# Patient Record
Sex: FEMALE | Race: WHITE | NOT HISPANIC OR LATINO | Employment: OTHER | URBAN - METROPOLITAN AREA
[De-identification: names, ages, dates, MRNs, and addresses within clinical notes are randomized per-mention and may not be internally consistent; named-entity substitution may affect disease eponyms.]

---

## 2017-08-29 ENCOUNTER — LAB (OUTPATIENT)
Dept: LAB | Facility: HOSPITAL | Age: 72
End: 2017-08-29

## 2017-08-29 ENCOUNTER — CONSULT (OUTPATIENT)
Dept: ONCOLOGY | Facility: CLINIC | Age: 72
End: 2017-08-29

## 2017-08-29 VITALS
WEIGHT: 146.8 LBS | TEMPERATURE: 98 F | BODY MASS INDEX: 24.46 KG/M2 | HEIGHT: 65 IN | HEART RATE: 48 BPM | RESPIRATION RATE: 16 BRPM | OXYGEN SATURATION: 94 % | SYSTOLIC BLOOD PRESSURE: 136 MMHG | DIASTOLIC BLOOD PRESSURE: 80 MMHG

## 2017-08-29 DIAGNOSIS — D75.839 THROMBOCYTOSIS: Primary | ICD-10-CM

## 2017-08-29 DIAGNOSIS — Z01.89 LABORATORY TEST: Primary | ICD-10-CM

## 2017-08-29 DIAGNOSIS — C80.1 CANCER (HCC): ICD-10-CM

## 2017-08-29 LAB
BASOPHILS # BLD AUTO: 1.4 10*3/MM3 (ref 0–0.1)
BASOPHILS NFR BLD AUTO: 5.9 % (ref 0–1.1)
DEPRECATED RDW RBC AUTO: 51.4 FL (ref 37–49)
EOSINOPHIL # BLD AUTO: 0.85 10*3/MM3 (ref 0–0.36)
EOSINOPHIL NFR BLD AUTO: 3.6 % (ref 1–5)
ERYTHROCYTE [DISTWIDTH] IN BLOOD BY AUTOMATED COUNT: 15.9 % (ref 11.7–14.5)
HCT VFR BLD AUTO: 40.2 % (ref 34–45)
HGB BLD-MCNC: 13.1 G/DL (ref 11.5–14.9)
IMM GRANULOCYTES # BLD: 1.23 10*3/MM3 (ref 0–0.03)
IMM GRANULOCYTES NFR BLD: 5.2 % (ref 0–0.5)
LYMPHOCYTES # BLD AUTO: 3.79 10*3/MM3 (ref 1–3.5)
LYMPHOCYTES NFR BLD AUTO: 15.9 % (ref 20–49)
MCH RBC QN AUTO: 28.9 PG (ref 27–33)
MCHC RBC AUTO-ENTMCNC: 32.6 G/DL (ref 32–35)
MCV RBC AUTO: 88.7 FL (ref 83–97)
MONOCYTES # BLD AUTO: 0.72 10*3/MM3 (ref 0.25–0.8)
MONOCYTES NFR BLD AUTO: 3 % (ref 4–12)
NEUTROPHILS # BLD AUTO: 15.84 10*3/MM3 (ref 1.5–7)
NEUTROPHILS NFR BLD AUTO: 66.4 % (ref 39–75)
NRBC BLD MANUAL-RTO: 0.1 /100 WBC (ref 0–0)
PLATELET # BLD AUTO: 1715 10*3/MM3 (ref 150–375)
PMV BLD AUTO: 10.2 FL (ref 8.9–12.1)
RBC # BLD AUTO: 4.53 10*6/MM3 (ref 3.9–5)
WBC NRBC COR # BLD: 23.83 10*3/MM3 (ref 4–10)

## 2017-08-29 PROCEDURE — 85025 COMPLETE CBC W/AUTO DIFF WBC: CPT | Performed by: INTERNAL MEDICINE

## 2017-08-29 PROCEDURE — 36416 COLLJ CAPILLARY BLOOD SPEC: CPT | Performed by: INTERNAL MEDICINE

## 2017-08-29 PROCEDURE — 99205 OFFICE O/P NEW HI 60 MIN: CPT | Performed by: INTERNAL MEDICINE

## 2017-08-29 RX ORDER — METOPROLOL SUCCINATE 50 MG/1
50 TABLET, EXTENDED RELEASE ORAL DAILY
COMMUNITY
End: 2017-09-11

## 2017-08-29 RX ORDER — FLUOXETINE HYDROCHLORIDE 40 MG/1
40 CAPSULE ORAL DAILY
COMMUNITY
End: 2018-03-28

## 2017-08-29 RX ORDER — HYDROXYUREA 500 MG/1
1000 CAPSULE ORAL DAILY
Qty: 60 CAPSULE | Refills: 3 | Status: SHIPPED | OUTPATIENT
Start: 2017-08-29 | End: 2017-09-06 | Stop reason: SDUPTHER

## 2017-08-29 RX ORDER — TRAZODONE HYDROCHLORIDE 100 MG/1
100 TABLET ORAL NIGHTLY
COMMUNITY
End: 2018-03-28

## 2017-08-29 RX ORDER — SULFAMETHOXAZOLE AND TRIMETHOPRIM 800; 160 MG/1; MG/1
TABLET ORAL
Refills: 0 | COMMUNITY
Start: 2017-08-25 | End: 2017-08-29

## 2017-08-29 RX ORDER — FUROSEMIDE 20 MG/1
TABLET ORAL
Refills: 5 | COMMUNITY
Start: 2017-08-09 | End: 2017-11-29

## 2017-08-29 RX ORDER — OLANZAPINE 2.5 MG/1
2.5 TABLET ORAL NIGHTLY
COMMUNITY
End: 2018-07-03 | Stop reason: ALTCHOICE

## 2017-08-29 NOTE — PROGRESS NOTES
REFERRING PROVIDER:    Amos Najera, SVETA  [address] [place] [address]    REASON FOR CONSULTATION:    Thrombocytosis    HISTORY OF PRESENT ILLNESS:  Nicole Lofton is a 71 y.o. female who is referred today for further evaluation of thrombocytosis.    She states that on Friday of last week she was found to have an elevated white blood cell count.  She was started empirically on treatment for urinary tract infection that was asymptomatic from a urinary standpoint.    For about 6 months she has noticed bilateral arm tingling and weakness in her hands.  This has occurred about 5 or 6 times over the past 6 months.  She is vague in her description of this.  She is globally weak and is quite inactive as she lives by herself.  She was completing physical therapy with some improvement but is now quite unsteady on her feet.  She has not had any falls.  She denies any bleeding.  She has chronic fatigue.  She has osteoarthritis pain and reports pain in the right side and in her neck.  She denies any acute low back pain but does have chronic low back pain.  She does have orthostatic symptoms and she is lightheaded when she stands.  She has had a decreased appetite and some weight loss over the past 9 months.    Labs performed on 8/28/2017 showed a white blood cell count of 39.1 with a differential showing 61% neutrophils and 32% lymphocytes, hemoglobin 13.9 and platelets 565,000.        She had further labs done at [facility] on 8/20/2017 showing a white blood cell count of 24.4, hemoglobin 13.7, and platelets 1795.  A differential showed 64% neutrophils, 7% lymphocytes, and 12% basophils.  Peripheral blood flow cytometry was also done on this date showing 11% basophils and less than 1% myeloblasts.  There was no monoclonal B-cell, T-cell, plasma cell, or NK cell population.  Apparently FISH for BCR-ABL and ESSENCE 2 mutation analysis are pending.    Her CBC was normal as of 7/8/2016.    Of note, she has a history of  bilateral breast cancer diagnosed in 1988.  She had bilateral mastectomy with reconstruction.  We do not have any records regarding this at this time.  She apparently completed at least 6 months of adjuvant chemotherapy but again does not know the details.    Past Medical History:   Diagnosis Date   • Cancer    • Depression    • Hyperlipidemia    • Hypertension    • Hypothyroidism        Past Surgical History:   Procedure Laterality Date   • BACK SURGERY  1986    ruptured herniated disc   • MASTECTOMY Bilateral 1990       SOCIAL HISTORY:   reports that she has been smoking Cigarettes.  She has a 60.00 pack-year smoking history. She has never used smokeless tobacco. She reports that she does not drink alcohol. Her drug history is not on file.    FAMILY HISTORY:  family history includes Breast cancer in her paternal grandmother and sister.    ALLERGIES:  No Known Allergies    MEDICATIONS:  The medication list has been reviewed with the patient by the medical assistant, and the list has been updated in the electronic medical record, which I reviewed.  Medication dosages and frequencies were confirmed to be accurate.    Review of Systems   Constitutional: Positive for chills and unexpected weight change. Negative for appetite change, fatigue and fever.   HENT: Negative for congestion, dental problem, facial swelling, hearing loss, mouth sores, nosebleeds, rhinorrhea, sore throat, tinnitus, trouble swallowing and voice change.    Eyes: Negative.    Respiratory: Negative for cough, chest tightness, shortness of breath, wheezing and stridor.    Cardiovascular: Negative for chest pain, palpitations and leg swelling.   Gastrointestinal: Negative for abdominal distention, abdominal pain, blood in stool, constipation, diarrhea, nausea and vomiting.   Endocrine: Negative.    Genitourinary: Negative for difficulty urinating, dysuria, flank pain, frequency, hematuria, menstrual problem, pelvic pain, vaginal bleeding and vaginal  "discharge.   Musculoskeletal: Positive for arthralgias, back pain, gait problem and neck pain. Negative for joint swelling, myalgias and neck stiffness.   Skin: Negative for color change, rash and wound.   Allergic/Immunologic: Negative for environmental allergies.   Neurological: Positive for light-headedness. Negative for dizziness, seizures, syncope, weakness, numbness and headaches.   Hematological: Negative for adenopathy. Does not bruise/bleed easily.   Psychiatric/Behavioral: Positive for confusion and dysphoric mood. Negative for agitation and sleep disturbance. The patient is not nervous/anxious.    All other systems reviewed and are negative.      Vitals:    08/29/17 1246   BP: 136/80   Pulse: (!) 48   Resp: 16   Temp: 98 °F (36.7 °C)   TempSrc: Oral   SpO2: 94%   Weight: 146 lb 12.8 oz (66.6 kg)   Height: 65.16\" (165.5 cm)  Comment: new ht   PainSc: 8  Comment: back, rt knee       Physical Exam   Constitutional: She is oriented to person, place, and time. She appears well-developed and well-nourished.   Frail appearing, a little unsteady on her feet   HENT:   Head: Normocephalic and atraumatic.   Nose: Nose normal.   Eyes: Conjunctivae and EOM are normal. Pupils are equal, round, and reactive to light.   Neck: Neck supple.   Cardiovascular: Regular rhythm, S1 normal, S2 normal and normal heart sounds.  Exam reveals no gallop and no friction rub.    No murmur heard.  bradycardic   Pulmonary/Chest: Effort normal and breath sounds normal. No stridor. No respiratory distress. She has no wheezes. She has no rhonchi. She has no rales. She exhibits no tenderness.   Abdominal: Soft. Bowel sounds are normal. She exhibits no distension and no mass. There is no tenderness. There is no rebound and no guarding.   Musculoskeletal: Normal range of motion. She exhibits no edema.   Lymphadenopathy:     She has no cervical adenopathy.     She has no axillary adenopathy.        Right: No inguinal and no supraclavicular " adenopathy present.        Left: No inguinal and no supraclavicular adenopathy present.   Neurological: She is alert and oriented to person, place, and time. No cranial nerve deficit or sensory deficit.   Skin: Skin is warm and dry. No rash noted. No erythema.   Psychiatric: She has a normal mood and affect. Her behavior is normal. Judgment and thought content normal.   Vitals reviewed.      DIAGNOSTIC DATA:  Lab Results   Component Value Date    WBC 23.83 (H) 08/29/2017    HGB 13.1 08/29/2017    HCT 40.2 08/29/2017    MCV 88.7 08/29/2017    PLT 1715 (C) 08/29/2017       IMAGING:    I personally reviewed her peripheral blood smear: Significant thrombocytosis noted.  White cells appear increased in number but otherwise normal in maturation and distribution.  Red cells are normal.    ASSESSMENT:  This is a 71 y.o. female with:  1.  Leukocytosis and thrombocytosis: I suspect she has a myeloproliferative disorder.  She had peripheral blood flow cytometry done yesterday at [facility] and apparently FISH for BCR-ABL and ESSENCE 2 mutation analysis are pending.  Her thrombocytosis is significant.  I recommended that she start aspirin 81 mg daily and I will start her on hydroxyurea 1000 mg daily.  A prescription for this was electronically sent to her pharmacy.  2.  Global weakness: It is difficult to know if this is simply related to deconditioning or if this represents something more.  Consider imaging if it persists.  See below.  3.  Bradycardia with orthostatic symptoms: I have advised her to discontinue her metoprolol at this point.  Hopefully she starts to feel better after this.  We will recheck her heart rate and blood pressure next week.    PLAN:   1.  Start aspirin 81 mg daily  2.  Start hydroxyurea 1000 mg daily.  A prescription for this was electronically sent to her pharmacy.  3.  Discontinue metoprolol  4.  We will work to obtain more records from primary care regarding her old blood counts  5.  Follow-up pending  BCR-ABL FISH and ESSENCE 2 mutation analysis that by report was sent yesterday at [place].  6.  I will see her back next week for follow-up with a CBC and other labs.

## 2017-09-06 ENCOUNTER — OFFICE VISIT (OUTPATIENT)
Dept: ONCOLOGY | Facility: CLINIC | Age: 72
End: 2017-09-06

## 2017-09-06 ENCOUNTER — TELEPHONE (OUTPATIENT)
Dept: ONCOLOGY | Facility: CLINIC | Age: 72
End: 2017-09-06

## 2017-09-06 ENCOUNTER — LAB (OUTPATIENT)
Dept: OTHER | Facility: HOSPITAL | Age: 72
End: 2017-09-06

## 2017-09-06 VITALS
OXYGEN SATURATION: 96 % | HEIGHT: 65 IN | RESPIRATION RATE: 16 BRPM | BODY MASS INDEX: 23.78 KG/M2 | DIASTOLIC BLOOD PRESSURE: 82 MMHG | TEMPERATURE: 98 F | SYSTOLIC BLOOD PRESSURE: 158 MMHG | WEIGHT: 142.7 LBS | HEART RATE: 69 BPM

## 2017-09-06 DIAGNOSIS — D75.839 THROMBOCYTOSIS: Primary | ICD-10-CM

## 2017-09-06 DIAGNOSIS — D75.839 THROMBOCYTOSIS: ICD-10-CM

## 2017-09-06 LAB
ALBUMIN SERPL-MCNC: 4.4 G/DL (ref 3.5–5.2)
ALBUMIN/GLOB SERPL: 1.3 G/DL
ALP SERPL-CCNC: 96 U/L (ref 39–117)
ALT SERPL W P-5'-P-CCNC: 29 U/L (ref 1–33)
ANION GAP SERPL CALCULATED.3IONS-SCNC: 13.6 MMOL/L
ANISOCYTOSIS BLD QL: NORMAL
AST SERPL-CCNC: 33 U/L (ref 1–32)
BASOPHILS # BLD AUTO: 0.9 10*3/MM3 (ref 0–0.2)
BASOPHILS NFR BLD AUTO: 7.2 % (ref 0–1.5)
BILIRUB SERPL-MCNC: 0.5 MG/DL (ref 0.1–1.2)
BUN BLD-MCNC: 16 MG/DL (ref 8–23)
BUN/CREAT SERPL: 16.3 (ref 7–25)
CALCIUM SPEC-SCNC: 9.6 MG/DL (ref 8.6–10.5)
CHLORIDE SERPL-SCNC: 99 MMOL/L (ref 98–107)
CO2 SERPL-SCNC: 26.4 MMOL/L (ref 22–29)
CREAT BLD-MCNC: 0.98 MG/DL (ref 0.57–1)
DEPRECATED RDW RBC AUTO: 51.4 FL (ref 37–54)
EOSINOPHIL # BLD AUTO: 0.32 10*3/MM3 (ref 0–0.7)
EOSINOPHIL NFR BLD AUTO: 2.6 % (ref 0.3–6.2)
ERYTHROCYTE [DISTWIDTH] IN BLOOD BY AUTOMATED COUNT: 16.1 % (ref 11.7–13)
GFR SERPL CREATININE-BSD FRML MDRD: 56 ML/MIN/1.73
GLOBULIN UR ELPH-MCNC: 3.4 GM/DL
GLUCOSE BLD-MCNC: 116 MG/DL (ref 65–99)
HCT VFR BLD AUTO: 42.3 % (ref 35.6–45.5)
HGB BLD-MCNC: 13.6 G/DL (ref 11.9–15.5)
IMM GRANULOCYTES # BLD: 0.21 10*3/MM3 (ref 0–0.03)
IMM GRANULOCYTES NFR BLD: 1.7 % (ref 0–0.5)
LDH SERPL-CCNC: 282 U/L (ref 135–214)
LYMPHOCYTES # BLD AUTO: 2.45 10*3/MM3 (ref 0.9–4.8)
LYMPHOCYTES NFR BLD AUTO: 19.7 % (ref 19.6–45.3)
MCH RBC QN AUTO: 28.7 PG (ref 26.9–32)
MCHC RBC AUTO-ENTMCNC: 32.2 G/DL (ref 32.4–36.3)
MCV RBC AUTO: 89.2 FL (ref 80.5–98.2)
MONOCYTES # BLD AUTO: 0.36 10*3/MM3 (ref 0.2–1.2)
MONOCYTES NFR BLD AUTO: 2.9 % (ref 5–12)
NEUTROPHILS # BLD AUTO: 8.22 10*3/MM3 (ref 1.9–8.1)
NEUTROPHILS NFR BLD AUTO: 65.9 % (ref 42.7–76)
NEUTS HYPERSEG # BLD: NORMAL 10*3/UL
NRBC BLD MANUAL-RTO: 0 /100 WBC (ref 0–0)
PLAT MORPH BLD: NORMAL
PLATELET # BLD AUTO: 1972 10*3/MM3 (ref 140–500)
PMV BLD AUTO: 9.8 FL (ref 6–12)
POTASSIUM BLD-SCNC: 3.9 MMOL/L (ref 3.5–5.2)
PROT SERPL-MCNC: 7.8 G/DL (ref 6–8.5)
RBC # BLD AUTO: 4.74 10*6/MM3 (ref 3.9–5.2)
SODIUM BLD-SCNC: 139 MMOL/L (ref 136–145)
URATE SERPL-MCNC: 5.4 MG/DL (ref 2.4–5.7)
WBC NRBC COR # BLD: 12.46 10*3/MM3 (ref 4.5–10.7)

## 2017-09-06 PROCEDURE — 85007 BL SMEAR W/DIFF WBC COUNT: CPT | Performed by: INTERNAL MEDICINE

## 2017-09-06 PROCEDURE — 84550 ASSAY OF BLOOD/URIC ACID: CPT | Performed by: INTERNAL MEDICINE

## 2017-09-06 PROCEDURE — 83615 LACTATE (LD) (LDH) ENZYME: CPT | Performed by: INTERNAL MEDICINE

## 2017-09-06 PROCEDURE — 99214 OFFICE O/P EST MOD 30 MIN: CPT | Performed by: INTERNAL MEDICINE

## 2017-09-06 PROCEDURE — 80053 COMPREHEN METABOLIC PANEL: CPT | Performed by: INTERNAL MEDICINE

## 2017-09-06 PROCEDURE — 81479 UNLISTED MOLECULAR PATHOLOGY: CPT

## 2017-09-06 PROCEDURE — 81207 BCR/ABL1 GENE MINOR BP: CPT | Performed by: INTERNAL MEDICINE

## 2017-09-06 PROCEDURE — 81206 BCR/ABL1 GENE MAJOR BP: CPT | Performed by: INTERNAL MEDICINE

## 2017-09-06 PROCEDURE — 85025 COMPLETE CBC W/AUTO DIFF WBC: CPT | Performed by: INTERNAL MEDICINE

## 2017-09-06 PROCEDURE — 36415 COLL VENOUS BLD VENIPUNCTURE: CPT

## 2017-09-06 RX ORDER — HYDROXYUREA 500 MG/1
1000 CAPSULE ORAL 2 TIMES DAILY
Qty: 120 CAPSULE | Refills: 2 | Status: SHIPPED | OUTPATIENT
Start: 2017-09-06 | End: 2017-10-25

## 2017-09-06 RX ORDER — HYDROXYUREA 500 MG/1
1500 CAPSULE ORAL DAILY
Qty: 90 CAPSULE | Refills: 3 | Status: SHIPPED | OUTPATIENT
Start: 2017-09-06 | End: 2017-09-06 | Stop reason: SDUPTHER

## 2017-09-06 NOTE — PROGRESS NOTES
[facility] CBC GROUP OUTPATIENT FOLLOW UP CLINIC VISIT    REASON FOR FOLLOW-UP:    1.  Thrombocytosis  2.  Positive FISH for BCR/ABL done at [place]    HISTORY OF PRESENT ILLNESS:  Nicole Lofton is a 71 y.o. female who returns today for follow up of the above issue.  She started hydroxyurea 1000 mg daily which he tolerates well.  She discontinue metoprolol.  She started aspirin 81 mg daily.  Her unsteadiness on her feet has improved a little bit.  She tolerates the hydroxyurea well without any adverse effects.        ONCOLOGIC HISTORY:  For about 6 months she has noticed bilateral arm tingling and weakness in her hands.  This has occurred about 5 or 6 times over the past 6 months.  She is vague in her description of this.  She is globally weak and is quite inactive as she lives by herself.  She was completing physical therapy with some improvement but is now quite unsteady on her feet.  She has not had any falls.  She denies any bleeding.  She has chronic fatigue.  She has osteoarthritis pain and reports pain in the right side and in her neck.  She denies any acute low back pain but does have chronic low back pain.  She does have orthostatic symptoms and she is lightheaded when she stands.  She has had a decreased appetite and some weight loss over the past 9 months.     Labs performed on 8/28/2017 showed a white blood cell count of 39.1 with a differential showing 61% neutrophils and 32% lymphocytes, hemoglobin 13.9 and platelets 565,000.         She had further labs done at [facility] on 8/20/2017 showing a white blood cell count of 24.4, hemoglobin 13.7, and platelets 1795.  A differential showed 64% neutrophils, 7% lymphocytes, and 12% basophils.  Peripheral blood flow cytometry was also done on this date showing 11% basophils and less than 1% myeloblasts.  There was no monoclonal B-cell, T-cell, plasma cell, or NK cell population.  FISH for BCR-ABL and ESSENCE 2 mutation analysis were done as well.     Her CBC was  normal as of 7/8/2016.     Of note, she has a history of bilateral breast cancer diagnosed in 1988.  She had bilateral mastectomy with reconstruction.  We do not have any records regarding this at this time.  She apparently completed at least 6 months of adjuvant chemotherapy but again does not know the details..    FISH for BCR-ABL performed at [facility] was actually positive.  As of 9/6/2017 ESSENCE 2 is still pending.    She returned on 9/6/2017 for follow-up.  Platelets are still 1.9 million with hydroxyurea 1000 mg daily.  She will increase the dose to 2000 mg daily.  She will have a bone marrow aspiration biopsy performed.  Weekly CBCs.      PAST MEDICAL, SURGICAL, FAMILY, AND SOCIAL HISTORIES were reviewed with the patient and in the electronic medical record, and were updated if indicated.    ALLERGIES:  No Known Allergies    MEDICATIONS:  The medication list has been reviewed with the patient by the medical assistant, and the list has been updated in the electronic medical record, which I reviewed.  Medication dosages and frequencies were confirmed to be accurate.    REVIEW OF SYSTEMS:  PAIN:  See Vital Signs below.  GENERAL:  No fevers, chills, night sweats, or unintended weight loss.  SKIN:  No rashes or non-healing lesions.  HEME/LYMPH:  No abnormal bleeding.  No palpable lymphadenopathy.  EYES:  No vision changes or diplopia.  ENT:  No sore throat or difficulty swallowing.  RESPIRATORY:  No cough, shortness of breath, hemoptysis, or wheezing.  CARDIOVASCULAR:  No chest pain, palpitations, orthopnea, or dyspnea on exertion.  Lightheadedness improved  GASTROINTESTINAL:  No abdominal pain, nausea, vomiting, constipation, diarrhea, melena, or hematochezia.  GENITOURINARY:  No dysuria or hematuria.  MUSCULOSKELETAL:  Arthralgias, back pain, abnormal gait, neck pain  NEUROLOGIC:  No dizziness, loss of consciousness, or seizures.  PSYCHIATRIC:  No depression, anxiety, or mood changes.    Vitals:    09/06/17 0902  "  BP: 158/82   Pulse: 69   Resp: 16   Temp: 98 °F (36.7 °C)   TempSrc: Oral   SpO2: 96%   Weight: 142 lb 11.2 oz (64.7 kg)   Height: 65.16\" (165.5 cm)   PainSc: 0-No pain       PHYSICAL EXAMINATION:  GENERAL:  Well-developed well-nourished female; awake, alert and oriented, in no acute distress.Ears to be more steady on her feet today.  No longer bradycardic.    DIAGNOSTIC DATA:  Lab Results   Component Value Date    WBC 12.46 (H) 09/06/2017    HGB 13.6 09/06/2017    HCT 42.3 09/06/2017    MCV 89.2 09/06/2017    PLT 1972 (H) 09/06/2017       IMAGING:  None reviewed    ASSESSMENT:  This is a 71 y.o. female with:  1.  History of bilateral breast cancer in 1988 status post bilateral mastectomy.  She apparently completed 6 months of adjuvant therapy.  2.  Thrombocytosis: She is on aspirin 81 mg daily.  She is on hydroxyurea 1000 mg daily.  Her platelet count did not improve today and remains 1.9 million.  She will increase the hydroxyurea to 1000 mg twice daily.  A new prescription was sent to her pharmacy.  See below.  We await ESSENCE 2 mutation analysis from [place].  3.  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  This is likely the cause of her thrombocytosis.  Her white blood cell count has improved on the hydroxyurea.  We will send peripheral blood for BCR-ABL PCR today.  We will schedule bone marrow aspiration and biopsy.  4.  Bradycardia: This resolves off the metoprolol.  Her blood pressure is a little bit high today and should be monitored at home.    PLAN:  1.  Continue aspirin 81 mg daily  2.  Increase hydroxyurea to 1000 mg twice daily  3.  Bone marrow aspiration and biopsy  4.  PCR for BCR-ABL in the peripheral blood today  5.  Weekly CBC with nurse review to make sure that the platelet count is improving  6.  I will see her back one to 2 weeks after her bone marrow biopsy to review the results.  At that time if all results are consistent with CML we will discuss starting imatinib.  "

## 2017-09-12 LAB — REF LAB TEST METHOD: NORMAL

## 2017-09-13 ENCOUNTER — LAB (OUTPATIENT)
Dept: OTHER | Facility: HOSPITAL | Age: 72
End: 2017-09-13

## 2017-09-13 ENCOUNTER — OFFICE VISIT (OUTPATIENT)
Dept: ONCOLOGY | Facility: CLINIC | Age: 72
End: 2017-09-13

## 2017-09-13 VITALS — BODY MASS INDEX: 23.37 KG/M2 | WEIGHT: 141.1 LBS

## 2017-09-13 DIAGNOSIS — D75.839 THROMBOCYTOSIS: Primary | ICD-10-CM

## 2017-09-13 LAB
ANISOCYTOSIS BLD QL: NORMAL
BASOPHILS # BLD AUTO: 0.28 10*3/MM3 (ref 0–0.2)
BASOPHILS NFR BLD AUTO: 3.5 % (ref 0–1.5)
DEPRECATED RDW RBC AUTO: 50.5 FL (ref 37–54)
EOSINOPHIL # BLD AUTO: 0.08 10*3/MM3 (ref 0–0.7)
EOSINOPHIL NFR BLD AUTO: 1 % (ref 0.3–6.2)
ERYTHROCYTE [DISTWIDTH] IN BLOOD BY AUTOMATED COUNT: 17.1 % (ref 11.7–13)
HCT VFR BLD AUTO: 38.2 % (ref 35.6–45.5)
HGB BLD-MCNC: 12.8 G/DL (ref 11.9–15.5)
IMM GRANULOCYTES # BLD: 0.06 10*3/MM3 (ref 0–0.03)
IMM GRANULOCYTES NFR BLD: 0.7 % (ref 0–0.5)
LYMPHOCYTES # BLD AUTO: 1.5 10*3/MM3 (ref 0.9–4.8)
LYMPHOCYTES NFR BLD AUTO: 18.7 % (ref 19.6–45.3)
MCH RBC QN AUTO: 29.2 PG (ref 26.9–32)
MCHC RBC AUTO-ENTMCNC: 33.5 G/DL (ref 32.4–36.3)
MCV RBC AUTO: 87.2 FL (ref 80.5–98.2)
MONOCYTES # BLD AUTO: 0.24 10*3/MM3 (ref 0.2–1.2)
MONOCYTES NFR BLD AUTO: 3 % (ref 5–12)
NEUTROPHILS # BLD AUTO: 5.88 10*3/MM3 (ref 1.9–8.1)
NEUTROPHILS NFR BLD AUTO: 73.1 % (ref 42.7–76)
NEUTS HYPERSEG # BLD: NORMAL 10*3/UL
NRBC BLD MANUAL-RTO: 0 /100 WBC (ref 0–0)
PLAT MORPH BLD: NORMAL
PLATELET # BLD AUTO: 1648 10*3/MM3 (ref 140–500)
PMV BLD AUTO: 9.4 FL (ref 6–12)
RBC # BLD AUTO: 4.38 10*6/MM3 (ref 3.9–5.2)
WBC NRBC COR # BLD: 8.04 10*3/MM3 (ref 4.5–10.7)

## 2017-09-13 PROCEDURE — 85007 BL SMEAR W/DIFF WBC COUNT: CPT | Performed by: INTERNAL MEDICINE

## 2017-09-13 PROCEDURE — 36415 COLL VENOUS BLD VENIPUNCTURE: CPT

## 2017-09-13 PROCEDURE — 99212-NC PR NO CHARGE CBC OFFICE OUTPATIENT VISIT 10 MINUTES: Performed by: NURSE PRACTITIONER

## 2017-09-13 PROCEDURE — 85025 COMPLETE CBC W/AUTO DIFF WBC: CPT | Performed by: INTERNAL MEDICINE

## 2017-09-13 NOTE — PROGRESS NOTES
I reviewed labs with the patient, her daughter, and her niece. She reports feeling relatively well on her current dose of hydrea. I have reviewed her labs with Dr Merida and we will not make any dose changes today. She will return as already scheduled next week to review bone marrow biopsy results with Dr Merida.    Lab Results   Component Value Date    WBC 8.04 09/13/2017    HGB 12.8 09/13/2017    HCT 38.2 09/13/2017    MCV 87.2 09/13/2017    PLT 1648 (H) 09/13/2017

## 2017-09-15 ENCOUNTER — HOSPITAL ENCOUNTER (OUTPATIENT)
Dept: CT IMAGING | Facility: HOSPITAL | Age: 72
Discharge: HOME | End: 2017-09-15
Attending: INTERNAL MEDICINE | Admitting: INTERNAL MEDICINE

## 2017-09-15 VITALS
HEART RATE: 68 BPM | OXYGEN SATURATION: 92 % | DIASTOLIC BLOOD PRESSURE: 74 MMHG | SYSTOLIC BLOOD PRESSURE: 134 MMHG | TEMPERATURE: 97.9 F | RESPIRATION RATE: 18 BRPM | BODY MASS INDEX: 21.97 KG/M2 | WEIGHT: 140 LBS | HEIGHT: 67 IN

## 2017-09-15 DIAGNOSIS — D75.839 THROMBOCYTOSIS: ICD-10-CM

## 2017-09-15 LAB
INR PPP: 1.1 (ref 0.8–1.2)
PROTHROMBIN TIME: 12.6 SECONDS (ref 12.8–15.2)

## 2017-09-15 PROCEDURE — 88184 FLOWCYTOMETRY/ TC 1 MARKER: CPT | Performed by: INTERNAL MEDICINE

## 2017-09-15 PROCEDURE — 88342 IMHCHEM/IMCYTCHM 1ST ANTB: CPT

## 2017-09-15 PROCEDURE — 88237 TISSUE CULTURE BONE MARROW: CPT

## 2017-09-15 PROCEDURE — 88311 DECALCIFY TISSUE: CPT | Performed by: INTERNAL MEDICINE

## 2017-09-15 PROCEDURE — 81219 CALR GENE COM VARIANTS: CPT

## 2017-09-15 PROCEDURE — 77012 CT SCAN FOR NEEDLE BIOPSY: CPT

## 2017-09-15 PROCEDURE — 88264 CHROMOSOME ANALYSIS 20-25: CPT

## 2017-09-15 PROCEDURE — 63710000001 ALPRAZOLAM 0.5 MG TABLET: Performed by: RADIOLOGY

## 2017-09-15 PROCEDURE — 88313 SPECIAL STAINS GROUP 2: CPT

## 2017-09-15 PROCEDURE — 88341 IMHCHEM/IMCYTCHM EA ADD ANTB: CPT

## 2017-09-15 PROCEDURE — 88305 TISSUE EXAM BY PATHOLOGIST: CPT | Performed by: INTERNAL MEDICINE

## 2017-09-15 PROCEDURE — A9270 NON-COVERED ITEM OR SERVICE: HCPCS | Performed by: RADIOLOGY

## 2017-09-15 PROCEDURE — 88185 FLOWCYTOMETRY/TC ADD-ON: CPT | Performed by: INTERNAL MEDICINE

## 2017-09-15 PROCEDURE — 88189 FLOWCYTOMETRY/READ 16 & >: CPT | Performed by: INTERNAL MEDICINE

## 2017-09-15 RX ORDER — ALPRAZOLAM 0.5 MG/1
0.5 TABLET ORAL NIGHTLY PRN
Status: DISCONTINUED | OUTPATIENT
Start: 2017-09-15 | End: 2017-09-16 | Stop reason: HOSPADM

## 2017-09-15 RX ORDER — ASPIRIN 81 MG/1
81 TABLET ORAL DAILY
COMMUNITY
End: 2018-03-07

## 2017-09-15 RX ORDER — LIDOCAINE HYDROCHLORIDE 10 MG/ML
20 INJECTION, SOLUTION INFILTRATION; PERINEURAL ONCE
Status: COMPLETED | OUTPATIENT
Start: 2017-09-15 | End: 2017-09-15

## 2017-09-15 RX ADMIN — LIDOCAINE HYDROCHLORIDE 20 ML: 10 INJECTION, SOLUTION INFILTRATION; PERINEURAL at 08:45

## 2017-09-15 RX ADMIN — ALPRAZOLAM 0.5 MG: 0.5 TABLET ORAL at 08:08

## 2017-09-15 NOTE — H&P (VIEW-ONLY)
[facility] CBC GROUP OUTPATIENT FOLLOW UP CLINIC VISIT    REASON FOR FOLLOW-UP:    1.  Thrombocytosis  2.  Positive FISH for BCR/ABL done at [place]    HISTORY OF PRESENT ILLNESS:  Nicole Lofton is a 71 y.o. female who returns today for follow up of the above issue.  She started hydroxyurea 1000 mg daily which he tolerates well.  She discontinue metoprolol.  She started aspirin 81 mg daily.  Her unsteadiness on her feet has improved a little bit.  She tolerates the hydroxyurea well without any adverse effects.        ONCOLOGIC HISTORY:  For about 6 months she has noticed bilateral arm tingling and weakness in her hands.  This has occurred about 5 or 6 times over the past 6 months.  She is vague in her description of this.  She is globally weak and is quite inactive as she lives by herself.  She was completing physical therapy with some improvement but is now quite unsteady on her feet.  She has not had any falls.  She denies any bleeding.  She has chronic fatigue.  She has osteoarthritis pain and reports pain in the right side and in her neck.  She denies any acute low back pain but does have chronic low back pain.  She does have orthostatic symptoms and she is lightheaded when she stands.  She has had a decreased appetite and some weight loss over the past 9 months.     Labs performed on 8/28/2017 showed a white blood cell count of 39.1 with a differential showing 61% neutrophils and 32% lymphocytes, hemoglobin 13.9 and platelets 565,000.         She had further labs done at [place] on 8/20/2017 showing a white blood cell count of 24.4, hemoglobin 13.7, and platelets 1795.  A differential showed 64% neutrophils, 7% lymphocytes, and 12% basophils.  Peripheral blood flow cytometry was also done on this date showing 11% basophils and less than 1% myeloblasts.  There was no monoclonal B-cell, T-cell, plasma cell, or NK cell population.  FISH for BCR-ABL and ESSENCE 2 mutation analysis were done as well.     Her CBC was  normal as of 7/8/2016.     Of note, she has a history of bilateral breast cancer diagnosed in 1988.  She had bilateral mastectomy with reconstruction.  We do not have any records regarding this at this time.  She apparently completed at least 6 months of adjuvant chemotherapy but again does not know the details..    FISH for BCR-ABL performed at [place] was actually positive.  As of 9/6/2017 ESSENCE 2 is still pending.    She returned on 9/6/2017 for follow-up.  Platelets are still 1.9 million with hydroxyurea 1000 mg daily.  She will increase the dose to 2000 mg daily.  She will have a bone marrow aspiration biopsy performed.  Weekly CBCs.      PAST MEDICAL, SURGICAL, FAMILY, AND SOCIAL HISTORIES were reviewed with the patient and in the electronic medical record, and were updated if indicated.    ALLERGIES:  No Known Allergies    MEDICATIONS:  The medication list has been reviewed with the patient by the medical assistant, and the list has been updated in the electronic medical record, which I reviewed.  Medication dosages and frequencies were confirmed to be accurate.    REVIEW OF SYSTEMS:  PAIN:  See Vital Signs below.  GENERAL:  No fevers, chills, night sweats, or unintended weight loss.  SKIN:  No rashes or non-healing lesions.  HEME/LYMPH:  No abnormal bleeding.  No palpable lymphadenopathy.  EYES:  No vision changes or diplopia.  ENT:  No sore throat or difficulty swallowing.  RESPIRATORY:  No cough, shortness of breath, hemoptysis, or wheezing.  CARDIOVASCULAR:  No chest pain, palpitations, orthopnea, or dyspnea on exertion.  Lightheadedness improved  GASTROINTESTINAL:  No abdominal pain, nausea, vomiting, constipation, diarrhea, melena, or hematochezia.  GENITOURINARY:  No dysuria or hematuria.  MUSCULOSKELETAL:  Arthralgias, back pain, abnormal gait, neck pain  NEUROLOGIC:  No dizziness, loss of consciousness, or seizures.  PSYCHIATRIC:  No depression, anxiety, or mood changes.    Vitals:    09/06/17 0902  "  BP: 158/82   Pulse: 69   Resp: 16   Temp: 98 °F (36.7 °C)   TempSrc: Oral   SpO2: 96%   Weight: 142 lb 11.2 oz (64.7 kg)   Height: 65.16\" (165.5 cm)   PainSc: 0-No pain       PHYSICAL EXAMINATION:  GENERAL:  Well-developed well-nourished female; awake, alert and oriented, in no acute distress.Ears to be more steady on her feet today.  No longer bradycardic.    DIAGNOSTIC DATA:  Lab Results   Component Value Date    WBC 12.46 (H) 09/06/2017    HGB 13.6 09/06/2017    HCT 42.3 09/06/2017    MCV 89.2 09/06/2017    PLT 1972 (H) 09/06/2017       IMAGING:  None reviewed    ASSESSMENT:  This is a 71 y.o. female with:  1.  History of bilateral breast cancer in 1988 status post bilateral mastectomy.  She apparently completed 6 months of adjuvant therapy.  2.  Thrombocytosis: She is on aspirin 81 mg daily.  She is on hydroxyurea 1000 mg daily.  Her platelet count did not improve today and remains 1.9 million.  She will increase the hydroxyurea to 1000 mg twice daily.  A new prescription was sent to her pharmacy.  See below.  We await ESSENCE 2 mutation analysis from [place].  3.  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  This is likely the cause of her thrombocytosis.  Her white blood cell count has improved on the hydroxyurea.  We will send peripheral blood for BCR-ABL PCR today.  We will schedule bone marrow aspiration and biopsy.  4.  Bradycardia: This resolves off the metoprolol.  Her blood pressure is a little bit high today and should be monitored at home.    PLAN:  1.  Continue aspirin 81 mg daily  2.  Increase hydroxyurea to 1000 mg twice daily  3.  Bone marrow aspiration and biopsy  4.  PCR for BCR-ABL in the peripheral blood today  5.  Weekly CBC with nurse review to make sure that the platelet count is improving  6.  I will see her back one to 2 weeks after her bone marrow biopsy to review the results.  At that time if all results are consistent with CML we will discuss starting imatinib.  "

## 2017-09-15 NOTE — DISCHARGE INSTRUCTIONS
EDUCATION /DISCHARGE INSTRUCTIONS  CT/US guided biopsy:  A biopsy is a procedure done to remove tissue for further analysis.  Before images are taken to locate the target area.  Images can be obtained using ultrasound, CT or MRI.  A physician will clean your skin with antiseptic soap, place a sterile towel around the site and administer a local anesthetic to numb the area.  The physician will then insert a special needle.  Sometimes images are taken of the needle after it is inserted to ensure the needle is in the correct area to be biopsied.   A sample is obtained and sent to the laboratory for study.  Occasionally the laboratory is unable to make a diagnosis from the sample and the procedure may need to be repeated.  Within a week the radiologist will send a report to your physician.  A pathologist will also examine the tissue and send a report.      Risks of the procedure include but are not limited to:   *  Bleeding    *  Infection   *  Puncture of surrounding organs *  Death     *  Lung collapse if the biopsy is near the chest which may require insertion of a       tube to re-inflate the lung if severe.      Benefits of the procedure:  Using x-ray helps to locate the area that requires a biopsy. The procedure is less invasive than a surgical procedure, there are no large incisions and it does not require anesthesia.      Alternatives to the procedure:  A biopsy can be performed surgically.  Risks of a surgical biopsy include exposure to anesthesia, infection, excessive bleeding and injury to abdominal organs.  A benefit of surgical biopsy is the ability to see the area to be biopsied and remove of a larger piece of tissue.    THIS EDUCATION INFORMATION WAS REVIEWED PRIOR TO PROCEDURE AND CONSENT. Patient initials__________________Time___________________    Post Procedure:    *  Expect the biopsy site may be tender up to one week.    *  Rest today (no pushing pulling or straining).   *  Slowly increase activity  tomorrow.    *  If you received sedation do not drive for 24 hours.   *  Keep dressing clean and dry.   *  Leave dressing on puncture site for 24 hours.    *  You may shower when dressing removed.    Call your doctor if experiencing:   *  Signs of infection such as redness, swelling, excessive pain and / or foul        smelling drainage from the puncture site.   *  Chills or fever over 101 degrees (by mouth).   *  Unrelieved pain.   *  Any new or severe symptoms.   *  If experiencing sudden / severe shortness of breath or chest pain go to the       nearest emergency room.     Following the procedure:     Follow-up with the ordering physician as directed.    Continue to take other medications as directed by your physician unless    otherwise instructed.   If applicable, resume taking your blood thinners or Aspirin on ___________.   If applicable, resume taking Glucophage on ___________.    If you have any concerns please call the Radiology Nurses Desk at [phone].  You are the most important factor in your recovery.  Follow the above instructions carefully.

## 2017-09-16 NOTE — PROGRESS NOTES
Was in pain yesterday and today it is better but still sore.  I suggested tylenol or advil and ice at the puncture site.  She thanked me and said she would.  I encouraged her to call if any other problems or concerns.

## 2017-09-18 LAB — REF LAB TEST METHOD: NORMAL

## 2017-09-19 ENCOUNTER — TELEPHONE (OUTPATIENT)
Dept: ONCOLOGY | Facility: HOSPITAL | Age: 72
End: 2017-09-19

## 2017-09-19 NOTE — TELEPHONE ENCOUNTER
Clarissa Rodriguez, daughter, called about pt Nicolenimco Lofton.  She was calling about Hydrea and stating that pt is out of med and pharmacy will not refill med.  RN noted that medication was sent on 9/6 by MD with new dosing - 1000mg BID (which is 2 pills BID).  RN called pharmacy and pharmacist stated that it was too soon to refill (pharmacist was looking at a script sent on a different date) and explained that new script was sent on 9/6 and pharmacist did see this new script  Pharmacist stated that the medication was filled on 9/9.  Called and left message for daughter to call office back.  RN called pt phone number and and spoke with pt and explained to her that the medication was filled on 9/9 and is ready to be picked up.  Pt v/u.

## 2017-09-20 ENCOUNTER — OFFICE VISIT (OUTPATIENT)
Dept: ONCOLOGY | Facility: CLINIC | Age: 72
End: 2017-09-20

## 2017-09-20 ENCOUNTER — APPOINTMENT (OUTPATIENT)
Dept: ONCOLOGY | Facility: CLINIC | Age: 72
End: 2017-09-20

## 2017-09-20 ENCOUNTER — APPOINTMENT (OUTPATIENT)
Dept: OTHER | Facility: HOSPITAL | Age: 72
End: 2017-09-20

## 2017-09-20 ENCOUNTER — LAB (OUTPATIENT)
Dept: OTHER | Facility: HOSPITAL | Age: 72
End: 2017-09-20

## 2017-09-20 DIAGNOSIS — D75.839 THROMBOCYTOSIS: Primary | ICD-10-CM

## 2017-09-20 LAB
ANISOCYTOSIS BLD QL: NORMAL
BASOPHILS # BLD AUTO: 0.14 10*3/MM3 (ref 0–0.2)
BASOPHILS NFR BLD AUTO: 3 % (ref 0–1.5)
BURR CELLS BLD QL SMEAR: NORMAL
DEPRECATED RDW RBC AUTO: 54.4 FL (ref 37–54)
EOSINOPHIL # BLD AUTO: 0.08 10*3/MM3 (ref 0–0.7)
EOSINOPHIL NFR BLD AUTO: 1.7 % (ref 0.3–6.2)
ERYTHROCYTE [DISTWIDTH] IN BLOOD BY AUTOMATED COUNT: 18.6 % (ref 11.7–13)
HCT VFR BLD AUTO: 37.7 % (ref 35.6–45.5)
HGB BLD-MCNC: 12.7 G/DL (ref 11.9–15.5)
IMM GRANULOCYTES # BLD: 0.03 10*3/MM3 (ref 0–0.03)
IMM GRANULOCYTES NFR BLD: 0.6 % (ref 0–0.5)
LYMPHOCYTES # BLD AUTO: 1.13 10*3/MM3 (ref 0.9–4.8)
LYMPHOCYTES NFR BLD AUTO: 23.9 % (ref 19.6–45.3)
MCH RBC QN AUTO: 29.9 PG (ref 26.9–32)
MCHC RBC AUTO-ENTMCNC: 33.7 G/DL (ref 32.4–36.3)
MCV RBC AUTO: 88.7 FL (ref 80.5–98.2)
MONOCYTES # BLD AUTO: 0.18 10*3/MM3 (ref 0.2–1.2)
MONOCYTES NFR BLD AUTO: 3.8 % (ref 5–12)
NEUTROPHILS # BLD AUTO: 3.17 10*3/MM3 (ref 1.9–8.1)
NEUTROPHILS NFR BLD AUTO: 67 % (ref 42.7–76)
NRBC BLD MANUAL-RTO: 0 /100 WBC (ref 0–0)
PLAT MORPH BLD: NORMAL
PLATELET # BLD AUTO: 1133 10*3/MM3 (ref 140–500)
PMV BLD AUTO: 9.7 FL (ref 6–12)
RBC # BLD AUTO: 4.25 10*6/MM3 (ref 3.9–5.2)
STOMATOCYTES BLD QL SMEAR: NORMAL
WBC MORPH BLD: NORMAL
WBC NRBC COR # BLD: 4.73 10*3/MM3 (ref 4.5–10.7)

## 2017-09-20 PROCEDURE — 99212-NC PR NO CHARGE CBC OFFICE OUTPATIENT VISIT 10 MINUTES: Performed by: NURSE PRACTITIONER

## 2017-09-20 PROCEDURE — 36415 COLL VENOUS BLD VENIPUNCTURE: CPT

## 2017-09-20 PROCEDURE — 85025 COMPLETE CBC W/AUTO DIFF WBC: CPT | Performed by: NURSE PRACTITIONER

## 2017-09-20 PROCEDURE — 85007 BL SMEAR W/DIFF WBC COUNT: CPT | Performed by: NURSE PRACTITIONER

## 2017-09-20 NOTE — PROGRESS NOTES
I reviewed labs with Mrs. Lofton and her daughter today. She reports feeling very fatigued since starting Hydrea. SHe denies shortness of breath,chest pain, fever or chills, or swelling. Her platelets have decreased further from 1648 to 1133. She had a bone marrow biopsy on Friday, 9/15/2017, and is scheduled to review the results with Dr Merida next week, 9/27/2017. She is anxious to for this visit. I have instructed her to call the office in the interim should she experience new or worsening symptoms.    Lab Results   Component Value Date    WBC 4.73 09/20/2017    HGB 12.7 09/20/2017    HCT 37.7 09/20/2017    MCV 88.7 09/20/2017    PLT 1133 (H) 09/20/2017

## 2017-09-27 ENCOUNTER — OFFICE VISIT (OUTPATIENT)
Dept: ONCOLOGY | Facility: CLINIC | Age: 72
End: 2017-09-27

## 2017-09-27 ENCOUNTER — DOCUMENTATION (OUTPATIENT)
Dept: ONCOLOGY | Facility: CLINIC | Age: 72
End: 2017-09-27

## 2017-09-27 ENCOUNTER — LAB (OUTPATIENT)
Dept: OTHER | Facility: HOSPITAL | Age: 72
End: 2017-09-27

## 2017-09-27 VITALS
HEIGHT: 65 IN | BODY MASS INDEX: 22.99 KG/M2 | OXYGEN SATURATION: 96 % | WEIGHT: 138 LBS | RESPIRATION RATE: 16 BRPM | SYSTOLIC BLOOD PRESSURE: 128 MMHG | DIASTOLIC BLOOD PRESSURE: 74 MMHG | HEART RATE: 70 BPM | TEMPERATURE: 98.6 F

## 2017-09-27 DIAGNOSIS — D75.839 THROMBOCYTOSIS: Primary | ICD-10-CM

## 2017-09-27 DIAGNOSIS — C92.10 CML (CHRONIC MYELOID LEUKEMIA) (HCC): Primary | ICD-10-CM

## 2017-09-27 LAB
ANISOCYTOSIS BLD QL: NORMAL
BASOPHILS # BLD AUTO: 0.11 10*3/MM3 (ref 0–0.2)
BASOPHILS NFR BLD AUTO: 2 % (ref 0–1.5)
DEPRECATED RDW RBC AUTO: 61.7 FL (ref 37–54)
EOSINOPHIL # BLD AUTO: 0.08 10*3/MM3 (ref 0–0.7)
EOSINOPHIL NFR BLD AUTO: 1.5 % (ref 0.3–6.2)
ERYTHROCYTE [DISTWIDTH] IN BLOOD BY AUTOMATED COUNT: 19.9 % (ref 11.7–13)
HCT VFR BLD AUTO: 36.8 % (ref 35.6–45.5)
HGB BLD-MCNC: 12.5 G/DL (ref 11.9–15.5)
IMM GRANULOCYTES # BLD: 0.03 10*3/MM3 (ref 0–0.03)
IMM GRANULOCYTES NFR BLD: 0.6 % (ref 0–0.5)
LYMPHOCYTES # BLD AUTO: 1.26 10*3/MM3 (ref 0.9–4.8)
LYMPHOCYTES NFR BLD AUTO: 23.2 % (ref 19.6–45.3)
MCH RBC QN AUTO: 30.7 PG (ref 26.9–32)
MCHC RBC AUTO-ENTMCNC: 34 G/DL (ref 32.4–36.3)
MCV RBC AUTO: 90.4 FL (ref 80.5–98.2)
MONOCYTES # BLD AUTO: 0.18 10*3/MM3 (ref 0.2–1.2)
MONOCYTES NFR BLD AUTO: 3.3 % (ref 5–12)
NEUTROPHILS # BLD AUTO: 3.77 10*3/MM3 (ref 1.9–8.1)
NEUTROPHILS NFR BLD AUTO: 69.4 % (ref 42.7–76)
NRBC BLD MANUAL-RTO: 0 /100 WBC (ref 0–0)
PLAT MORPH BLD: NORMAL
PLATELET # BLD AUTO: 722 10*3/MM3 (ref 140–500)
PMV BLD AUTO: 9.6 FL (ref 6–12)
RBC # BLD AUTO: 4.07 10*6/MM3 (ref 3.9–5.2)
WBC MORPH BLD: NORMAL
WBC NRBC COR # BLD: 5.43 10*3/MM3 (ref 4.5–10.7)

## 2017-09-27 PROCEDURE — 85025 COMPLETE CBC W/AUTO DIFF WBC: CPT | Performed by: INTERNAL MEDICINE

## 2017-09-27 PROCEDURE — 36415 COLL VENOUS BLD VENIPUNCTURE: CPT

## 2017-09-27 PROCEDURE — 85007 BL SMEAR W/DIFF WBC COUNT: CPT | Performed by: INTERNAL MEDICINE

## 2017-09-27 PROCEDURE — 99215 OFFICE O/P EST HI 40 MIN: CPT | Performed by: INTERNAL MEDICINE

## 2017-09-27 RX ORDER — IMATINIB MESYLATE 400 MG/1
400 TABLET, FILM COATED ORAL DAILY
Qty: 30 TABLET | Refills: 5 | Status: SHIPPED | OUTPATIENT
Start: 2017-09-27 | End: 2018-02-21

## 2017-09-27 RX ORDER — IMATINIB MESYLATE 400 MG/1
400 TABLET, FILM COATED ORAL DAILY
Qty: 30 TABLET | Refills: 5
Start: 2017-09-27 | End: 2017-09-27 | Stop reason: SDUPTHER

## 2017-09-27 NOTE — PROGRESS NOTES
Gleevec rx faxed to [facility] [phone]  Phone: [phone]-Dcta. Line  [phone]-pt. Line

## 2017-09-27 NOTE — PROGRESS NOTES
Staff message rec from Dr Merida-Pt will start Gleevec 400 mg daily. See below.  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  We are seeing her at [place].  Thanks,  [facility]                Process will be started.

## 2017-09-27 NOTE — PROGRESS NOTES
[facility] CBC GROUP OUTPATIENT FOLLOW UP CLINIC VISIT    REASON FOR FOLLOW-UP:    1.  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 female who returns today for follow up of the above issue.  She has been taking hydroxyurea at a dose of 1000 mg twice daily.  She was having some nausea but is now tolerating this well.  Energy level is improving.  Appetite is improving.    ONCOLOGIC HISTORY:  For about 6 months she has noticed bilateral arm tingling and weakness in her hands.  This has occurred about 5 or 6 times over the past 6 months.  She is vague in her description of this.  She is globally weak and is quite inactive as she lives by herself.  She was completing physical therapy with some improvement but is now quite unsteady on her feet.  She has not had any falls.  She denies any bleeding.  She has chronic fatigue.  She has osteoarthritis pain and reports pain in the right side and in her neck.  She denies any acute low back pain but does have chronic low back pain.  She does have orthostatic symptoms and she is lightheaded when she stands.  She has had a decreased appetite and some weight loss over the past 9 months.     Labs performed on 8/28/2017 showed a white blood cell count of 39.1 with a differential showing 61% neutrophils and 32% lymphocytes, hemoglobin 13.9 and platelets 565,000.         She had further labs done at [facility] on 8/20/2017 showing a white blood cell count of 24.4, hemoglobin 13.7, and platelets 1795.  A differential showed 64% neutrophils, 7% lymphocytes, and 12% basophils.  Peripheral blood flow cytometry was also done on this date showing 11% basophils and less than 1% myeloblasts.  There was no monoclonal B-cell, T-cell, plasma cell, or NK cell population.  FISH for BCR-ABL and ESSENCE 2 mutation analysis were done as well.     Her CBC was normal as of 7/8/2016.     Of note, she has a history  of bilateral breast cancer diagnosed in 1988.  She had bilateral mastectomy with reconstruction.  We do not have any records regarding this at this time.  She apparently completed at least 6 months of adjuvant chemotherapy but again does not know the details..    FISH for BCR-ABL performed at [place] was actually positive.  As of 9/6/2017 ESSENCE 2 is still pending.    She returned on 9/6/2017 for follow-up.  Platelets at 1.9 million with hydroxyurea 1000 mg daily.  She will increase the dose to 2000 mg daily.  She will have a bone marrow aspiration biopsy performed.  Weekly CBCs.    As of 9/6/2017 peripheral blood PCR was positive at 89.238% with a major break point mutation.    ESSENCE 2 and CALR mutation negative.    Bone marrow aspiration and biopsy performed on 9/15/2017.  Flow cytometry showed no increase in blasts.  Morphology showed no increase in blasts.  Bone marrow FISH showed BCR/ABL rearrangement in 93% of cells.  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    Labs as of 9/20/2017 show white blood cell count of 4.7 with hemoglobin of 12.7 and platelets 1.1 million.    On 9/27/2017 white blood cells and hemoglobin are normal.  Platelets are down to 722,000.  We plan to start Gleevec at 400 mg daily if she tolerates it.  In the meantime she will decrease the hydroxyurea dose to 1000 mg daily.      PAST MEDICAL, SURGICAL, FAMILY, AND SOCIAL HISTORIES were reviewed with the patient and in the electronic medical record, and were updated if indicated.    ALLERGIES:  No Known Allergies    MEDICATIONS:  The medication list has been reviewed with the patient by the medical assistant, and the list has been updated in the electronic medical record, which I reviewed.  Medication dosages and frequencies were confirmed to be accurate.    REVIEW OF SYSTEMS:  PAIN:  See Vital Signs below.  GENERAL:  No fevers, chills, night sweats, or unintended weight loss.  Fatigue  SKIN:  No rashes or non-healing lesions.  HEME/LYMPH:   "No abnormal bleeding.  No palpable lymphadenopathy.  EYES:  No vision changes or diplopia.  ENT:  No sore throat or difficulty swallowing.  RESPIRATORY:  No cough, shortness of breath, hemoptysis, or wheezing.  CARDIOVASCULAR:  No chest pain, palpitations, orthopnea, or dyspnea on exertion.  Lightheadedness improved  GASTROINTESTINAL:  No abdominal pain, nausea, vomiting, constipation, diarrhea, melena, or hematochezia.  Decreased appetite  GENITOURINARY:  No dysuria or hematuria.  MUSCULOSKELETAL:  Arthralgias, back pain, abnormal gait, neck pain  NEUROLOGIC:  No dizziness, loss of consciousness, or seizures.  PSYCHIATRIC:  No depression, anxiety, or mood changes.    Vitals:    09/27/17 0900   BP: 128/74   Pulse: 70   Resp: 16   Temp: 98.6 °F (37 °C)   TempSrc: Oral   SpO2: 96%   Weight: 138 lb (62.6 kg)   Height: 65.16\" (165.5 cm)   PainSc:   6   PainLoc: Generalized  Comment: hip and back pain       PHYSICAL EXAMINATION:  GENERAL:  Well-developed well-nourished female; awake, alert and oriented, in no acute distress.  SKIN:  Warm and dry, without rashes, purpura, or petechiae.  HEAD:  Normocephalic, atraumatic.  EYES:  Pupils equal, round and reactive to light.  Extraocular movements intact.  Conjunctivae normal.  EARS:  Hearing intact.  NOSE:  Septum midline.  No excoriations or nasal discharge.  MOUTH:  No stomatitis or ulcers.  Lips are normal.  THROAT:  Oropharynx without lesions or exudates.  NECK:  Supple with good range of motion; no thyromegaly or masses; no JVD or bruits.  LYMPHATICS:  No cervical, supraclavicular, axillary, or inguinal lymphadenopathy.  CHEST:  Lungs are clear to auscultation bilaterally.  No wheezes, rales, or rhonchi.  HEART:  Regular rate; normal rhythm.  No murmurs, gallops or rubs.  ABDOMEN:  Soft, non-tender, non-distended.  Normal active bowel sounds.  No organomegaly.  EXTREMITIES:  No clubbing, cyanosis, or edema.  NEUROLOGICAL:  No focal neurologic deficits.      DIAGNOSTIC " DATA:  Lab Results   Component Value Date    WBC 5.43 09/27/2017    HGB 12.5 09/27/2017    HCT 36.8 09/27/2017    MCV 90.4 09/27/2017     (H) 09/27/2017       IMAGING:  None reviewed    ASSESSMENT:  This is a 72 y.o. female with:  1.  History of bilateral breast cancer in 1988 status post bilateral mastectomy.  She apparently completed 6 months of adjuvant therapy.  We have no details regarding this.    2.  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  We await the final comprehensive case summary from her bone marrow biopsy.  She has been on hydroxyurea 2000 mg daily with significant improvement in her platelet count.  She will decrease the dose to 1000 mg daily at this point.  I discussed the diagnosis with her today.  We will plan to start therapy with Gleevec 400 mg daily if she tolerates it.  If she does not tolerate it we can decrease the dose to 200 mg daily.  I discussed potential adverse effects and gave her some written information regarding the drug today.  She will have a chemotherapy education session within the next week.  We will hope to start the drug in the next week or 2.  She will then return for a nurse practitioner visit to discuss oral compliance with medication.  I will then see her back a few weeks after that.  We will check weekly CBCs and she will have further labs with each provider visit.  2.  Thrombocytosis: Secondary to her myeloproliferative disorder.  Continue Hydrea but decrease the dose to 1000 mg daily.  Continue aspirin 81 mg daily.      PLAN:  1.  Continue aspirin 81 mg daily  2.  Decrease hydroxyurea to 1000 mg daily  3.  Start Gleevec 400 mg daily.  We will work to obtain this medication for her.  4.  Weekly CBC until I see her again  5.  Nurse practitioner appointment for an education session next week.  She will then see a nurse practitioner back a couple of weeks after starting Gleevec for oral compliance.  I will see  her back a couple of weeks after that.

## 2017-09-27 NOTE — PROGRESS NOTES
Gleevec PA submitted to [employer] via [employer] Request approved. Office will be faxed approval letter with dates.

## 2017-09-28 ENCOUNTER — DOCUMENTATION (OUTPATIENT)
Dept: ONCOLOGY | Facility: CLINIC | Age: 72
End: 2017-09-28

## 2017-09-28 NOTE — PROGRESS NOTES
"Pt's copay for Gleevec is $2340.17. There are no foundation with open funding for CML. We will have to apply to [employer] and apply for free drug. I have contacted the pt to spoke to her about the copay. Her response was \"i can't afford that and so I won't take it\". I proceeded to explain to her that we will look to the manufacture for assistance. She is agreeable with this. She will be at the [employer] office on Thursday, 10/5/17 and I will have the application out there for her to sign. She is aware to bring financial documentation. I have also informed Dr Merida.   "

## 2017-09-29 ENCOUNTER — DOCUMENTATION (OUTPATIENT)
Dept: ONCOLOGY | Facility: CLINIC | Age: 72
End: 2017-09-29

## 2017-09-29 NOTE — PROGRESS NOTES
Call rec from pts daughter, Clarissa. She states pt was completely confused by our conversation yesterday about the Gleevec. I explained to Clarissa about the copay cost for pt and that I would like to send her for assistance. No funding open through [employer] so we are going through the Manufacture, [email or web address], for free drug. Clarissa has access to a fax machine so the application page that needs pt signature was faxed to her. She will return it signed with the income documentation. Once this is rec I can fax to [employer].

## 2017-10-02 ENCOUNTER — DOCUMENTATION (OUTPATIENT)
Dept: ONCOLOGY | Facility: CLINIC | Age: 72
End: 2017-10-02

## 2017-10-02 NOTE — PROGRESS NOTES
Income information rec from pts daughter. I have faxed the [employer] application to [phone]  Phone: [phone]

## 2017-10-03 LAB
LAB AP CASE REPORT: NORMAL
Lab: NORMAL
PATH REPORT.ADDENDUM SPEC: NORMAL
PATH REPORT.FINAL DX SPEC: NORMAL
PATH REPORT.GROSS SPEC: NORMAL

## 2017-10-04 ENCOUNTER — DOCUMENTATION (OUTPATIENT)
Dept: ONCOLOGY | Facility: CLINIC | Age: 72
End: 2017-10-04

## 2017-10-04 NOTE — PROGRESS NOTES
Call rec from April at [facility] ([facility] She states they did the benefit investigation and pt has met all her deductibles. She has a 5% copay. I explained to April that pt may have to only pay 5% but she cannot afford this. April has sent pt's application to [employer] Assistance [employer]. The phone number for that dept is [phone]

## 2017-10-05 ENCOUNTER — DOCUMENTATION (OUTPATIENT)
Dept: ONCOLOGY | Facility: CLINIC | Age: 72
End: 2017-10-05

## 2017-10-05 ENCOUNTER — LAB (OUTPATIENT)
Dept: OTHER | Facility: HOSPITAL | Age: 72
End: 2017-10-05

## 2017-10-05 ENCOUNTER — OFFICE VISIT (OUTPATIENT)
Dept: ONCOLOGY | Facility: CLINIC | Age: 72
End: 2017-10-05

## 2017-10-05 VITALS — BODY MASS INDEX: 22.64 KG/M2 | WEIGHT: 136.7 LBS

## 2017-10-05 DIAGNOSIS — C92.10 CML (CHRONIC MYELOID LEUKEMIA) (HCC): Primary | ICD-10-CM

## 2017-10-05 DIAGNOSIS — C92.10 CML (CHRONIC MYELOID LEUKEMIA) (HCC): ICD-10-CM

## 2017-10-05 LAB
BASOPHILS # BLD AUTO: 0.08 10*3/MM3 (ref 0–0.2)
BASOPHILS NFR BLD AUTO: 1.1 % (ref 0–1.5)
DEPRECATED RDW RBC AUTO: 67.7 FL (ref 37–54)
EOSINOPHIL # BLD AUTO: 0.1 10*3/MM3 (ref 0–0.7)
EOSINOPHIL NFR BLD AUTO: 1.4 % (ref 0.3–6.2)
ERYTHROCYTE [DISTWIDTH] IN BLOOD BY AUTOMATED COUNT: 20.9 % (ref 11.7–13)
HCT VFR BLD AUTO: 39.6 % (ref 35.6–45.5)
HGB BLD-MCNC: 13.4 G/DL (ref 11.9–15.5)
IMM GRANULOCYTES # BLD: 0.04 10*3/MM3 (ref 0–0.03)
IMM GRANULOCYTES NFR BLD: 0.6 % (ref 0–0.5)
LYMPHOCYTES # BLD AUTO: 1.34 10*3/MM3 (ref 0.9–4.8)
LYMPHOCYTES NFR BLD AUTO: 18.9 % (ref 19.6–45.3)
MCH RBC QN AUTO: 31.3 PG (ref 26.9–32)
MCHC RBC AUTO-ENTMCNC: 33.8 G/DL (ref 32.4–36.3)
MCV RBC AUTO: 92.5 FL (ref 80.5–98.2)
MONOCYTES # BLD AUTO: 0.24 10*3/MM3 (ref 0.2–1.2)
MONOCYTES NFR BLD AUTO: 3.4 % (ref 5–12)
NEUTROPHILS # BLD AUTO: 5.3 10*3/MM3 (ref 1.9–8.1)
NEUTROPHILS NFR BLD AUTO: 74.6 % (ref 42.7–76)
NRBC BLD MANUAL-RTO: 0 /100 WBC (ref 0–0)
PLATELET # BLD AUTO: 466 10*3/MM3 (ref 140–500)
PMV BLD AUTO: 9.6 FL (ref 6–12)
RBC # BLD AUTO: 4.28 10*6/MM3 (ref 3.9–5.2)
WBC NRBC COR # BLD: 7.1 10*3/MM3 (ref 4.5–10.7)

## 2017-10-05 PROCEDURE — 36415 COLL VENOUS BLD VENIPUNCTURE: CPT

## 2017-10-05 PROCEDURE — 99215 OFFICE O/P EST HI 40 MIN: CPT | Performed by: NURSE PRACTITIONER

## 2017-10-05 PROCEDURE — 85025 COMPLETE CBC W/AUTO DIFF WBC: CPT | Performed by: INTERNAL MEDICINE

## 2017-10-05 NOTE — PROGRESS NOTES
Fax rec from [employer]-Pt is denied assistance due to her household income exceeds program limitations. I contacted [employer] [phone] to get clarity on this and pt's income exceeds the $75,000 allowed for a one person household. I explained that pt cannot afford the copay for the Gleevec and I am told at this time, she does not qualify. If her income changes, we can reapply.    I have notified Maryse ROMANO NP who is going to educate pt today. I have sent Dr Merida a staff message as well.

## 2017-10-05 NOTE — PROGRESS NOTES
Subjective     No primary care provider on file.    PATIENT NAME:  Nicole Lofton  YOB: 1945  PATIENTS AGE:  72 y.o.  PATIENTS SEX:  female  DATE OF SERVICE:  10/05/2017  PROVIDER:  FERNANDO Duke      __________ORAL CHEMOTHERAPY PATIENT EDUCATION__________    PATIENT EDUCATION:  Today I met with the patient to discuss ORAL chemotherapy/biotherapy recommended for treatment of her disease.  Also discussed were medication administration, adherence, and proper handling/disposal.  The patient received the Oral Chemotherapy/Biotherapy Plan Summary including diagnosis and specific treatment plan.    SIDE EFFECTS:  Common side effects were discussed with the patient, her son, and her daughter.  Discussion included hair loss/discoloration, anemia/fatigue, infection/chills/fever, appetite, bleeding risk/precautions, constipation, diarrhea, mouth sores, taste alteration, loss of appetite,nausea/vomiting, peripheral neuropathy, skin/nail changes, rash, muscle aches/weakness, photosensitivity, weight gain/loss, hearing loss, dizziness, menopausal symptoms, menstrrual irregularity, reproductive risk, high blood pressure, heart damage, liver damage, lung damage, kidney damage, DVT/PE risk, fluid retention, pleural/pericardial effusion, somnolence, electrolyte/LFT imbalance.    Discussion also included side effects specific to drugs in the treatment plan,  Gleevec specifically.        A total of 45  minutes were spent with the patient, with 100% of time spent in education and counseling.      **I was informed by Marifer Arvizu that the patient will not qualify for assistance and her co-pay will be over $2000.00 a month. The patient and her family want to think about this and remain on Hydrea. I will discus other options with Dr Parker. She is scheduled to return next week for lab review at which time we will discuss further treatment.    FERNANDO Duke

## 2017-10-09 ENCOUNTER — TELEPHONE (OUTPATIENT)
Dept: ONCOLOGY | Facility: HOSPITAL | Age: 72
End: 2017-10-09

## 2017-10-09 ENCOUNTER — DOCUMENTATION (OUTPATIENT)
Dept: ONCOLOGY | Facility: CLINIC | Age: 72
End: 2017-10-09

## 2017-10-09 RX ORDER — PROCHLORPERAZINE MALEATE 10 MG
TABLET ORAL
Qty: 360 TABLET | Refills: 0 | Status: SHIPPED | OUTPATIENT
Start: 2017-10-09 | End: 2017-10-25

## 2017-10-09 RX ORDER — PROCHLORPERAZINE MALEATE 10 MG
10 TABLET ORAL EVERY 6 HOURS PRN
Qty: 60 TABLET | Refills: 0 | Status: SHIPPED | OUTPATIENT
Start: 2017-10-09 | End: 2017-10-09 | Stop reason: SDUPTHER

## 2017-10-09 NOTE — TELEPHONE ENCOUNTER
Patient calling c/o off and on nausea since starting the hydrea. She has been nauseas the past couple of days. No vomitting but unable to eat much because of it. She would like something called in for nausea. Reviewed with [facility] [place] to send in compazine for patient. Escribed to patient's pharmacy. Notified patient.

## 2017-10-09 NOTE — PROGRESS NOTES
Fax rec stating Minnie has started a PA through [employer] for pts Prochlorperazine. I have completed the request and submitted to [employer]. The request has been approved from 10/9/17 to 10/9/18.    Minnie notified [phone]

## 2017-10-11 ENCOUNTER — OFFICE VISIT (OUTPATIENT)
Dept: ONCOLOGY | Facility: CLINIC | Age: 72
End: 2017-10-11

## 2017-10-11 ENCOUNTER — LAB (OUTPATIENT)
Dept: OTHER | Facility: HOSPITAL | Age: 72
End: 2017-10-11

## 2017-10-11 DIAGNOSIS — C92.10 CML (CHRONIC MYELOID LEUKEMIA) (HCC): ICD-10-CM

## 2017-10-11 DIAGNOSIS — C92.10 CML (CHRONIC MYELOID LEUKEMIA) (HCC): Primary | ICD-10-CM

## 2017-10-11 LAB
BASOPHILS # BLD AUTO: 0.12 10*3/MM3 (ref 0–0.2)
BASOPHILS NFR BLD AUTO: 1.7 % (ref 0–1.5)
DEPRECATED RDW RBC AUTO: 69 FL (ref 37–54)
EOSINOPHIL # BLD AUTO: 0.12 10*3/MM3 (ref 0–0.7)
EOSINOPHIL NFR BLD AUTO: 1.7 % (ref 0.3–6.2)
ERYTHROCYTE [DISTWIDTH] IN BLOOD BY AUTOMATED COUNT: 20.6 % (ref 11.7–13)
HCT VFR BLD AUTO: 37.8 % (ref 35.6–45.5)
HGB BLD-MCNC: 12.7 G/DL (ref 11.9–15.5)
IMM GRANULOCYTES # BLD: 0.04 10*3/MM3 (ref 0–0.03)
IMM GRANULOCYTES NFR BLD: 0.6 % (ref 0–0.5)
LYMPHOCYTES # BLD AUTO: 1.36 10*3/MM3 (ref 0.9–4.8)
LYMPHOCYTES NFR BLD AUTO: 19.2 % (ref 19.6–45.3)
MCH RBC QN AUTO: 31.6 PG (ref 26.9–32)
MCHC RBC AUTO-ENTMCNC: 33.6 G/DL (ref 32.4–36.3)
MCV RBC AUTO: 94 FL (ref 80.5–98.2)
MONOCYTES # BLD AUTO: 0.28 10*3/MM3 (ref 0.2–1.2)
MONOCYTES NFR BLD AUTO: 4 % (ref 5–12)
NEUTROPHILS # BLD AUTO: 5.16 10*3/MM3 (ref 1.9–8.1)
NEUTROPHILS NFR BLD AUTO: 72.8 % (ref 42.7–76)
NRBC BLD MANUAL-RTO: 0 /100 WBC (ref 0–0)
PLAT MORPH BLD: NORMAL
PLATELET # BLD AUTO: 433 10*3/MM3 (ref 140–500)
PMV BLD AUTO: 9.7 FL (ref 6–12)
RBC # BLD AUTO: 4.02 10*6/MM3 (ref 3.9–5.2)
RBC MORPH BLD: NORMAL
WBC MORPH BLD: NORMAL
WBC NRBC COR # BLD: 7.08 10*3/MM3 (ref 4.5–10.7)

## 2017-10-11 PROCEDURE — 85025 COMPLETE CBC W/AUTO DIFF WBC: CPT | Performed by: INTERNAL MEDICINE

## 2017-10-11 PROCEDURE — 36415 COLL VENOUS BLD VENIPUNCTURE: CPT

## 2017-10-11 PROCEDURE — 99212-NC PR NO CHARGE CBC OFFICE OUTPATIENT VISIT 10 MINUTES: Performed by: NURSE PRACTITIONER

## 2017-10-11 PROCEDURE — 85007 BL SMEAR W/DIFF WBC COUNT: CPT | Performed by: INTERNAL MEDICINE

## 2017-10-11 RX ORDER — ONDANSETRON HYDROCHLORIDE 8 MG/1
8 TABLET, FILM COATED ORAL EVERY 8 HOURS PRN
Qty: 30 TABLET | Refills: 2 | Status: SHIPPED | OUTPATIENT
Start: 2017-10-11 | End: 2020-12-08

## 2017-10-12 ENCOUNTER — DOCUMENTATION (OUTPATIENT)
Dept: ONCOLOGY | Facility: CLINIC | Age: 72
End: 2017-10-12

## 2017-10-12 NOTE — PROGRESS NOTES
[facility]    Pt is over qualified for assistance so she has paid the copay.

## 2017-10-18 ENCOUNTER — APPOINTMENT (OUTPATIENT)
Dept: OTHER | Facility: HOSPITAL | Age: 72
End: 2017-10-18

## 2017-10-18 ENCOUNTER — APPOINTMENT (OUTPATIENT)
Dept: ONCOLOGY | Facility: CLINIC | Age: 72
End: 2017-10-18

## 2017-10-19 ENCOUNTER — TELEPHONE (OUTPATIENT)
Dept: ONCOLOGY | Facility: CLINIC | Age: 72
End: 2017-10-19

## 2017-10-19 NOTE — TELEPHONE ENCOUNTER
----- Message from FERNANDO Sanders sent at 10/19/2017 10:22 AM EDT -----  Regarding: FW: Gleevec delivery  Please call this patient and let her know her apt 10/25 will be changed to a follow up appointment with the nurse practitioner instead of just a lab review since she started a new medication recently.      Leave her other appointments as scheduled (pascual if off the week prior).    Thanks,  Madina    ----- Message -----     From: FERNANDO Ontiveros     Sent: 10/17/2017   3:46 PM       To: FERNANDO Sanders  Subject: RE: Gleevec delivery                             Yes I think that sounds perfect.  ----- Message -----     From: FERNANDO Sanders     Sent: 10/13/2017   1:06 PM       To: FERNANDO Ontiveros  Subject: FW: Gleevec delivery                             Since you have seen this patient a few times and she looks to be on your schedule coming up I wanted to run her appointments by you.  She just got he Gleevec 10/12, so her 2 week oral f/u would be more in line with her 10/25 apt, I was thinking cancel 10/18.  And then have her see Merida around 11/8? Thoughts?    ----- Message -----     From: Marifer Arvizu     Sent: 10/12/2017   8:30 AM       To: Burt Merida MD, #  Subject: Gleevec delivery                                 Nicole will rec her Gleevec today, 10/12/17.    Thank you,  Marifer

## 2017-10-25 ENCOUNTER — LAB (OUTPATIENT)
Dept: OTHER | Facility: HOSPITAL | Age: 72
End: 2017-10-25

## 2017-10-25 ENCOUNTER — OFFICE VISIT (OUTPATIENT)
Dept: ONCOLOGY | Facility: CLINIC | Age: 72
End: 2017-10-25

## 2017-10-25 VITALS
OXYGEN SATURATION: 97 % | SYSTOLIC BLOOD PRESSURE: 124 MMHG | RESPIRATION RATE: 16 BRPM | HEIGHT: 65 IN | WEIGHT: 139.7 LBS | HEART RATE: 69 BPM | DIASTOLIC BLOOD PRESSURE: 80 MMHG | TEMPERATURE: 98.8 F | BODY MASS INDEX: 23.28 KG/M2

## 2017-10-25 DIAGNOSIS — C92.10 CML (CHRONIC MYELOID LEUKEMIA) (HCC): Primary | ICD-10-CM

## 2017-10-25 DIAGNOSIS — C92.10 CML (CHRONIC MYELOID LEUKEMIA) (HCC): ICD-10-CM

## 2017-10-25 LAB
BASOPHILS # BLD AUTO: 0.08 10*3/MM3 (ref 0–0.2)
BASOPHILS NFR BLD AUTO: 1 % (ref 0–1.5)
DEPRECATED RDW RBC AUTO: 65.7 FL (ref 37–54)
EOSINOPHIL # BLD AUTO: 0.11 10*3/MM3 (ref 0–0.7)
EOSINOPHIL NFR BLD AUTO: 1.3 % (ref 0.3–6.2)
ERYTHROCYTE [DISTWIDTH] IN BLOOD BY AUTOMATED COUNT: 19 % (ref 11.7–13)
HCT VFR BLD AUTO: 41.3 % (ref 35.6–45.5)
HGB BLD-MCNC: 13.8 G/DL (ref 11.9–15.5)
IMM GRANULOCYTES # BLD: 0.02 10*3/MM3 (ref 0–0.03)
IMM GRANULOCYTES NFR BLD: 0.2 % (ref 0–0.5)
LYMPHOCYTES # BLD AUTO: 1.28 10*3/MM3 (ref 0.9–4.8)
LYMPHOCYTES NFR BLD AUTO: 15.5 % (ref 19.6–45.3)
MCH RBC QN AUTO: 31.4 PG (ref 26.9–32)
MCHC RBC AUTO-ENTMCNC: 33.4 G/DL (ref 32.4–36.3)
MCV RBC AUTO: 94.1 FL (ref 80.5–98.2)
MONOCYTES # BLD AUTO: 0.29 10*3/MM3 (ref 0.2–1.2)
MONOCYTES NFR BLD AUTO: 3.5 % (ref 5–12)
NEUTROPHILS # BLD AUTO: 6.48 10*3/MM3 (ref 1.9–8.1)
NEUTROPHILS NFR BLD AUTO: 78.5 % (ref 42.7–76)
NRBC BLD MANUAL-RTO: 0 /100 WBC (ref 0–0)
PLATELET # BLD AUTO: 505 10*3/MM3 (ref 140–500)
PMV BLD AUTO: 10.7 FL (ref 6–12)
RBC # BLD AUTO: 4.39 10*6/MM3 (ref 3.9–5.2)
WBC NRBC COR # BLD: 8.26 10*3/MM3 (ref 4.5–10.7)

## 2017-10-25 PROCEDURE — 99214 OFFICE O/P EST MOD 30 MIN: CPT | Performed by: NURSE PRACTITIONER

## 2017-10-25 PROCEDURE — 85025 COMPLETE CBC W/AUTO DIFF WBC: CPT | Performed by: INTERNAL MEDICINE

## 2017-10-25 PROCEDURE — 36415 COLL VENOUS BLD VENIPUNCTURE: CPT

## 2017-10-25 NOTE — PROGRESS NOTES
[facility] CBC GROUP OUTPATIENT FOLLOW UP CLINIC VISIT    REASON FOR FOLLOW-UP:    1.  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 female who returns today for follow up of the above issue.  She started Gleevec approximately 12 days ago.  He reports feeling rather poorly.  Her primary complaint today is fatigue.  This has been ongoing for months and she does not feel this has improved  in the past couple of weeks.  Does take naps frequently and experiences some relief from this.  She denies fevers, chills, shortness of breath.  She denies bleeding or swelling.    She was taking Hydrea, which she directly correlates to her increased fatigue.  She did of course discontinue this medication when she started Gleevec less than 2 weeks ago.  Her  CBC is stable today, however, her platelet count is slightly higher at 505,000 as opposed to 433,000 last visit.    Does report nausea.  She has been taking Zofran but only once or twice a day.  Her nausea is worse in the morning and eases up throughout the day.  She denies vomiting, diarrhea or constipation.  She denies pain.      ONCOLOGIC HISTORY:  For about 6 months she has noticed bilateral arm tingling and weakness in her hands.  This has occurred about 5 or 6 times over the past 6 months.  She is vague in her description of this.  She is globally weak and is quite inactive as she lives by herself.  She was completing physical therapy with some improvement but is now quite unsteady on her feet.  She has not had any falls.  She denies any bleeding.  She has chronic fatigue.  She has osteoarthritis pain and reports pain in the right side and in her neck.  She denies any acute low back pain but does have chronic low back pain.  She does have orthostatic symptoms and she is lightheaded when she stands.  She has had a decreased appetite and some weight loss over the past 9  months.     Labs performed on 8/28/2017 showed a white blood cell count of 39.1 with a differential showing 61% neutrophils and 32% lymphocytes, hemoglobin 13.9 and platelets 565,000.         She had further labs done at [place] on 8/20/2017 showing a white blood cell count of 24.4, hemoglobin 13.7, and platelets 1795.  A differential showed 64% neutrophils, 7% lymphocytes, and 12% basophils.  Peripheral blood flow cytometry was also done on this date showing 11% basophils and less than 1% myeloblasts.  There was no monoclonal B-cell, T-cell, plasma cell, or NK cell population.  FISH for BCR-ABL and ESSENCE 2 mutation analysis were done as well.     Her CBC was normal as of 7/8/2016.     Of note, she has a history of bilateral breast cancer diagnosed in 1988.  She had bilateral mastectomy with reconstruction.  We do not have any records regarding this at this time.  She apparently completed at least 6 months of adjuvant chemotherapy but again does not know the details..    FISH for BCR-ABL performed at [place] was actually positive.  As of 9/6/2017 ESSENCE 2 is still pending.    She returned on 9/6/2017 for follow-up.  Platelets at 1.9 million with hydroxyurea 1000 mg daily.  She will increase the dose to 2000 mg daily.  She will have a bone marrow aspiration biopsy performed.  Weekly CBCs.    As of 9/6/2017 peripheral blood PCR was positive at 89.238% with a major break point mutation.    ESSENCE 2 and CALR mutation negative.    Bone marrow aspiration and biopsy performed on 9/15/2017.  Flow cytometry showed no increase in blasts.  Morphology showed no increase in blasts.  Bone marrow FISH showed BCR/ABL rearrangement in 93% of cells.  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    Labs as of 9/20/2017 show white blood cell count of 4.7 with hemoglobin of 12.7 and platelets 1.1 million.    On 9/27/2017 white blood cells and hemoglobin are normal.  Platelets are down to 722,000.  We plan to start Gleevec at 400 mg daily if  "she tolerates it.  In the meantime she will decrease the hydroxyurea dose to 1000 mg daily.      PAST MEDICAL, SURGICAL, FAMILY, AND SOCIAL HISTORIES were reviewed with the patient and in the electronic medical record, and were updated if indicated.    ALLERGIES:  No Known Allergies    MEDICATIONS:  The medication list has been reviewed with the patient by the medical assistant, and the list has been updated in the electronic medical record, which I reviewed.  Medication dosages and frequencies were confirmed to be accurate.    REVIEW OF SYSTEMS:  PAIN:  See Vital Signs below.  GENERAL:  No fevers, chills, night sweats, or unintended weight loss.  Fatigue  SKIN:  No rashes or non-healing lesions.  HEME/LYMPH: See history of present illness  EYES:  No vision changes or diplopia.  ENT:  No sore throat or difficulty swallowing.  RESPIRATORY:  No cough, shortness of breath, hemoptysis, or wheezing.  CARDIOVASCULAR:  No chest pain, palpitations, orthopnea, or dyspnea on exertion.  Lightheadedness improved  GASTROINTESTINAL: See history of present illness  GENITOURINARY:  No dysuria or hematuria.  MUSCULOSKELETAL:  Arthralgias, back pain, abnormal gait, neck pain  NEUROLOGIC:  No dizziness, loss of consciousness, or seizures.  PSYCHIATRIC:  No depression, anxiety, or mood changes.    Vitals:    10/25/17 1016   BP: 124/80   Pulse: 69   Resp: 16   Temp: 98.8 °F (37.1 °C)   TempSrc: Oral   SpO2: 97%   Weight: 139 lb 11.2 oz (63.4 kg)   Height: 65.16\" (165.5 cm)   PainSc: 0-No pain       PHYSICAL EXAMINATION:  GENERAL:  Well-developed well-nourished female; awake, alert and oriented, in no acute distress.She is accompanied by her daughter today.  SKIN:  Warm and dry, without rashes, purpura, or petechiae.  HEAD:  Normocephalic, atraumatic.  EYES:  Pupils equal, round and reactive to light.  Extraocular movements intact.  Conjunctivae normal.  EARS:  Hearing intact.  NOSE:  Septum midline.  No excoriations or nasal " discharge.  MOUTH:  No stomatitis or ulcers.  Lips are normal.  THROAT:  Oropharynx without lesions or exudates.  NECK:  Supple with good range of motion; no thyromegaly or masses; no JVD or bruits.  LYMPHATICS:  No cervical, supraclavicular, axillary, or inguinal lymphadenopathy.  CHEST:  Lungs are clear to auscultation bilaterally.  No wheezes, rales, or rhonchi.  HEART:  Regular rate; normal rhythm.  No murmurs, gallops or rubs.  ABDOMEN:  Soft, non-tender, non-distended.  Normal active bowel sounds.  No organomegaly.  EXTREMITIES:  No clubbing, cyanosis, or edema.  NEUROLOGICAL:  No focal neurologic deficits.      DIAGNOSTIC DATA:  Lab Results   Component Value Date    WBC 8.26 10/25/2017    HGB 13.8 10/25/2017    HCT 41.3 10/25/2017    MCV 94.1 10/25/2017     (H) 10/25/2017       IMAGING:  None reviewed    ASSESSMENT:  This is a 72 y.o. female with:  1.  History of bilateral breast cancer in 1988 status post bilateral mastectomy.  She apparently completed 6 months of adjuvant therapy.  We have no details regarding this.    2.  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  Started on Hydrea 1000 mg daily.  This was discontinued less than 2 weeks ago and she started Gleevec approximately 12 days ago.  He has tolerated Gleevec relatively well so far.  She does continue to struggle with significant fatigue.  3.  Thrombocytosis:  Her platelet count today is slightly higher at 505,000.  4.  Fatigue related to therapy and disease.  This is chronic and stable but disconcerting for the patient.  She does rest frequently throughout the day and does feel some relief when she naps.  5.  Nausea related to therapy and disease.  She has been taking Zofran but only once or twice daily.        PLAN:  1.  I have instructed the patient to start Zofran on an every 8 hour schedule for several days.  I have advised that she take the Zofran even when she has not nauseous.   We'll return as already scheduled week for RN review.  2.  He is currently scheduled to see Dr. Merida on 11/15/2017.  3.  She will continue Gleevec 400 mg daily as prescribed.  I did review potential side effects of Gleevec today and management of the side effects with the patient and her daughter.  They do verbalize understanding.  4.  I have instructed the patient to call the office should she experience new or worsening symptoms.

## 2017-11-01 ENCOUNTER — TELEPHONE (OUTPATIENT)
Dept: GENERAL RADIOLOGY | Facility: HOSPITAL | Age: 72
End: 2017-11-01

## 2017-11-01 ENCOUNTER — APPOINTMENT (OUTPATIENT)
Dept: OTHER | Facility: HOSPITAL | Age: 72
End: 2017-11-01

## 2017-11-01 ENCOUNTER — APPOINTMENT (OUTPATIENT)
Dept: ONCOLOGY | Facility: CLINIC | Age: 72
End: 2017-11-01

## 2017-11-01 ENCOUNTER — TELEPHONE (OUTPATIENT)
Dept: ONCOLOGY | Facility: HOSPITAL | Age: 72
End: 2017-11-01

## 2017-11-01 NOTE — TELEPHONE ENCOUNTER
Patient's daughter calling. She cancelled patient's lab appt today with NP because pt said she was not feeling well. She has been very tired and has some swelling in her face. Informed daughter she should keep appts when pt is not feeling well. She states she can reschedule appt. Reviewed with Maryse KING. Ok to reschedule patient for tomorrow or Friday. Inbasket sent to scheduling to set up.

## 2017-11-01 NOTE — TELEPHONE ENCOUNTER
----- Message from Bety Lechuga RN sent at 11/1/2017 10:55 AM EDT -----  Please call daughter (Clarissa) and reschedule patient's missed appt today at [facility] for tomorrow or Friday. (Friday might be better for NP if pt is ok with that)   [phone]

## 2017-11-03 ENCOUNTER — OFFICE VISIT (OUTPATIENT)
Dept: ONCOLOGY | Facility: CLINIC | Age: 72
End: 2017-11-03

## 2017-11-03 ENCOUNTER — LAB (OUTPATIENT)
Dept: OTHER | Facility: HOSPITAL | Age: 72
End: 2017-11-03

## 2017-11-03 VITALS
HEART RATE: 66 BPM | SYSTOLIC BLOOD PRESSURE: 132 MMHG | WEIGHT: 143.2 LBS | DIASTOLIC BLOOD PRESSURE: 80 MMHG | BODY MASS INDEX: 23.71 KG/M2 | TEMPERATURE: 98.5 F | OXYGEN SATURATION: 95 %

## 2017-11-03 DIAGNOSIS — R60.0 PERIORBITAL EDEMA: ICD-10-CM

## 2017-11-03 DIAGNOSIS — C92.10 CML (CHRONIC MYELOID LEUKEMIA) (HCC): ICD-10-CM

## 2017-11-03 DIAGNOSIS — E03.9 HYPOTHYROIDISM, UNSPECIFIED TYPE: ICD-10-CM

## 2017-11-03 DIAGNOSIS — C92.10 CML (CHRONIC MYELOID LEUKEMIA) (HCC): Primary | ICD-10-CM

## 2017-11-03 DIAGNOSIS — R53.83 OTHER FATIGUE: ICD-10-CM

## 2017-11-03 LAB
BASOPHILS # BLD AUTO: 0.05 10*3/MM3 (ref 0–0.2)
BASOPHILS NFR BLD AUTO: 0.8 % (ref 0–1.5)
DEPRECATED RDW RBC AUTO: 62.7 FL (ref 37–54)
EOSINOPHIL # BLD AUTO: 0.18 10*3/MM3 (ref 0–0.7)
EOSINOPHIL NFR BLD AUTO: 3 % (ref 0.3–6.2)
ERYTHROCYTE [DISTWIDTH] IN BLOOD BY AUTOMATED COUNT: 18 % (ref 11.7–13)
HCT VFR BLD AUTO: 37.6 % (ref 35.6–45.5)
HGB BLD-MCNC: 12.5 G/DL (ref 11.9–15.5)
IMM GRANULOCYTES # BLD: 0.02 10*3/MM3 (ref 0–0.03)
IMM GRANULOCYTES NFR BLD: 0.3 % (ref 0–0.5)
LYMPHOCYTES # BLD AUTO: 1.26 10*3/MM3 (ref 0.9–4.8)
LYMPHOCYTES NFR BLD AUTO: 21.3 % (ref 19.6–45.3)
MCH RBC QN AUTO: 31.3 PG (ref 26.9–32)
MCHC RBC AUTO-ENTMCNC: 33.2 G/DL (ref 32.4–36.3)
MCV RBC AUTO: 94 FL (ref 80.5–98.2)
MONOCYTES # BLD AUTO: 0.25 10*3/MM3 (ref 0.2–1.2)
MONOCYTES NFR BLD AUTO: 4.2 % (ref 5–12)
NEUTROPHILS # BLD AUTO: 4.16 10*3/MM3 (ref 1.9–8.1)
NEUTROPHILS NFR BLD AUTO: 70.4 % (ref 42.7–76)
NRBC BLD MANUAL-RTO: 0 /100 WBC (ref 0–0)
PLATELET # BLD AUTO: 225 10*3/MM3 (ref 140–500)
PMV BLD AUTO: 10.6 FL (ref 6–12)
RBC # BLD AUTO: 4 10*6/MM3 (ref 3.9–5.2)
T4 FREE SERPL-MCNC: 1.18 NG/DL (ref 0.93–1.7)
TSH SERPL DL<=0.05 MIU/L-ACNC: 28.3 MIU/ML (ref 0.27–4.2)
WBC NRBC COR # BLD: 5.92 10*3/MM3 (ref 4.5–10.7)

## 2017-11-03 PROCEDURE — 99214 OFFICE O/P EST MOD 30 MIN: CPT | Performed by: NURSE PRACTITIONER

## 2017-11-03 PROCEDURE — 84439 ASSAY OF FREE THYROXINE: CPT | Performed by: NURSE PRACTITIONER

## 2017-11-03 PROCEDURE — 36415 COLL VENOUS BLD VENIPUNCTURE: CPT

## 2017-11-03 PROCEDURE — 85025 COMPLETE CBC W/AUTO DIFF WBC: CPT | Performed by: INTERNAL MEDICINE

## 2017-11-03 PROCEDURE — 84443 ASSAY THYROID STIM HORMONE: CPT | Performed by: NURSE PRACTITIONER

## 2017-11-03 NOTE — PROGRESS NOTES
[facility] CBC GROUP OUTPATIENT FOLLOW UP CLINIC VISIT    REASON FOR FOLLOW-UP:    1.  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 female who returns today for follow up of the above issue.  She initiated Gleevac approximately 3 weeks ago and returns today with reports of progressive fatigue and periorbital edema over the last week. She denies opthalmic pain or vision changes. Fatigue has been a chronic issue over the last several months, though patient does feel that it has worsened over the past two weeks.     Despite fatigue, patient's counts have responded sufficiently to Gleevac with improvement of her platelets from 505,000 to 225,000 over a week. She denies infectious symptoms including fever, chills, or productive cough. No associated shortness of breath or chest pain reported.     Patient briefly mentioned a few small areas of skin lesions that have developed in the last 5 days. They are non-painful and slightly vesicular in appearance, though patient denies any oozing.      Nausea persists, though patient admits to missing doses of ondansetron throughout the day. Last week, patient was advised to take ondansetron scheduled every 8 hours for relief. Bowels and urination remain regular. Appetite has been decreased secondary to generalized malaise, though her weight remains stable.     ONCOLOGIC HISTORY:  For about 6 months she has noticed bilateral arm tingling and weakness in her hands.  This has occurred about 5 or 6 times over the past 6 months.  She is vague in her description of this.  She is globally weak and is quite inactive as she lives by herself.  She was completing physical therapy with some improvement but is now quite unsteady on her feet.  She has not had any falls.  She denies any bleeding.  She has chronic fatigue.  She has osteoarthritis pain and reports pain in the right side and in her neck.   She denies any acute low back pain but does have chronic low back pain.  She does have orthostatic symptoms and she is lightheaded when she stands.  She has had a decreased appetite and some weight loss over the past 9 months.     Labs performed on 8/28/2017 showed a white blood cell count of 39.1 with a differential showing 61% neutrophils and 32% lymphocytes, hemoglobin 13.9 and platelets 565,000.         She had further labs done at [place] on 8/20/2017 showing a white blood cell count of 24.4, hemoglobin 13.7, and platelets 1795.  A differential showed 64% neutrophils, 7% lymphocytes, and 12% basophils.  Peripheral blood flow cytometry was also done on this date showing 11% basophils and less than 1% myeloblasts.  There was no monoclonal B-cell, T-cell, plasma cell, or NK cell population.  FISH for BCR-ABL and ESSENCE 2 mutation analysis were done as well.     Her CBC was normal as of 7/8/2016.     Of note, she has a history of bilateral breast cancer diagnosed in 1988.  She had bilateral mastectomy with reconstruction.  We do not have any records regarding this at this time.  She apparently completed at least 6 months of adjuvant chemotherapy but again does not know the details..    FISH for BCR-ABL performed at [place] was actually positive.  As of 9/6/2017 ESSENCE 2 is still pending.    She returned on 9/6/2017 for follow-up.  Platelets at 1.9 million with hydroxyurea 1000 mg daily.  She will increase the dose to 2000 mg daily.  She will have a bone marrow aspiration biopsy performed.  Weekly CBCs.    As of 9/6/2017 peripheral blood PCR was positive at 89.238% with a major break point mutation.    ESSENCE 2 and CALR mutation negative.    Bone marrow aspiration and biopsy performed on 9/15/2017.  Flow cytometry showed no increase in blasts.  Morphology showed no increase in blasts.  Bone marrow FISH showed BCR/ABL rearrangement in 93% of cells.  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    Labs as of 9/20/2017 show  white blood cell count of 4.7 with hemoglobin of 12.7 and platelets 1.1 million.    On 9/27/2017 white blood cells and hemoglobin are normal.  Platelets are down to 722,000.  We plan to start Gleevec at 400 mg daily if she tolerates it.  In the meantime she will decrease the hydroxyurea dose to 1000 mg daily.      PAST MEDICAL, SURGICAL, FAMILY, AND SOCIAL HISTORIES were reviewed with the patient and in the electronic medical record, and were updated if indicated.    ALLERGIES:  No Known Allergies    MEDICATIONS:  The medication list has been reviewed with the patient by the medical assistant, and the list has been updated in the electronic medical record, which I reviewed.  Medication dosages and frequencies were confirmed to be accurate.    REVIEW OF SYSTEMS:  PAIN:  See Vital Signs below.  GENERAL:  No fevers, chills, night sweats, or unintended weight loss.  Fatigue  SKIN:  No rashes or non-healing lesions. See HPI   HEME/LYMPH: See history of present illness  EYES:  See HPI   ENT:  No sore throat or difficulty swallowing.  RESPIRATORY:  No cough, shortness of breath, hemoptysis, or wheezing.  CARDIOVASCULAR:  No chest pain, palpitations, orthopnea, or dyspnea on exertion.  Lightheadedness improved  GASTROINTESTINAL: See history of present illness  GENITOURINARY:  No dysuria or hematuria.  MUSCULOSKELETAL:  Arthralgias, back pain, abnormal gait, neck pain  NEUROLOGIC:  No dizziness, loss of consciousness, or seizures.  PSYCHIATRIC:  No depression, anxiety, or mood changes.    Vitals:    11/03/17 1249   BP: 132/80   Pulse: 66   Temp: 98.5 °F (36.9 °C)   TempSrc: Oral   SpO2: 95%   Weight: 143 lb 3.2 oz (65 kg)       PHYSICAL EXAMINATION:  GENERAL:  Well-developed well-nourished female; awake, alert and oriented, in no acute distress.She is accompanied by her daughter today.  SKIN:  Warm and dry, without rashes, purpura, or petechiae.  HEAD:  Normocephalic, atraumatic.  EYES:  Pupils equal, round and reactive to  light.  Extraocular movements intact.  Conjunctivae normal.Evidence of bilateral periorbital edema, no purulent drainage or visual deficits noted   EARS:  Hearing intact.  NOSE:  Septum midline.  No excoriations or nasal discharge.  MOUTH:  No stomatitis or ulcers.  Lips are normal.  THROAT:  Oropharynx without lesions or exudates.  NECK:  Supple with good range of motion; no thyromegaly or masses; no JVD or bruits.  LYMPHATICS:  No cervical, supraclavicular, axillary, or inguinal lymphadenopathy.  CHEST:  Lungs are clear to auscultation bilaterally.  No wheezes, rales, or rhonchi.  HEART:  Regular rate; normal rhythm.  No murmurs, gallops or rubs.  ABDOMEN:  Soft, non-tender, non-distended.  Normal active bowel sounds.  No organomegaly.  EXTREMITIES:  No clubbing, cyanosis, or edema. Evidence of one approximately  0.5 cm lesion with a vesicular appearance, lesion is firm to touch, non-painful with no warmth to palpation,   NEUROLOGICAL:  No focal neurologic deficits.      DIAGNOSTIC DATA:  Lab Results   Component Value Date    WBC 5.92 11/03/2017    HGB 12.5 11/03/2017    HCT 37.6 11/03/2017    MCV 94.0 11/03/2017     11/03/2017       IMAGING:  None reviewed    ASSESSMENT:  This is a 72 y.o. female with:  1.  History of bilateral breast cancer in 1988 status post bilateral mastectomy.  She apparently completed 6 months of adjuvant therapy.  We have no details regarding this.    2.  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  Started on Hydrea 1000 mg daily.  This was discontinued one month ago and she started Gleevec approximately 3 weeks ago.  She continues on 400 mg of Gleevac daily, though has noted progressive fatigue and precipitation of non-painful periorbital edema, which is a known side effect.   3.  Thrombocytosis:  Her platelet count today has improved significantly to 225,000  4.  Fatigue related to therapy and disease. Though this is a chronic  issue for the patient, she has noted progression, requiring more frequent rest breaks. This was reviewed with Dr. Merida and we will pursue additional lab testing with thyroid studies. Patient takes daily synthroid, but does not have a primary care provider who has been monitoring thyroid levels. Studies are pending.   5.  Nausea related to therapy and disease. Patient has noted a modest degree of relief with more frequent ondansetron dosing, though I have encouraged her to take this every 8 hours. Her daughter will assist her in obtaining a pill box so that she may be reminded to take her medications regularly.   6. Periorbital edema: This is a known side effect effect secondary to Gleevac, that developed for the patient one week ago. Thankfully this has not obstructed her vision, nor has inflicted pain.   7. Likely benign skin lesion: patient reports that this is not bothersome and only has one area on her right upper extremity. I have asked her to call with additional lesions. We will continue to monitor       PLAN:  1.  I have reviewed new periorbital edema and worsening fatigue with Dr. Merida and despite this, we will continue at current dosing of Gleevac given patient's degree of response in her blood counts. We will continue to monitor symptoms closely and will consider dose reduction to 200 mg daily if patient demonstrates further intolerance or side effects   2. Patient is to take zofran scheduled every 8 hours for nausea   3. She is scheduled for RN review on Wednesday, November 8, 2017 with Maryse Giraldo and will see Dr. Merida on 11/15/2017.  4. Thyroid studies pending today. I have asked the patient to obtain a primary care provider for continued management of hypothyroidism  5. Patient knows to call with any progression of symptoms prior to her next office visit.

## 2017-11-06 ENCOUNTER — APPOINTMENT (OUTPATIENT)
Dept: GENERAL RADIOLOGY | Facility: HOSPITAL | Age: 72
End: 2017-11-06

## 2017-11-06 ENCOUNTER — APPOINTMENT (OUTPATIENT)
Dept: ONCOLOGY | Facility: CLINIC | Age: 72
End: 2017-11-06

## 2017-11-06 ENCOUNTER — APPOINTMENT (OUTPATIENT)
Dept: OTHER | Facility: HOSPITAL | Age: 72
End: 2017-11-06

## 2017-11-06 ENCOUNTER — APPOINTMENT (OUTPATIENT)
Dept: ONCOLOGY | Facility: HOSPITAL | Age: 72
End: 2017-11-06

## 2017-11-06 ENCOUNTER — HOSPITAL ENCOUNTER (EMERGENCY)
Facility: HOSPITAL | Age: 72
Discharge: HOME | End: 2017-11-06
Attending: FAMILY MEDICINE | Admitting: FAMILY MEDICINE

## 2017-11-06 ENCOUNTER — TELEPHONE (OUTPATIENT)
Dept: ONCOLOGY | Facility: HOSPITAL | Age: 72
End: 2017-11-06

## 2017-11-06 VITALS
HEART RATE: 68 BPM | DIASTOLIC BLOOD PRESSURE: 89 MMHG | WEIGHT: 140 LBS | TEMPERATURE: 96.7 F | HEIGHT: 67 IN | SYSTOLIC BLOOD PRESSURE: 184 MMHG | BODY MASS INDEX: 21.97 KG/M2 | OXYGEN SATURATION: 97 % | RESPIRATION RATE: 16 BRPM

## 2017-11-06 DIAGNOSIS — C92.10 CML (CHRONIC MYELOID LEUKEMIA) (HCC): Primary | ICD-10-CM

## 2017-11-06 DIAGNOSIS — E87.6 HYPOKALEMIA: Primary | ICD-10-CM

## 2017-11-06 DIAGNOSIS — R11.2 NON-INTRACTABLE VOMITING WITH NAUSEA, UNSPECIFIED VOMITING TYPE: ICD-10-CM

## 2017-11-06 LAB
ALBUMIN SERPL-MCNC: 3.9 G/DL (ref 3.5–5.2)
ALBUMIN/GLOB SERPL: 1.2 G/DL
ALP SERPL-CCNC: 89 U/L (ref 39–117)
ALT SERPL W P-5'-P-CCNC: 28 U/L (ref 1–33)
ANION GAP SERPL CALCULATED.3IONS-SCNC: 14 MMOL/L
AST SERPL-CCNC: 35 U/L (ref 1–32)
BASOPHILS # BLD AUTO: 0.01 10*3/MM3 (ref 0–0.2)
BASOPHILS NFR BLD AUTO: 0.2 % (ref 0–1.5)
BILIRUB SERPL-MCNC: 0.9 MG/DL (ref 0.1–1.2)
BUN BLD-MCNC: 10 MG/DL (ref 8–23)
BUN/CREAT SERPL: 11.2 (ref 7–25)
CALCIUM SPEC-SCNC: 9.1 MG/DL (ref 8.6–10.5)
CHLORIDE SERPL-SCNC: 97 MMOL/L (ref 98–107)
CO2 SERPL-SCNC: 30 MMOL/L (ref 22–29)
CREAT BLD-MCNC: 0.89 MG/DL (ref 0.57–1)
DEPRECATED RDW RBC AUTO: 62.9 FL (ref 37–54)
EOSINOPHIL # BLD AUTO: 0.02 10*3/MM3 (ref 0–0.7)
EOSINOPHIL NFR BLD AUTO: 0.3 % (ref 0.3–6.2)
ERYTHROCYTE [DISTWIDTH] IN BLOOD BY AUTOMATED COUNT: 17.7 % (ref 11.7–13)
GFR SERPL CREATININE-BSD FRML MDRD: 62 ML/MIN/1.73
GLOBULIN UR ELPH-MCNC: 3.3 GM/DL
GLUCOSE BLD-MCNC: 108 MG/DL (ref 65–99)
HCT VFR BLD AUTO: 42.1 % (ref 35.6–45.5)
HGB BLD-MCNC: 14 G/DL (ref 11.9–15.5)
IMM GRANULOCYTES # BLD: 0 10*3/MM3 (ref 0–0.03)
IMM GRANULOCYTES NFR BLD: 0 % (ref 0–0.5)
LYMPHOCYTES # BLD AUTO: 1.08 10*3/MM3 (ref 0.9–4.8)
LYMPHOCYTES NFR BLD AUTO: 17.4 % (ref 19.6–45.3)
MCH RBC QN AUTO: 32.3 PG (ref 26.9–32)
MCHC RBC AUTO-ENTMCNC: 33.3 G/DL (ref 32.4–36.3)
MCV RBC AUTO: 97.2 FL (ref 80.5–98.2)
MONOCYTES # BLD AUTO: 0.31 10*3/MM3 (ref 0.2–1.2)
MONOCYTES NFR BLD AUTO: 5 % (ref 5–12)
NEUTROPHILS # BLD AUTO: 4.79 10*3/MM3 (ref 1.9–8.1)
NEUTROPHILS NFR BLD AUTO: 77.1 % (ref 42.7–76)
PLATELET # BLD AUTO: 168 10*3/MM3 (ref 140–500)
PMV BLD AUTO: 10.6 FL (ref 6–12)
POTASSIUM BLD-SCNC: 2.7 MMOL/L (ref 3.5–5.2)
PROT SERPL-MCNC: 7.2 G/DL (ref 6–8.5)
RBC # BLD AUTO: 4.33 10*6/MM3 (ref 3.9–5.2)
SODIUM BLD-SCNC: 141 MMOL/L (ref 136–145)
WBC NRBC COR # BLD: 6.21 10*3/MM3 (ref 4.5–10.7)

## 2017-11-06 PROCEDURE — 80053 COMPREHEN METABOLIC PANEL: CPT | Performed by: FAMILY MEDICINE

## 2017-11-06 PROCEDURE — 85025 COMPLETE CBC W/AUTO DIFF WBC: CPT | Performed by: FAMILY MEDICINE

## 2017-11-06 PROCEDURE — 96374 THER/PROPH/DIAG INJ IV PUSH: CPT

## 2017-11-06 PROCEDURE — 96361 HYDRATE IV INFUSION ADD-ON: CPT

## 2017-11-06 PROCEDURE — 99284 EMERGENCY DEPT VISIT MOD MDM: CPT

## 2017-11-06 PROCEDURE — 74022 RADEX COMPL AQT ABD SERIES: CPT

## 2017-11-06 PROCEDURE — 25010000002 ONDANSETRON PER 1 MG: Performed by: FAMILY MEDICINE

## 2017-11-06 RX ORDER — ONDANSETRON 2 MG/ML
4 INJECTION INTRAMUSCULAR; INTRAVENOUS ONCE
Status: COMPLETED | OUTPATIENT
Start: 2017-11-06 | End: 2017-11-06

## 2017-11-06 RX ORDER — PROMETHAZINE HYDROCHLORIDE 12.5 MG/1
12.5 TABLET ORAL EVERY 8 HOURS PRN
Qty: 4 TABLET | Refills: 0 | Status: SHIPPED | OUTPATIENT
Start: 2017-11-06 | End: 2018-09-06

## 2017-11-06 RX ORDER — SODIUM CHLORIDE 9 MG/ML
125 INJECTION, SOLUTION INTRAVENOUS CONTINUOUS
Status: DISCONTINUED | OUTPATIENT
Start: 2017-11-06 | End: 2017-11-06 | Stop reason: HOSPADM

## 2017-11-06 RX ORDER — POTASSIUM CHLORIDE 1.5 G/1.77G
40 POWDER, FOR SOLUTION ORAL ONCE
Status: COMPLETED | OUTPATIENT
Start: 2017-11-06 | End: 2017-11-06

## 2017-11-06 RX ADMIN — POTASSIUM CHLORIDE 40 MEQ: 1.5 POWDER, FOR SOLUTION ORAL at 13:21

## 2017-11-06 RX ADMIN — SODIUM CHLORIDE 1000 ML: 9 INJECTION, SOLUTION INTRAVENOUS at 12:09

## 2017-11-06 RX ADMIN — ONDANSETRON 4 MG: 2 INJECTION INTRAMUSCULAR; INTRAVENOUS at 12:09

## 2017-11-06 NOTE — ED PROVIDER NOTES
EMERGENCY DEPARTMENT ENCOUNTER    CHIEF COMPLAINT  Chief Complaint: vomiting  History given by: patient, family  History limited by: N/A  Room Number: 20/20  PMD: SVETA Camacho  Oncologist- Dr Merida (has appt on 11/15/17)      HPI:  Pt has hx of CML for which she was started on oral chemotherapy (gleevac) about 3 weeks ago. Since starting the gleevac, she has had fatigue, generalized weakness, bilateral periorbital swelling, nausea, and vomiting with bilious emesis. Yesterday, the nausea and vomiting worsened and are exacerbated by PO intake. She has also had decreased PO intake and constipation. She denies abd pain, chest pain, trouble breathing, diarrhea, fevers, chills, cough, and pain and difficulty with urination. Per family, pt has taken zofran without significant relief. Pt has no other complaints at this time.     Location: GI  Radiation: N/A  Quality: N/A  Intensity/Severity: moderate  Duration: started about 3 weeks ago  Onset quality: gradual  Timing: intermittent  Progression: worsening   Aggravating Factors: PO intake  Alleviating Factors: none  Previous Episodes: none mentioned  Treatment before arrival: Per family, pt has taken zofran for nausea and vomiting without significant relief.  Associated Symptoms: fatigue (for the last 3 weeks, started after initiating gleevac), generalized weakness (for the last 3 weeks, started after initiating gleevac), bilateral periorbital swelling (for the last 3 weeks, started after initiating gleevac), decreased PO intake, constipation       PAST MEDICAL HISTORY  Active Ambulatory Problems     Diagnosis Date Noted   • Thrombocytosis 08/29/2017   • CML (chronic myeloid leukemia) 09/27/2017     Resolved Ambulatory Problems     Diagnosis Date Noted   • No Resolved Ambulatory Problems     Past Medical History:   Diagnosis Date   • Arthritis    • Cancer    • Depression    • Hyperlipidemia    • Hypertension    • Hypothyroidism    • Thrombocytosis          PAST SURGICAL  HISTORY  Past Surgical History:   Procedure Laterality Date   • BACK SURGERY  1980; 1992    ruptured herniated disc   • MASTECTOMY Bilateral 1988         FAMILY HISTORY  Family History   Problem Relation Age of Onset   • Breast cancer Sister      Diagnosed in her 40s   • Breast cancer Paternal Grandmother    • Lung cancer Brother      Diagnosed in his 50s   • Lung cancer Brother      Diagnosed in his 50s   • Lung cancer Brother      Diagnosed in his 50s   • Breast cancer Sister      Diagnosed in her 40s   • No Known Problems Sister    • No Known Problems Sister          SOCIAL HISTORY  Social History     Social History   • Marital status:      Spouse name: N/A   • Number of children: 3   • Years of education: High school     Occupational History   •  Retired     Social History Main Topics   • Smoking status: Current Every Day Smoker     Packs/day: 1.00     Years: 60.00     Types: Cigarettes   • Smokeless tobacco: Never Used   • Alcohol use No   • Drug use: No   • Sexual activity: Defer     Other Topics Concern   • Not on file     Social History Narrative         ALLERGIES  Review of patient's allergies indicates no known allergies.        REVIEW OF SYSTEMS  Review of Systems   Constitutional: Positive for fatigue (for the last 3 weeks, started after initiating gleevac). Negative for chills and fever.        Decreased PO intake   HENT: Positive for facial swelling (bilateral periorbital swelling (for the last 3 weeks, started after initiating gleevac)). Negative for congestion, rhinorrhea and sore throat.    Eyes: Negative for pain.   Respiratory: Negative for cough, shortness of breath and stridor.    Cardiovascular: Negative for chest pain and palpitations.   Gastrointestinal: Positive for constipation, nausea and vomiting. Negative for abdominal pain and diarrhea.   Endocrine: Negative.    Genitourinary: Negative for difficulty urinating.   Musculoskeletal: Negative for myalgias.   Skin: Negative.     Neurological: Positive for weakness (generalized weakness (for the last 3 weeks, started after initiating gleevac), no focal weakness). Negative for speech difficulty, numbness and headaches.   Psychiatric/Behavioral: Negative.    All other systems reviewed and are negative.           PHYSICAL EXAM  ED Triage Vitals   Temp Heart Rate Resp BP SpO2   11/06/17 1020 11/06/17 1020 11/06/17 1020 11/06/17 1028 11/06/17 1020   96.7 °F (35.9 °C) 79 16 99/74 96 % WNL      Temp src Heart Rate Source Patient Position BP Location FiO2 (%)   -- -- 11/06/17 1028 -- --     Sitting         Physical Exam   Constitutional: She is oriented to person, place, and time. No distress.   HENT:   Head: Normocephalic.   Mouth/Throat: Mucous membranes are dry.   Mild bilateral periorbital edema   Eyes: EOM are normal. Pupils are equal, round, and reactive to light.   Neck: Normal range of motion. Neck supple.   Cardiovascular: Normal rate, regular rhythm and normal heart sounds.    Pulmonary/Chest: Effort normal and breath sounds normal. No respiratory distress. She has no decreased breath sounds. She has no wheezes. She has no rhonchi. She has no rales.   Abdominal: Soft. There is no tenderness. There is no rebound and no guarding.   Musculoskeletal: Normal range of motion.   Neurological: She is alert and oriented to person, place, and time. She has normal sensation.   Skin: Skin is warm and dry.   Psychiatric: Mood and affect normal.   Nursing note and vitals reviewed.          LAB RESULTS  Recent Results (from the past 24 hour(s))   Comprehensive Metabolic Panel    Collection Time: 11/06/17 12:09 PM   Result Value Ref Range    Glucose 108 (H) 65 - 99 mg/dL    BUN 10 8 - 23 mg/dL    Creatinine 0.89 0.57 - 1.00 mg/dL    Sodium 141 136 - 145 mmol/L    Potassium 2.7 (L) 3.5 - 5.2 mmol/L    Chloride 97 (L) 98 - 107 mmol/L    CO2 30.0 (H) 22.0 - 29.0 mmol/L    Calcium 9.1 8.6 - 10.5 mg/dL    Total Protein 7.2 6.0 - 8.5 g/dL    Albumin 3.90 3.50  - 5.20 g/dL    ALT (SGPT) 28 1 - 33 U/L    AST (SGOT) 35 (H) 1 - 32 U/L    Alkaline Phosphatase 89 39 - 117 U/L    Total Bilirubin 0.9 0.1 - 1.2 mg/dL    eGFR Non African Amer 62 >60 mL/min/1.73    Globulin 3.3 gm/dL    A/G Ratio 1.2 g/dL    BUN/Creatinine Ratio 11.2 7.0 - 25.0    Anion Gap 14.0 mmol/L   CBC Auto Differential    Collection Time: 11/06/17 12:09 PM   Result Value Ref Range    WBC 6.21 4.50 - 10.70 10*3/mm3    RBC 4.33 3.90 - 5.20 10*6/mm3    Hemoglobin 14.0 11.9 - 15.5 g/dL    Hematocrit 42.1 35.6 - 45.5 %    MCV 97.2 80.5 - 98.2 fL    MCH 32.3 (H) 26.9 - 32.0 pg    MCHC 33.3 32.4 - 36.3 g/dL    RDW 17.7 (H) 11.7 - 13.0 %    RDW-SD 62.9 (H) 37.0 - 54.0 fl    MPV 10.6 6.0 - 12.0 fL    Platelets 168 140 - 500 10*3/mm3    Neutrophil % 77.1 (H) 42.7 - 76.0 %    Lymphocyte % 17.4 (L) 19.6 - 45.3 %    Monocyte % 5.0 5.0 - 12.0 %    Eosinophil % 0.3 0.3 - 6.2 %    Basophil % 0.2 0.0 - 1.5 %    Immature Grans % 0.0 0.0 - 0.5 %    Neutrophils, Absolute 4.79 1.90 - 8.10 10*3/mm3    Lymphocytes, Absolute 1.08 0.90 - 4.80 10*3/mm3    Monocytes, Absolute 0.31 0.20 - 1.20 10*3/mm3    Eosinophils, Absolute 0.02 0.00 - 0.70 10*3/mm3    Basophils, Absolute 0.01 0.00 - 0.20 10*3/mm3    Immature Grans, Absolute 0.00 0.00 - 0.03 10*3/mm3       Ordered the above labs and reviewed the results.        RADIOLOGY  XR Abdomen 2 View With Chest 1 View   Final Result       No focal pulmonary consolidation. Nonobstructive bowel gas pattern. If   there is further clinical concern, CT can be considered for further   evaluation.       This report was finalized on 11/6/2017 12:58 PM by Dr. Rolly Soto MD.             KUB with CXR (independently viewed by me, interpreted by radiologist)- No focal pulmonary consolidation. Nonobstructive bowel gas pattern.      Ordered the above noted radiological studies. Reviewed by me in PACS.       PROCEDURES  Procedures          PROGRESS AND CONSULTS  ED Course     11:57 AM- Ordered KUB  with CXR and blood work for further evaluation. Ordered IV fluids for hydration and zofran for nausea.     1:10 PM- Potassium level is 2.7. Ordered KCl for hypokalemia.     1:30 PM- Rechecked pt. She states that she feels better after ED treatments. Pt tolerated PO fluid intake in ED. She has had no vomiting in ED. Discussed with pt and family about all pertinent results including decreased potassium level of 2.7, normal WBC count, and about KUB with CXR findings (no obvious acute process). Advised pt to stop taking lasix in order to allow her potassium level to normalize. Informed pt of plan to prescribe short course of phenergan but to take it only as needed for her nausea and vomiting unrelieved by zofra (she is on multiple meds). Instructed to f/u with oncologist on 11/15/17 as scheduled for recheck of condition and potassium level. RTER warnings given. Pt understands and agrees with plan. Addressed all questions.    1:38 PM- Will prescribe few doses of 12.5mg phenergan for nausea/vomiting since pt is on a lot of other medications.     2:27 PM- Orthostatics reviewed and are unremarkable.       MEDICAL DECISION MAKING  Results were reviewed/discussed with the patient and family.     MDM  Number of Diagnoses or Management Options  Hypokalemia:      Amount and/or Complexity of Data Reviewed  Clinical lab tests: ordered and reviewed (Potassium= 2.7, WBC= 6.21, BUN= 10, creatinine= 0.89)  Tests in the radiology section of CPT®: reviewed and ordered (KUB with CXR- No focal pulmonary consolidation. Nonobstructive bowel gas pattern.)  Decide to obtain previous medical records or to obtain history from someone other than the patient: yes  Review and summarize past medical records: yes (Pt was seen by CRYSTAL Rebollar (oncology) on 11/3/17 for follow up of CML. )  Independent visualization of images, tracings, or specimens: yes    Patient Progress  Patient progress: stable             DIAGNOSIS  Final diagnoses:   Hypokalemia          DISPOSITION  Discharged    DISCHARGE    Patient discharged in stable condition.    Reviewed implications of results, diagnosis, meds, responsibility to follow up, warning signs and symptoms of possible worsening, potential complications and reasons to return to ER.    Patient/Family voiced understanding of above instructions.    Discussed plan for discharge, as there is no emergent indication for admission.  Pt/family is agreeable and understands need for follow up and repeat testing.  Pt is aware that discharge does not mean that nothing is wrong but it indicates no emergency is present and they must continue care with follow-up as given below or physician of their choice.     FOLLOW-UP  Burt Merida MD  [address] [address]  [phone]      See Dr Merida as scheduled next week      DISCHARGE MEDICATIONS     Medication List      New Prescriptions          promethazine 12.5 MG tablet   Commonly known as:  PHENERGAN   Take 1 tablet by mouth Every 8 (Eight) Hours As Needed for Nausea or   Vomiting.                 Latest Documented Vital Signs:  As of 2:27 PM  BP- 184/89 HR- 68 Temp- 96.7 °F (35.9 °C) O2 sat- 97%        --  Documentation assistance provided by jose Rodrigues for Dr Oakley.  Information recorded by the scribe was done at my direction and has been verified and validated by me.     Calvin Rodrigues  11/06/17 4229       Mario Alberto Oakley MD  11/06/17 0914

## 2017-11-06 NOTE — TELEPHONE ENCOUNTER
Received a call from pt's daughter Clarissa. She states pt called her this morning informing her that she has been up vomiting all night. She has not been able to keep any food or drink down since Saturday. Daughter is concern she is getting dehydrated. She has tried to take zofran but it has not stayed down. Clarissa seems to think it is the Gleevec and not a stomach bug. She states her mother has had trouble with nausea since starting on Gleevec. Informed daughter we can bring pt in today to be seen by an NP and possible fluids. Message sent to scheduling to make an appt at [facility]. CBC and CMP order placed.

## 2017-11-08 ENCOUNTER — APPOINTMENT (OUTPATIENT)
Dept: OTHER | Facility: HOSPITAL | Age: 72
End: 2017-11-08

## 2017-11-08 ENCOUNTER — OFFICE VISIT (OUTPATIENT)
Dept: ONCOLOGY | Facility: CLINIC | Age: 72
End: 2017-11-08

## 2017-11-08 ENCOUNTER — APPOINTMENT (OUTPATIENT)
Dept: ONCOLOGY | Facility: CLINIC | Age: 72
End: 2017-11-08

## 2017-11-08 DIAGNOSIS — D75.839 THROMBOCYTOSIS: ICD-10-CM

## 2017-11-08 DIAGNOSIS — C92.10 CML (CHRONIC MYELOID LEUKEMIA) (HCC): Primary | ICD-10-CM

## 2017-11-08 PROCEDURE — 99212-NC PR NO CHARGE CBC OFFICE OUTPATIENT VISIT 10 MINUTES: Performed by: NURSE PRACTITIONER

## 2017-11-08 RX ORDER — LEVOTHYROXINE SODIUM 137 UG/1
TABLET ORAL
Qty: 90 TABLET | Refills: 0 | Status: SHIPPED | OUTPATIENT
Start: 2017-11-08 | End: 2018-02-21 | Stop reason: DRUGHIGH

## 2017-11-08 RX ORDER — LEVOTHYROXINE SODIUM 137 UG/1
137 TABLET ORAL DAILY
Qty: 30 TABLET | Refills: 0 | Status: SHIPPED | OUTPATIENT
Start: 2017-11-08 | End: 2017-11-08 | Stop reason: SDUPTHER

## 2017-11-08 NOTE — PROGRESS NOTES
I saw Mrs. Lofton today for scheduled RN review.  She continues on Gleevec 200 mg daily.  She continues to report significant fatigue and nausea.  She was actually seen in the emergency department over the weekend for nausea and provided fluids and antinausea medication through IV.  SHe states since this time her symptoms have improved.  She has not experienced nausea  for the last couple of days.  He reports eating and drinking well.  Her periorbital swelling has improved.    Her TSH and FreeT4 obtained last week reveal a TSH of 28.3 and a free T4 of 1.18.  SHe has been on Synthroid for quite some time, however, has not had thyroid panel drawn in what sounds like years.  Her daughter states she tried to set up an appointment with a new primary care last week but was put on hold to [facility].  Her primary care was the physician who had originally prescribed Synthroid.  I have reviewed her lab work with Dr. Merida today.  We have increased her Synthroid dose from 112 µg daily to 137 µg daily.  I have e- scribed 30 day supply to her pharmacy today.  I have instructed the patient and her daughter to establish primary care as soon as possible.  If she has trouble getting in within a reasonable time frame we will refer her to endocrinology.    She will return as already scheduled to see Dr. Merida next week.  Structured her to call the office with new or worsening symptoms.    Lab Results   Component Value Date    WBC 6.21 11/06/2017    HGB 14.0 11/06/2017    HCT 42.1 11/06/2017    MCV 97.2 11/06/2017     11/06/2017     Lab Results   Component Value Date    TSH 28.300 (H) 11/03/2017

## 2017-11-15 ENCOUNTER — OFFICE VISIT (OUTPATIENT)
Dept: ONCOLOGY | Facility: CLINIC | Age: 72
End: 2017-11-15

## 2017-11-15 ENCOUNTER — INFUSION (OUTPATIENT)
Dept: ONCOLOGY | Facility: HOSPITAL | Age: 72
End: 2017-11-15

## 2017-11-15 ENCOUNTER — LAB (OUTPATIENT)
Dept: OTHER | Facility: HOSPITAL | Age: 72
End: 2017-11-15

## 2017-11-15 VITALS
OXYGEN SATURATION: 99 % | RESPIRATION RATE: 16 BRPM | TEMPERATURE: 97.7 F | DIASTOLIC BLOOD PRESSURE: 80 MMHG | HEIGHT: 67 IN | HEART RATE: 68 BPM | SYSTOLIC BLOOD PRESSURE: 134 MMHG | WEIGHT: 140 LBS | BODY MASS INDEX: 21.97 KG/M2

## 2017-11-15 DIAGNOSIS — C92.10 CML (CHRONIC MYELOID LEUKEMIA) (HCC): Primary | ICD-10-CM

## 2017-11-15 DIAGNOSIS — C92.10 CML (CHRONIC MYELOID LEUKEMIA) (HCC): ICD-10-CM

## 2017-11-15 LAB
ALBUMIN SERPL-MCNC: 3.9 G/DL (ref 3.5–5.2)
ALBUMIN/GLOB SERPL: 1.2 G/DL
ALP SERPL-CCNC: 87 U/L (ref 39–117)
ALT SERPL W P-5'-P-CCNC: 25 U/L (ref 1–33)
ANION GAP SERPL CALCULATED.3IONS-SCNC: 12.7 MMOL/L
AST SERPL-CCNC: 39 U/L (ref 1–32)
BASOPHILS # BLD AUTO: 0.02 10*3/MM3 (ref 0–0.2)
BASOPHILS NFR BLD AUTO: 0.5 % (ref 0–1.5)
BILIRUB SERPL-MCNC: 0.8 MG/DL (ref 0.1–1.2)
BUN BLD-MCNC: 8 MG/DL (ref 8–23)
BUN/CREAT SERPL: 8.4 (ref 7–25)
CALCIUM SPEC-SCNC: 9 MG/DL (ref 8.6–10.5)
CHLORIDE SERPL-SCNC: 95 MMOL/L (ref 98–107)
CO2 SERPL-SCNC: 31.3 MMOL/L (ref 22–29)
CREAT BLD-MCNC: 0.95 MG/DL (ref 0.57–1)
DEPRECATED RDW RBC AUTO: 57.5 FL (ref 37–54)
EOSINOPHIL # BLD AUTO: 0.06 10*3/MM3 (ref 0–0.7)
EOSINOPHIL NFR BLD AUTO: 1.6 % (ref 0.3–6.2)
ERYTHROCYTE [DISTWIDTH] IN BLOOD BY AUTOMATED COUNT: 16.9 % (ref 11.7–13)
GFR SERPL CREATININE-BSD FRML MDRD: 58 ML/MIN/1.73
GLOBULIN UR ELPH-MCNC: 3.3 GM/DL
GLUCOSE BLD-MCNC: 102 MG/DL (ref 65–99)
HCT VFR BLD AUTO: 37.9 % (ref 35.6–45.5)
HGB BLD-MCNC: 13.2 G/DL (ref 11.9–15.5)
IMM GRANULOCYTES # BLD: 0.01 10*3/MM3 (ref 0–0.03)
IMM GRANULOCYTES NFR BLD: 0.3 % (ref 0–0.5)
LYMPHOCYTES # BLD AUTO: 1.04 10*3/MM3 (ref 0.9–4.8)
LYMPHOCYTES NFR BLD AUTO: 28.6 % (ref 19.6–45.3)
MAGNESIUM SERPL-MCNC: 2 MG/DL (ref 1.6–2.4)
MCH RBC QN AUTO: 32.3 PG (ref 26.9–32)
MCHC RBC AUTO-ENTMCNC: 34.8 G/DL (ref 32.4–36.3)
MCV RBC AUTO: 92.7 FL (ref 80.5–98.2)
MONOCYTES # BLD AUTO: 0.31 10*3/MM3 (ref 0.2–1.2)
MONOCYTES NFR BLD AUTO: 8.5 % (ref 5–12)
NEUTROPHILS # BLD AUTO: 2.2 10*3/MM3 (ref 1.9–8.1)
NEUTROPHILS NFR BLD AUTO: 60.5 % (ref 42.7–76)
NRBC BLD MANUAL-RTO: 0 /100 WBC (ref 0–0)
PLATELET # BLD AUTO: 173 10*3/MM3 (ref 140–500)
PMV BLD AUTO: 10.5 FL (ref 6–12)
POTASSIUM BLD-SCNC: 2.8 MMOL/L (ref 3.5–5.2)
PROT SERPL-MCNC: 7.2 G/DL (ref 6–8.5)
RBC # BLD AUTO: 4.09 10*6/MM3 (ref 3.9–5.2)
SODIUM BLD-SCNC: 139 MMOL/L (ref 136–145)
WBC NRBC COR # BLD: 3.64 10*3/MM3 (ref 4.5–10.7)

## 2017-11-15 PROCEDURE — 80053 COMPREHEN METABOLIC PANEL: CPT | Performed by: INTERNAL MEDICINE

## 2017-11-15 PROCEDURE — 96365 THER/PROPH/DIAG IV INF INIT: CPT | Performed by: INTERNAL MEDICINE

## 2017-11-15 PROCEDURE — 96366 THER/PROPH/DIAG IV INF ADDON: CPT | Performed by: INTERNAL MEDICINE

## 2017-11-15 PROCEDURE — 83735 ASSAY OF MAGNESIUM: CPT | Performed by: INTERNAL MEDICINE

## 2017-11-15 PROCEDURE — 85025 COMPLETE CBC W/AUTO DIFF WBC: CPT | Performed by: INTERNAL MEDICINE

## 2017-11-15 PROCEDURE — 99215 OFFICE O/P EST HI 40 MIN: CPT | Performed by: INTERNAL MEDICINE

## 2017-11-15 PROCEDURE — 25010000002 POTASSIUM CHLORIDE PER 2 MEQ OF POTASSIUM: Performed by: INTERNAL MEDICINE

## 2017-11-15 PROCEDURE — 36415 COLL VENOUS BLD VENIPUNCTURE: CPT

## 2017-11-15 PROCEDURE — 96361 HYDRATE IV INFUSION ADD-ON: CPT | Performed by: INTERNAL MEDICINE

## 2017-11-15 RX ORDER — SODIUM CHLORIDE 9 MG/ML
500 INJECTION, SOLUTION INTRAVENOUS ONCE
Status: COMPLETED | OUTPATIENT
Start: 2017-11-15 | End: 2017-11-15

## 2017-11-15 RX ORDER — POTASSIUM CHLORIDE 20 MEQ/1
40 TABLET, EXTENDED RELEASE ORAL DAILY
Qty: 60 TABLET | Refills: 0 | Status: SHIPPED | OUTPATIENT
Start: 2017-11-15 | End: 2017-11-15 | Stop reason: SDUPTHER

## 2017-11-15 RX ORDER — HYDROCODONE BITARTRATE AND ACETAMINOPHEN 7.5; 325 MG/1; MG/1
TABLET ORAL
Refills: 0 | COMMUNITY
Start: 2017-10-30 | End: 2017-11-29

## 2017-11-15 RX ADMIN — SODIUM CHLORIDE 500 ML: 900 INJECTION, SOLUTION INTRAVENOUS at 11:30

## 2017-11-15 RX ADMIN — POTASSIUM CHLORIDE: 149 INJECTION, SOLUTION, CONCENTRATE INTRAVENOUS at 12:54

## 2017-11-15 NOTE — PROGRESS NOTES
[facility] GROUP OUTPATIENT FOLLOW UP CLINIC VISIT    REASON FOR FOLLOW-UP:    1.  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  Gleevec initiated around 10/12/2017.    HISTORY OF PRESENT ILLNESS:  Nicole Lofton is a 72 y.o. female who returns today for follow up of the above issue.      Unfortunately she continues to have issues with nausea and vomiting.  She has been taking ondansetron 3 times daily for this which has not really been helping.  She is keeping fluids down.  She remains very weak.  She continues to have periorbital edema.  She continues Gleevec 400 mg daily.  She did present to the emergency department on 11/6/2017.  Her potassium was very low at 2.8.  She was given some IV fluids with potassium.    ONCOLOGIC HISTORY:  For about 6 months she has noticed bilateral arm tingling and weakness in her hands.  This has occurred about 5 or 6 times over the past 6 months.  She is vague in her description of this.  She is globally weak and is quite inactive as she lives by herself.  She was completing physical therapy with some improvement but is now quite unsteady on her feet.  She has not had any falls.  She denies any bleeding.  She has chronic fatigue.  She has osteoarthritis pain and reports pain in the right side and in her neck.  She denies any acute low back pain but does have chronic low back pain.  She does have orthostatic symptoms and she is lightheaded when she stands.  She has had a decreased appetite and some weight loss over the past 9 months.     Labs performed on 8/28/2017 showed a white blood cell count of 39.1 with a differential showing 61% neutrophils and 32% lymphocytes, hemoglobin 13.9 and platelets 565,000.         She had further labs done at [facility] on 8/20/2017 showing a white blood cell count of 24.4, hemoglobin 13.7, and platelets 1795.  A differential showed 64% neutrophils, 7% lymphocytes, and 12% basophils.  Peripheral blood flow  cytometry was also done on this date showing 11% basophils and less than 1% myeloblasts.  There was no monoclonal B-cell, T-cell, plasma cell, or NK cell population.  FISH for BCR-ABL and ESSENCE 2 mutation analysis were done as well.     Her CBC was normal as of 7/8/2016.     Of note, she has a history of bilateral breast cancer diagnosed in 1988.  She had bilateral mastectomy with reconstruction.  We do not have any records regarding this at this time.  She apparently completed at least 6 months of adjuvant chemotherapy but again does not know the details..    FISH for BCR-ABL performed at [place] was actually positive.  As of 9/6/2017 ESSENCE 2 is still pending.    She returned on 9/6/2017 for follow-up.  Platelets at 1.9 million with hydroxyurea 1000 mg daily.  She will increase the dose to 2000 mg daily.  She will have a bone marrow aspiration biopsy performed.  Weekly CBCs.    As of 9/6/2017 peripheral blood PCR was positive at 89.238% with a major break point mutation.    ESSENCE 2 and CALR mutation negative.    Bone marrow aspiration and biopsy performed on 9/15/2017.  Flow cytometry showed no increase in blasts.  Morphology showed no increase in blasts.  Bone marrow FISH showed BCR/ABL rearrangement in 93% of cells.  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    Labs as of 9/20/2017 show white blood cell count of 4.7 with hemoglobin of 12.7 and platelets 1.1 million.    On 9/27/2017 white blood cells and hemoglobin are normal.  Platelets are down to 722,000.  We plan to start Gleevec at 400 mg daily if she tolerates it.  In the meantime she will decrease the hydroxyurea dose to 1000 mg daily.      PAST MEDICAL, SURGICAL, FAMILY, AND SOCIAL HISTORIES were reviewed with the patient and in the electronic medical record, and were updated if indicated.    ALLERGIES:  No Known Allergies    MEDICATIONS:  The medication list has been reviewed with the patient by the medical assistant, and the list has been updated in the  "electronic medical record, which I reviewed.  Medication dosages and frequencies were confirmed to be accurate.    REVIEW OF SYSTEMS:  PAIN:  See Vital Signs below.  GENERAL:  No fevers, chills, night sweats, or unintended weight loss.  Fatigue  SKIN:  No rashes or non-healing lesions. See HPI   HEME/LYMPH: See history of present illness  EYES:  See HPI   ENT:  No sore throat or difficulty swallowing.  RESPIRATORY:  No cough, shortness of breath, hemoptysis, or wheezing.  CARDIOVASCULAR:  No chest pain, palpitations, orthopnea, or dyspnea on exertion.  Lightheadedness improved  GASTROINTESTINAL: See history of present illness  GENITOURINARY:  No dysuria or hematuria.  MUSCULOSKELETAL:  Arthralgias, back pain, abnormal gait, neck pain  NEUROLOGIC:  No dizziness, loss of consciousness, or seizures.  PSYCHIATRIC:  No depression, anxiety, or mood changes.    Vitals:    11/15/17 1018   BP: 134/80   Pulse: 68   Resp: 16   Temp: 97.7 °F (36.5 °C)   TempSrc: Oral   SpO2: 99%   Weight: 140 lb (63.5 kg)   Height: 67\" (170.2 cm)   PainSc:   4   PainLoc: Hip       PHYSICAL EXAMINATION:  GENERAL:  Moderately ill-appearing female; awake, alert and oriented, in no acute distress.She is accompanied by her son and daughter today.  SKIN:  Warm and dry, without rashes, purpura, or petechiae.  HEAD:  Normocephalic, atraumatic.  EYES:  Pupils equal, round and reactive to light.  Extraocular movements intact.  Conjunctivae normal.Evidence of bilateral periorbital edema, no purulent drainage or visual deficits noted   EARS:  Hearing intact.  NOSE:  Septum midline.  No excoriations or nasal discharge.  MOUTH:  No stomatitis or ulcers.  Lips are normal.  Dry mucous membranes  THROAT:  Oropharynx without lesions or exudates.  NECK:  Supple with good range of motion; no thyromegaly or masses; no JVD or bruits.  LYMPHATICS:  No cervical, supraclavicular, axillary, or inguinal lymphadenopathy.  CHEST:  Lungs are clear to auscultation " bilaterally.  No wheezes, rales, or rhonchi.  HEART:  Regular rate; normal rhythm.  No murmurs, gallops or rubs.  ABDOMEN:  Soft, non-tender, non-distended.  Normal active bowel sounds.  No organomegaly.  EXTREMITIES:  No clubbing, cyanosis, or edema.  NEUROLOGICAL:  No focal neurologic deficits.      DIAGNOSTIC DATA:  Lab Results   Component Value Date    WBC 3.64 (L) 11/15/2017    HGB 13.2 11/15/2017    HCT 37.9 11/15/2017    MCV 92.7 11/15/2017     11/15/2017       IMAGING:  None reviewed    ASSESSMENT:  This is a 72 y.o. female with:  1.  History of bilateral breast cancer in 1988 status post bilateral mastectomy.  She apparently completed 6 months of adjuvant therapy.  We have no details regarding this.    2.  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  Started on Hydrea 1000 mg daily.  This was discontinued and she started Gleevec On approximately 10/12/2017.  She continues on 400 mg of Gleevac daily.  We will discontinue this at this time due to intolerance including nausea vomiting and fatigue.  3.  Thrombocytosis:  Her platelet count today shows continued improvement.  4.  Fatigue related to therapy and disease. Though this is a chronic issue for the patient, she has noted progression.  He found her TSH to be quite elevated and we increased her levothyroxine dose last week.  This has not really helped.  5.  Nausea and vomiting: Gleevec can certainly be contributing.  Discontinue the medication at this time.  If we resume that we may try 200 mg daily.  Continue antiemetics.  6. Periorbital edema: This is a known side effect effect secondary to Gleevec.  Again, hold the medication at this time.  7.  Hypokalemia: Her potassium is 2.8 today.  Her magnesium is normal.  We will give her 20 mEq of potassium chloride today.  Start 40 mEq daily at home.  Lasix is listed as a home medication but she is no longer taking this.    PLAN:  1.  IV fluids with  potassium repletion today.  2.  Hold Gleevec at this time.  3.  Start potassium chloride 40 mEq once daily at home.  Nursing has sent a prescription to her pharmacy.  4.  I will see her back in 2 weeks for follow-up with labs.  Hopefully her symptoms will be improved at that time.  If so, we will consider resuming Gleevec at 200 mg daily.  Alternatively, another TKI could utilized as well.  Further discussion at that time.    Son and daughter are present and assisted with history.  High-risk medication use.  Multiple issues as discussed above.

## 2017-11-16 RX ORDER — POTASSIUM CHLORIDE 20 MEQ/1
TABLET, EXTENDED RELEASE ORAL
Qty: 180 TABLET | Refills: 0 | Status: SHIPPED | OUTPATIENT
Start: 2017-11-16 | End: 2018-03-07

## 2017-11-17 ENCOUNTER — TELEPHONE (OUTPATIENT)
Dept: ONCOLOGY | Facility: HOSPITAL | Age: 72
End: 2017-11-17

## 2017-11-17 RX ORDER — ALPRAZOLAM 0.25 MG/1
0.25 TABLET ORAL 2 TIMES DAILY PRN
Qty: 30 TABLET | Refills: 0 | OUTPATIENT
Start: 2017-11-17 | End: 2017-12-06 | Stop reason: SDUPTHER

## 2017-11-17 NOTE — TELEPHONE ENCOUNTER
Pt's daughter calling requesting script for xanax. They had discussed with Maryse previously but forgot to talk with Dr Merida about it. Reviewed with Dr Merida, Ok to call in xanax 0.25mg BID PRN #[id number] with no refills.   Script called in to patient's pharmacy.

## 2017-11-29 ENCOUNTER — LAB (OUTPATIENT)
Dept: OTHER | Facility: HOSPITAL | Age: 72
End: 2017-11-29

## 2017-11-29 ENCOUNTER — OFFICE VISIT (OUTPATIENT)
Dept: ONCOLOGY | Facility: CLINIC | Age: 72
End: 2017-11-29

## 2017-11-29 VITALS
HEART RATE: 77 BPM | OXYGEN SATURATION: 97 % | WEIGHT: 133.8 LBS | HEIGHT: 65 IN | SYSTOLIC BLOOD PRESSURE: 136 MMHG | DIASTOLIC BLOOD PRESSURE: 83 MMHG | BODY MASS INDEX: 22.29 KG/M2 | TEMPERATURE: 98.2 F | RESPIRATION RATE: 16 BRPM

## 2017-11-29 DIAGNOSIS — C92.10 CML (CHRONIC MYELOID LEUKEMIA) (HCC): ICD-10-CM

## 2017-11-29 DIAGNOSIS — C92.10 CML (CHRONIC MYELOID LEUKEMIA) (HCC): Primary | ICD-10-CM

## 2017-11-29 LAB
ALBUMIN SERPL-MCNC: 4.3 G/DL (ref 3.5–5.2)
ALBUMIN/GLOB SERPL: 1.3 G/DL
ALP SERPL-CCNC: 88 U/L (ref 39–117)
ALT SERPL W P-5'-P-CCNC: 26 U/L (ref 1–33)
ANION GAP SERPL CALCULATED.3IONS-SCNC: 12.9 MMOL/L
AST SERPL-CCNC: 31 U/L (ref 1–32)
BASOPHILS # BLD AUTO: 0.02 10*3/MM3 (ref 0–0.2)
BASOPHILS NFR BLD AUTO: 0.5 % (ref 0–1.5)
BILIRUB SERPL-MCNC: 0.8 MG/DL (ref 0.1–1.2)
BUN BLD-MCNC: 11 MG/DL (ref 8–23)
BUN/CREAT SERPL: 13.9 (ref 7–25)
CALCIUM SPEC-SCNC: 9.6 MG/DL (ref 8.6–10.5)
CHLORIDE SERPL-SCNC: 102 MMOL/L (ref 98–107)
CO2 SERPL-SCNC: 25.1 MMOL/L (ref 22–29)
CREAT BLD-MCNC: 0.79 MG/DL (ref 0.57–1)
DEPRECATED RDW RBC AUTO: 53.1 FL (ref 37–54)
EOSINOPHIL # BLD AUTO: 0.08 10*3/MM3 (ref 0–0.7)
EOSINOPHIL NFR BLD AUTO: 2 % (ref 0.3–6.2)
ERYTHROCYTE [DISTWIDTH] IN BLOOD BY AUTOMATED COUNT: 15 % (ref 11.7–13)
GFR SERPL CREATININE-BSD FRML MDRD: 71 ML/MIN/1.73
GLOBULIN UR ELPH-MCNC: 3.2 GM/DL
GLUCOSE BLD-MCNC: 105 MG/DL (ref 65–99)
HCT VFR BLD AUTO: 36.4 % (ref 35.6–45.5)
HGB BLD-MCNC: 12.4 G/DL (ref 11.9–15.5)
IMM GRANULOCYTES # BLD: 0.01 10*3/MM3 (ref 0–0.03)
IMM GRANULOCYTES NFR BLD: 0.2 % (ref 0–0.5)
LYMPHOCYTES # BLD AUTO: 1.4 10*3/MM3 (ref 0.9–4.8)
LYMPHOCYTES NFR BLD AUTO: 34.4 % (ref 19.6–45.3)
MAGNESIUM SERPL-MCNC: 1.8 MG/DL (ref 1.6–2.4)
MCH RBC QN AUTO: 32.8 PG (ref 26.9–32)
MCHC RBC AUTO-ENTMCNC: 34.1 G/DL (ref 32.4–36.3)
MCV RBC AUTO: 96.3 FL (ref 80.5–98.2)
MONOCYTES # BLD AUTO: 0.38 10*3/MM3 (ref 0.2–1.2)
MONOCYTES NFR BLD AUTO: 9.3 % (ref 5–12)
NEUTROPHILS # BLD AUTO: 2.18 10*3/MM3 (ref 1.9–8.1)
NEUTROPHILS NFR BLD AUTO: 53.6 % (ref 42.7–76)
NRBC BLD MANUAL-RTO: 0 /100 WBC (ref 0–0)
PLATELET # BLD AUTO: 175 10*3/MM3 (ref 140–500)
PMV BLD AUTO: 9.6 FL (ref 6–12)
POTASSIUM BLD-SCNC: 3.8 MMOL/L (ref 3.5–5.2)
PROT SERPL-MCNC: 7.5 G/DL (ref 6–8.5)
RBC # BLD AUTO: 3.78 10*6/MM3 (ref 3.9–5.2)
SODIUM BLD-SCNC: 140 MMOL/L (ref 136–145)
WBC NRBC COR # BLD: 4.07 10*3/MM3 (ref 4.5–10.7)

## 2017-11-29 PROCEDURE — 80053 COMPREHEN METABOLIC PANEL: CPT | Performed by: INTERNAL MEDICINE

## 2017-11-29 PROCEDURE — 36415 COLL VENOUS BLD VENIPUNCTURE: CPT

## 2017-11-29 PROCEDURE — 85025 COMPLETE CBC W/AUTO DIFF WBC: CPT | Performed by: INTERNAL MEDICINE

## 2017-11-29 PROCEDURE — 99214 OFFICE O/P EST MOD 30 MIN: CPT | Performed by: INTERNAL MEDICINE

## 2017-11-29 PROCEDURE — 83735 ASSAY OF MAGNESIUM: CPT | Performed by: INTERNAL MEDICINE

## 2017-11-29 RX ORDER — INFLUENZA A VIRUS A/MICHIGAN/45/2015 X-275 (H1N1) ANTIGEN (FORMALDEHYDE INACTIVATED), INFLUENZA A VIRUS A/SINGAPORE/INFIMH-16-0019/2016 IVR-186 (H3N2) ANTIGEN (FORMALDEHYDE INACTIVATED), AND INFLUENZA B VIRUS B/MARYLAND/15/2016 BX-69A (A B/COLORADO/6/2017-LIKE VIRUS) ANTIGEN (FORMALDEHYDE INACTIVATED) 60; 60; 60 UG/.5ML; UG/.5ML; UG/.5ML
INJECTION, SUSPENSION INTRAMUSCULAR
Refills: 0 | COMMUNITY
Start: 2017-11-22 | End: 2018-09-06

## 2017-11-29 NOTE — PROGRESS NOTES
[facility] GROUP OUTPATIENT FOLLOW UP CLINIC VISIT    REASON FOR FOLLOW-UP:    1.  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  Gleevec initiated around 10/12/2017, stopped on 11/15/2017 due to intolerance (noah-orbital edema, nausea/vomiting, fatigue).    HISTORY OF PRESENT ILLNESS:  Nicole Lofton is a 72 y.o. female who returns today for follow up of the above issue.      She feels better since stopping the Gleevec.  Energy level is slowly improving.  Nausea and vomiting continue to improve.  Periorbital edema is improving.  She does complain of arthritis pain mostly in her back.    ONCOLOGIC HISTORY:  For about 6 months she has noticed bilateral arm tingling and weakness in her hands.  This has occurred about 5 or 6 times over the past 6 months.  She is vague in her description of this.  She is globally weak and is quite inactive as she lives by herself.  She was completing physical therapy with some improvement but is now quite unsteady on her feet.  She has not had any falls.  She denies any bleeding.  She has chronic fatigue.  She has osteoarthritis pain and reports pain in the right side and in her neck.  She denies any acute low back pain but does have chronic low back pain.  She does have orthostatic symptoms and she is lightheaded when she stands.  She has had a decreased appetite and some weight loss over the past 9 months.     Labs performed on 8/28/2017 showed a white blood cell count of 39.1 with a differential showing 61% neutrophils and 32% lymphocytes, hemoglobin 13.9 and platelets 565,000.         She had further labs done at [place] on 8/20/2017 showing a white blood cell count of 24.4, hemoglobin 13.7, and platelets 1795.  A differential showed 64% neutrophils, 7% lymphocytes, and 12% basophils.  Peripheral blood flow cytometry was also done on this date showing 11% basophils and less than 1% myeloblasts.  There was no monoclonal B-cell, T-cell,  plasma cell, or NK cell population.  FISH for BCR-ABL and ESSENCE 2 mutation analysis were done as well.     Her CBC was normal as of 7/8/2016.     Of note, she has a history of bilateral breast cancer diagnosed in 1988.  She had bilateral mastectomy with reconstruction.  We do not have any records regarding this at this time.  She apparently completed at least 6 months of adjuvant chemotherapy but again does not know the details..    FISH for BCR-ABL performed at [place] was actually positive.  As of 9/6/2017 ESSENCE 2 is still pending.    She returned on 9/6/2017 for follow-up.  Platelets at 1.9 million with hydroxyurea 1000 mg daily.  She will increase the dose to 2000 mg daily.  She will have a bone marrow aspiration biopsy performed.  Weekly CBCs.    As of 9/6/2017 peripheral blood PCR was positive at 89.238% with a major break point mutation.    ESSENCE 2 and CALR mutation negative.    Bone marrow aspiration and biopsy performed on 9/15/2017.  Flow cytometry showed no increase in blasts.  Morphology showed no increase in blasts.  Bone marrow FISH showed BCR/ABL rearrangement in 93% of cells.  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    Labs as of 9/20/2017 show white blood cell count of 4.7 with hemoglobin of 12.7 and platelets 1.1 million.    On 9/27/2017 white blood cells and hemoglobin are normal.  Platelets are down to 722,000.  We plan to start Gleevec at 400 mg daily if she tolerates it.  In the meantime she will decrease the hydroxyurea dose to 1000 mg daily.    Blood counts normalized.  Hydroxyurea stopped.    Gleevec initiated around 10/12/2017, stopped on 11/15/2017 due to intolerance (noah-orbital edema, nausea/vomiting, fatigue).    Symptoms improving as of 11/29/2017.  Blood counts normal.  Hold further therapy at this time until she improves more clinically.        PAST MEDICAL, SURGICAL, FAMILY, AND SOCIAL HISTORIES were reviewed with the patient and in the electronic medical record, and were updated  "if indicated.    ALLERGIES:  No Known Allergies    MEDICATIONS:  The medication list has been reviewed with the patient by the medical assistant, and the list has been updated in the electronic medical record, which I reviewed.  Medication dosages and frequencies were confirmed to be accurate.    REVIEW OF SYSTEMS:  PAIN:  See Vital Signs below.  GENERAL:  No fevers, chills, night sweats, or unintended weight loss.  Fatigue  SKIN:  No rashes or non-healing lesions. See HPI   HEME/LYMPH: See history of present illness  EYES:  See HPI   ENT:  No sore throat or difficulty swallowing.  RESPIRATORY:  No cough, shortness of breath, hemoptysis, or wheezing.  CARDIOVASCULAR:  No chest pain, palpitations, orthopnea, or dyspnea on exertion.  Lightheadedness improved  GASTROINTESTINAL: See history of present illness  GENITOURINARY:  No dysuria or hematuria.  MUSCULOSKELETAL:  Arthralgias, back pain, abnormal gait, neck pain  NEUROLOGIC:  No dizziness, loss of consciousness, or seizures.  PSYCHIATRIC:  No depression, anxiety, or mood changes.    Vitals:    11/29/17 1104   BP: 136/83   Pulse: 77   Resp: 16   Temp: 98.2 °F (36.8 °C)   TempSrc: Oral   SpO2: 97%   Weight: 133 lb 12.8 oz (60.7 kg)   Height: 65.16\" (165.5 cm)  Comment: new ht was taken on 8/29/2017   PainSc:   8       PHYSICAL EXAMINATION:  GENERAL:  Well developed female; awake, alert and oriented, in no acute distress.  Looks somewhat better today.    SKIN:  Warm and dry, without rashes, purpura, or petechiae.  HEAD:  Normocephalic, atraumatic.  EYES:  Pupils equal, round and reactive to light.  Extraocular movements intact.  Conjunctivae normal.  Bilateral periorbital edema improved  EARS:  Hearing intact.  NOSE:  Septum midline.  No excoriations or nasal discharge.  MOUTH:  No stomatitis or ulcers.  Lips are normal.  Dry mucous membranes  THROAT:  Oropharynx without lesions or exudates.  NECK:  Supple with good range of motion; no thyromegaly or masses; no JVD or " bruits.  LYMPHATICS:  No cervical, supraclavicular, axillary, or inguinal lymphadenopathy.  CHEST:  Lungs are clear to auscultation bilaterally.  No wheezes, rales, or rhonchi.  HEART:  Regular rate; normal rhythm.  No murmurs, gallops or rubs.  ABDOMEN:  Soft, non-tender, non-distended.  Normal active bowel sounds.  No organomegaly.  EXTREMITIES:  No clubbing, cyanosis, or edema.  NEUROLOGICAL:  No focal neurologic deficits.      DIAGNOSTIC DATA:  Lab Results   Component Value Date    WBC 4.07 (L) 11/29/2017    HGB 12.4 11/29/2017    HCT 36.4 11/29/2017    MCV 96.3 11/29/2017     11/29/2017       IMAGING:  None reviewed    ASSESSMENT:  This is a 72 y.o. female with:  1.  History of bilateral breast cancer in 1988 status post bilateral mastectomy.  She apparently completed 6 months of adjuvant therapy.  We have no details regarding this.    2.  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  Started on Hydrea 1000 mg daily.  This was discontinued and she started Gleevec on approximately 10/12/2017.  Gleevec discontinued due to intolerance on 11/15/2017.  She continues to slowly improve.  Her blood counts are normal.  Hold further therapy at this time.  We plan to start nilotinib when she clinically improves.    3.  Thrombocytosis:  Her platelet count today shows continued improvement.  4.  Fatigue related to therapy and disease. Though this is a chronic issue for the patient, she has noted progression.  We found her TSH to be quite elevated and we increased her levothyroxine dose last week.  She is slowly improving.    5.  Nausea and vomiting: Improving off Gleevec  6. Periorbital edema: Improving off Gleevec.    7.  Hypokalemia:  Resolved.  Decrease oral potassium to once pill daily.    8.  Osteoarthritis pain: She requested an opiate today but I'm not comfortable prescribing this for this indication as it is not cancer related pain.  She is using ibuprofen.   I recommended naproxen.  New issue discussed today.      PLAN:  1.  Continue to hold Gleevec at this time.  2.  Continue potassium chloride  But decrease to 20 mEq once daily at home.    3.  I'll see her back in 3 weeks.  We can consider Gleevec 200 mg daily or nilotinib 300 mg bid at that time.  She has a supply of Gleevec at home.

## 2017-12-04 ENCOUNTER — TELEPHONE (OUTPATIENT)
Dept: ONCOLOGY | Facility: HOSPITAL | Age: 72
End: 2017-12-04

## 2017-12-04 NOTE — TELEPHONE ENCOUNTER
Patients daughter calling  Patient fell and is at [facility] and broke her humerus and needs surgery. Ortho surgeon wants to make sure it is ok. Told daughter to have surgeon call Dr. Merida.

## 2017-12-05 ENCOUNTER — TELEPHONE (OUTPATIENT)
Dept: ONCOLOGY | Facility: HOSPITAL | Age: 72
End: 2017-12-05

## 2017-12-05 NOTE — TELEPHONE ENCOUNTER
SULLY WITH [facility] CALLING, PT IS BEING DISCHARGED FROM HOSPITAL TODAY AFTER FALL WITH INJURY.  PT DOES  NOT HAVE A PCP, THEY WERE ENCOURAGED TO TRY SUB-ACUTE REHAB AT D/C BUT PT AND FAMILY REFUSED.  THEY ARE SEEING IF DR CARLIN WOULD BE OKAY BEING THE ORDERING MD FOR PT/OT EVAL AND RN TO GO TO HOUSE TO DO ASSESSMENT ON PT.    MESSAGE SENT TO DR CARLIN, KIMBERLEY TO ORDER PT/OT EVAL AND RN TO GO TO HOUSE TO EVALUATE PT.  ATTEMPTED TO CALL SULLY BACK BUT NO ANSWER, MESSAGE LEFT WITH ORDERS OKAY FOR PT/OT EVAL AND RN TO HOUSE. LEFT MESSAGE TO CALL OFFICE FOR FURTHER QUESTIONS

## 2017-12-06 RX ORDER — ALPRAZOLAM 0.25 MG/1
0.25 TABLET ORAL 2 TIMES DAILY PRN
Qty: 30 TABLET | Refills: 0 | OUTPATIENT
Start: 2017-12-06 | End: 2018-03-07

## 2017-12-06 RX ORDER — ALPRAZOLAM 0.25 MG/1
TABLET ORAL
Qty: 30 TABLET | Refills: 0 | Status: CANCELLED | OUTPATIENT
Start: 2017-12-06

## 2017-12-06 NOTE — TELEPHONE ENCOUNTER
Refill request received through [employer] for Xanax.  Refill called into [facility] pharmacy.  Xanax 0.25mg BID as needed for anxiety.  Dispense 30 with no refills.

## 2017-12-08 ENCOUNTER — TELEPHONE (OUTPATIENT)
Dept: ONCOLOGY | Facility: HOSPITAL | Age: 72
End: 2017-12-08

## 2017-12-08 RX ORDER — AMLODIPINE BESYLATE 10 MG/1
10 TABLET ORAL DAILY
Qty: 30 TABLET | Refills: 0 | Status: SHIPPED | OUTPATIENT
Start: 2017-12-08 | End: 2018-12-19 | Stop reason: SDUPTHER

## 2017-12-08 RX ORDER — ATORVASTATIN CALCIUM 40 MG/1
40 TABLET, FILM COATED ORAL DAILY
Qty: 30 TABLET | Refills: 0 | Status: SHIPPED | OUTPATIENT
Start: 2017-12-08 | End: 2018-10-16 | Stop reason: SDUPTHER

## 2017-12-08 RX ORDER — ATORVASTATIN CALCIUM 40 MG/1
TABLET, FILM COATED ORAL
Qty: 90 TABLET | Refills: 0 | OUTPATIENT
Start: 2017-12-08

## 2017-12-08 RX ORDER — AMLODIPINE BESYLATE 10 MG/1
TABLET ORAL
Qty: 90 TABLET | Refills: 0 | OUTPATIENT
Start: 2017-12-08

## 2017-12-08 NOTE — TELEPHONE ENCOUNTER
Denied request for 90 day supply---we will only fill 30 days once for pt as she transitions to new PCP

## 2017-12-08 NOTE — TELEPHONE ENCOUNTER
----- Message from Burt Merida MD sent at 12/8/2017 11:48 AM EST -----  Ok fine  ----- Message -----     From: Christa Tran RN     Sent: 12/8/2017   9:56 AM       To: Burt Merida MD    Patients daughter called--she is going to run out of her Norvasc and Lipitor and her PCP had to immediately stop practicing and they are in the process of getting [facility] to come out but wont be for a week or two. Asking if we can send in month supply until [facility] takes over?

## 2017-12-08 NOTE — TELEPHONE ENCOUNTER
Patients daughter called. Need rf of norvasc and lipitor and her pcp had to abruptly stop practicing. They have contacted [facility] and they will be coming out to see her in the next week or two to take over but will run out before then. Msg sent to  to see if ok to fill for a month.

## 2017-12-08 NOTE — TELEPHONE ENCOUNTER
Lisette PT with [facility] calling to obtain orders to continue PT twice weekly for 4 weeks.  Pt is in the midst of getting [facility] but currently doesn't have a PCP.  Dr. Fuad hewitt'd previously for OT/PT to eval and treat. Orders given

## 2017-12-18 RX ORDER — POTASSIUM CHLORIDE 20 MEQ/1
TABLET, EXTENDED RELEASE ORAL
Qty: 60 TABLET | Refills: 0 | Status: SHIPPED | OUTPATIENT
Start: 2017-12-18 | End: 2018-03-07

## 2017-12-20 ENCOUNTER — LAB (OUTPATIENT)
Dept: OTHER | Facility: HOSPITAL | Age: 72
End: 2017-12-20

## 2017-12-20 ENCOUNTER — OFFICE VISIT (OUTPATIENT)
Dept: ONCOLOGY | Facility: CLINIC | Age: 72
End: 2017-12-20

## 2017-12-20 VITALS
HEIGHT: 65 IN | HEART RATE: 75 BPM | OXYGEN SATURATION: 97 % | WEIGHT: 130.2 LBS | TEMPERATURE: 98 F | SYSTOLIC BLOOD PRESSURE: 139 MMHG | RESPIRATION RATE: 16 BRPM | BODY MASS INDEX: 21.69 KG/M2 | DIASTOLIC BLOOD PRESSURE: 82 MMHG

## 2017-12-20 DIAGNOSIS — C92.10 CML (CHRONIC MYELOID LEUKEMIA) (HCC): Primary | ICD-10-CM

## 2017-12-20 DIAGNOSIS — C92.10 CML (CHRONIC MYELOID LEUKEMIA) (HCC): ICD-10-CM

## 2017-12-20 LAB
ALBUMIN SERPL-MCNC: 4 G/DL (ref 3.5–5.2)
ALBUMIN/GLOB SERPL: 1.3 G/DL
ALP SERPL-CCNC: 122 U/L (ref 39–117)
ALT SERPL W P-5'-P-CCNC: 22 U/L (ref 1–33)
ANION GAP SERPL CALCULATED.3IONS-SCNC: 12 MMOL/L
AST SERPL-CCNC: 33 U/L (ref 1–32)
BASOPHILS # BLD AUTO: 0.04 10*3/MM3 (ref 0–0.2)
BASOPHILS NFR BLD AUTO: 0.6 % (ref 0–1.5)
BILIRUB SERPL-MCNC: 0.7 MG/DL (ref 0.1–1.2)
BUN BLD-MCNC: 14 MG/DL (ref 8–23)
BUN/CREAT SERPL: 16.1 (ref 7–25)
CALCIUM SPEC-SCNC: 9.6 MG/DL (ref 8.6–10.5)
CHLORIDE SERPL-SCNC: 100 MMOL/L (ref 98–107)
CO2 SERPL-SCNC: 28 MMOL/L (ref 22–29)
CREAT BLD-MCNC: 0.87 MG/DL (ref 0.57–1)
DEPRECATED RDW RBC AUTO: 46 FL (ref 37–54)
EOSINOPHIL # BLD AUTO: 0.12 10*3/MM3 (ref 0–0.7)
EOSINOPHIL NFR BLD AUTO: 1.8 % (ref 0.3–6.2)
ERYTHROCYTE [DISTWIDTH] IN BLOOD BY AUTOMATED COUNT: 13.1 % (ref 11.7–13)
GFR SERPL CREATININE-BSD FRML MDRD: 64 ML/MIN/1.73
GLOBULIN UR ELPH-MCNC: 3.2 GM/DL
GLUCOSE BLD-MCNC: 132 MG/DL (ref 65–99)
HCT VFR BLD AUTO: 36.9 % (ref 35.6–45.5)
HGB BLD-MCNC: 12.1 G/DL (ref 11.9–15.5)
IMM GRANULOCYTES # BLD: 0.03 10*3/MM3 (ref 0–0.03)
IMM GRANULOCYTES NFR BLD: 0.4 % (ref 0–0.5)
LYMPHOCYTES # BLD AUTO: 1.29 10*3/MM3 (ref 0.9–4.8)
LYMPHOCYTES NFR BLD AUTO: 19.3 % (ref 19.6–45.3)
MCH RBC QN AUTO: 31.6 PG (ref 26.9–32)
MCHC RBC AUTO-ENTMCNC: 32.8 G/DL (ref 32.4–36.3)
MCV RBC AUTO: 96.3 FL (ref 80.5–98.2)
MONOCYTES # BLD AUTO: 0.54 10*3/MM3 (ref 0.2–1.2)
MONOCYTES NFR BLD AUTO: 8.1 % (ref 5–12)
NEUTROPHILS # BLD AUTO: 4.67 10*3/MM3 (ref 1.9–8.1)
NEUTROPHILS NFR BLD AUTO: 69.8 % (ref 42.7–76)
NRBC BLD MANUAL-RTO: 0 /100 WBC (ref 0–0)
PLATELET # BLD AUTO: 377 10*3/MM3 (ref 140–500)
PMV BLD AUTO: 9.4 FL (ref 6–12)
POTASSIUM BLD-SCNC: 3.9 MMOL/L (ref 3.5–5.2)
PROT SERPL-MCNC: 7.2 G/DL (ref 6–8.5)
RBC # BLD AUTO: 3.83 10*6/MM3 (ref 3.9–5.2)
SODIUM BLD-SCNC: 140 MMOL/L (ref 136–145)
WBC NRBC COR # BLD: 6.69 10*3/MM3 (ref 4.5–10.7)

## 2017-12-20 PROCEDURE — 99213 OFFICE O/P EST LOW 20 MIN: CPT | Performed by: INTERNAL MEDICINE

## 2017-12-20 PROCEDURE — 80053 COMPREHEN METABOLIC PANEL: CPT | Performed by: NURSE PRACTITIONER

## 2017-12-20 PROCEDURE — 85025 COMPLETE CBC W/AUTO DIFF WBC: CPT | Performed by: NURSE PRACTITIONER

## 2017-12-20 PROCEDURE — 36415 COLL VENOUS BLD VENIPUNCTURE: CPT

## 2017-12-20 NOTE — PROGRESS NOTES
[facility] GROUP OUTPATIENT FOLLOW UP CLINIC VISIT    REASON FOR FOLLOW-UP:    1.  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  Gleevec initiated around 10/12/2017, stopped on 11/15/2017 due to intolerance (noah-orbital edema, nausea/vomiting, fatigue).    HISTORY OF PRESENT ILLNESS:  Nicole Lofton is a 72 y.o. female who returns today for follow up of the above issue.      She unfortunately fell about 2-3 weeks ago fracturing her right humerus.  This is healing nicely apparently according to the orthopedic surgeon.  In spite of this however she is feeling much better.  She is getting stronger.  Her appetite is improving.  No nausea or vomiting.    ONCOLOGIC HISTORY:  For about 6 months she has noticed bilateral arm tingling and weakness in her hands.  This has occurred about 5 or 6 times over the past 6 months.  She is vague in her description of this.  She is globally weak and is quite inactive as she lives by herself.  She was completing physical therapy with some improvement but is now quite unsteady on her feet.  She has not had any falls.  She denies any bleeding.  She has chronic fatigue.  She has osteoarthritis pain and reports pain in the right side and in her neck.  She denies any acute low back pain but does have chronic low back pain.  She does have orthostatic symptoms and she is lightheaded when she stands.  She has had a decreased appetite and some weight loss over the past 9 months.     Labs performed on 8/28/2017 showed a white blood cell count of 39.1 with a differential showing 61% neutrophils and 32% lymphocytes, hemoglobin 13.9 and platelets 565,000.         She had further labs done at [place] on 8/20/2017 showing a white blood cell count of 24.4, hemoglobin 13.7, and platelets 1795.  A differential showed 64% neutrophils, 7% lymphocytes, and 12% basophils.  Peripheral blood flow cytometry was also done on this date showing 11% basophils and less  than 1% myeloblasts.  There was no monoclonal B-cell, T-cell, plasma cell, or NK cell population.  FISH for BCR-ABL and ESSENCE 2 mutation analysis were done as well.     Her CBC was normal as of 7/8/2016.     Of note, she has a history of bilateral breast cancer diagnosed in 1988.  She had bilateral mastectomy with reconstruction.  We do not have any records regarding this at this time.  She apparently completed at least 6 months of adjuvant chemotherapy but again does not know the details..    FISH for BCR-ABL performed at [place] was actually positive.  As of 9/6/2017 ESSENCE 2 is still pending.    She returned on 9/6/2017 for follow-up.  Platelets at 1.9 million with hydroxyurea 1000 mg daily.  She will increase the dose to 2000 mg daily.  She will have a bone marrow aspiration biopsy performed.  Weekly CBCs.    As of 9/6/2017 peripheral blood PCR was positive at 89.238% with a major break point mutation.    ESSENCE 2 and CALR mutation negative.    Bone marrow aspiration and biopsy performed on 9/15/2017.  Flow cytometry showed no increase in blasts.  Morphology showed no increase in blasts.  Bone marrow FISH showed BCR/ABL rearrangement in 93% of cells.  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    Labs as of 9/20/2017 show white blood cell count of 4.7 with hemoglobin of 12.7 and platelets 1.1 million.    On 9/27/2017 white blood cells and hemoglobin are normal.  Platelets are down to 722,000.  We plan to start Gleevec at 400 mg daily if she tolerates it.  In the meantime she will decrease the hydroxyurea dose to 1000 mg daily.    Blood counts normalized.  Hydroxyurea stopped.    Gleevec initiated around 10/12/2017, stopped on 11/15/2017 due to intolerance (noah-orbital edema, nausea/vomiting, fatigue).    Symptoms improving as of 11/29/2017.  Blood counts normal.  Hold further therapy at this time until she improves more clinically.        PAST MEDICAL, SURGICAL, FAMILY, AND SOCIAL HISTORIES were reviewed with the  "patient and in the electronic medical record, and were updated if indicated.    ALLERGIES:  No Known Allergies    MEDICATIONS:  The medication list has been reviewed with the patient by the medical assistant, and the list has been updated in the electronic medical record, which I reviewed.  Medication dosages and frequencies were confirmed to be accurate.    REVIEW OF SYSTEMS:  PAIN:  See Vital Signs below.  GENERAL:  No fevers, chills, night sweats, or unintended weight loss.  Fatigue.  See history of present illness  SKIN:  No rashes or non-healing lesions. See HPI   HEME/LYMPH: See history of present illness  EYES:  Periorbital edema resolving  ENT:  No sore throat or difficulty swallowing.  RESPIRATORY:  No cough, shortness of breath, hemoptysis, or wheezing.  CARDIOVASCULAR:  No chest pain, palpitations, orthopnea, or dyspnea on exertion.  Lightheadedness improved  GASTROINTESTINAL: See history of present illness  GENITOURINARY:  No dysuria or hematuria.  MUSCULOSKELETAL:  Arthralgias, back pain, abnormal gait, neck pain.  Right arm pain secondary to humerus fracture  NEUROLOGIC:  No dizziness, loss of consciousness, or seizures.  PSYCHIATRIC:  No depression, anxiety, or mood changes.    Vitals:    12/20/17 1110   BP: 139/82   Pulse: 75   Resp: 16   Temp: 98 °F (36.7 °C)   TempSrc: Oral   SpO2: 97%   Weight: 59.1 kg (130 lb 3.2 oz)   Height: 165 cm (64.96\")  Comment: new ht   PainSc:   6   PainLoc: Arm  Comment: rt       PHYSICAL EXAMINATION:  GENERAL:  Well developed female; awake, alert and oriented, in no acute distress.  Looks a lot better today.    SKIN:  Warm and dry, without rashes, purpura, or petechiae.  HEAD:  Normocephalic, atraumatic.  EYES:  Pupils equal, round and reactive to light.  Extraocular movements intact.  Conjunctivae normal.  Bilateral periorbital edema resolved  EARS:  Hearing intact.  NOSE:  Septum midline.  No excoriations or nasal discharge.  MOUTH:  No stomatitis or ulcers.  Lips " are normal.  Dry mucous membranes  THROAT:  Oropharynx without lesions or exudates.  NECK:  Supple with good range of motion; no thyromegaly or masses; no JVD or bruits.  LYMPHATICS:  No cervical, supraclavicular, axillary, or inguinal lymphadenopathy.  CHEST:  Lungs are clear to auscultation bilaterally.  No wheezes, rales, or rhonchi.  HEART:  Regular rate; normal rhythm.  No murmurs, gallops or rubs.  ABDOMEN:  Soft, non-tender, non-distended.  Normal active bowel sounds.  No organomegaly.  EXTREMITIES:  No clubbing, cyanosis, or edema.  Right upper extremity immobilized in a sling  NEUROLOGICAL:  No focal neurologic deficits.      DIAGNOSTIC DATA:  Lab Results   Component Value Date    WBC 6.69 12/20/2017    HGB 12.1 12/20/2017    HCT 36.9 12/20/2017    MCV 96.3 12/20/2017     12/20/2017     Potassium 3.9    IMAGING:  None reviewed    ASSESSMENT:  This is a 72 y.o. female with:  1.  History of bilateral breast cancer in 1988 status post bilateral mastectomy.  She apparently completed 6 months of adjuvant therapy.  We have no details regarding this.    2.  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  Started on Hydrea 1000 mg daily.  This was discontinued and she started Gleevec on approximately 10/12/2017.  Gleevec discontinued due to intolerance on 11/15/2017.  She continues to improve.  Her blood counts are normal, although her platelet count has doubled and we will keep a close eye on this.  I discussed options today including resuming Gleevec at 200 mg, initiating nilotinib or holding therapy until she improves more following her right upper extremity fracture she and her daughter prefer to hold further therapy at this time.    3.  Thrombocytosis:  Her platelet count remains normal but is rising and we will keep a close eye on this.  4.  Fatigue related to Gleevec and disease. Though this is a chronic issue for the patient, she noted progression.  We  found her TSH to be quite elevated and we increased her levothyroxine dose.  Continuing improvement off Gleevec.    5.  Nausea and vomiting: Resolved off Gleevec  6. Periorbital edema: Resolved off Gleevec.    7.  Hypokalemia:  Resolved.  Continue oral potassium to once pill daily.    8.  Osteoarthritis pain: Did not complain of this today.    PLAN:  1.  Continue to hold Gleevec at this time.  2.  Continue potassium chloride at 20 mEq once daily at home.    3.  I'll see her back in 3 weeks with a CBC.  We can consider Gleevec 200 mg daily or nilotinib 300 mg bid at that time.

## 2018-01-10 ENCOUNTER — OFFICE VISIT (OUTPATIENT)
Dept: ONCOLOGY | Facility: CLINIC | Age: 73
End: 2018-01-10

## 2018-01-10 ENCOUNTER — LAB (OUTPATIENT)
Dept: OTHER | Facility: HOSPITAL | Age: 73
End: 2018-01-10

## 2018-01-10 VITALS
RESPIRATION RATE: 16 BRPM | BODY MASS INDEX: 21.36 KG/M2 | WEIGHT: 128.2 LBS | DIASTOLIC BLOOD PRESSURE: 80 MMHG | SYSTOLIC BLOOD PRESSURE: 132 MMHG | HEART RATE: 70 BPM | OXYGEN SATURATION: 97 % | TEMPERATURE: 98.1 F | HEIGHT: 65 IN

## 2018-01-10 DIAGNOSIS — C92.10 CML (CHRONIC MYELOID LEUKEMIA) (HCC): Primary | ICD-10-CM

## 2018-01-10 DIAGNOSIS — C92.10 CML (CHRONIC MYELOID LEUKEMIA) (HCC): ICD-10-CM

## 2018-01-10 DIAGNOSIS — E03.9 ACQUIRED HYPOTHYROIDISM: ICD-10-CM

## 2018-01-10 LAB
BASOPHILS # BLD AUTO: 0.1 10*3/MM3 (ref 0–0.2)
BASOPHILS NFR BLD AUTO: 1.2 % (ref 0–1.5)
DEPRECATED RDW RBC AUTO: 41.6 FL (ref 37–54)
EOSINOPHIL # BLD AUTO: 0.12 10*3/MM3 (ref 0–0.7)
EOSINOPHIL NFR BLD AUTO: 1.5 % (ref 0.3–6.2)
ERYTHROCYTE [DISTWIDTH] IN BLOOD BY AUTOMATED COUNT: 12.6 % (ref 11.7–13)
HCT VFR BLD AUTO: 37.8 % (ref 35.6–45.5)
HGB BLD-MCNC: 12.4 G/DL (ref 11.9–15.5)
IMM GRANULOCYTES # BLD: 0.04 10*3/MM3 (ref 0–0.03)
IMM GRANULOCYTES NFR BLD: 0.5 % (ref 0–0.5)
LYMPHOCYTES # BLD AUTO: 1.61 10*3/MM3 (ref 0.9–4.8)
LYMPHOCYTES NFR BLD AUTO: 20 % (ref 19.6–45.3)
MCH RBC QN AUTO: 29.6 PG (ref 26.9–32)
MCHC RBC AUTO-ENTMCNC: 32.8 G/DL (ref 32.4–36.3)
MCV RBC AUTO: 90.2 FL (ref 80.5–98.2)
MONOCYTES # BLD AUTO: 0.69 10*3/MM3 (ref 0.2–1.2)
MONOCYTES NFR BLD AUTO: 8.6 % (ref 5–12)
NEUTROPHILS # BLD AUTO: 5.48 10*3/MM3 (ref 1.9–8.1)
NEUTROPHILS NFR BLD AUTO: 68.2 % (ref 42.7–76)
NRBC BLD MANUAL-RTO: 0 /100 WBC (ref 0–0)
PLATELET # BLD AUTO: 479 10*3/MM3 (ref 140–500)
PMV BLD AUTO: 10.6 FL (ref 6–12)
RBC # BLD AUTO: 4.19 10*6/MM3 (ref 3.9–5.2)
WBC NRBC COR # BLD: 8.04 10*3/MM3 (ref 4.5–10.7)

## 2018-01-10 PROCEDURE — 36415 COLL VENOUS BLD VENIPUNCTURE: CPT

## 2018-01-10 PROCEDURE — 99214 OFFICE O/P EST MOD 30 MIN: CPT | Performed by: INTERNAL MEDICINE

## 2018-01-10 PROCEDURE — 85025 COMPLETE CBC W/AUTO DIFF WBC: CPT | Performed by: INTERNAL MEDICINE

## 2018-01-10 NOTE — PROGRESS NOTES
[facility] CBC GROUP OUTPATIENT FOLLOW UP CLINIC VISIT    REASON FOR FOLLOW-UP:    1.  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  Gleevec 400 mg daily initiated around 10/12/2017, stopped on 11/15/2017 due to intolerance (noah-orbital edema, nausea/vomiting, fatigue).    HISTORY OF PRESENT ILLNESS:  Nicole Lofton is a 72 y.o. female who returns today for follow up of the above issue.      She fell in early December fracturing her right humerus.  This is healing nicely apparently according to the orthopedic surgeon.    Discontinue steel nicely.  She continues to do better every day.  She is back at home living independently.  Her appetite is adequate.  Fatigue is improving.  Denies pain other than the right upper extremity.    ONCOLOGIC HISTORY:  For about 6 months she has noticed bilateral arm tingling and weakness in her hands.  This has occurred about 5 or 6 times over the past 6 months.  She is vague in her description of this.  She is globally weak and is quite inactive as she lives by herself.  She was completing physical therapy with some improvement but is now quite unsteady on her feet.  She has not had any falls.  She denies any bleeding.  She has chronic fatigue.  She has osteoarthritis pain and reports pain in the right side and in her neck.  She denies any acute low back pain but does have chronic low back pain.  She does have orthostatic symptoms and she is lightheaded when she stands.  She has had a decreased appetite and some weight loss over the past 9 months.     Labs performed on 8/28/2017 showed a white blood cell count of 39.1 with a differential showing 61% neutrophils and 32% lymphocytes, hemoglobin 13.9 and platelets 565,000.         She had further labs done at [facility] on 8/20/2017 showing a white blood cell count of 24.4, hemoglobin 13.7, and platelets 1795.  A differential showed 64% neutrophils, 7% lymphocytes, and 12% basophils.  Peripheral  blood flow cytometry was also done on this date showing 11% basophils and less than 1% myeloblasts.  There was no monoclonal B-cell, T-cell, plasma cell, or NK cell population.  FISH for BCR-ABL and ESSENCE 2 mutation analysis were done as well.     Her CBC was normal as of 7/8/2016.     Of note, she has a history of bilateral breast cancer diagnosed in 1988.  She had bilateral mastectomy with reconstruction.  We do not have any records regarding this at this time.  She apparently completed at least 6 months of adjuvant chemotherapy but again does not know the details..    FISH for BCR-ABL performed at [facility] was actually positive.  As of 9/6/2017 ESSENCE 2 is still pending.    She returned on 9/6/2017 for follow-up.  Platelets at 1.9 million with hydroxyurea 1000 mg daily.  She will increase the dose to 2000 mg daily.  She will have a bone marrow aspiration biopsy performed.  Weekly CBCs.    As of 9/6/2017 peripheral blood PCR was positive at 89.238% with a major break point mutation.    ESSENCE 2 and CALR mutation negative.    Bone marrow aspiration and biopsy performed on 9/15/2017.  Flow cytometry showed no increase in blasts.  Morphology showed no increase in blasts.  Bone marrow FISH showed BCR/ABL rearrangement in 93% of cells.  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    Labs as of 9/20/2017 show white blood cell count of 4.7 with hemoglobin of 12.7 and platelets 1.1 million.    On 9/27/2017 white blood cells and hemoglobin are normal.  Platelets are down to 722,000.  We plan to start Gleevec at 400 mg daily if she tolerates it.  In the meantime she will decrease the hydroxyurea dose to 1000 mg daily.    Blood counts normalized.  Hydroxyurea stopped.    Gleevec initiated around 10/12/2017, stopped on 11/15/2017 due to intolerance (noah-orbital edema, nausea/vomiting, fatigue).    Symptoms improving as of 11/29/2017.  Blood counts normal.  Hold further therapy at this time until she improves more clinically.   "    Plan to resume Gleevec at 200 mg daily as of 1/11/2018.      PAST MEDICAL, SURGICAL, FAMILY, AND SOCIAL HISTORIES were reviewed with the patient and in the electronic medical record, and were updated if indicated.    ALLERGIES:  No Known Allergies    MEDICATIONS:  The medication list has been reviewed with the patient by the medical assistant, and the list has been updated in the electronic medical record, which I reviewed.  Medication dosages and frequencies were confirmed to be accurate.    REVIEW OF SYSTEMS:  PAIN:  See Vital Signs below.  GENERAL:  No fevers, chills, night sweats, or unintended weight loss.  Fatigue.  See history of present illness  SKIN:  No rashes or non-healing lesions.   HEME/LYMPH: See history of present illness  EYES:  Periorbital edema resolved  ENT:  No sore throat or difficulty swallowing.  RESPIRATORY:  No cough, shortness of breath, hemoptysis, or wheezing.  CARDIOVASCULAR:  No chest pain, palpitations, orthopnea, or dyspnea on exertion.  Lightheadedness improved  GASTROINTESTINAL: No nausea vomiting diarrhea or constipation  GENITOURINARY:  No dysuria or hematuria.  MUSCULOSKELETAL:  Arthralgias, back pain, abnormal gait, neck pain.  Right arm pain secondary to humerus fracture  NEUROLOGIC:  No dizziness, loss of consciousness, or seizures.  PSYCHIATRIC:  No depression, anxiety, or mood changes.    Vitals:    01/10/18 1021   BP: 132/80   Pulse: 70   Resp: 16   Temp: 98.1 °F (36.7 °C)   TempSrc: Oral   SpO2: 97%   Weight: 58.2 kg (128 lb 3.2 oz)   Height: 165 cm (64.96\")   PainSc:   4   PainLoc: Arm  Comment: rt       PHYSICAL EXAMINATION:  GENERAL:  Well developed female; awake, alert and oriented, in no acute distress.  Remains chronically ill-appearing but appears better.   SKIN:  Warm and dry, without rashes, purpura, or petechiae.  HEAD:  Normocephalic, atraumatic.  EYES:  Pupils equal, round and reactive to light.  Extraocular movements intact.  Conjunctivae normal.  " Bilateral periorbital edema resolved  EARS:  Hearing intact.  NOSE:  Septum midline.  No excoriations or nasal discharge.  MOUTH:  No stomatitis or ulcers.  Lips are normal.  Dry mucous membranes  THROAT:  Oropharynx without lesions or exudates.  NECK:  Supple with good range of motion; no thyromegaly or masses; no JVD or bruits.  CHEST:  Lungs are clear to auscultation bilaterally.  No wheezes, rales, or rhonchi.  HEART:  Regular rate; normal rhythm.  No murmurs, gallops or rubs.  ABDOMEN:  Soft, non-tender, non-distended.  Normal active bowel sounds.  No organomegaly.  EXTREMITIES:  No clubbing, cyanosis, or edema.  Right upper extremity immobilized in a sling  NEUROLOGICAL:  No focal neurologic deficits.      DIAGNOSTIC DATA:  Lab Results   Component Value Date    WBC 6.69 12/20/2017    HGB 12.1 12/20/2017    HCT 36.9 12/20/2017    MCV 96.3 12/20/2017     12/20/2017       IMAGING:  None reviewed    ASSESSMENT:  This is a 72 y.o. female with:  1.  History of bilateral breast cancer in 1988 status post bilateral mastectomy.  She apparently completed 6 months of adjuvant therapy.  We have no details regarding this.    2.  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  Started on Hydrea 1000 mg daily.  This was discontinued and she started Gleevec on approximately 10/12/2017.  Gleevec discontinued due to intolerance on 11/15/2017.  She continues to improve.  Her blood counts are normal, although her platelet count has doubled and we will keep a close eye on this.  I discussed options today including resuming Gleevec at 200 mg, initiating nilotinib or holding therapy until she improves more.At this point she agrees to resume Gleevec at 200 mg daily.  Her 400 mg pills are scored and she will cut them in half.  She will resume tomorrow 1/11/2018 and notify us if she has any adverse effects.    3.  Thrombocytosis:  Her platelet count remains normal but is rising  and we will keep a close eye on this.  4.  Fatigue related to Gleevec and disease. Though this is a chronic issue for the patient, she noted progression.  We found her TSH to be quite elevated and we increased her levothyroxine dose.  Continuing improvement off Gleevec.    5.  Nausea and vomiting: Resolved off Gleevec  6. Periorbital edema: Resolved off Gleevec.    7.  Hypokalemia:  Resolved.  Continue oral potassium to once pill daily.    8.  Osteoarthritis pain: Did not complain of this today.    PLAN:  1.  She will resume Gleevec 200 mg daily tomorrow 1/11/2018.  Her 400 mg tablets are scored and she will take half of a tablet.  2.  She will notify us if she develops any adverse effects.  3.  She will see nurse practitioner in a few weeks for oral adherence with a CBC and I will see her back a few weeks after that with labs.

## 2018-01-31 ENCOUNTER — LAB (OUTPATIENT)
Dept: OTHER | Facility: HOSPITAL | Age: 73
End: 2018-01-31

## 2018-01-31 ENCOUNTER — OFFICE VISIT (OUTPATIENT)
Dept: ONCOLOGY | Facility: CLINIC | Age: 73
End: 2018-01-31

## 2018-01-31 VITALS
HEIGHT: 65 IN | OXYGEN SATURATION: 98 % | WEIGHT: 128.1 LBS | RESPIRATION RATE: 16 BRPM | BODY MASS INDEX: 21.34 KG/M2 | TEMPERATURE: 98.3 F | HEART RATE: 66 BPM | DIASTOLIC BLOOD PRESSURE: 81 MMHG | SYSTOLIC BLOOD PRESSURE: 131 MMHG

## 2018-01-31 DIAGNOSIS — C92.10 CML (CHRONIC MYELOID LEUKEMIA) (HCC): Primary | ICD-10-CM

## 2018-01-31 DIAGNOSIS — D75.839 THROMBOCYTOSIS: ICD-10-CM

## 2018-01-31 DIAGNOSIS — C92.10 CML (CHRONIC MYELOID LEUKEMIA) (HCC): ICD-10-CM

## 2018-01-31 LAB
BASOPHILS # BLD AUTO: 0.04 10*3/MM3 (ref 0–0.2)
BASOPHILS NFR BLD AUTO: 0.6 % (ref 0–1.5)
DEPRECATED RDW RBC AUTO: 42.5 FL (ref 37–54)
EOSINOPHIL # BLD AUTO: 0.23 10*3/MM3 (ref 0–0.7)
EOSINOPHIL NFR BLD AUTO: 3.3 % (ref 0.3–6.2)
ERYTHROCYTE [DISTWIDTH] IN BLOOD BY AUTOMATED COUNT: 13.2 % (ref 11.7–13)
HCT VFR BLD AUTO: 38.2 % (ref 35.6–45.5)
HGB BLD-MCNC: 12.7 G/DL (ref 11.9–15.5)
IMM GRANULOCYTES # BLD: 0.02 10*3/MM3 (ref 0–0.03)
IMM GRANULOCYTES NFR BLD: 0.3 % (ref 0–0.5)
LYMPHOCYTES # BLD AUTO: 1.87 10*3/MM3 (ref 0.9–4.8)
LYMPHOCYTES NFR BLD AUTO: 26.9 % (ref 19.6–45.3)
MCH RBC QN AUTO: 29.1 PG (ref 26.9–32)
MCHC RBC AUTO-ENTMCNC: 33.2 G/DL (ref 32.4–36.3)
MCV RBC AUTO: 87.6 FL (ref 80.5–98.2)
MONOCYTES # BLD AUTO: 0.45 10*3/MM3 (ref 0.2–1.2)
MONOCYTES NFR BLD AUTO: 6.5 % (ref 5–12)
NEUTROPHILS # BLD AUTO: 4.33 10*3/MM3 (ref 1.9–8.1)
NEUTROPHILS NFR BLD AUTO: 62.4 % (ref 42.7–76)
NRBC BLD MANUAL-RTO: 0 /100 WBC (ref 0–0)
PLATELET # BLD AUTO: 321 10*3/MM3 (ref 140–500)
PMV BLD AUTO: 11 FL (ref 6–12)
RBC # BLD AUTO: 4.36 10*6/MM3 (ref 3.9–5.2)
WBC NRBC COR # BLD: 6.94 10*3/MM3 (ref 4.5–10.7)

## 2018-01-31 PROCEDURE — 85025 COMPLETE CBC W/AUTO DIFF WBC: CPT | Performed by: INTERNAL MEDICINE

## 2018-01-31 PROCEDURE — 99214 OFFICE O/P EST MOD 30 MIN: CPT | Performed by: NURSE PRACTITIONER

## 2018-01-31 PROCEDURE — 36415 COLL VENOUS BLD VENIPUNCTURE: CPT

## 2018-01-31 NOTE — PROGRESS NOTES
[facility] CBC GROUP OUTPATIENT FOLLOW UP CLINIC VISIT    REASON FOR FOLLOW-UP:    1.  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  Gleevec 400 mg daily initiated around 10/12/2017, stopped on 11/15/2017 due to intolerance (noah-orbital edema, nausea/vomiting, fatigue).    HISTORY OF PRESENT ILLNESS:  Nicole Lofton is a 72 y.o. female who returns today for follow up of the above issue.  She started Gleevec approximately 2 weeks ago and discontinued after just 6 or 7 days.  She states that side effects were just as bad at a lower dose of 200mg the prior dose of 400 mg.  Reports severe fatigue, swelling, decreased appetite, nausea.  She has enjoyed a resolution of the symptoms since discontinuing.  It is been approximately 7 days and she last took Gleevec.  Her labs are stable today and she feels well.        ONCOLOGIC HISTORY:  For about 6 months she has noticed bilateral arm tingling and weakness in her hands.  This has occurred about 5 or 6 times over the past 6 months.  She is vague in her description of this.  She is globally weak and is quite inactive as she lives by herself.  She was completing physical therapy with some improvement but is now quite unsteady on her feet.  She has not had any falls.  She denies any bleeding.  She has chronic fatigue.  She has osteoarthritis pain and reports pain in the right side and in her neck.  She denies any acute low back pain but does have chronic low back pain.  She does have orthostatic symptoms and she is lightheaded when she stands.  She has had a decreased appetite and some weight loss over the past 9 months.     Labs performed on 8/28/2017 showed a white blood cell count of 39.1 with a differential showing 61% neutrophils and 32% lymphocytes, hemoglobin 13.9 and platelets 565,000.         She had further labs done at [facility] on 8/20/2017 showing a white blood cell count of 24.4, hemoglobin 13.7, and platelets 1795.  A  differential showed 64% neutrophils, 7% lymphocytes, and 12% basophils.  Peripheral blood flow cytometry was also done on this date showing 11% basophils and less than 1% myeloblasts.  There was no monoclonal B-cell, T-cell, plasma cell, or NK cell population.  FISH for BCR-ABL and ESSENCE 2 mutation analysis were done as well.     Her CBC was normal as of 7/8/2016.     Of note, she has a history of bilateral breast cancer diagnosed in 1988.  She had bilateral mastectomy with reconstruction.  We do not have any records regarding this at this time.  She apparently completed at least 6 months of adjuvant chemotherapy but again does not know the details..    FISH for BCR-ABL performed at [place] was actually positive.  As of 9/6/2017 ESSENCE 2 is still pending.    She returned on 9/6/2017 for follow-up.  Platelets at 1.9 million with hydroxyurea 1000 mg daily.  She will increase the dose to 2000 mg daily.  She will have a bone marrow aspiration biopsy performed.  Weekly CBCs.    As of 9/6/2017 peripheral blood PCR was positive at 89.238% with a major break point mutation.    ESSENCE 2 and CALR mutation negative.    Bone marrow aspiration and biopsy performed on 9/15/2017.  Flow cytometry showed no increase in blasts.  Morphology showed no increase in blasts.  Bone marrow FISH showed BCR/ABL rearrangement in 93% of cells.  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    Labs as of 9/20/2017 show white blood cell count of 4.7 with hemoglobin of 12.7 and platelets 1.1 million.    On 9/27/2017 white blood cells and hemoglobin are normal.  Platelets are down to 722,000.  We plan to start Gleevec at 400 mg daily if she tolerates it.  In the meantime she will decrease the hydroxyurea dose to 1000 mg daily.    Blood counts normalized.  Hydroxyurea stopped.    Gleevec initiated around 10/12/2017, stopped on 11/15/2017 due to intolerance (noah-orbital edema, nausea/vomiting, fatigue).    Symptoms improving as of 11/29/2017.  Blood counts  "normal.  Hold further therapy at this time until she improves more clinically.      Plan to resume Gleevec at 200 mg daily as of 1/11/2018.      PAST MEDICAL, SURGICAL, FAMILY, AND SOCIAL HISTORIES were reviewed with the patient and in the electronic medical record, and were updated if indicated.    ALLERGIES:  No Known Allergies    MEDICATIONS:  The medication list has been reviewed with the patient by the medical assistant, and the list has been updated in the electronic medical record, which I reviewed.  Medication dosages and frequencies were confirmed to be accurate.    REVIEW OF SYSTEMS:  PAIN:  See Vital Signs below.  GENERAL:  See history of present illness  SKIN:  No rashes or non-healing lesions.   HEME/LYMPH: See history of present illness  EYES:  Periorbital edema resolved  ENT:  No sore throat or difficulty swallowing.  RESPIRATORY:  No cough, shortness of breath, hemoptysis, or wheezing.  CARDIOVASCULAR:  No chest pain, palpitations, orthopnea, or dyspnea on exertion.  Lightheadedness improved  GASTROINTESTINAL: See history of present illness  GENITOURINARY:  No dysuria or hematuria.  MUSCULOSKELETAL:  Arthralgias, back pain, abnormal gait, neck pain.  Right arm pain secondary to humerus fracture  NEUROLOGIC:  No dizziness, loss of consciousness, or seizures.  PSYCHIATRIC:  No depression, anxiety, or mood changes.    Vitals:    01/31/18 1045   BP: 131/81   Pulse: 66   Resp: 16   Temp: 98.3 °F (36.8 °C)   TempSrc: Oral   SpO2: 98%   Weight: 58.1 kg (128 lb 1.6 oz)   Height: 165 cm (64.96\")   PainSc: 0-No pain       PHYSICAL EXAMINATION:  GENERAL:  Well developed female; awake, alert and oriented, in no acute distress.  Remains chronically ill-appearing but appears better. She is accompanied by her daughter.  SKIN:  Warm and dry, without rashes, purpura, or petechiae.  HEAD:  Normocephalic, atraumatic.  EYES:  Pupils equal, round and reactive to light.  Extraocular movements intact.  Conjunctivae normal. "  Bilateral periorbital edema resolved  EARS:  Hearing intact.  NOSE:  Septum midline.  No excoriations or nasal discharge.  MOUTH:  No stomatitis or ulcers.  Lips are normal.  Dry mucous membranes  THROAT:  Oropharynx without lesions or exudates.  NECK:  Supple with good range of motion; no thyromegaly or masses; no JVD or bruits.  CHEST:  Lungs are clear to auscultation bilaterally.  No wheezes, rales, or rhonchi.  HEART:  Regular rate; normal rhythm.  No murmurs, gallops or rubs.  ABDOMEN:  Soft, non-tender, non-distended.  Normal active bowel sounds.  No organomegaly.  EXTREMITIES:  No clubbing, cyanosis, or edema.  Right upper extremity immobilized in a sling  NEUROLOGICAL:  No focal neurologic deficits.      DIAGNOSTIC DATA:  Lab Results   Component Value Date    WBC 6.94 01/31/2018    HGB 12.7 01/31/2018    HCT 38.2 01/31/2018    MCV 87.6 01/31/2018     01/31/2018       IMAGING:  None reviewed    ASSESSMENT:  This is a 72 y.o. female with:  1.  History of bilateral breast cancer in 1988 status post bilateral mastectomy.  She apparently completed 6 months of adjuvant therapy.  We have no details regarding this.    2.  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  Started on Hydrea 1000 mg daily.  This was discontinued and she started Gleevec on approximately 10/12/2017.  Gleevec discontinued due to intolerance on 11/15/2017.  He started Gleevec at a lower dose of 200 mg approximately 2 weeks ago.  Her side effects were as severe with a lower dose and she therefore stopped taking the drug.  She has been off Gleevec for approximately 1 week and her symptoms have almost resolved completely.  3.  Thrombocytosis:  Her platelet count is slightly lower today at 321 as opposed to 479 last week.  4.  Fatigue related to Gleevec and disease. Though this is a chronic issue for the patient, she noted progression.  We found her TSH to be quite elevated and we  increased her levothyroxine dose.  Continuing improvement off Gleevec.    5.  Nausea and vomiting: Resolved off Gleevec.   6. Periorbital edema: Resolved off Gleevec.    7.  Hypokalemia:  Resolved.  Continue oral potassium to once pill daily.    8.  Osteoarthritis pain: Did not complain of this today.    PLAN:  1.  She will remain off the Gleevec.  She is currently scheduled to see Dr. Merida on 02/21/2018.  They will discuss at this time further treatment options.  2.  She will call the office with any new or worsening symptoms.

## 2018-02-21 ENCOUNTER — OFFICE VISIT (OUTPATIENT)
Dept: ONCOLOGY | Facility: CLINIC | Age: 73
End: 2018-02-21

## 2018-02-21 ENCOUNTER — LAB (OUTPATIENT)
Dept: OTHER | Facility: HOSPITAL | Age: 73
End: 2018-02-21

## 2018-02-21 ENCOUNTER — TELEPHONE (OUTPATIENT)
Dept: ONCOLOGY | Facility: HOSPITAL | Age: 73
End: 2018-02-21

## 2018-02-21 VITALS
OXYGEN SATURATION: 96 % | HEIGHT: 65 IN | SYSTOLIC BLOOD PRESSURE: 151 MMHG | BODY MASS INDEX: 21.33 KG/M2 | TEMPERATURE: 98.6 F | DIASTOLIC BLOOD PRESSURE: 81 MMHG | HEART RATE: 64 BPM | WEIGHT: 128 LBS | RESPIRATION RATE: 16 BRPM

## 2018-02-21 DIAGNOSIS — D75.839 THROMBOCYTOSIS: ICD-10-CM

## 2018-02-21 DIAGNOSIS — C92.10 CML (CHRONIC MYELOID LEUKEMIA) (HCC): Primary | ICD-10-CM

## 2018-02-21 DIAGNOSIS — E03.9 ACQUIRED HYPOTHYROIDISM: ICD-10-CM

## 2018-02-21 DIAGNOSIS — C92.10 CML (CHRONIC MYELOID LEUKEMIA) (HCC): ICD-10-CM

## 2018-02-21 LAB
ALBUMIN SERPL-MCNC: 3.9 G/DL (ref 3.5–5.2)
ALBUMIN/GLOB SERPL: 1.2 G/DL
ALP SERPL-CCNC: 104 U/L (ref 39–117)
ALT SERPL W P-5'-P-CCNC: 37 U/L (ref 1–33)
ANION GAP SERPL CALCULATED.3IONS-SCNC: 9.6 MMOL/L
AST SERPL-CCNC: 40 U/L (ref 1–32)
BASOPHILS # BLD MANUAL: 0.24 10*3/MM3 (ref 0–0.2)
BASOPHILS NFR BLD AUTO: 3 % (ref 0–2)
BILIRUB SERPL-MCNC: 0.3 MG/DL (ref 0.1–1.2)
BUN BLD-MCNC: 11 MG/DL (ref 8–23)
BUN/CREAT SERPL: 13.6 (ref 7–25)
CALCIUM SPEC-SCNC: 9.1 MG/DL (ref 8.6–10.5)
CHLORIDE SERPL-SCNC: 100 MMOL/L (ref 98–107)
CO2 SERPL-SCNC: 32.4 MMOL/L (ref 22–29)
CREAT BLD-MCNC: 0.81 MG/DL (ref 0.57–1)
DEPRECATED RDW RBC AUTO: 43.2 FL (ref 37–54)
EOSINOPHIL # BLD MANUAL: 0.16 10*3/MM3 (ref 0–0.7)
EOSINOPHIL NFR BLD MANUAL: 2 % (ref 0–7)
ERYTHROCYTE [DISTWIDTH] IN BLOOD BY AUTOMATED COUNT: 13.3 % (ref 11.7–13)
GFR SERPL CREATININE-BSD FRML MDRD: 69 ML/MIN/1.73
GLOBULIN UR ELPH-MCNC: 3.2 GM/DL
GLUCOSE BLD-MCNC: 94 MG/DL (ref 65–99)
HCT VFR BLD AUTO: 43.3 % (ref 35.6–45.5)
HGB BLD-MCNC: 13.7 G/DL (ref 11.9–15.5)
LYMPHOCYTES # BLD MANUAL: 1.87 10*3/MM3 (ref 0.8–7)
LYMPHOCYTES NFR BLD MANUAL: 23 % (ref 24–44)
LYMPHOCYTES NFR BLD MANUAL: 4 % (ref 0–12)
MCH RBC QN AUTO: 28 PG (ref 26.9–32)
MCHC RBC AUTO-ENTMCNC: 31.6 G/DL (ref 32.4–36.3)
MCV RBC AUTO: 88.5 FL (ref 80.5–98.2)
MONOCYTES # BLD AUTO: 0.33 10*3/MM3 (ref 0–1)
NEUTROPHILS # BLD AUTO: 5.54 10*3/MM3 (ref 1.9–8.1)
NEUTROPHILS NFR BLD MANUAL: 68 % (ref 42.7–76)
PLAT MORPH BLD: NORMAL
PLATELET # BLD AUTO: 792 10*3/MM3 (ref 140–500)
PMV BLD AUTO: 10 FL (ref 6–12)
POTASSIUM BLD-SCNC: 3.6 MMOL/L (ref 3.5–5.2)
PROT SERPL-MCNC: 7.1 G/DL (ref 6–8.5)
RBC # BLD AUTO: 4.89 10*6/MM3 (ref 3.9–5.2)
RBC MORPH BLD: NORMAL
SCAN SLIDE: NORMAL
SODIUM BLD-SCNC: 142 MMOL/L (ref 136–145)
TSH SERPL DL<=0.05 MIU/L-ACNC: 0.27 MIU/ML (ref 0.27–4.2)
WBC MORPH BLD: NORMAL
WBC NRBC COR # BLD: 8.15 10*3/MM3 (ref 4.5–10.7)

## 2018-02-21 PROCEDURE — 85007 BL SMEAR W/DIFF WBC COUNT: CPT | Performed by: INTERNAL MEDICINE

## 2018-02-21 PROCEDURE — 99214 OFFICE O/P EST MOD 30 MIN: CPT | Performed by: INTERNAL MEDICINE

## 2018-02-21 PROCEDURE — 36415 COLL VENOUS BLD VENIPUNCTURE: CPT

## 2018-02-21 PROCEDURE — 80053 COMPREHEN METABOLIC PANEL: CPT | Performed by: INTERNAL MEDICINE

## 2018-02-21 PROCEDURE — 84443 ASSAY THYROID STIM HORMONE: CPT | Performed by: INTERNAL MEDICINE

## 2018-02-21 PROCEDURE — 85025 COMPLETE CBC W/AUTO DIFF WBC: CPT | Performed by: INTERNAL MEDICINE

## 2018-02-21 RX ORDER — HYDROCODONE BITARTRATE AND ACETAMINOPHEN 5; 325 MG/1; MG/1
TABLET ORAL
Refills: 0 | COMMUNITY
Start: 2018-02-08 | End: 2018-09-06

## 2018-02-21 RX ORDER — LEVOTHYROXINE SODIUM 0.1 MG/1
100 TABLET ORAL DAILY
Qty: 30 TABLET | Refills: 3 | Status: SHIPPED | OUTPATIENT
Start: 2018-02-21 | End: 2018-07-02 | Stop reason: SDUPTHER

## 2018-02-21 RX ORDER — HYDROXYUREA 500 MG/1
1000 CAPSULE ORAL DAILY
Qty: 60 CAPSULE | Refills: 5 | Status: SHIPPED | OUTPATIENT
Start: 2018-02-21 | End: 2018-05-09

## 2018-02-21 RX ORDER — LEVOTHYROXINE SODIUM 112 UG/1
TABLET ORAL
Refills: 1 | COMMUNITY
Start: 2018-01-31 | End: 2018-02-21 | Stop reason: DRUGHIGH

## 2018-02-21 NOTE — TELEPHONE ENCOUNTER
----- Message from Burt Merida MD sent at 2/21/2018 12:06 PM EST -----  Regarding: TSH  Her TSH is slightly low today.  This means that her levothyroxine dose needs to be decreased slightly.  Can somebody call her to confirm which dose it she is taking and then decrease the dose to the next lower dose please.  Thanks,  [facility]      Pt called back stating she is taking 112mcg synthroid. Informed pt that her synthroid would be decreasing to 100mcg daily. New script sent to pharmacy

## 2018-02-21 NOTE — PROGRESS NOTES
[facility] GROUP OUTPATIENT FOLLOW UP CLINIC VISIT    REASON FOR FOLLOW-UP:    1.  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  Gleevec 400 mg daily initiated around 10/12/2017, stopped on 11/15/2017 due to intolerance (noah-orbital edema, nausea/vomiting, fatigue).  Resumed at 200 mg daily but, again, not tolerated.      HISTORY OF PRESENT ILLNESS:  Nicole Lofton is a 72 y.o. female who returns today for follow up of the above issue.  She resumed imatinib at 200 mg daily but only took it for 6 or 7 days and discontinued it secondary to intolerance as noted in the previous NP note.  She has been feeling well since then.  Energy level is improving.  Appetite is improving.  No bleeding.  No fevers or chills.        ONCOLOGIC HISTORY:  For about 6 months she has noticed bilateral arm tingling and weakness in her hands.  This has occurred about 5 or 6 times over the past 6 months.  She is vague in her description of this.  She is globally weak and is quite inactive as she lives by herself.  She was completing physical therapy with some improvement but is now quite unsteady on her feet.  She has not had any falls.  She denies any bleeding.  She has chronic fatigue.  She has osteoarthritis pain and reports pain in the right side and in her neck.  She denies any acute low back pain but does have chronic low back pain.  She does have orthostatic symptoms and she is lightheaded when she stands.  She has had a decreased appetite and some weight loss over the past 9 months.     Labs performed on 8/28/2017 showed a white blood cell count of 39.1 with a differential showing 61% neutrophils and 32% lymphocytes, hemoglobin 13.9 and platelets 565,000.         She had further labs done at [facility] on 8/20/2017 showing a white blood cell count of 24.4, hemoglobin 13.7, and platelets 1795.  A differential showed 64% neutrophils, 7% lymphocytes, and 12% basophils.  Peripheral blood flow  cytometry was also done on this date showing 11% basophils and less than 1% myeloblasts.  There was no monoclonal B-cell, T-cell, plasma cell, or NK cell population.  FISH for BCR-ABL and ESSENCE 2 mutation analysis were done as well.     Her CBC was normal as of 7/8/2016.     Of note, she has a history of bilateral breast cancer diagnosed in 1988.  She had bilateral mastectomy with reconstruction.  We do not have any records regarding this at this time.  She apparently completed at least 6 months of adjuvant chemotherapy but again does not know the details..    FISH for BCR-ABL performed at [place] was actually positive.  As of 9/6/2017 ESSENCE 2 is still pending.    She returned on 9/6/2017 for follow-up.  Platelets at 1.9 million with hydroxyurea 1000 mg daily.  She will increase the dose to 2000 mg daily.  She will have a bone marrow aspiration biopsy performed.  Weekly CBCs.    As of 9/6/2017 peripheral blood PCR was positive at 89.238% with a major break point mutation.    ESSENCE 2 and CALR mutation negative.    Bone marrow aspiration and biopsy performed on 9/15/2017.  Flow cytometry showed no increase in blasts.  Morphology showed no increase in blasts.  Bone marrow FISH showed BCR/ABL rearrangement in 93% of cells.  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    Labs as of 9/20/2017 show white blood cell count of 4.7 with hemoglobin of 12.7 and platelets 1.1 million.    On 9/27/2017 white blood cells and hemoglobin are normal.  Platelets are down to 722,000.  We plan to start Gleevec at 400 mg daily if she tolerates it.  In the meantime she will decrease the hydroxyurea dose to 1000 mg daily.    Blood counts normalized.  Hydroxyurea stopped.    Gleevec initiated around 10/12/2017, stopped on 11/15/2017 due to intolerance (noah-orbital edema, nausea/vomiting, fatigue).    Symptoms improving as of 11/29/2017.  Blood counts normal.  Hold further therapy at this time until she improves more clinically.      Plan to  "resume Gleevec at 200 mg daily as of 1/11/2018.  Only took it for less than a week but also did not tolerate this dose.    As of 2/21/2018 her platelet count is again elevated at greater than 700,000.  Resume hydroxyurea 1000 mg daily.  Considering nilotinib at a decreased dose once blood counts normalize.        PAST MEDICAL, SURGICAL, FAMILY, AND SOCIAL HISTORIES were reviewed with the patient and in the electronic medical record, and were updated if indicated.    ALLERGIES:  No Known Allergies    MEDICATIONS:  The medication list has been reviewed with the patient by the medical assistant, and the list has been updated in the electronic medical record, which I reviewed.  Medication dosages and frequencies were confirmed to be accurate.    REVIEW OF SYSTEMS:  PAIN:  See Vital Signs below.  GENERAL:  See history of present illness  SKIN:  No rashes or non-healing lesions.   HEME/LYMPH: Increased platelets.  No lymphadenopathy.    EYES:  Periorbital edema resolved  ENT:  No sore throat or difficulty swallowing.  RESPIRATORY:  No cough, shortness of breath, hemoptysis, or wheezing.  CARDIOVASCULAR:  No chest pain, palpitations, orthopnea, or dyspnea on exertion.  Lightheadedness improved  GASTROINTESTINAL: Appetite improving.  No nausea, vomiting, or diarrhea.    GENITOURINARY:  No dysuria or hematuria.  MUSCULOSKELETAL:  Arthralgias, back pain, abnormal gait, neck pain.  Right arm pain secondary to humerus fracture  NEUROLOGIC:  No dizziness, loss of consciousness, or seizures.  PSYCHIATRIC:  No depression, anxiety, or mood changes.    Vitals:    02/21/18 1039   BP: 151/81   Pulse: 64   Resp: 16   Temp: 98.6 °F (37 °C)   TempSrc: Oral   SpO2: 96%   Weight: 58.1 kg (128 lb)   Height: 165 cm (64.96\")   PainSc: 0-No pain       PHYSICAL EXAMINATION:  GENERAL:  Well developed female; awake, alert and oriented, in no acute distress.  Chronically ill appearing.  Overall better.  She is accompanied by her daughter.  SKIN:  " Warm and dry, without rashes, purpura, or petechiae.  HEAD:  Normocephalic, atraumatic.  EYES:  Bilateral periorbital edema resolved  EARS:  Hearing intact.  MOUTH:  No stomatitis or ulcers.  Lips are normal.  Dry mucous membranes  THROAT:  Oropharynx without lesions or exudates.  NECK:  Supple with good range of motion; no thyromegaly or masses; no JVD or bruits.  CHEST:  Lungs are clear to auscultation bilaterally.  No wheezes, rales, or rhonchi.  HEART:  Regular rate; normal rhythm.  No murmurs, gallops or rubs.  ABDOMEN:  Soft, non-tender, non-distended.  Normal active bowel sounds.  No organomegaly.  EXTREMITIES:  No edema.   NEUROLOGICAL:  No focal neurologic deficits.      DIAGNOSTIC DATA:  Lab Results   Component Value Date    WBC 8.15 02/21/2018    HGB 13.7 02/21/2018    HCT 43.3 02/21/2018    MCV 88.5 02/21/2018     (H) 02/21/2018     TSH 0.267    IMAGING:  None reviewed    ASSESSMENT:  This is a 72 y.o. female with:  1.  History of bilateral breast cancer in 1988 status post bilateral mastectomy.  She apparently completed 6 months of adjuvant therapy.  We have no details regarding this.    2.  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  Started on Hydrea 1000 mg daily.  This was discontinued and she started Gleevec on approximately 10/12/2017.  Gleevec discontinued due to intolerance on 11/15/2017.  We started Gleevec at a lower dose of 200 mg but she was also intolerant of this dose.  Her side effects were as severe with a lower dose and she therefore stopped taking the drug.  Symptoms have improved significantly.  3.  Thrombocytosis:  Her platelet count has increased significantly to 792,000 today.  Likely secondary to the CML although this is somewhat atypical since her white blood cell count and hemoglobin are normal.  At this point we will resume hydroxyurea 1000 mg daily and follow her blood counts closely.  4.  Hypothyroidism: Her TSH was  quite elevated but now is slightly low at 0.267 today.  I will have nursing contact her to decrease her levothyroxine dose slightly.  5.  Nausea and vomiting: Resolved off Gleevec.   6. Periorbital edema: Resolved off Gleevec.    7.  Hypokalemia:  Resolved.  Continue oral potassium to once pill daily.    8.  Osteoarthritis pain: Did not complain of this today.    PLAN:  1.  No further Gleevec  2.  Resume hydroxyurea 1000 mg daily.  3.  I will have nursing contact her to her to confirm which levothyroxine dose she is taking and we will decrease the dose to the next lower dose level.  4.  Return in 2 weeks with a nurse practitioner with a CBC and I will see her back in 4 weeks also with a CBC.  At that time if her blood counts are normal we will consider initiating therapy with nilotinib at a reduced dose to start, likely 150 mg twice daily.

## 2018-03-07 ENCOUNTER — DOCUMENTATION (OUTPATIENT)
Dept: ONCOLOGY | Facility: CLINIC | Age: 73
End: 2018-03-07

## 2018-03-07 ENCOUNTER — LAB (OUTPATIENT)
Dept: OTHER | Facility: HOSPITAL | Age: 73
End: 2018-03-07

## 2018-03-07 ENCOUNTER — OFFICE VISIT (OUTPATIENT)
Dept: ONCOLOGY | Facility: CLINIC | Age: 73
End: 2018-03-07

## 2018-03-07 VITALS
SYSTOLIC BLOOD PRESSURE: 136 MMHG | DIASTOLIC BLOOD PRESSURE: 79 MMHG | RESPIRATION RATE: 16 BRPM | HEIGHT: 65 IN | HEART RATE: 68 BPM | TEMPERATURE: 97.7 F | WEIGHT: 130.2 LBS | BODY MASS INDEX: 21.69 KG/M2 | OXYGEN SATURATION: 98 %

## 2018-03-07 DIAGNOSIS — D75.839 THROMBOCYTOSIS: ICD-10-CM

## 2018-03-07 DIAGNOSIS — C92.10 CML (CHRONIC MYELOID LEUKEMIA) (HCC): ICD-10-CM

## 2018-03-07 DIAGNOSIS — C92.10 CML (CHRONIC MYELOID LEUKEMIA) (HCC): Primary | ICD-10-CM

## 2018-03-07 LAB
BASOPHILS # BLD AUTO: 0.3 10*3/MM3 (ref 0–0.2)
BASOPHILS NFR BLD AUTO: 2.8 % (ref 0–1.5)
DEPRECATED RDW RBC AUTO: 45.6 FL (ref 37–54)
EOSINOPHIL # BLD AUTO: 0.17 10*3/MM3 (ref 0–0.7)
EOSINOPHIL NFR BLD AUTO: 1.6 % (ref 0.3–6.2)
ERYTHROCYTE [DISTWIDTH] IN BLOOD BY AUTOMATED COUNT: 15.1 % (ref 11.7–13)
HCT VFR BLD AUTO: 41.6 % (ref 35.6–45.5)
HGB BLD-MCNC: 13.6 G/DL (ref 11.9–15.5)
IMM GRANULOCYTES # BLD: 0.1 10*3/MM3 (ref 0–0.03)
IMM GRANULOCYTES NFR BLD: 0.9 % (ref 0–0.5)
LYMPHOCYTES # BLD AUTO: 2.67 10*3/MM3 (ref 0.9–4.8)
LYMPHOCYTES NFR BLD AUTO: 24.9 % (ref 19.6–45.3)
MCH RBC QN AUTO: 28.1 PG (ref 26.9–32)
MCHC RBC AUTO-ENTMCNC: 32.7 G/DL (ref 32.4–36.3)
MCV RBC AUTO: 86 FL (ref 80.5–98.2)
MONOCYTES # BLD AUTO: 0.56 10*3/MM3 (ref 0.2–1.2)
MONOCYTES NFR BLD AUTO: 5.2 % (ref 5–12)
NEUTROPHILS # BLD AUTO: 6.94 10*3/MM3 (ref 1.9–8.1)
NEUTROPHILS NFR BLD AUTO: 64.6 % (ref 42.7–76)
NRBC BLD MANUAL-RTO: 0 /100 WBC (ref 0–0)
PLAT MORPH BLD: NORMAL
PLATELET # BLD AUTO: 1129 10*3/MM3 (ref 140–500)
PMV BLD AUTO: 9.8 FL (ref 6–12)
RBC # BLD AUTO: 4.84 10*6/MM3 (ref 3.9–5.2)
RBC MORPH BLD: NORMAL
WBC MORPH BLD: NORMAL
WBC NRBC COR # BLD: 10.74 10*3/MM3 (ref 4.5–10.7)

## 2018-03-07 PROCEDURE — 85025 COMPLETE CBC W/AUTO DIFF WBC: CPT | Performed by: INTERNAL MEDICINE

## 2018-03-07 PROCEDURE — 36415 COLL VENOUS BLD VENIPUNCTURE: CPT

## 2018-03-07 PROCEDURE — 99213 OFFICE O/P EST LOW 20 MIN: CPT | Performed by: NURSE PRACTITIONER

## 2018-03-07 PROCEDURE — 85007 BL SMEAR W/DIFF WBC COUNT: CPT | Performed by: INTERNAL MEDICINE

## 2018-03-07 NOTE — PROGRESS NOTES
[facility] GROUP OUTPATIENT FOLLOW UP CLINIC VISIT    REASON FOR FOLLOW-UP:    1.  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  Gleevec 400 mg daily initiated around 10/12/2017, stopped on 11/15/2017 due to intolerance (noah-orbital edema, nausea/vomiting, fatigue).  Resumed at 200 mg daily but, again, not tolerated.      HISTORY OF PRESENT ILLNESS:  Nicole Lofton is a 72 y.o. female with the above-mentioned history, who returns today for lab review and toxicity check.  She resumed Hydrea 1000 mg 2 weeks ago due to elevated platelet count from CML.  She has tolerated this medication well.  She slowly recovered from previous intolerance to Gleevec with overall improvement in her nausea, vomiting and poor intake.  She has slowly began to gain weight.  She reports she did recently discontinue her aspirin 81 mg daily, unsure if this was necessary to continue.  She denies any chest pain, shortness of breath, diarrhea or constipation.  She denies any fevers or chills, signs or symptoms of infection.    ONCOLOGIC HISTORY:  For about 6 months she has noticed bilateral arm tingling and weakness in her hands.  This has occurred about 5 or 6 times over the past 6 months.  She is vague in her description of this.  She is globally weak and is quite inactive as she lives by herself.  She was completing physical therapy with some improvement but is now quite unsteady on her feet.  She has not had any falls.  She denies any bleeding.  She has chronic fatigue.  She has osteoarthritis pain and reports pain in the right side and in her neck.  She denies any acute low back pain but does have chronic low back pain.  She does have orthostatic symptoms and she is lightheaded when she stands.  She has had a decreased appetite and some weight loss over the past 9 months.     Labs performed on 8/28/2017 showed a white blood cell count of 39.1 with a differential showing 61% neutrophils and  32% lymphocytes, hemoglobin 13.9 and platelets 565,000.         She had further labs done at [facility] on 8/20/2017 showing a white blood cell count of 24.4, hemoglobin 13.7, and platelets 1795.  A differential showed 64% neutrophils, 7% lymphocytes, and 12% basophils.  Peripheral blood flow cytometry was also done on this date showing 11% basophils and less than 1% myeloblasts.  There was no monoclonal B-cell, T-cell, plasma cell, or NK cell population.  FISH for BCR-ABL and ESSENCE 2 mutation analysis were done as well.     Her CBC was normal as of 7/8/2016.     Of note, she has a history of bilateral breast cancer diagnosed in 1988.  She had bilateral mastectomy with reconstruction.  We do not have any records regarding this at this time.  She apparently completed at least 6 months of adjuvant chemotherapy but again does not know the details..    FISH for BCR-ABL performed at [facility] was actually positive.  As of 9/6/2017 ESSENCE 2 is still pending.    She returned on 9/6/2017 for follow-up.  Platelets at 1.9 million with hydroxyurea 1000 mg daily.  She will increase the dose to 2000 mg daily.  She will have a bone marrow aspiration biopsy performed.  Weekly CBCs.    As of 9/6/2017 peripheral blood PCR was positive at 89.238% with a major break point mutation.    ESSENCE 2 and CALR mutation negative.    Bone marrow aspiration and biopsy performed on 9/15/2017.  Flow cytometry showed no increase in blasts.  Morphology showed no increase in blasts.  Bone marrow FISH showed BCR/ABL rearrangement in 93% of cells.  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    Labs as of 9/20/2017 show white blood cell count of 4.7 with hemoglobin of 12.7 and platelets 1.1 million.    On 9/27/2017 white blood cells and hemoglobin are normal.  Platelets are down to 722,000.  We plan to start Gleevec at 400 mg daily if she tolerates it.  In the meantime she will decrease the hydroxyurea dose to 1000 mg daily.    Blood counts normalized.   Hydroxyurea stopped.    Gleevec initiated around 10/12/2017, stopped on 11/15/2017 due to intolerance (noah-orbital edema, nausea/vomiting, fatigue).    Symptoms improving as of 11/29/2017.  Blood counts normal.  Hold further therapy at this time until she improves more clinically.      Plan to resume Gleevec at 200 mg daily as of 1/11/2018.  Only took it for less than a week but also did not tolerate this dose.    As of 2/21/2018 her platelet count is again elevated at greater than 700,000.  Resume hydroxyurea 1000 mg daily.  Considering nilotinib at a decreased dose once blood counts normalize.        PAST MEDICAL, SURGICAL, FAMILY, AND SOCIAL HISTORIES were reviewed with the patient and in the electronic medical record, and were updated if indicated.    ALLERGIES:  No Known Allergies    MEDICATIONS:  The medication list has been reviewed with the patient by the medical assistant, and the list has been updated in the electronic medical record, which I reviewed.  Medication dosages and frequencies were confirmed to be accurate.    I have reviewed the patient's medical history in detail and updated the computerized patient record.    REVIEW OF SYSTEMS:  PAIN:  See Vital Signs below.  GENERAL:  See history of present illness  SKIN:  No rashes or non-healing lesions.   HEME/LYMPH: Increased platelets.  No lymphadenopathy.    EYES:  Periorbital edema resolved  ENT:  No sore throat or difficulty swallowing.  RESPIRATORY:  No cough, shortness of breath, hemoptysis, or wheezing.  CARDIOVASCULAR:  No chest pain, palpitations, orthopnea, or dyspnea on exertion.  Lightheadedness improved  GASTROINTESTINAL: Appetite improving.  No nausea, vomiting, or diarrhea.    GENITOURINARY:  No dysuria or hematuria.  MUSCULOSKELETAL:  Arthralgias, back pain, abnormal gait, neck pain.  Right arm pain secondary to humerus fracture  NEUROLOGIC:  No dizziness, loss of consciousness, or seizures.  PSYCHIATRIC:  No depression, anxiety, or mood  "changes.    Vitals:    03/07/18 1112   BP: 136/79   Pulse: 68   Resp: 16   Temp: 97.7 °F (36.5 °C)   TempSrc: Oral   SpO2: 98%   Weight: 59.1 kg (130 lb 3.2 oz)   Height: 165 cm (64.96\")   PainSc: 0-No pain       PHYSICAL EXAMINATION:  GENERAL:  Well developed female; awake, alert and oriented, in no acute distress.  Chronically ill appearing.  Overall better.  She is accompanied by her daughter.  SKIN:  Warm and dry, without rashes, purpura, or petechiae.  HEAD:  Normocephalic, atraumatic.  EYES:  Bilateral periorbital edema resolved  EARS:  Hearing intact.  CHEST:  Lungs are clear to auscultation bilaterally.  No wheezes, rales, or rhonchi.  HEART:  Regular rate; normal rhythm.  No murmurs, gallops or rubs.  ABDOMEN:  Soft, non-tender, non-distended.  Normal active bowel sounds.  No organomegaly.  EXTREMITIES:  No edema.   NEUROLOGICAL:  No focal neurologic deficits.      DIAGNOSTIC DATA:  Lab Results   Component Value Date    WBC 10.74 (H) 03/07/2018    HGB 13.6 03/07/2018    HCT 41.6 03/07/2018    MCV 86.0 03/07/2018    PLT 1129 (H) 03/07/2018     IMAGING:  None reviewed    ASSESSMENT:  This is a 72 y.o. female with:  1.  History of bilateral breast cancer in 1988 status post bilateral mastectomy.  She apparently completed 6 months of adjuvant therapy.  We have no details regarding this.    2.  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  Started on Hydrea 1000 mg daily.  This was discontinued and she started Gleevec on approximately 10/12/2017.  Gleevec discontinued due to intolerance on 11/15/2017.  We started Gleevec at a lower dose of 200 mg but she was also intolerant of this dose.  Her side effects were as severe with a lower dose and she therefore stopped taking the drug.  Symptoms have improved significantly.  3.  Thrombocytosis:  Likely secondary to the CML although this is somewhat atypical since her white blood cell count and hemoglobin are normal. " Resumed Hydrea 1000 mg 2 weeks ago it.  Further increase of platelet count to 1129,000, therefore Hydrea increased to 1500mg daily.  4.  Hypothyroidism: Most recent TSH was low at 0.267. Levothyroxine decreased to 100 mcg.  5.  Nausea and vomiting: Resolved off Gleevec.   6. Periorbital edema: Resolved off Gleevec.    7.  Hypokalemia:  Resolved.  Continue oral potassium to once pill daily.    8.  Osteoarthritis pain: Did not complain of this today.    PLAN:  1. Increase Hydrea to 1500mg daily.  2. Resume Aspirin 81mg daily, the patient had previously discontinued this.  3. MD follow up in 2 weeks with repeat CBC.  4. I have contacted [employer] to being obtaining approval for nilotinib 150mg twice a day, we will wait on the delivery and intiation of this medication until later notice.    Today's plan was reviewed with Dr. Merida who is in agreement.      Melanie Casas, APRN  03/07/2018

## 2018-03-07 NOTE — PROGRESS NOTES
Call rec from Melanie SRINIVASAN NP-She states that pt may have to have a treatment change to Nilotinib 150 mg BID.    She would like it processed by not ordered as pt is to return to see MD. I will get the medication approved and copay assistance completed if needed but the medication will NOT be ordered.    I will start the process.

## 2018-03-07 NOTE — PROGRESS NOTES
Tasigna PA submitted to [employer] through covermymeds  Request approved from 3/7/18 to 3/7/19    I will check cost and apply for assistance if needed.

## 2018-03-21 ENCOUNTER — LAB (OUTPATIENT)
Dept: OTHER | Facility: HOSPITAL | Age: 73
End: 2018-03-21

## 2018-03-21 ENCOUNTER — APPOINTMENT (OUTPATIENT)
Dept: ONCOLOGY | Facility: CLINIC | Age: 73
End: 2018-03-21

## 2018-03-21 ENCOUNTER — APPOINTMENT (OUTPATIENT)
Dept: OTHER | Facility: HOSPITAL | Age: 73
End: 2018-03-21

## 2018-03-21 ENCOUNTER — OFFICE VISIT (OUTPATIENT)
Dept: ONCOLOGY | Facility: CLINIC | Age: 73
End: 2018-03-21

## 2018-03-21 VITALS
SYSTOLIC BLOOD PRESSURE: 130 MMHG | RESPIRATION RATE: 16 BRPM | TEMPERATURE: 98.7 F | HEIGHT: 65 IN | BODY MASS INDEX: 21.81 KG/M2 | OXYGEN SATURATION: 96 % | HEART RATE: 71 BPM | DIASTOLIC BLOOD PRESSURE: 82 MMHG | WEIGHT: 130.9 LBS

## 2018-03-21 DIAGNOSIS — Z51.81 MEDICATION MONITORING ENCOUNTER: ICD-10-CM

## 2018-03-21 DIAGNOSIS — C92.10 CML (CHRONIC MYELOID LEUKEMIA) (HCC): Primary | ICD-10-CM

## 2018-03-21 DIAGNOSIS — C92.10 CML (CHRONIC MYELOID LEUKEMIA) (HCC): ICD-10-CM

## 2018-03-21 DIAGNOSIS — D75.839 THROMBOCYTOSIS: ICD-10-CM

## 2018-03-21 LAB
BASOPHILS # BLD AUTO: 0.26 10*3/MM3 (ref 0–0.2)
BASOPHILS NFR BLD AUTO: 3 % (ref 0–1.5)
DEPRECATED RDW RBC AUTO: 53.1 FL (ref 37–54)
EOSINOPHIL # BLD AUTO: 0.18 10*3/MM3 (ref 0–0.7)
EOSINOPHIL NFR BLD AUTO: 2.1 % (ref 0.3–6.2)
ERYTHROCYTE [DISTWIDTH] IN BLOOD BY AUTOMATED COUNT: 18.3 % (ref 11.7–13)
HCT VFR BLD AUTO: 41.4 % (ref 35.6–45.5)
HGB BLD-MCNC: 13.6 G/DL (ref 11.9–15.5)
IMM GRANULOCYTES # BLD: 0.11 10*3/MM3 (ref 0–0.03)
IMM GRANULOCYTES NFR BLD: 1.3 % (ref 0–0.5)
LYMPHOCYTES # BLD AUTO: 1.99 10*3/MM3 (ref 0.9–4.8)
LYMPHOCYTES NFR BLD AUTO: 22.8 % (ref 19.6–45.3)
MCH RBC QN AUTO: 28.5 PG (ref 26.9–32)
MCHC RBC AUTO-ENTMCNC: 32.9 G/DL (ref 32.4–36.3)
MCV RBC AUTO: 86.8 FL (ref 80.5–98.2)
MONOCYTES # BLD AUTO: 0.4 10*3/MM3 (ref 0.2–1.2)
MONOCYTES NFR BLD AUTO: 4.6 % (ref 5–12)
NEUTROPHILS # BLD AUTO: 5.79 10*3/MM3 (ref 1.9–8.1)
NEUTROPHILS NFR BLD AUTO: 66.2 % (ref 42.7–76)
NRBC BLD MANUAL-RTO: 0 /100 WBC (ref 0–0)
PLAT MORPH BLD: NORMAL
PLATELET # BLD AUTO: 1093 10*3/MM3 (ref 140–500)
PMV BLD AUTO: 9.6 FL (ref 6–12)
RBC # BLD AUTO: 4.77 10*6/MM3 (ref 3.9–5.2)
RBC MORPH BLD: NORMAL
WBC MORPH BLD: NORMAL
WBC NRBC COR # BLD: 8.73 10*3/MM3 (ref 4.5–10.7)

## 2018-03-21 PROCEDURE — 99214 OFFICE O/P EST MOD 30 MIN: CPT | Performed by: INTERNAL MEDICINE

## 2018-03-21 PROCEDURE — 85007 BL SMEAR W/DIFF WBC COUNT: CPT | Performed by: INTERNAL MEDICINE

## 2018-03-21 PROCEDURE — 36415 COLL VENOUS BLD VENIPUNCTURE: CPT

## 2018-03-21 PROCEDURE — 85025 COMPLETE CBC W/AUTO DIFF WBC: CPT | Performed by: INTERNAL MEDICINE

## 2018-03-21 NOTE — PROGRESS NOTES
[facility] GROUP OUTPATIENT FOLLOW UP CLINIC VISIT    REASON FOR FOLLOW-UP:    1.  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  Gleevec 400 mg daily initiated around 10/12/2017, stopped on 11/15/2017 due to intolerance (noah-orbital edema, nausea/vomiting, fatigue).  Resumed at 200 mg daily but, again, not tolerated.      HISTORY OF PRESENT ILLNESS:  Nicole Lofton is a 72 y.o. female who returns today for follow-up.  She continues hydroxyurea 1500 mg daily which she is tolerating very well.  She complains of dry mouth.  She has some mild ankle edema.  Energy level is improving.  She remains fatigued.  Otherwise she is doing well with no new complaints today.    ONCOLOGIC HISTORY:  For about 6 months she has noticed bilateral arm tingling and weakness in her hands.  This has occurred about 5 or 6 times over the past 6 months.  She is vague in her description of this.  She is globally weak and is quite inactive as she lives by herself.  She was completing physical therapy with some improvement but is now quite unsteady on her feet.  She has not had any falls.  She denies any bleeding.  She has chronic fatigue.  She has osteoarthritis pain and reports pain in the right side and in her neck.  She denies any acute low back pain but does have chronic low back pain.  She does have orthostatic symptoms and she is lightheaded when she stands.  She has had a decreased appetite and some weight loss over the past 9 months.     Labs performed on 8/28/2017 showed a white blood cell count of 39.1 with a differential showing 61% neutrophils and 32% lymphocytes, hemoglobin 13.9 and platelets 565,000.         She had further labs done at [place] on 8/20/2017 showing a white blood cell count of 24.4, hemoglobin 13.7, and platelets 1795.  A differential showed 64% neutrophils, 7% lymphocytes, and 12% basophils.  Peripheral blood flow cytometry was also done on this date showing 11%  basophils and less than 1% myeloblasts.  There was no monoclonal B-cell, T-cell, plasma cell, or NK cell population.  FISH for BCR-ABL and ESSENCE 2 mutation analysis were done as well.     Her CBC was normal as of 7/8/2016.     Of note, she has a history of bilateral breast cancer diagnosed in 1988.  She had bilateral mastectomy with reconstruction.  We do not have any records regarding this at this time.  She apparently completed at least 6 months of adjuvant chemotherapy but again does not know the details..    FISH for BCR-ABL performed at [place] was actually positive.  As of 9/6/2017 ESSENCE 2 is still pending.    She returned on 9/6/2017 for follow-up.  Platelets at 1.9 million with hydroxyurea 1000 mg daily.  She will increase the dose to 2000 mg daily.  She will have a bone marrow aspiration biopsy performed.  Weekly CBCs.    As of 9/6/2017 peripheral blood PCR was positive at 89.238% with a major break point mutation.    ESSENCE 2 and CALR mutation negative.    Bone marrow aspiration and biopsy performed on 9/15/2017.  Flow cytometry showed no increase in blasts.  Morphology showed no increase in blasts.  Bone marrow FISH showed BCR/ABL rearrangement in 93% of cells.  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    Labs as of 9/20/2017 show white blood cell count of 4.7 with hemoglobin of 12.7 and platelets 1.1 million.    On 9/27/2017 white blood cells and hemoglobin are normal.  Platelets are down to 722,000.  We plan to start Gleevec at 400 mg daily if she tolerates it.  In the meantime she will decrease the hydroxyurea dose to 1000 mg daily.    Blood counts normalized.  Hydroxyurea stopped.    Gleevec initiated around 10/12/2017, stopped on 11/15/2017 due to intolerance (noah-orbital edema, nausea/vomiting, fatigue).    Symptoms improving as of 11/29/2017.  Blood counts normal.  Hold further therapy at this time until she improves more clinically.      Plan to resume Gleevec at 200 mg daily as of 1/11/2018.   Only took it for less than a week but also did not tolerate this dose.    As of 2/21/2018 her platelet count is again elevated at greater than 700,000.  Resume hydroxyurea 1000 mg daily.  Considering nilotinib at a decreased dose.    Platelets continued to rise in March 2018.  Hydroxyurea increased to 1500 mg daily with stabilization of her platelet count and some improvement as of 3/21/2018.  Plan for nilotinib.      PAST MEDICAL, SURGICAL, FAMILY, AND SOCIAL HISTORIES were reviewed with the patient and in the electronic medical record, and were updated if indicated.    ALLERGIES:  No Known Allergies    MEDICATIONS:  The medication list has been reviewed with the patient by the medical assistant, and the list has been updated in the electronic medical record, which I reviewed.  Medication dosages and frequencies were confirmed to be accurate.    I have reviewed the patient's medical history in detail and updated the computerized patient record.    REVIEW OF SYSTEMS:  PAIN:  See Vital Signs below.  GENERAL:  See history of present illness  SKIN:  No rashes or non-healing lesions.   HEME/LYMPH: Increased platelets.  No lymphadenopathy.    EYES:  Periorbital edema resolved  ENT:  No sore throat or difficulty swallowing.  dry mouth  RESPIRATORY:  No cough, shortness of breath, hemoptysis, or wheezing.  CARDIOVASCULAR:  No chest pain, palpitations, orthopnea, or dyspnea on exertion.  Lightheadedness improved  GASTROINTESTINAL: Appetite improving.  No nausea, vomiting, or diarrhea.    GENITOURINARY:  No dysuria or hematuria.  MUSCULOSKELETAL:  Arthralgias, back pain, abnormal gait, neck pain.  Right arm pain secondary to humerus fracture has improved.  NEUROLOGIC:  No dizziness, loss of consciousness, or seizures.  PSYCHIATRIC:  No depression, anxiety, or mood changes.    Vitals:    03/21/18 1029   BP: 130/82   Pulse: 71   Resp: 16   Temp: 98.7 °F (37.1 °C)   TempSrc: Oral   SpO2: 96%   Weight: 59.4 kg (130 lb 14.4 oz)  "  Height: 165 cm (64.96\")   PainSc: 0-No pain       PHYSICAL EXAMINATION:  GENERAL:  Well developed female; awake, alert and oriented, in no acute distress.  Chronically ill appearing.  Overall better.  She is accompanied by her daughter.  SKIN:  Warm and dry, without rashes, purpura, or petechiae.  HEAD:  Normocephalic, atraumatic.  EYES:  Bilateral periorbital edema resolved  EARS:  Hearing intact.  CHEST:  Lungs are clear to auscultation bilaterally.  No wheezes, rales, or rhonchi.  HEART:  Regular rate; normal rhythm.  No murmurs, gallops or rubs.  ABDOMEN:  Soft, non-tender, non-distended.  Normal active bowel sounds.  No organomegaly.  EXTREMITIES:  Trace bilateral ankle edema left greater than right   NEUROLOGICAL:  No focal neurologic deficits.      DIAGNOSTIC DATA:  Lab Results   Component Value Date    WBC 8.73 03/21/2018    HGB 13.6 03/21/2018    HCT 41.4 03/21/2018    MCV 86.8 03/21/2018    PLT 1,093 (H) 03/21/2018     IMAGING:  None reviewed    ASSESSMENT:  This is a 72 y.o. female with:  1.  History of bilateral breast cancer in 1988 status post bilateral mastectomy.  She apparently completed 6 months of adjuvant therapy.  We have no details regarding this.    2.  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  Started on Hydrea 1000 mg daily.  This was discontinued and she started Gleevec on approximately 10/12/2017.  Gleevec discontinued due to intolerance on 11/15/2017.  We started Gleevec at a lower dose of 200 mg but she was also intolerant of this dose.  Her side effects were as severe with a lower dose and she therefore stopped taking the drug.  Symptoms have improved significantly.  3.  Thrombocytosis:  Likely secondary to the CML although this is somewhat atypical since her white blood cell count and hemoglobin are normal. He is now taking hydroxyurea 1500 mg daily with a slight improvement in her platelet count today.  Continue this dose.  " Today we discussed that ultimately would be better to take medication that helps with this at the molecular level and therefore we will plan to initiate therapy with nilotinib 150 mg twice daily as long as the cost is not prohibitive.  I will communicate with her pharmacy benefit advocate regarding this.  4.  Hypothyroidism: Most recent TSH was low at 0.267. Levothyroxine decreased to 100 mcg.  5.  Nausea and vomiting: Resolved off Gleevec.   6. Periorbital edema: Resolved off Gleevec.    7.  Hypokalemia:  Resolved.  Continue oral potassium to once pill daily.    8.  Osteoarthritis pain: Did not complain of this today.    PLAN:  1.  For now continue hydroxyurea 1500 mg daily  2.  Teaching session for nilotinib with plans to start this at 150 mg twice daily as long as the cost is not prohibitive.  Baseline EKG and labs at that visit.  3.  Return in 2 weeks after that for an oral adherence appointment with one of our nurse practitioners with an EKG and labs that day.  4.  I will see her back a couple of weeks after that.     Burt Merida MD

## 2018-03-22 ENCOUNTER — DOCUMENTATION (OUTPATIENT)
Dept: ONCOLOGY | Facility: CLINIC | Age: 73
End: 2018-03-22

## 2018-03-22 NOTE — PROGRESS NOTES
Call rec from Clarissa. I explained to her about the copay for the Tasigna and she will speak with her mom and contact me back on Monday.

## 2018-03-22 NOTE — PROGRESS NOTES
Staff message rec from Dr Merida asking about the cost of Tasigna. See below.    nilotinib   Received: Yesterday   Message Contents   Burt Merida MD sent to Marifer Caicedo,     Do we have an idea on cost for the nilotinib 150 bid?  [facility]      I did check the cost and failed to put a note in the chart.    Pt's copay is $2194.91.    Dr Merida also asked me to contact pts daughter to let her know as well.    I contacted Clarissa and left a VM asking for a return call back.

## 2018-03-23 ENCOUNTER — APPOINTMENT (OUTPATIENT)
Dept: OTHER | Facility: HOSPITAL | Age: 73
End: 2018-03-23

## 2018-03-23 ENCOUNTER — APPOINTMENT (OUTPATIENT)
Dept: ONCOLOGY | Facility: CLINIC | Age: 73
End: 2018-03-23

## 2018-03-26 ENCOUNTER — DOCUMENTATION (OUTPATIENT)
Dept: ONCOLOGY | Facility: CLINIC | Age: 73
End: 2018-03-26

## 2018-03-26 NOTE — PROGRESS NOTES
VM rec from Clarissa, pts Daughter-She states that her mom will proceed with treatment and she asks that I send in the rx. I will finish the process for the Tasigna. I have notified Dr Merida of pts decision to continue.

## 2018-03-26 NOTE — PROGRESS NOTES
Tasigna rx faxed to [facility] [phone]  Phone: [phone]-[place]. Line  [phone]-pt. Line

## 2018-03-28 ENCOUNTER — LAB (OUTPATIENT)
Dept: OTHER | Facility: HOSPITAL | Age: 73
End: 2018-03-28

## 2018-03-28 ENCOUNTER — OFFICE VISIT (OUTPATIENT)
Dept: ONCOLOGY | Facility: CLINIC | Age: 73
End: 2018-03-28

## 2018-03-28 VITALS — WEIGHT: 131.2 LBS | BODY MASS INDEX: 21.86 KG/M2

## 2018-03-28 DIAGNOSIS — Z79.899 LONG-TERM USE OF HIGH-RISK MEDICATION: Primary | ICD-10-CM

## 2018-03-28 DIAGNOSIS — C92.10 CML (CHRONIC MYELOID LEUKEMIA) (HCC): ICD-10-CM

## 2018-03-28 LAB
ALBUMIN SERPL-MCNC: 4 G/DL (ref 3.5–5.2)
ALBUMIN/GLOB SERPL: 1.2 G/DL
ALP SERPL-CCNC: 97 U/L (ref 39–117)
ALT SERPL W P-5'-P-CCNC: 26 U/L (ref 1–33)
ANION GAP SERPL CALCULATED.3IONS-SCNC: 12.1 MMOL/L
AST SERPL-CCNC: 30 U/L (ref 1–32)
BASOPHILS # BLD AUTO: 0.19 10*3/MM3 (ref 0–0.2)
BASOPHILS NFR BLD AUTO: 2.4 % (ref 0–1.5)
BILIRUB SERPL-MCNC: 0.4 MG/DL (ref 0.1–1.2)
BUN BLD-MCNC: 17 MG/DL (ref 8–23)
BUN/CREAT SERPL: 18.7 (ref 7–25)
CALCIUM SPEC-SCNC: 9.3 MG/DL (ref 8.6–10.5)
CHLORIDE SERPL-SCNC: 98 MMOL/L (ref 98–107)
CO2 SERPL-SCNC: 28.9 MMOL/L (ref 22–29)
CREAT BLD-MCNC: 0.91 MG/DL (ref 0.57–1)
DEPRECATED RDW RBC AUTO: 60.5 FL (ref 37–54)
EOSINOPHIL # BLD AUTO: 0.12 10*3/MM3 (ref 0–0.7)
EOSINOPHIL NFR BLD AUTO: 1.5 % (ref 0.3–6.2)
ERYTHROCYTE [DISTWIDTH] IN BLOOD BY AUTOMATED COUNT: 19.6 % (ref 11.7–13)
GFR SERPL CREATININE-BSD FRML MDRD: 60 ML/MIN/1.73
GLOBULIN UR ELPH-MCNC: 3.4 GM/DL
GLUCOSE BLD-MCNC: 100 MG/DL (ref 65–99)
HCT VFR BLD AUTO: 40.1 % (ref 35.6–45.5)
HGB BLD-MCNC: 13 G/DL (ref 11.9–15.5)
IMM GRANULOCYTES # BLD: 0.05 10*3/MM3 (ref 0–0.03)
IMM GRANULOCYTES NFR BLD: 0.6 % (ref 0–0.5)
LDH SERPL-CCNC: 192 U/L (ref 135–214)
LIPASE SERPL-CCNC: 44 U/L (ref 13–60)
LYMPHOCYTES # BLD AUTO: 1.9 10*3/MM3 (ref 0.9–4.8)
LYMPHOCYTES NFR BLD AUTO: 24.5 % (ref 19.6–45.3)
MCH RBC QN AUTO: 29.1 PG (ref 26.9–32)
MCHC RBC AUTO-ENTMCNC: 32.4 G/DL (ref 32.4–36.3)
MCV RBC AUTO: 89.7 FL (ref 80.5–98.2)
MONOCYTES # BLD AUTO: 0.26 10*3/MM3 (ref 0.2–1.2)
MONOCYTES NFR BLD AUTO: 3.3 % (ref 5–12)
NEUTROPHILS # BLD AUTO: 5.25 10*3/MM3 (ref 1.9–8.1)
NEUTROPHILS NFR BLD AUTO: 67.7 % (ref 42.7–76)
NRBC BLD MANUAL-RTO: 0 /100 WBC (ref 0–0)
PLAT MORPH BLD: NORMAL
PLATELET # BLD AUTO: 894 10*3/MM3 (ref 140–500)
PMV BLD AUTO: 9.5 FL (ref 6–12)
POTASSIUM BLD-SCNC: 3.7 MMOL/L (ref 3.5–5.2)
PROT SERPL-MCNC: 7.4 G/DL (ref 6–8.5)
RBC # BLD AUTO: 4.47 10*6/MM3 (ref 3.9–5.2)
RBC MORPH BLD: NORMAL
SODIUM BLD-SCNC: 139 MMOL/L (ref 136–145)
WBC MORPH BLD: NORMAL
WBC NRBC COR # BLD: 7.77 10*3/MM3 (ref 4.5–10.7)

## 2018-03-28 PROCEDURE — 99215 OFFICE O/P EST HI 40 MIN: CPT | Performed by: NURSE PRACTITIONER

## 2018-03-28 PROCEDURE — 85007 BL SMEAR W/DIFF WBC COUNT: CPT | Performed by: INTERNAL MEDICINE

## 2018-03-28 PROCEDURE — 36415 COLL VENOUS BLD VENIPUNCTURE: CPT

## 2018-03-28 PROCEDURE — 83690 ASSAY OF LIPASE: CPT | Performed by: INTERNAL MEDICINE

## 2018-03-28 PROCEDURE — 83615 LACTATE (LD) (LDH) ENZYME: CPT | Performed by: INTERNAL MEDICINE

## 2018-03-28 PROCEDURE — 85025 COMPLETE CBC W/AUTO DIFF WBC: CPT | Performed by: INTERNAL MEDICINE

## 2018-03-28 PROCEDURE — 80053 COMPREHEN METABOLIC PANEL: CPT | Performed by: INTERNAL MEDICINE

## 2018-03-28 PROCEDURE — 93005 ELECTROCARDIOGRAM TRACING: CPT | Performed by: NURSE PRACTITIONER

## 2018-03-28 RX ORDER — TEMAZEPAM 15 MG/1
15 CAPSULE ORAL NIGHTLY PRN
Qty: 30 CAPSULE | Refills: 2 | OUTPATIENT
Start: 2018-03-28 | End: 2018-06-18 | Stop reason: SDUPTHER

## 2018-03-28 RX ORDER — PAROXETINE HYDROCHLORIDE 20 MG/1
20 TABLET, FILM COATED ORAL EVERY MORNING
Qty: 30 TABLET | Refills: 2 | Status: SHIPPED | OUTPATIENT
Start: 2018-03-28 | End: 2018-07-02 | Stop reason: SDUPTHER

## 2018-03-28 NOTE — PROGRESS NOTES
Tasigna causes a prolonged QT interval when taken with Prozac and trazodone.  Dr. Merida and I discussed this with the pharmacy and did multiple cross checks.  I have therefore ordered Paxil 20 mg to be started every other day alternating with Prozac every other day for 2 weeks.  She will completely discontinue Prozac in 2 weeks.  At this time she will start her Tasigna.  I did instruct the patient to discontinue her trazodone tonight and start Temazepam.  I have called in a prescription for temazepam 15 mg nightly.  The patient and her daughter verbalize understanding of dose instructions and schedule.  I have also written this down for the patient.  She will return in 2 weeks for RN review.  She will then return in 4 weeks for a NP visit and oral adherence visit.  She will then see Dr. Merida in 6 weeks.  She will be due for an EKG in 4 weeks (after 2 weeks being on the drug, then again at 6 weeks).

## 2018-03-28 NOTE — PROGRESS NOTES
Subjective     PATIENT NAME:  Nicoel Lofton  YOB: 1945  PATIENTS AGE:  72 y.o.  PATIENTS SEX:  female  DATE OF SERVICE:  03/28/2018  PROVIDER:  FERNANDO Duke      ____________________PATIENT EDUCATION____________________    PATIENT EDUCATION:  Today I met with the patient to discuss the chemotherapy regimen recommended for treatment of her disease.  The patient was given explanation of treatment premed side effects including office policy that prohibits patients to drive if sedating medications are administered, MD explanation given regarding benefits, side effects, toxicities and goals of treatment.  The patient received a Chemotherapy/Biotherapy Plan Summary including diagnosis and specific treatment plan.    SIDE EFFECTS:  Common side effects were discussed with the patient and her daughter.  Discussion included hair loss/discoloration, anemia/fatigue, infection/chills/fever, appetite, bleeding risk/precautions, constipation, diarrhea, mouth sores, taste alteration, loss of appetite,nausea/vomiting, peripheral neuropathy, skin/nail changes, rash, muscle aches/weakness, photosensitivity, weight gain/loss, hearing loss, dizziness, heart damage, liver damage, lung damage, kidney damage, DVT/PE risk, fluid retention, pleural/pericardial effusion, somnolence, electrolyte/LFT imbalance.  .    Discussion also included side effects specific to drugs in the treatment plan, Tasigna specifically.      A total of 45 minutes were spent with the patient, with 100% of time spent in education and counseling.

## 2018-03-29 ENCOUNTER — DOCUMENTATION (OUTPATIENT)
Dept: ONCOLOGY | Facility: CLINIC | Age: 73
End: 2018-03-29

## 2018-04-06 RX ORDER — PROCHLORPERAZINE MALEATE 10 MG
TABLET ORAL
Qty: 60 TABLET | Refills: 0 | Status: SHIPPED | OUTPATIENT
Start: 2018-04-06 | End: 2018-07-16 | Stop reason: SDUPTHER

## 2018-04-11 ENCOUNTER — OFFICE VISIT (OUTPATIENT)
Dept: ONCOLOGY | Facility: CLINIC | Age: 73
End: 2018-04-11

## 2018-04-11 ENCOUNTER — LAB (OUTPATIENT)
Dept: OTHER | Facility: HOSPITAL | Age: 73
End: 2018-04-11

## 2018-04-11 DIAGNOSIS — C92.10 CML (CHRONIC MYELOID LEUKEMIA) (HCC): ICD-10-CM

## 2018-04-11 DIAGNOSIS — C92.10 CML (CHRONIC MYELOID LEUKEMIA) (HCC): Primary | ICD-10-CM

## 2018-04-11 LAB
ALBUMIN SERPL-MCNC: 4.3 G/DL (ref 3.5–5.2)
ALBUMIN/GLOB SERPL: 1.2 G/DL
ALP SERPL-CCNC: 99 U/L (ref 39–117)
ALT SERPL W P-5'-P-CCNC: 35 U/L (ref 1–33)
ANION GAP SERPL CALCULATED.3IONS-SCNC: 11.4 MMOL/L
AST SERPL-CCNC: 38 U/L (ref 1–32)
BASOPHILS # BLD AUTO: 0.1 10*3/MM3 (ref 0–0.2)
BASOPHILS NFR BLD AUTO: 1.3 % (ref 0–1.5)
BILIRUB SERPL-MCNC: 0.7 MG/DL (ref 0.1–1.2)
BUN BLD-MCNC: 18 MG/DL (ref 8–23)
BUN/CREAT SERPL: 22.5 (ref 7–25)
CALCIUM SPEC-SCNC: 9.4 MG/DL (ref 8.6–10.5)
CHLORIDE SERPL-SCNC: 99 MMOL/L (ref 98–107)
CO2 SERPL-SCNC: 29.6 MMOL/L (ref 22–29)
CREAT BLD-MCNC: 0.8 MG/DL (ref 0.57–1)
DEPRECATED RDW RBC AUTO: 71.1 FL (ref 37–54)
EOSINOPHIL # BLD AUTO: 0.19 10*3/MM3 (ref 0–0.7)
EOSINOPHIL NFR BLD AUTO: 2.5 % (ref 0.3–6.2)
ERYTHROCYTE [DISTWIDTH] IN BLOOD BY AUTOMATED COUNT: 21.8 % (ref 11.7–13)
GFR SERPL CREATININE-BSD FRML MDRD: 70 ML/MIN/1.73
GLOBULIN UR ELPH-MCNC: 3.5 GM/DL
GLUCOSE BLD-MCNC: 139 MG/DL (ref 65–99)
HCT VFR BLD AUTO: 40 % (ref 35.6–45.5)
HGB BLD-MCNC: 13 G/DL (ref 11.9–15.5)
IMM GRANULOCYTES # BLD: 0.05 10*3/MM3 (ref 0–0.03)
IMM GRANULOCYTES NFR BLD: 0.7 % (ref 0–0.5)
LDH SERPL-CCNC: 191 U/L (ref 135–214)
LIPASE SERPL-CCNC: 51 U/L (ref 13–60)
LYMPHOCYTES # BLD AUTO: 1.72 10*3/MM3 (ref 0.9–4.8)
LYMPHOCYTES NFR BLD AUTO: 23 % (ref 19.6–45.3)
MCH RBC QN AUTO: 30 PG (ref 26.9–32)
MCHC RBC AUTO-ENTMCNC: 32.5 G/DL (ref 32.4–36.3)
MCV RBC AUTO: 92.2 FL (ref 80.5–98.2)
MICROCYTES BLD QL: NORMAL
MONOCYTES # BLD AUTO: 0.24 10*3/MM3 (ref 0.2–1.2)
MONOCYTES NFR BLD AUTO: 3.2 % (ref 5–12)
NEUTROPHILS # BLD AUTO: 5.19 10*3/MM3 (ref 1.9–8.1)
NEUTROPHILS NFR BLD AUTO: 69.3 % (ref 42.7–76)
NRBC BLD MANUAL-RTO: 0 /100 WBC (ref 0–0)
PLAT MORPH BLD: NORMAL
PLATELET # BLD AUTO: 576 10*3/MM3 (ref 140–500)
PMV BLD AUTO: 9.7 FL (ref 6–12)
POTASSIUM BLD-SCNC: 3.2 MMOL/L (ref 3.5–5.2)
PROT SERPL-MCNC: 7.8 G/DL (ref 6–8.5)
RBC # BLD AUTO: 4.34 10*6/MM3 (ref 3.9–5.2)
SODIUM BLD-SCNC: 140 MMOL/L (ref 136–145)
WBC MORPH BLD: NORMAL
WBC NRBC COR # BLD: 7.49 10*3/MM3 (ref 4.5–10.7)

## 2018-04-11 PROCEDURE — 99212-NC PR NO CHARGE CBC OFFICE OUTPATIENT VISIT 10 MINUTES: Performed by: NURSE PRACTITIONER

## 2018-04-11 PROCEDURE — 85007 BL SMEAR W/DIFF WBC COUNT: CPT | Performed by: INTERNAL MEDICINE

## 2018-04-11 PROCEDURE — 83615 LACTATE (LD) (LDH) ENZYME: CPT | Performed by: INTERNAL MEDICINE

## 2018-04-11 PROCEDURE — 85025 COMPLETE CBC W/AUTO DIFF WBC: CPT | Performed by: INTERNAL MEDICINE

## 2018-04-11 PROCEDURE — 36415 COLL VENOUS BLD VENIPUNCTURE: CPT

## 2018-04-11 PROCEDURE — 83690 ASSAY OF LIPASE: CPT | Performed by: INTERNAL MEDICINE

## 2018-04-11 PROCEDURE — 80053 COMPREHEN METABOLIC PANEL: CPT | Performed by: INTERNAL MEDICINE

## 2018-04-11 NOTE — PROGRESS NOTES
The patient returns today for CBC with RN review. Over the past two weeks she has tapered off Prozac and onto Paxil. As well as off Trazodone and onto Restoril for sleep. We delayed the initiation of Tasigna. Now that she is off Prozac and Trazodone, she will initiate Tasigna 150mg twice a day. She will return in two weeks for CBC with nurse practitioner evaluation and oral adherence.  She will be due for an EKG at that time, 2 weeks into therapy.  She is scheduled for M.D. follow-up in 4 weeks, at which time she will also receive an EKG to evaluate QTc prolongation.    Lab Results   Component Value Date    WBC 7.49 04/11/2018    HGB 13.0 04/11/2018    HCT 40.0 04/11/2018    MCV 92.2 04/11/2018     (H) 04/11/2018     Melanie Casas, APRN  04/11/2018

## 2018-04-25 ENCOUNTER — OFFICE VISIT (OUTPATIENT)
Dept: ONCOLOGY | Facility: CLINIC | Age: 73
End: 2018-04-25

## 2018-04-25 ENCOUNTER — LAB (OUTPATIENT)
Dept: OTHER | Facility: HOSPITAL | Age: 73
End: 2018-04-25

## 2018-04-25 VITALS
OXYGEN SATURATION: 99 % | WEIGHT: 131 LBS | BODY MASS INDEX: 21.83 KG/M2 | HEART RATE: 70 BPM | DIASTOLIC BLOOD PRESSURE: 75 MMHG | TEMPERATURE: 98.9 F | RESPIRATION RATE: 18 BRPM | HEIGHT: 65 IN | SYSTOLIC BLOOD PRESSURE: 134 MMHG

## 2018-04-25 DIAGNOSIS — Z51.81 MEDICATION MONITORING ENCOUNTER: Primary | ICD-10-CM

## 2018-04-25 DIAGNOSIS — C92.10 CML (CHRONIC MYELOID LEUKEMIA) (HCC): ICD-10-CM

## 2018-04-25 LAB
ALBUMIN SERPL-MCNC: 3.9 G/DL (ref 3.5–5.2)
ALBUMIN/GLOB SERPL: 1.1 G/DL
ALP SERPL-CCNC: 116 U/L (ref 39–117)
ALT SERPL W P-5'-P-CCNC: 27 U/L (ref 1–33)
ANION GAP SERPL CALCULATED.3IONS-SCNC: 12.7 MMOL/L
AST SERPL-CCNC: 28 U/L (ref 1–32)
BASOPHILS # BLD AUTO: 0.03 10*3/MM3 (ref 0–0.2)
BASOPHILS NFR BLD AUTO: 0.4 % (ref 0–1.5)
BILIRUB SERPL-MCNC: 0.9 MG/DL (ref 0.1–1.2)
BUN BLD-MCNC: 14 MG/DL (ref 8–23)
BUN/CREAT SERPL: 19.7 (ref 7–25)
CALCIUM SPEC-SCNC: 9 MG/DL (ref 8.6–10.5)
CHLORIDE SERPL-SCNC: 101 MMOL/L (ref 98–107)
CO2 SERPL-SCNC: 25.3 MMOL/L (ref 22–29)
CREAT BLD-MCNC: 0.71 MG/DL (ref 0.57–1)
DEPRECATED RDW RBC AUTO: 72.8 FL (ref 37–54)
EOSINOPHIL # BLD AUTO: 0.23 10*3/MM3 (ref 0–0.7)
EOSINOPHIL NFR BLD AUTO: 2.8 % (ref 0.3–6.2)
ERYTHROCYTE [DISTWIDTH] IN BLOOD BY AUTOMATED COUNT: 21.1 % (ref 11.7–13)
GFR SERPL CREATININE-BSD FRML MDRD: 80 ML/MIN/1.73
GLOBULIN UR ELPH-MCNC: 3.7 GM/DL
GLUCOSE BLD-MCNC: 107 MG/DL (ref 65–99)
HCT VFR BLD AUTO: 38.5 % (ref 35.6–45.5)
HGB BLD-MCNC: 12.6 G/DL (ref 11.9–15.5)
IMM GRANULOCYTES # BLD: 0.03 10*3/MM3 (ref 0–0.03)
IMM GRANULOCYTES NFR BLD: 0.4 % (ref 0–0.5)
LDH SERPL-CCNC: 161 U/L (ref 135–214)
LIPASE SERPL-CCNC: 62 U/L (ref 13–60)
LYMPHOCYTES # BLD AUTO: 2.16 10*3/MM3 (ref 0.9–4.8)
LYMPHOCYTES NFR BLD AUTO: 26.4 % (ref 19.6–45.3)
MCH RBC QN AUTO: 30.7 PG (ref 26.9–32)
MCHC RBC AUTO-ENTMCNC: 32.7 G/DL (ref 32.4–36.3)
MCV RBC AUTO: 93.9 FL (ref 80.5–98.2)
MONOCYTES # BLD AUTO: 0.34 10*3/MM3 (ref 0.2–1.2)
MONOCYTES NFR BLD AUTO: 4.2 % (ref 5–12)
NEUTROPHILS # BLD AUTO: 5.39 10*3/MM3 (ref 1.9–8.1)
NEUTROPHILS NFR BLD AUTO: 65.8 % (ref 42.7–76)
NRBC BLD MANUAL-RTO: 0 /100 WBC (ref 0–0)
PLATELET # BLD AUTO: 323 10*3/MM3 (ref 140–500)
PMV BLD AUTO: 10.1 FL (ref 6–12)
POTASSIUM BLD-SCNC: 3.4 MMOL/L (ref 3.5–5.2)
PROT SERPL-MCNC: 7.6 G/DL (ref 6–8.5)
RBC # BLD AUTO: 4.1 10*6/MM3 (ref 3.9–5.2)
SODIUM BLD-SCNC: 139 MMOL/L (ref 136–145)
WBC NRBC COR # BLD: 8.18 10*3/MM3 (ref 4.5–10.7)

## 2018-04-25 PROCEDURE — 36415 COLL VENOUS BLD VENIPUNCTURE: CPT

## 2018-04-25 PROCEDURE — 85025 COMPLETE CBC W/AUTO DIFF WBC: CPT | Performed by: INTERNAL MEDICINE

## 2018-04-25 PROCEDURE — 99215 OFFICE O/P EST HI 40 MIN: CPT | Performed by: NURSE PRACTITIONER

## 2018-04-25 PROCEDURE — 93005 ELECTROCARDIOGRAM TRACING: CPT | Performed by: NURSE PRACTITIONER

## 2018-04-25 PROCEDURE — 80053 COMPREHEN METABOLIC PANEL: CPT | Performed by: INTERNAL MEDICINE

## 2018-04-25 PROCEDURE — 83615 LACTATE (LD) (LDH) ENZYME: CPT | Performed by: INTERNAL MEDICINE

## 2018-04-25 PROCEDURE — 83690 ASSAY OF LIPASE: CPT | Performed by: INTERNAL MEDICINE

## 2018-04-25 NOTE — PROGRESS NOTES
[facility] GROUP OUTPATIENT FOLLOW UP CLINIC VISIT    REASON FOR FOLLOW-UP:    1.  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  Gleevec 400 mg daily initiated around 10/12/2017, stopped on 11/15/2017 due to intolerance (noah-orbital edema, nausea/vomiting, fatigue).  Resumed at 200 mg daily but, again, not tolerated.      HISTORY OF PRESENT ILLNESS:  Nicole Lofton is a 72 y.o. female with the above medical history who is here today for scheduled toxicity check after initiating nilotinib  and 150 mg twice daily just 2 weeks ago.  She did require medication changes in order to start this drug which included replacing her Prozac with Taxol, and her trazodone with Restoril.  Slowly tapered off Prozac and onto Paxil over the course of a couple of weeks.    She reports feeling fair today.  She does feel the new medication has possibly made her arthritis slightly worse.  She denies any bleeding, bruising, swelling, chest pain, fever, chills.  Her primary complaint is fatigue.  She is eating well.  She denies nausea or vomiting.  Not feel that her Restoril is currently as effective as trazodone.      So expresses frustration with the cost of nilotinib,as  this does incur a co-pay of approximately $2000 monthly.    Platelets have improved today from 576,000 last visit to 323,000 today.    ONCOLOGIC HISTORY:  For about 6 months she has noticed bilateral arm tingling and weakness in her hands.  This has occurred about 5 or 6 times over the past 6 months.  She is vague in her description of this.  She is globally weak and is quite inactive as she lives by herself.  She was completing physical therapy with some improvement but is now quite unsteady on her feet.  She has not had any falls.  She denies any bleeding.  She has chronic fatigue.  She has osteoarthritis pain and reports pain in the right side and in her neck.  She denies any acute low back pain but does have chronic low  back pain.  She does have orthostatic symptoms and she is lightheaded when she stands.  She has had a decreased appetite and some weight loss over the past 9 months.     Labs performed on 8/28/2017 showed a white blood cell count of 39.1 with a differential showing 61% neutrophils and 32% lymphocytes, hemoglobin 13.9 and platelets 565,000.         She had further labs done at [place] on 8/20/2017 showing a white blood cell count of 24.4, hemoglobin 13.7, and platelets 1795.  A differential showed 64% neutrophils, 7% lymphocytes, and 12% basophils.  Peripheral blood flow cytometry was also done on this date showing 11% basophils and less than 1% myeloblasts.  There was no monoclonal B-cell, T-cell, plasma cell, or NK cell population.  FISH for BCR-ABL and ESSENCE 2 mutation analysis were done as well.     Her CBC was normal as of 7/8/2016.     Of note, she has a history of bilateral breast cancer diagnosed in 1988.  She had bilateral mastectomy with reconstruction.  We do not have any records regarding this at this time.  She apparently completed at least 6 months of adjuvant chemotherapy but again does not know the details..    FISH for BCR-ABL performed at [place] was actually positive.  As of 9/6/2017 ESSENCE 2 is still pending.    She returned on 9/6/2017 for follow-up.  Platelets at 1.9 million with hydroxyurea 1000 mg daily.  She will increase the dose to 2000 mg daily.  She will have a bone marrow aspiration biopsy performed.  Weekly CBCs.    As of 9/6/2017 peripheral blood PCR was positive at 89.238% with a major break point mutation.    ESSENCE 2 and CALR mutation negative.    Bone marrow aspiration and biopsy performed on 9/15/2017.  Flow cytometry showed no increase in blasts.  Morphology showed no increase in blasts.  Bone marrow FISH showed BCR/ABL rearrangement in 93% of cells.  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    Labs as of 9/20/2017 show white blood cell count of 4.7 with hemoglobin of 12.7 and  platelets 1.1 million.    On 9/27/2017 white blood cells and hemoglobin are normal.  Platelets are down to 722,000.  We plan to start Gleevec at 400 mg daily if she tolerates it.  In the meantime she will decrease the hydroxyurea dose to 1000 mg daily.    Blood counts normalized.  Hydroxyurea stopped.    Gleevec initiated around 10/12/2017, stopped on 11/15/2017 due to intolerance (noah-orbital edema, nausea/vomiting, fatigue).    Symptoms improving as of 11/29/2017.  Blood counts normal.  Hold further therapy at this time until she improves more clinically.      Plan to resume Gleevec at 200 mg daily as of 1/11/2018.  Only took it for less than a week but also did not tolerate this dose.    As of 2/21/2018 her platelet count is again elevated at greater than 700,000.  Resume hydroxyurea 1000 mg daily.  Considering nilotinib at a decreased dose.    Platelets continued to rise in March 2018.  Hydroxyurea increased to 1500 mg daily with stabilization of her platelet count and some improvement as of 3/21/2018.  Plan for nilotinib.      PAST MEDICAL, SURGICAL, FAMILY, AND SOCIAL HISTORIES were reviewed with the patient and in the electronic medical record, and were updated if indicated.    ALLERGIES:  No Known Allergies    MEDICATIONS:  The medication list has been reviewed with the patient by the medical assistant, and the list has been updated in the electronic medical record, which I reviewed.  Medication dosages and frequencies were confirmed to be accurate.    I have reviewed the patient's medical history in detail and updated the computerized patient record.    REVIEW OF SYSTEMS:  PAIN:  See Vital Signs below.  GENERAL:  See history of present illness  SKIN:  No rashes or non-healing lesions.   HEME/LYMPH: Increased platelets.  No lymphadenopathy.    EYES:  Periorbital edema resolved  ENT:  No sore throat or difficulty swallowing.  dry mouth  RESPIRATORY:  No cough, shortness of breath, hemoptysis, or  "wheezing.  CARDIOVASCULAR: History of present illness  Lightheadedness improved  GASTROINTESTINAL: Appetite improving.  No nausea, vomiting, or diarrhea.    GENITOURINARY:  No dysuria or hematuria.  MUSCULOSKELETAL:  Arthralgias, back pain, abnormal gait, neck pain.  Right arm pain secondary to humerus fracture has improved.  NEUROLOGIC:  No dizziness, loss of consciousness, or seizures.  PSYCHIATRIC:  See history of present illness    Vitals:    04/25/18 1032   BP: 134/75   Pulse: 70   Resp: 18   Temp: 98.9 °F (37.2 °C)   TempSrc: Oral   SpO2: 99%   Weight: 59.4 kg (131 lb)   Height: 165 cm (64.96\")   PainSc:   6   PainLoc: Generalized       PHYSICAL EXAMINATION:  GENERAL:  Well developed female; awake, alert and oriented, in no acute distress.  Chronically ill appearing.   She is accompanied by her daughter.  SKIN:  Warm and dry, without rashes, purpura, or petechiae.  HEAD:  Normocephalic, atraumatic.  EYES:  Bilateral periorbital edema resolved  EARS:  Hearing intact.  CHEST:  Lungs are clear to auscultation bilaterally.  No wheezes, rales, or rhonchi.  HEART:  Regular rate; normal rhythm.  No murmurs, gallops or rubs.  ABDOMEN:  Soft, non-tender, non-distended.  Normal active bowel sounds.  No organomegaly.  EXTREMITIES:  Trace bilateral ankle edema left greater than right   NEUROLOGICAL:  No focal neurologic deficits.      DIAGNOSTIC DATA:  Lab Results   Component Value Date    WBC 8.18 04/25/2018    HGB 12.6 04/25/2018    HCT 38.5 04/25/2018    MCV 93.9 04/25/2018     04/25/2018     IMAGING:  None reviewed    EKG obtained today,4/25/2018. We will await formal read.     ASSESSMENT:  This is a 72 y.o. female with:  1.  History of bilateral breast cancer in 1988 status post bilateral mastectomy.  She apparently completed 6 months of adjuvant therapy.  We have no details regarding this.    2.  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  Started on Hydrea " 1000 mg daily.  This was discontinued and she started Gleevec on approximately 10/12/2017.  Gleevec discontinued due to intolerance on 11/15/2017.  We started Gleevec at a lower dose of 200 mg but she was also intolerant of this dose.  Her side effects were as severe with a lower dose and she therefore stopped taking the drug.  Symptoms have improved significantly.    She started nilotinib  150mg twice daily.    3.  Thrombocytosis:  This has improved.   4.  Hypothyroidism: Most recent TSH was low at 0.267. Levothyroxine decreased to 100 mcg.  5.  Nausea and vomiting: Resolved off Gleevec.   6. Periorbital edema: Resolved off Gleevec.    7.  Hypokalemia:  Resolved.  Continue oral potassium to once pill daily.    8.  Osteoarthritis pain: she reports this has become worse sine starting nilotinib.  9.  Pressure in.  She had been on Prozac for years, however, drug interaction with nilotinib prompted us to change this to Paxil.  She continued on Prozac she had a higher risk of QT interval prolongation.  10.  Insomnia.  This had been treated with trazodone but as mentioned above this also caused a potential for QT interval prolongation.  She was therefore changed to Restoril.  She states that she is sleeping but feels the trazodone was more effective.    PLAN:  1.  Continue nilotinib as prescribed.  I have encouraged her to give the drug a little more time before making a decision tingling therapy.  At this time she does feel extremely fatigued and feels that her osteoarthritis has worsened since starting the medication.  I explained to the patient that we have made multiple medication changes over the past month and it may take time for her body to adjust.  2.  Continue trazodone as prescribed.  3.  New Paxil as prescribed.  I did explain to the patient and her daughter today that it can take several weeks to feel optimal effects of this medication.  4.  He will return as already scheduled in 2 weeks to see Dr. Merida.  At  this time they will discuss whether she is to proceed with further treatment..     FERNANDO Duke

## 2018-05-07 ENCOUNTER — DOCUMENTATION (OUTPATIENT)
Dept: ONCOLOGY | Facility: CLINIC | Age: 73
End: 2018-05-07

## 2018-05-09 ENCOUNTER — OFFICE VISIT (OUTPATIENT)
Dept: ONCOLOGY | Facility: CLINIC | Age: 73
End: 2018-05-09

## 2018-05-09 ENCOUNTER — LAB (OUTPATIENT)
Dept: OTHER | Facility: HOSPITAL | Age: 73
End: 2018-05-09

## 2018-05-09 ENCOUNTER — TELEPHONE (OUTPATIENT)
Dept: ONCOLOGY | Facility: HOSPITAL | Age: 73
End: 2018-05-09

## 2018-05-09 VITALS
DIASTOLIC BLOOD PRESSURE: 84 MMHG | TEMPERATURE: 97.4 F | HEART RATE: 73 BPM | SYSTOLIC BLOOD PRESSURE: 178 MMHG | WEIGHT: 132.5 LBS | RESPIRATION RATE: 16 BRPM | BODY MASS INDEX: 22.08 KG/M2 | HEIGHT: 65 IN | OXYGEN SATURATION: 96 %

## 2018-05-09 DIAGNOSIS — Z51.81 MEDICATION MONITORING ENCOUNTER: Primary | ICD-10-CM

## 2018-05-09 DIAGNOSIS — C92.10 CML (CHRONIC MYELOID LEUKEMIA) (HCC): ICD-10-CM

## 2018-05-09 DIAGNOSIS — Z51.81 MEDICATION MONITORING ENCOUNTER: ICD-10-CM

## 2018-05-09 LAB
ALBUMIN SERPL-MCNC: 4.3 G/DL (ref 3.5–5.2)
ALBUMIN/GLOB SERPL: 1.1 G/DL
ALP SERPL-CCNC: 116 U/L (ref 39–117)
ALT SERPL W P-5'-P-CCNC: 36 U/L (ref 1–33)
ANION GAP SERPL CALCULATED.3IONS-SCNC: 13.2 MMOL/L
AST SERPL-CCNC: 33 U/L (ref 1–32)
BASOPHILS # BLD AUTO: 0.03 10*3/MM3 (ref 0–0.2)
BASOPHILS NFR BLD AUTO: 0.4 % (ref 0–1.5)
BILIRUB SERPL-MCNC: 0.7 MG/DL (ref 0.1–1.2)
BUN BLD-MCNC: 12 MG/DL (ref 8–23)
BUN/CREAT SERPL: 16.9 (ref 7–25)
CALCIUM SPEC-SCNC: 9.2 MG/DL (ref 8.6–10.5)
CHLORIDE SERPL-SCNC: 97 MMOL/L (ref 98–107)
CO2 SERPL-SCNC: 28.8 MMOL/L (ref 22–29)
CREAT BLD-MCNC: 0.71 MG/DL (ref 0.57–1)
DEPRECATED RDW RBC AUTO: 62.4 FL (ref 37–54)
EOSINOPHIL # BLD AUTO: 0.15 10*3/MM3 (ref 0–0.7)
EOSINOPHIL NFR BLD AUTO: 2 % (ref 0.3–6.2)
ERYTHROCYTE [DISTWIDTH] IN BLOOD BY AUTOMATED COUNT: 18.7 % (ref 11.7–13)
GFR SERPL CREATININE-BSD FRML MDRD: 80 ML/MIN/1.73
GLOBULIN UR ELPH-MCNC: 3.9 GM/DL
GLUCOSE BLD-MCNC: 131 MG/DL (ref 65–99)
HCT VFR BLD AUTO: 38.4 % (ref 35.6–45.5)
HGB BLD-MCNC: 13.2 G/DL (ref 11.9–15.5)
IMM GRANULOCYTES # BLD: 0.03 10*3/MM3 (ref 0–0.03)
IMM GRANULOCYTES NFR BLD: 0.4 % (ref 0–0.5)
LIPASE SERPL-CCNC: 64 U/L (ref 13–60)
LYMPHOCYTES # BLD AUTO: 2.62 10*3/MM3 (ref 0.9–4.8)
LYMPHOCYTES NFR BLD AUTO: 35.1 % (ref 19.6–45.3)
MAGNESIUM SERPL-MCNC: 2 MG/DL (ref 1.6–2.4)
MCH RBC QN AUTO: 31.7 PG (ref 26.9–32)
MCHC RBC AUTO-ENTMCNC: 34.4 G/DL (ref 32.4–36.3)
MCV RBC AUTO: 92.3 FL (ref 80.5–98.2)
MONOCYTES # BLD AUTO: 0.59 10*3/MM3 (ref 0.2–1.2)
MONOCYTES NFR BLD AUTO: 7.9 % (ref 5–12)
NEUTROPHILS # BLD AUTO: 4.04 10*3/MM3 (ref 1.9–8.1)
NEUTROPHILS NFR BLD AUTO: 54.2 % (ref 42.7–76)
NRBC BLD MANUAL-RTO: 0 /100 WBC (ref 0–0)
PHOSPHATE SERPL-MCNC: 2.8 MG/DL (ref 2.5–4.5)
PLATELET # BLD AUTO: 245 10*3/MM3 (ref 140–500)
PMV BLD AUTO: 9.8 FL (ref 6–12)
POTASSIUM BLD-SCNC: 2.9 MMOL/L (ref 3.5–5.2)
PROT SERPL-MCNC: 8.2 G/DL (ref 6–8.5)
RBC # BLD AUTO: 4.16 10*6/MM3 (ref 3.9–5.2)
SODIUM BLD-SCNC: 139 MMOL/L (ref 136–145)
WBC NRBC COR # BLD: 7.46 10*3/MM3 (ref 4.5–10.7)

## 2018-05-09 PROCEDURE — 83735 ASSAY OF MAGNESIUM: CPT | Performed by: INTERNAL MEDICINE

## 2018-05-09 PROCEDURE — 99215 OFFICE O/P EST HI 40 MIN: CPT | Performed by: INTERNAL MEDICINE

## 2018-05-09 PROCEDURE — 83690 ASSAY OF LIPASE: CPT | Performed by: INTERNAL MEDICINE

## 2018-05-09 PROCEDURE — 80053 COMPREHEN METABOLIC PANEL: CPT | Performed by: INTERNAL MEDICINE

## 2018-05-09 PROCEDURE — 36415 COLL VENOUS BLD VENIPUNCTURE: CPT

## 2018-05-09 PROCEDURE — 93005 ELECTROCARDIOGRAM TRACING: CPT | Performed by: INTERNAL MEDICINE

## 2018-05-09 PROCEDURE — 84100 ASSAY OF PHOSPHORUS: CPT | Performed by: INTERNAL MEDICINE

## 2018-05-09 PROCEDURE — 85025 COMPLETE CBC W/AUTO DIFF WBC: CPT | Performed by: INTERNAL MEDICINE

## 2018-05-09 RX ORDER — POTASSIUM CHLORIDE 20 MEQ/1
20 TABLET, EXTENDED RELEASE ORAL
Qty: 60 TABLET | Refills: 2 | Status: SHIPPED | OUTPATIENT
Start: 2018-05-09 | End: 2018-08-13 | Stop reason: SDUPTHER

## 2018-05-09 NOTE — TELEPHONE ENCOUNTER
rec'd call from lab, pt K+ 2.9 today.  Reviewed with Dr Merida, he states pt is not taking her potassium supplement any more.  He asked that I contact the patient and ask her to resume her potassium.  I spoke with the patient over the phone and she stated she needed a refill.  I sent refill to her pharmacy.  She v/u instructions

## 2018-05-09 NOTE — PROGRESS NOTES
[facility] CBC GROUP OUTPATIENT FOLLOW UP CLINIC VISIT    REASON FOR FOLLOW-UP:    1.  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  Gleevec 400 mg daily initiated around 10/12/2017, stopped on 11/15/2017 due to intolerance (noah-orbital edema, nausea/vomiting, fatigue).  Resumed at 200 mg daily but, again, not tolerated.      HISTORY OF PRESENT ILLNESS:  Nicole Lofton is a 72 y.o. female with the above medical history who is here today for follow up, continuing nilotinib 150 mg twice daily.      She seems to be tolerating this better than the Gleevec at this point.  She complains of Scalf weakness.  She has not been very active.  Her appetite is adequate.  She is reluctant to be very active due to her recent fall.  She is not taking her potassium supplement.  She does complain of insomnia after switching her sleep aid.    ONCOLOGIC HISTORY:  For about 6 months she has noticed bilateral arm tingling and weakness in her hands.  This has occurred about 5 or 6 times over the past 6 months.  She is vague in her description of this.  She is globally weak and is quite inactive as she lives by herself.  She was completing physical therapy with some improvement but is now quite unsteady on her feet.  She has not had any falls.  She denies any bleeding.  She has chronic fatigue.  She has osteoarthritis pain and reports pain in the right side and in her neck.  She denies any acute low back pain but does have chronic low back pain.  She does have orthostatic symptoms and she is lightheaded when she stands.  She has had a decreased appetite and some weight loss over the past 9 months.     Labs performed on 8/28/2017 showed a white blood cell count of 39.1 with a differential showing 61% neutrophils and 32% lymphocytes, hemoglobin 13.9 and platelets 565,000.         She had further labs done at [place] on 8/20/2017 showing a white blood cell count of 24.4, hemoglobin 13.7, and  platelets 1795.  A differential showed 64% neutrophils, 7% lymphocytes, and 12% basophils.  Peripheral blood flow cytometry was also done on this date showing 11% basophils and less than 1% myeloblasts.  There was no monoclonal B-cell, T-cell, plasma cell, or NK cell population.  FISH for BCR-ABL and ESSENCE 2 mutation analysis were done as well.     Her CBC was normal as of 7/8/2016.     Of note, she has a history of bilateral breast cancer diagnosed in 1988.  She had bilateral mastectomy with reconstruction.  We do not have any records regarding this at this time.  She apparently completed at least 6 months of adjuvant chemotherapy but again does not know the details..    FISH for BCR-ABL performed at [place] was actually positive.  As of 9/6/2017 ESSENCE 2 is still pending.    She returned on 9/6/2017 for follow-up.  Platelets at 1.9 million with hydroxyurea 1000 mg daily.  She will increase the dose to 2000 mg daily.  She will have a bone marrow aspiration biopsy performed.  Weekly CBCs.    As of 9/6/2017 peripheral blood PCR was positive at 89.238% with a major break point mutation.    ESSENCE 2 and CALR mutation negative.    Bone marrow aspiration and biopsy performed on 9/15/2017.  Flow cytometry showed no increase in blasts.  Morphology showed no increase in blasts.  Bone marrow FISH showed BCR/ABL rearrangement in 93% of cells.  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    Labs as of 9/20/2017 show white blood cell count of 4.7 with hemoglobin of 12.7 and platelets 1.1 million.    On 9/27/2017 white blood cells and hemoglobin are normal.  Platelets are down to 722,000.  We plan to start Gleevec at 400 mg daily if she tolerates it.  In the meantime she will decrease the hydroxyurea dose to 1000 mg daily.    Blood counts normalized.  Hydroxyurea stopped.    Gleevec initiated around 10/12/2017, stopped on 11/15/2017 due to intolerance (noah-orbital edema, nausea/vomiting, fatigue).    Symptoms improving as of  11/29/2017.  Blood counts normal.  Hold further therapy at this time until she improves more clinically.      Plan to resume Gleevec at 200 mg daily as of 1/11/2018.  Only took it for less than a week but also did not tolerate this dose.    As of 2/21/2018 her platelet count is again elevated at greater than 700,000.  Resume hydroxyurea 1000 mg daily.  Considering nilotinib at a decreased dose.    Platelets continued to rise in March 2018.  Hydroxyurea increased to 1500 mg daily with stabilization of her platelet count and some improvement as of 3/21/2018.  Plan for nilotinib.    Nilotinib initiated in late March 2018.      PAST MEDICAL, SURGICAL, FAMILY, AND SOCIAL HISTORIES were reviewed with the patient and in the electronic medical record, and were updated if indicated.    ALLERGIES:  No Known Allergies    MEDICATIONS:  The medication list has been reviewed with the patient by the medical assistant, and the list has been updated in the electronic medical record, which I reviewed.  Medication dosages and frequencies were confirmed to be accurate.    I have reviewed the patient's medical history in detail and updated the computerized patient record.    REVIEW OF SYSTEMS:  PAIN:  See Vital Signs below.  GENERAL:  Fatigue and global weakness  SKIN:  No rashes or non-healing lesions.   HEME/LYMPH: Increased platelets.  No lymphadenopathy.    EYES:  Periorbital edema resolved  ENT:  No sore throat or difficulty swallowing.  dry mouth  RESPIRATORY:  No cough, shortness of breath, hemoptysis, or wheezing.  CARDIOVASCULAR: History of present illness  Lightheadedness improved  GASTROINTESTINAL: Appetite improving.  No nausea, vomiting, or diarrhea.    GENITOURINARY:  No dysuria or hematuria.  MUSCULOSKELETAL:  Arthralgias, back pain, abnormal gait, neck pain.  Right arm pain secondary to humerus fracture has improved.  NEUROLOGIC:  No dizziness, loss of consciousness, or seizures.  PSYCHIATRIC:  insomnia and  "depression    Vitals:    05/09/18 1048   BP: 178/84   Pulse: 73   Resp: 16   Temp: 97.4 °F (36.3 °C)   TempSrc: Oral   SpO2: 96%   Weight: 60.1 kg (132 lb 8 oz)   Height: 165 cm (64.96\")   PainSc:   5       PHYSICAL EXAMINATION:  GENERAL:  Well developed female; awake, alert and oriented, in no acute distress.  Chronically ill appearing.   She is accompanied by her daughter.  SKIN:  Warm and dry, without rashes, purpura, or petechiae.  HEAD:  Normocephalic, atraumatic.  EYES:  Bilateral periorbital edema resolved  EARS:  Hearing intact.  CHEST:  Lungs are clear to auscultation bilaterally.  No wheezes, rales, or rhonchi.  HEART:  Regular rate; normal rhythm.  No murmurs, gallops or rubs.  ABDOMEN:  Soft, non-tender, non-distended.  Normal active bowel sounds.  No organomegaly.  EXTREMITIES:  Trace bilateral ankle edema left greater than right   NEUROLOGICAL:  No focal neurologic deficits.      DIAGNOSTIC DATA:  Results for orders placed or performed in visit on 05/09/18   CBC Auto Differential   Result Value Ref Range    WBC 7.46 4.50 - 10.70 10*3/mm3    RBC 4.16 3.90 - 5.20 10*6/mm3    Hemoglobin 13.2 11.9 - 15.5 g/dL    Hematocrit 38.4 35.6 - 45.5 %    MCV 92.3 80.5 - 98.2 fL    MCH 31.7 26.9 - 32.0 pg    MCHC 34.4 32.4 - 36.3 g/dL    RDW 18.7 (H) 11.7 - 13.0 %    RDW-SD 62.4 (H) 37.0 - 54.0 fl    MPV 9.8 6.0 - 12.0 fL    Platelets 245 140 - 500 10*3/mm3    Neutrophil % 54.2 42.7 - 76.0 %    Lymphocyte % 35.1 19.6 - 45.3 %    Monocyte % 7.9 5.0 - 12.0 %    Eosinophil % 2.0 0.3 - 6.2 %    Basophil % 0.4 0.0 - 1.5 %    Immature Grans % 0.4 0.0 - 0.5 %    Neutrophils, Absolute 4.04 1.90 - 8.10 10*3/mm3    Lymphocytes, Absolute 2.62 0.90 - 4.80 10*3/mm3    Monocytes, Absolute 0.59 0.20 - 1.20 10*3/mm3    Eosinophils, Absolute 0.15 0.00 - 0.70 10*3/mm3    Basophils, Absolute 0.03 0.00 - 0.20 10*3/mm3    Immature Grans, Absolute 0.03 0.00 - 0.03 10*3/mm3    nRBC 0.0 0.0 - 0.0 /100 WBC         IMAGING:  EKG today with a " QTc interval of 500 ms     ASSESSMENT:  This is a 72 y.o. female with:  1.  History of bilateral breast cancer in 1988 status post bilateral mastectomy.  She apparently completed 6 months of adjuvant therapy.  We have no details regarding this.    2.  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  Started on Hydrea 1000 mg daily.  This was discontinued and she started Gleevec on approximately 10/12/2017.  Gleevec discontinued due to intolerance on 11/15/2017.  We started Gleevec at a lower dose of 200 mg but she was also intolerant of this dose.  Her side effects were as severe with a lower dose and she therefore stopped taking the drug.  Symptoms have improved significantly.    She started nilotinib at 150mg twice daily.  She seems to be tolerating this reasonably well.  Her counts are now normal.  See below.    3.  Thrombocytosis:  This has improved.   4.  Hypothyroidism: Most recent TSH was low at 0.267. Levothyroxine decreased to 100 mcg.  5.  Nausea and vomiting: Resolved off Gleevec.   6. Periorbital edema: Resolved off Gleevec.    7.  Hypokalemia:  He has not been on her oral potassium supplement.  We will check a potassium level today.  We will check a magnesium level today.  8.  Osteoarthritis pain: she reports this has become worse sine starting nilotinib.  9.  Depression: She had been on Prozac for years, however, drug interaction with nilotinib prompted us to change this to Paxil.  10.  Insomnia.  This had been treated with trazodone but as mentioned above this also caused a potential for QT interval prolongation.  She was therefore changed to temazepam.  Increased to 30 mg as necessary.  11.  QTC prolongation: Her QTC now is about 500 ms.  I have spoken with her pharmacist who will investigate other potential QTC prolonging drugs.  For now I have advised her to hold the nilotinib.  Return in 2 weeks with labs and EKG.  We will check potassium and  magnesium levels today and repeat if necessary.    PLAN:  1.  Hold nilotinib for now  2.  Double up on the temazepam if necessary.  3.  Otherwise continue medications for supportive care  4.  We will check a CMP and magnesium level today is necessary.  5.  Return in 2 weeks for follow-up with labs and EKG.  If labs are acceptable and EKG shows a QTc interval of around 450 msec, resume nilotinib.    6.  I'll plan to see her back in about 1 month for follow-up also with labs and an EKG.    ADDENDUM:  Potassium is low.  Nursing to call and advise her to resume her potassium supplement.  Magnesium is normal.     Burt Merida MD

## 2018-05-24 ENCOUNTER — APPOINTMENT (OUTPATIENT)
Dept: ONCOLOGY | Facility: HOSPITAL | Age: 73
End: 2018-05-24

## 2018-05-24 ENCOUNTER — LAB (OUTPATIENT)
Dept: OTHER | Facility: HOSPITAL | Age: 73
End: 2018-05-24

## 2018-05-24 ENCOUNTER — OFFICE VISIT (OUTPATIENT)
Dept: ONCOLOGY | Facility: CLINIC | Age: 73
End: 2018-05-24

## 2018-05-24 VITALS
OXYGEN SATURATION: 97 % | WEIGHT: 134.3 LBS | HEART RATE: 63 BPM | SYSTOLIC BLOOD PRESSURE: 133 MMHG | RESPIRATION RATE: 14 BRPM | BODY MASS INDEX: 22.38 KG/M2 | DIASTOLIC BLOOD PRESSURE: 81 MMHG | HEIGHT: 65 IN | TEMPERATURE: 98.5 F

## 2018-05-24 DIAGNOSIS — Z51.81 MEDICATION MONITORING ENCOUNTER: ICD-10-CM

## 2018-05-24 DIAGNOSIS — Z51.81 ENCOUNTER FOR MONITORING CARDIOTOXIC DRUG THERAPY: Primary | ICD-10-CM

## 2018-05-24 DIAGNOSIS — Z79.899 ENCOUNTER FOR MONITORING CARDIOTOXIC DRUG THERAPY: Primary | ICD-10-CM

## 2018-05-24 DIAGNOSIS — C92.10 CML (CHRONIC MYELOID LEUKEMIA) (HCC): ICD-10-CM

## 2018-05-24 LAB
ALBUMIN SERPL-MCNC: 4.4 G/DL (ref 3.5–5.2)
ALBUMIN/GLOB SERPL: 1.3 G/DL
ALP SERPL-CCNC: 98 U/L (ref 39–117)
ALT SERPL W P-5'-P-CCNC: 22 U/L (ref 1–33)
ANION GAP SERPL CALCULATED.3IONS-SCNC: 8.8 MMOL/L
AST SERPL-CCNC: 26 U/L (ref 1–32)
BASOPHILS # BLD AUTO: 0.05 10*3/MM3 (ref 0–0.2)
BASOPHILS NFR BLD AUTO: 0.6 % (ref 0–1.5)
BILIRUB SERPL-MCNC: 0.4 MG/DL (ref 0.1–1.2)
BUN BLD-MCNC: 17 MG/DL (ref 8–23)
BUN/CREAT SERPL: 20 (ref 7–25)
CALCIUM SPEC-SCNC: 9.4 MG/DL (ref 8.6–10.5)
CHLORIDE SERPL-SCNC: 102 MMOL/L (ref 98–107)
CO2 SERPL-SCNC: 29.2 MMOL/L (ref 22–29)
CREAT BLD-MCNC: 0.85 MG/DL (ref 0.57–1)
DEPRECATED RDW RBC AUTO: 57.6 FL (ref 37–54)
EOSINOPHIL # BLD AUTO: 0.15 10*3/MM3 (ref 0–0.7)
EOSINOPHIL NFR BLD AUTO: 1.9 % (ref 0.3–6.2)
ERYTHROCYTE [DISTWIDTH] IN BLOOD BY AUTOMATED COUNT: 16.1 % (ref 11.7–13)
GFR SERPL CREATININE-BSD FRML MDRD: 65 ML/MIN/1.73
GLOBULIN UR ELPH-MCNC: 3.4 GM/DL
GLUCOSE BLD-MCNC: 75 MG/DL (ref 65–99)
HCT VFR BLD AUTO: 40.8 % (ref 35.6–45.5)
HGB BLD-MCNC: 13.5 G/DL (ref 11.9–15.5)
IMM GRANULOCYTES # BLD: 0.03 10*3/MM3 (ref 0–0.03)
IMM GRANULOCYTES NFR BLD: 0.4 % (ref 0–0.5)
LIPASE SERPL-CCNC: 75 U/L (ref 13–60)
LYMPHOCYTES # BLD AUTO: 2.51 10*3/MM3 (ref 0.9–4.8)
LYMPHOCYTES NFR BLD AUTO: 32.2 % (ref 19.6–45.3)
MAGNESIUM SERPL-MCNC: 2 MG/DL (ref 1.6–2.4)
MCH RBC QN AUTO: 32 PG (ref 26.9–32)
MCHC RBC AUTO-ENTMCNC: 33.1 G/DL (ref 32.4–36.3)
MCV RBC AUTO: 96.7 FL (ref 80.5–98.2)
MONOCYTES # BLD AUTO: 0.64 10*3/MM3 (ref 0.2–1.2)
MONOCYTES NFR BLD AUTO: 8.2 % (ref 5–12)
NEUTROPHILS # BLD AUTO: 4.41 10*3/MM3 (ref 1.9–8.1)
NEUTROPHILS NFR BLD AUTO: 56.7 % (ref 42.7–76)
NRBC BLD MANUAL-RTO: 0 /100 WBC (ref 0–0)
PHOSPHATE SERPL-MCNC: 3.9 MG/DL (ref 2.5–4.5)
PLATELET # BLD AUTO: 256 10*3/MM3 (ref 140–500)
PMV BLD AUTO: 9.5 FL (ref 6–12)
POTASSIUM BLD-SCNC: 3.5 MMOL/L (ref 3.5–5.2)
PROT SERPL-MCNC: 7.8 G/DL (ref 6–8.5)
RBC # BLD AUTO: 4.22 10*6/MM3 (ref 3.9–5.2)
SODIUM BLD-SCNC: 140 MMOL/L (ref 136–145)
WBC NRBC COR # BLD: 7.79 10*3/MM3 (ref 4.5–10.7)

## 2018-05-24 PROCEDURE — 84100 ASSAY OF PHOSPHORUS: CPT | Performed by: INTERNAL MEDICINE

## 2018-05-24 PROCEDURE — 99214 OFFICE O/P EST MOD 30 MIN: CPT | Performed by: NURSE PRACTITIONER

## 2018-05-24 PROCEDURE — 83690 ASSAY OF LIPASE: CPT | Performed by: INTERNAL MEDICINE

## 2018-05-24 PROCEDURE — 83735 ASSAY OF MAGNESIUM: CPT | Performed by: INTERNAL MEDICINE

## 2018-05-24 PROCEDURE — 93005 ELECTROCARDIOGRAM TRACING: CPT | Performed by: NURSE PRACTITIONER

## 2018-05-24 PROCEDURE — 36415 COLL VENOUS BLD VENIPUNCTURE: CPT

## 2018-05-24 PROCEDURE — 85025 COMPLETE CBC W/AUTO DIFF WBC: CPT | Performed by: INTERNAL MEDICINE

## 2018-05-24 PROCEDURE — 80053 COMPREHEN METABOLIC PANEL: CPT | Performed by: INTERNAL MEDICINE

## 2018-05-31 ENCOUNTER — OFFICE VISIT (OUTPATIENT)
Dept: ONCOLOGY | Facility: CLINIC | Age: 73
End: 2018-05-31

## 2018-05-31 ENCOUNTER — LAB (OUTPATIENT)
Dept: OTHER | Facility: HOSPITAL | Age: 73
End: 2018-05-31

## 2018-05-31 ENCOUNTER — APPOINTMENT (OUTPATIENT)
Dept: ONCOLOGY | Facility: HOSPITAL | Age: 73
End: 2018-05-31

## 2018-05-31 DIAGNOSIS — C92.10 CML (CHRONIC MYELOID LEUKEMIA) (HCC): Primary | ICD-10-CM

## 2018-05-31 DIAGNOSIS — Z51.81 MEDICATION MONITORING ENCOUNTER: ICD-10-CM

## 2018-05-31 DIAGNOSIS — D75.839 THROMBOCYTOSIS: ICD-10-CM

## 2018-05-31 LAB
BASOPHILS # BLD AUTO: 0.06 10*3/MM3 (ref 0–0.2)
BASOPHILS NFR BLD AUTO: 0.6 % (ref 0–1.5)
DEPRECATED RDW RBC AUTO: 51.8 FL (ref 37–54)
EOSINOPHIL # BLD AUTO: 0.29 10*3/MM3 (ref 0–0.7)
EOSINOPHIL NFR BLD AUTO: 2.9 % (ref 0.3–6.2)
ERYTHROCYTE [DISTWIDTH] IN BLOOD BY AUTOMATED COUNT: 14.8 % (ref 11.7–13)
HCT VFR BLD AUTO: 38.4 % (ref 35.6–45.5)
HGB BLD-MCNC: 13.2 G/DL (ref 11.9–15.5)
IMM GRANULOCYTES # BLD: 0.04 10*3/MM3 (ref 0–0.03)
IMM GRANULOCYTES NFR BLD: 0.4 % (ref 0–0.5)
LYMPHOCYTES # BLD AUTO: 2.57 10*3/MM3 (ref 0.9–4.8)
LYMPHOCYTES NFR BLD AUTO: 25.5 % (ref 19.6–45.3)
MCH RBC QN AUTO: 32.6 PG (ref 26.9–32)
MCHC RBC AUTO-ENTMCNC: 34.4 G/DL (ref 32.4–36.3)
MCV RBC AUTO: 94.8 FL (ref 80.5–98.2)
MONOCYTES # BLD AUTO: 0.88 10*3/MM3 (ref 0.2–1.2)
MONOCYTES NFR BLD AUTO: 8.7 % (ref 5–12)
NEUTROPHILS # BLD AUTO: 6.22 10*3/MM3 (ref 1.9–8.1)
NEUTROPHILS NFR BLD AUTO: 61.9 % (ref 42.7–76)
NRBC BLD MANUAL-RTO: 0 /100 WBC (ref 0–0)
PLATELET # BLD AUTO: 267 10*3/MM3 (ref 140–500)
PMV BLD AUTO: 9.8 FL (ref 6–12)
RBC # BLD AUTO: 4.05 10*6/MM3 (ref 3.9–5.2)
WBC NRBC COR # BLD: 10.06 10*3/MM3 (ref 4.5–10.7)

## 2018-05-31 PROCEDURE — 99214 OFFICE O/P EST MOD 30 MIN: CPT | Performed by: NURSE PRACTITIONER

## 2018-05-31 PROCEDURE — 36415 COLL VENOUS BLD VENIPUNCTURE: CPT

## 2018-05-31 PROCEDURE — 85025 COMPLETE CBC W/AUTO DIFF WBC: CPT | Performed by: INTERNAL MEDICINE

## 2018-05-31 NOTE — PROGRESS NOTES
[facility] CBC GROUP OUTPATIENT FOLLOW UP CLINIC VISIT    REASON FOR FOLLOW-UP:    1.  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  Gleevec 400 mg daily initiated around 10/12/2017, stopped on 11/15/2017 due to intolerance (noah-orbital edema, nausea/vomiting, fatigue).  Resumed at 200 mg daily but, again, not tolerated.      HISTORY OF PRESENT ILLNESS:  Nicole Lofton is a 72 y.o. female with the above medical history who is here today for scheduled follow up and EKG.  She has been holding her nilotinib due to elevated QTC. She reports feeling quite well today. Her appetite is good and she is sleeping well.         She denies shortness of breath, bleeding, bruising. Fevers, or chills.    Her CBC is stable.    Her EKG today reveals a QT/Qtc of 454/479. She denies chest pain or palpitations.    ONCOLOGIC HISTORY:  For about 6 months she has noticed bilateral arm tingling and weakness in her hands.  This has occurred about 5 or 6 times over the past 6 months.  She is vague in her description of this.  She is globally weak and is quite inactive as she lives by herself.  She was completing physical therapy with some improvement but is now quite unsteady on her feet.  She has not had any falls.  She denies any bleeding.  She has chronic fatigue.  She has osteoarthritis pain and reports pain in the right side and in her neck.  She denies any acute low back pain but does have chronic low back pain.  She does have orthostatic symptoms and she is lightheaded when she stands.  She has had a decreased appetite and some weight loss over the past 9 months.     Labs performed on 8/28/2017 showed a white blood cell count of 39.1 with a differential showing 61% neutrophils and 32% lymphocytes, hemoglobin 13.9 and platelets 565,000.         She had further labs done at [place] on 8/20/2017 showing a white blood cell count of 24.4, hemoglobin 13.7, and platelets 1795.  A differential showed  64% neutrophils, 7% lymphocytes, and 12% basophils.  Peripheral blood flow cytometry was also done on this date showing 11% basophils and less than 1% myeloblasts.  There was no monoclonal B-cell, T-cell, plasma cell, or NK cell population.  FISH for BCR-ABL and ESSENCE 2 mutation analysis were done as well.     Her CBC was normal as of 7/8/2016.     Of note, she has a history of bilateral breast cancer diagnosed in 1988.  She had bilateral mastectomy with reconstruction.  We do not have any records regarding this at this time.  She apparently completed at least 6 months of adjuvant chemotherapy but again does not know the details..    FISH for BCR-ABL performed at [place] was actually positive.  As of 9/6/2017 ESSENCE 2 is still pending.    She returned on 9/6/2017 for follow-up.  Platelets at 1.9 million with hydroxyurea 1000 mg daily.  She will increase the dose to 2000 mg daily.  She will have a bone marrow aspiration biopsy performed.  Weekly CBCs.    As of 9/6/2017 peripheral blood PCR was positive at 89.238% with a major break point mutation.    ESSENCE 2 and CALR mutation negative.    Bone marrow aspiration and biopsy performed on 9/15/2017.  Flow cytometry showed no increase in blasts.  Morphology showed no increase in blasts.  Bone marrow FISH showed BCR/ABL rearrangement in 93% of cells.  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    Labs as of 9/20/2017 show white blood cell count of 4.7 with hemoglobin of 12.7 and platelets 1.1 million.    On 9/27/2017 white blood cells and hemoglobin are normal.  Platelets are down to 722,000.  We plan to start Gleevec at 400 mg daily if she tolerates it.  In the meantime she will decrease the hydroxyurea dose to 1000 mg daily.    Blood counts normalized.  Hydroxyurea stopped.    Gleevec initiated around 10/12/2017, stopped on 11/15/2017 due to intolerance (noah-orbital edema, nausea/vomiting, fatigue).    Symptoms improving as of 11/29/2017.  Blood counts normal.  Hold  further therapy at this time until she improves more clinically.      Plan to resume Gleevec at 200 mg daily as of 1/11/2018.  Only took it for less than a week but also did not tolerate this dose.    As of 2/21/2018 her platelet count is again elevated at greater than 700,000.  Resume hydroxyurea 1000 mg daily.  Considering nilotinib at a decreased dose.    Platelets continued to rise in March 2018.  Hydroxyurea increased to 1500 mg daily with stabilization of her platelet count and some improvement as of 3/21/2018.  Plan for nilotinib.    Nilotinib initiated in late March 2018.      PAST MEDICAL, SURGICAL, FAMILY, AND SOCIAL HISTORIES were reviewed with the patient and in the electronic medical record, and were updated if indicated.    ALLERGIES:  No Known Allergies    MEDICATIONS:  The medication list has been reviewed with the patient by the medical assistant, and the list has been updated in the electronic medical record, which I reviewed.  Medication dosages and frequencies were confirmed to be accurate.    I have reviewed the patient's medical history in detail and updated the computerized patient record.    REVIEW OF SYSTEMS:  PAIN:  See Vital Signs below.  GENERAL:  Fatigue and global weakness much improved.  SKIN:  No rashes or non-healing lesions.   HEME/LYMPH: Increased platelets.  No lymphadenopathy.    EYES:  Periorbital edema resolved  ENT:  No sore throat or difficulty swallowing.  dry mouth  RESPIRATORY:  No cough, shortness of breath, hemoptysis, or wheezing.  CARDIOVASCULAR: History of present illness  GASTROINTESTINAL: Appetite improving.  No nausea, vomiting, or diarrhea.    GENITOURINARY:  No dysuria or hematuria.  MUSCULOSKELETAL:  Arthralgias, back pain, abnormal gait, neck pain.  Right arm pain secondary to humerus fracture has improved. Improved.  NEUROLOGIC:  No dizziness, loss of consciousness, or seizures.  PSYCHIATRIC:  insomnia and depression, Improved.    There were no vitals filed  for this visit.    PHYSICAL EXAMINATION:  GENERAL:  Well developed female; awake, alert and oriented, in no acute distress. She appears stronger and in better spirits today than prior visits.  SKIN:  Warm and dry, without rashes, purpura, or petechiae.  HEAD:  Normocephalic, atraumatic.  EYES:  Bilateral periorbital edema resolved  EARS:  Hearing intact.  CHEST:  Lungs are clear to auscultation bilaterally.  No wheezes, rales, or rhonchi.  HEART:  Regular rate; normal rhythm.  No murmurs, gallops or rubs.  ABDOMEN:  Soft, non-tender, non-distended.  Normal active bowel sounds.  No organomegaly.  EXTREMITIES:  Trace bilateral ankle edema left greater than right   NEUROLOGICAL:  No focal neurologic deficits.      DIAGNOSTIC DATA:  Results for orders placed or performed in visit on 05/31/18   CBC Auto Differential   Result Value Ref Range    WBC 10.06 4.50 - 10.70 10*3/mm3    RBC 4.05 3.90 - 5.20 10*6/mm3    Hemoglobin 13.2 11.9 - 15.5 g/dL    Hematocrit 38.4 35.6 - 45.5 %    MCV 94.8 80.5 - 98.2 fL    MCH 32.6 (H) 26.9 - 32.0 pg    MCHC 34.4 32.4 - 36.3 g/dL    RDW 14.8 (H) 11.7 - 13.0 %    RDW-SD 51.8 37.0 - 54.0 fl    MPV 9.8 6.0 - 12.0 fL    Platelets 267 140 - 500 10*3/mm3    Neutrophil % 61.9 42.7 - 76.0 %    Lymphocyte % 25.5 19.6 - 45.3 %    Monocyte % 8.7 5.0 - 12.0 %    Eosinophil % 2.9 0.3 - 6.2 %    Basophil % 0.6 0.0 - 1.5 %    Immature Grans % 0.4 0.0 - 0.5 %    Neutrophils, Absolute 6.22 1.90 - 8.10 10*3/mm3    Lymphocytes, Absolute 2.57 0.90 - 4.80 10*3/mm3    Monocytes, Absolute 0.88 0.20 - 1.20 10*3/mm3    Eosinophils, Absolute 0.29 0.00 - 0.70 10*3/mm3    Basophils, Absolute 0.06 0.00 - 0.20 10*3/mm3    Immature Grans, Absolute 0.04 (H) 0.00 - 0.03 10*3/mm3    nRBC 0.0 0.0 - 0.0 /100 WBC         IMAGING:  EKG today with a QT/QTc interval of 454/479 ms - 5/31/2018    ASSESSMENT:  This is a 72 y.o. female with:  1.  History of bilateral breast cancer in 1988 status post bilateral mastectomy.  She  apparently completed 6 months of adjuvant therapy.  We have no details regarding this.    2.  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  Started on Hydrea 1000 mg daily.  This was discontinued and she started Gleevec on approximately 10/12/2017.  Gleevec discontinued due to intolerance on 11/15/2017.  We started Gleevec at a lower dose of 200 mg but she was also intolerant of this dose.  Her side effects were as severe with a lower dose and she therefore stopped taking the drug.  Symptoms have improved significantly.    She started nilotinib at 150mg twice daily but was held 3 weeks ago due to prolonged QT interval.    3.  Thrombocytosis:  This has improved.   4.  Hypothyroidism:Levothyroxine 100 mcg.  5.  Nausea and vomiting: Resolved off Gleevec.   6. Periorbital edema: Resolved off Gleevec.    7.  Hypokalemia:  Potassium today is 3.5.  8.  Osteoarthritis pain: she reports this has become worse sine starting nilotinib. This has improved over the past 2 weeks.  9.  Depression: She had been on Prozac for years, however, drug interaction with nilotinib prompted us to change this to Paxil. Her depression is improving.  10.  Insomnia.  This had been treated with trazodone but as mentioned above this also caused a potential for QT interval prolongation.  She was therefore changed to temazepam.  Increased to 30 mg as necessary. Improved.  11.  QTC prolongation: Her QTC today 454/479    PLAN:  1.  I have reviewed EKG results with Dr Merida who at this point feels the patient could restart her therapy. Mrs. Lofton, however, wants to wait another week. She is feeling so well and is fearful that if she restarts therapy she may decline. She is also more concerned with the cost of the drug at $3000 per month and wants to take more time to determine if she wants to proceed with treatment at all. She will return next wee as already scheduled to see Dr Merida on 6/6/2018. They  will discuss resuming treatment at this time.   2.  Continue temazepam if necessary.  3.  Continue medications for supportive care           FERNANDO Duke

## 2018-06-06 ENCOUNTER — LAB (OUTPATIENT)
Dept: OTHER | Facility: HOSPITAL | Age: 73
End: 2018-06-06

## 2018-06-06 ENCOUNTER — OFFICE VISIT (OUTPATIENT)
Dept: ONCOLOGY | Facility: CLINIC | Age: 73
End: 2018-06-06

## 2018-06-06 ENCOUNTER — PROCEDURE VISIT (OUTPATIENT)
Dept: ONCOLOGY | Facility: HOSPITAL | Age: 73
End: 2018-06-06

## 2018-06-06 VITALS
HEIGHT: 65 IN | RESPIRATION RATE: 16 BRPM | SYSTOLIC BLOOD PRESSURE: 140 MMHG | HEART RATE: 67 BPM | WEIGHT: 136.3 LBS | DIASTOLIC BLOOD PRESSURE: 78 MMHG | BODY MASS INDEX: 22.71 KG/M2 | TEMPERATURE: 97.8 F | OXYGEN SATURATION: 96 %

## 2018-06-06 DIAGNOSIS — R94.31 PROLONGED Q-T INTERVAL ON ECG: ICD-10-CM

## 2018-06-06 DIAGNOSIS — C92.10 CML (CHRONIC MYELOID LEUKEMIA) (HCC): ICD-10-CM

## 2018-06-06 DIAGNOSIS — Z51.81 MEDICATION MONITORING ENCOUNTER: Primary | ICD-10-CM

## 2018-06-06 LAB
ALBUMIN SERPL-MCNC: 4.5 G/DL (ref 3.5–5.2)
ALBUMIN/GLOB SERPL: 1.3 G/DL
ALP SERPL-CCNC: 113 U/L (ref 39–117)
ALT SERPL W P-5'-P-CCNC: 29 U/L (ref 1–33)
ANION GAP SERPL CALCULATED.3IONS-SCNC: 11.4 MMOL/L
AST SERPL-CCNC: 29 U/L (ref 1–32)
BASOPHILS # BLD AUTO: 0.05 10*3/MM3 (ref 0–0.2)
BASOPHILS NFR BLD AUTO: 0.7 % (ref 0–1.5)
BILIRUB SERPL-MCNC: 0.4 MG/DL (ref 0.1–1.2)
BUN BLD-MCNC: 14 MG/DL (ref 8–23)
BUN/CREAT SERPL: 16.9 (ref 7–25)
CALCIUM SPEC-SCNC: 9.6 MG/DL (ref 8.6–10.5)
CHLORIDE SERPL-SCNC: 98 MMOL/L (ref 98–107)
CO2 SERPL-SCNC: 30.6 MMOL/L (ref 22–29)
CREAT BLD-MCNC: 0.83 MG/DL (ref 0.57–1)
DEPRECATED RDW RBC AUTO: 50.7 FL (ref 37–54)
EOSINOPHIL # BLD AUTO: 0.25 10*3/MM3 (ref 0–0.7)
EOSINOPHIL NFR BLD AUTO: 3.3 % (ref 0.3–6.2)
ERYTHROCYTE [DISTWIDTH] IN BLOOD BY AUTOMATED COUNT: 14.5 % (ref 11.7–13)
GFR SERPL CREATININE-BSD FRML MDRD: 67 ML/MIN/1.73
GLOBULIN UR ELPH-MCNC: 3.5 GM/DL
GLUCOSE BLD-MCNC: 82 MG/DL (ref 65–99)
HCT VFR BLD AUTO: 40.6 % (ref 35.6–45.5)
HGB BLD-MCNC: 13.4 G/DL (ref 11.9–15.5)
IMM GRANULOCYTES # BLD: 0.03 10*3/MM3 (ref 0–0.03)
IMM GRANULOCYTES NFR BLD: 0.4 % (ref 0–0.5)
LYMPHOCYTES # BLD AUTO: 2.71 10*3/MM3 (ref 0.9–4.8)
LYMPHOCYTES NFR BLD AUTO: 35.9 % (ref 19.6–45.3)
MAGNESIUM SERPL-MCNC: 2.1 MG/DL (ref 1.6–2.4)
MCH RBC QN AUTO: 31.8 PG (ref 26.9–32)
MCHC RBC AUTO-ENTMCNC: 33 G/DL (ref 32.4–36.3)
MCV RBC AUTO: 96.4 FL (ref 80.5–98.2)
MONOCYTES # BLD AUTO: 0.62 10*3/MM3 (ref 0.2–1.2)
MONOCYTES NFR BLD AUTO: 8.2 % (ref 5–12)
NEUTROPHILS # BLD AUTO: 3.89 10*3/MM3 (ref 1.9–8.1)
NEUTROPHILS NFR BLD AUTO: 51.5 % (ref 42.7–76)
NRBC BLD MANUAL-RTO: 0 /100 WBC (ref 0–0)
PLATELET # BLD AUTO: 322 10*3/MM3 (ref 140–500)
PMV BLD AUTO: 9.7 FL (ref 6–12)
POTASSIUM BLD-SCNC: 3.4 MMOL/L (ref 3.5–5.2)
PROT SERPL-MCNC: 8 G/DL (ref 6–8.5)
RBC # BLD AUTO: 4.21 10*6/MM3 (ref 3.9–5.2)
SODIUM BLD-SCNC: 140 MMOL/L (ref 136–145)
WBC NRBC COR # BLD: 7.55 10*3/MM3 (ref 4.5–10.7)

## 2018-06-06 PROCEDURE — 83735 ASSAY OF MAGNESIUM: CPT | Performed by: INTERNAL MEDICINE

## 2018-06-06 PROCEDURE — 36415 COLL VENOUS BLD VENIPUNCTURE: CPT | Performed by: INTERNAL MEDICINE

## 2018-06-06 PROCEDURE — 99215 OFFICE O/P EST HI 40 MIN: CPT | Performed by: INTERNAL MEDICINE

## 2018-06-06 PROCEDURE — 85025 COMPLETE CBC W/AUTO DIFF WBC: CPT | Performed by: INTERNAL MEDICINE

## 2018-06-06 PROCEDURE — 80053 COMPREHEN METABOLIC PANEL: CPT | Performed by: INTERNAL MEDICINE

## 2018-06-06 PROCEDURE — 93005 ELECTROCARDIOGRAM TRACING: CPT | Performed by: INTERNAL MEDICINE

## 2018-06-06 NOTE — PROGRESS NOTES
[facility] GROUP OUTPATIENT FOLLOW UP CLINIC VISIT    REASON FOR FOLLOW-UP:    1.  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  Gleevec 400 mg daily initiated around 10/12/2017, stopped on 11/15/2017 due to intolerance (noah-orbital edema, nausea/vomiting, fatigue).  Resumed at 200 mg daily but, again, not tolerated.    3.  Nilotinib 150 mg twice daily started in late March, 2018.  Held due to intolerance and QTc prolongation.     HISTORY OF PRESENT ILLNESS:  Nicole Lofton is a 72 y.o. female with the above medical history who is here today for scheduled follow up and EKG.     She has been holding the nilotinib for a few weeks.  She is feeling better at this point with increased energy and appetite.    Her QTc interval today remains prolonged at 487 ms.  She does not desire to go back on the nilotinib due to adverse effects.    ONCOLOGIC HISTORY:  For about 6 months she has noticed bilateral arm tingling and weakness in her hands.  This has occurred about 5 or 6 times over the past 6 months.  She is vague in her description of this.  She is globally weak and is quite inactive as she lives by herself.  She was completing physical therapy with some improvement but is now quite unsteady on her feet.  She has not had any falls.  She denies any bleeding.  She has chronic fatigue.  She has osteoarthritis pain and reports pain in the right side and in her neck.  She denies any acute low back pain but does have chronic low back pain.  She does have orthostatic symptoms and she is lightheaded when she stands.  She has had a decreased appetite and some weight loss over the past 9 months.     Labs performed on 8/28/2017 showed a white blood cell count of 39.1 with a differential showing 61% neutrophils and 32% lymphocytes, hemoglobin 13.9 and platelets 565,000.         She had further labs done at [place] on 8/20/2017 showing a white blood cell count of 24.4, hemoglobin 13.7,  and platelets 1795.  A differential showed 64% neutrophils, 7% lymphocytes, and 12% basophils.  Peripheral blood flow cytometry was also done on this date showing 11% basophils and less than 1% myeloblasts.  There was no monoclonal B-cell, T-cell, plasma cell, or NK cell population.  FISH for BCR-ABL and ESSENCE 2 mutation analysis were done as well.     Her CBC was normal as of 7/8/2016.     Of note, she has a history of bilateral breast cancer diagnosed in 1988.  She had bilateral mastectomy with reconstruction.  We do not have any records regarding this at this time.  She apparently completed at least 6 months of adjuvant chemotherapy but again does not know the details..    FISH for BCR-ABL performed at [place] was actually positive.  As of 9/6/2017 ESSENCE 2 is still pending.    She returned on 9/6/2017 for follow-up.  Platelets at 1.9 million with hydroxyurea 1000 mg daily.  She will increase the dose to 2000 mg daily.  She will have a bone marrow aspiration biopsy performed.  Weekly CBCs.    As of 9/6/2017 peripheral blood PCR was positive at 89.238% with a major break point mutation.    ESSENCE 2 and CALR mutation negative.    Bone marrow aspiration and biopsy performed on 9/15/2017.  Flow cytometry showed no increase in blasts.  Morphology showed no increase in blasts.  Bone marrow FISH showed BCR/ABL rearrangement in 93% of cells.  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    Labs as of 9/20/2017 show white blood cell count of 4.7 with hemoglobin of 12.7 and platelets 1.1 million.    On 9/27/2017 white blood cells and hemoglobin are normal.  Platelets are down to 722,000.  We plan to start Gleevec at 400 mg daily if she tolerates it.  In the meantime she will decrease the hydroxyurea dose to 1000 mg daily.    Blood counts normalized.  Hydroxyurea stopped.    Gleevec initiated around 10/12/2017, stopped on 11/15/2017 due to intolerance (noah-orbital edema, nausea/vomiting, fatigue).    Symptoms improving as of  11/29/2017.  Blood counts normal.  Hold further therapy at this time until she improves more clinically.      Plan to resume Gleevec at 200 mg daily as of 1/11/2018.  Only took it for less than a week but also did not tolerate this dose.    As of 2/21/2018 her platelet count is again elevated at greater than 700,000.  Resume hydroxyurea 1000 mg daily.  Considering nilotinib at a decreased dose.    Platelets continued to rise in March 2018.  Hydroxyurea increased to 1500 mg daily with stabilization of her platelet count and some improvement as of 3/21/2018.  Plan for nilotinib.    Nilotinib initiated in late March 2018.    Subsequently held due to QTC prolongation.      PAST MEDICAL, SURGICAL, FAMILY, AND SOCIAL HISTORIES were reviewed with the patient and in the electronic medical record, and were updated if indicated.    ALLERGIES:  No Known Allergies    MEDICATIONS:  The medication list has been reviewed with the patient by the medical assistant, and the list has been updated in the electronic medical record, which I reviewed.  Medication dosages and frequencies were confirmed to be accurate.    I have reviewed the patient's medical history in detail and updated the computerized patient record.    REVIEW OF SYSTEMS:  PAIN:  See Vital Signs below.  GENERAL:  Fatigue and global weakness improved.  SKIN:  No rashes or non-healing lesions.   HEME/LYMPH:  No lymphadenopathy.    EYES:  Periorbital edema no longer an issue  ENT:  No sore throat or difficulty swallowing.  dry mouth  RESPIRATORY:  No cough, shortness of breath, hemoptysis, or wheezing.  CARDIOVASCULAR: No chest pain or palpitations  GASTROINTESTINAL: Appetite improving.  No nausea, vomiting, or diarrhea.    GENITOURINARY:  No dysuria or hematuria.  MUSCULOSKELETAL:  Arthralgias, back pain, abnormal gait, neck pain.  Right arm pain secondary to humerus fracture has improved. Improved.  NEUROLOGIC:  No dizziness, loss of consciousness, or  "seizures.  PSYCHIATRIC:  insomnia and depression, Improved.    Vitals:    06/06/18 1258   BP: 140/78   Pulse: 67   Resp: 16   Temp: 97.8 °F (36.6 °C)   TempSrc: Oral   SpO2: 96%   Weight: 61.8 kg (136 lb 4.8 oz)   Height: 165 cm (64.96\")   PainSc:   5   PainLoc: Neck       PHYSICAL EXAMINATION:  GENERAL:  Well developed female; awake, alert and oriented, in no acute distress.  SKIN:  Warm and dry, without rashes, purpura, or petechiae.  HEAD:  Normocephalic, atraumatic.  EYES:  Pupils equally round and reactive to light, anicteric sclera.  EARS:  Hearing intact.  CHEST:  Lungs are clear to auscultation bilaterally.  No wheezes, rales, or rhonchi.  HEART:  Regular rate; normal rhythm.  No murmurs, gallops or rubs.  ABDOMEN:  Soft, non-tender, non-distended.  Normal active bowel sounds.  No organomegaly.  EXTREMITIES:  Trace bilateral ankle edema left greater than right   NEUROLOGICAL:  No focal neurologic deficits.      DIAGNOSTIC DATA:  Results for orders placed or performed in visit on 06/06/18   CBC Auto Differential   Result Value Ref Range    WBC 7.55 4.50 - 10.70 10*3/mm3    RBC 4.21 3.90 - 5.20 10*6/mm3    Hemoglobin 13.4 11.9 - 15.5 g/dL    Hematocrit 40.6 35.6 - 45.5 %    MCV 96.4 80.5 - 98.2 fL    MCH 31.8 26.9 - 32.0 pg    MCHC 33.0 32.4 - 36.3 g/dL    RDW 14.5 (H) 11.7 - 13.0 %    RDW-SD 50.7 37.0 - 54.0 fl    MPV 9.7 6.0 - 12.0 fL    Platelets 322 140 - 500 10*3/mm3    Neutrophil % 51.5 42.7 - 76.0 %    Lymphocyte % 35.9 19.6 - 45.3 %    Monocyte % 8.2 5.0 - 12.0 %    Eosinophil % 3.3 0.3 - 6.2 %    Basophil % 0.7 0.0 - 1.5 %    Immature Grans % 0.4 0.0 - 0.5 %    Neutrophils, Absolute 3.89 1.90 - 8.10 10*3/mm3    Lymphocytes, Absolute 2.71 0.90 - 4.80 10*3/mm3    Monocytes, Absolute 0.62 0.20 - 1.20 10*3/mm3    Eosinophils, Absolute 0.25 0.00 - 0.70 10*3/mm3    Basophils, Absolute 0.05 0.00 - 0.20 10*3/mm3    Immature Grans, Absolute 0.03 0.00 - 0.03 10*3/mm3    nRBC 0.0 0.0 - 0.0 /100 WBC "         IMAGING:  EKG, QTc interval 487 ms    ASSESSMENT:  This is a 72 y.o. female with:  1.  History of bilateral breast cancer in 1988 status post bilateral mastectomy.  She apparently completed 6 months of adjuvant therapy.  We have no details regarding this.    2.  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  Started on Hydrea 1000 mg daily.  This was discontinued and she started Gleevec on approximately 10/12/2017.  Gleevec discontinued due to intolerance on 11/15/2017.  We started Gleevec at a lower dose of 200 mg but she was also intolerant of this dose.  Her side effects were as severe with a lower dose and she therefore stopped taking the drug.  Symptoms improved significantly.  Platelet count subsequently elevated again.    She started nilotinib at 150mg twice daily at the end of March 2018.  This was held in mid-May due to QTc prolongation.  Her QTc interval has not normalized enough to resume the medication.  She also reports significant adverse effects with fatigue and anorexia with the medication.  Therefore, she has not tolerated this medication well either.  We will not plan to resume it.  For now her CBC is normal and she will remain off medication.  We may consider hydroxyurea if her platelet count subsequently increases.    3.  Thrombocytosis:  This has resolved.  Due to CML.   4.  Hypothyroidism:Levothyroxine 100 mcg.  5.  Nausea and vomiting: Resolved off Gleevec.   6. Periorbital edema: Resolved off Gleevec.    7.  Hypokalemia:  Potassium today is 3.4.  8.  Osteoarthritis pain: she reports this became worse sine starting nilotinib. This has improved over the past few weeks.  9.  Depression: She had been on Prozac for years, however, drug interaction with nilotinib prompted us to change this to Paxil. Her depression is stable.  10.  Insomnia.  This had been treated with trazodone but as mentioned above this also caused a potential for QT  interval prolongation.  She was therefore changed to temazepam.  Increased to 30 mg as necessary. Improved.  11.  QTc prolongation: Her QTc today remains prolonged at 487 ms.  It's unclear to me why the long.  We changed medication that we thought would be contributing to this when she started the nilotinib.  It has not normalized at this point.  We may run into further issues in the future if we try to resume other medication for CML.  I do think it would be helpful for her to see cardiology regarding this and there are a few cardiologists who are interested in cardio oncology.  I will refer her to Dr. Ordoñez with [facility] for assistance.  12.  Tobacco use: She is unwilling to quit smoking although was advised to today.    PLAN:  1.  Hold any medication for CML at this time.  2.  If we do need to resume something in the future for thrombocytosis we will consider hydroxyurea.  3.  At this point I think it would be helpful for her to see cardiology regarding her persistently prolonged QTc interval and therefore I have referred her to Dr. Ordoñez with [facility].  Again, her QTc interval has not normalized enough for us to consider resuming nilotinib even if she were tolerating it well, and I am worried that we will continue to run into issues with this in the future if I try to resume any medication for her CML and therefore I think it would be useful to get a cardiologists perspective on this.  4.  I will have a nurse practitioner see her back in the office in a few weeks and I will see her back a few weeks after that with a CBC with each visit.    I spent 40 minutes in this visit today with 25 minutes spent in face-to-face counseling with the patient and her daughter regarding the above issues discussing options for therapy as well as side effects including and especially cardiac effects of the medication.     Burt Merida MD

## 2018-06-18 RX ORDER — TEMAZEPAM 15 MG/1
15 CAPSULE ORAL NIGHTLY PRN
Qty: 30 CAPSULE | Refills: 2 | Status: SHIPPED | OUTPATIENT
Start: 2018-06-18 | End: 2018-09-06

## 2018-06-18 RX ORDER — TEMAZEPAM 15 MG/1
CAPSULE ORAL
Qty: 30 CAPSULE | Refills: 0 | Status: CANCELLED | OUTPATIENT
Start: 2018-06-18

## 2018-06-27 ENCOUNTER — LAB (OUTPATIENT)
Dept: OTHER | Facility: HOSPITAL | Age: 73
End: 2018-06-27

## 2018-06-27 ENCOUNTER — OFFICE VISIT (OUTPATIENT)
Dept: ONCOLOGY | Facility: CLINIC | Age: 73
End: 2018-06-27

## 2018-06-27 VITALS
OXYGEN SATURATION: 95 % | HEIGHT: 65 IN | HEART RATE: 69 BPM | DIASTOLIC BLOOD PRESSURE: 80 MMHG | WEIGHT: 135 LBS | BODY MASS INDEX: 22.49 KG/M2 | RESPIRATION RATE: 16 BRPM | TEMPERATURE: 98.7 F | SYSTOLIC BLOOD PRESSURE: 170 MMHG

## 2018-06-27 DIAGNOSIS — C92.10 CML (CHRONIC MYELOID LEUKEMIA) (HCC): ICD-10-CM

## 2018-06-27 DIAGNOSIS — C92.10 CML (CHRONIC MYELOID LEUKEMIA) (HCC): Primary | ICD-10-CM

## 2018-06-27 DIAGNOSIS — D75.839 THROMBOCYTOSIS: ICD-10-CM

## 2018-06-27 LAB
BASOPHILS # BLD AUTO: 0.18 10*3/MM3 (ref 0–0.2)
BASOPHILS NFR BLD AUTO: 1.9 % (ref 0–1.5)
DEPRECATED RDW RBC AUTO: 44.1 FL (ref 37–54)
EOSINOPHIL # BLD AUTO: 0.21 10*3/MM3 (ref 0–0.7)
EOSINOPHIL NFR BLD AUTO: 2.2 % (ref 0.3–6.2)
ERYTHROCYTE [DISTWIDTH] IN BLOOD BY AUTOMATED COUNT: 13 % (ref 11.7–13)
HCT VFR BLD AUTO: 42.6 % (ref 35.6–45.5)
HGB BLD-MCNC: 14.4 G/DL (ref 11.9–15.5)
IMM GRANULOCYTES # BLD: 0.09 10*3/MM3 (ref 0–0.03)
IMM GRANULOCYTES NFR BLD: 0.9 % (ref 0–0.5)
LYMPHOCYTES # BLD AUTO: 3.12 10*3/MM3 (ref 0.9–4.8)
LYMPHOCYTES NFR BLD AUTO: 32.6 % (ref 19.6–45.3)
MCH RBC QN AUTO: 31.5 PG (ref 26.9–32)
MCHC RBC AUTO-ENTMCNC: 33.8 G/DL (ref 32.4–36.3)
MCV RBC AUTO: 93.2 FL (ref 80.5–98.2)
MONOCYTES # BLD AUTO: 0.72 10*3/MM3 (ref 0.2–1.2)
MONOCYTES NFR BLD AUTO: 7.5 % (ref 5–12)
NEUTROPHILS # BLD AUTO: 5.25 10*3/MM3 (ref 1.9–8.1)
NEUTROPHILS NFR BLD AUTO: 54.9 % (ref 42.7–76)
NRBC BLD MANUAL-RTO: 0 /100 WBC (ref 0–0)
PLATELET # BLD AUTO: 582 10*3/MM3 (ref 140–500)
PMV BLD AUTO: 9.8 FL (ref 6–12)
RBC # BLD AUTO: 4.57 10*6/MM3 (ref 3.9–5.2)
WBC NRBC COR # BLD: 9.57 10*3/MM3 (ref 4.5–10.7)

## 2018-06-27 PROCEDURE — 85025 COMPLETE CBC W/AUTO DIFF WBC: CPT | Performed by: INTERNAL MEDICINE

## 2018-06-27 PROCEDURE — 99214 OFFICE O/P EST MOD 30 MIN: CPT | Performed by: NURSE PRACTITIONER

## 2018-06-27 PROCEDURE — 36415 COLL VENOUS BLD VENIPUNCTURE: CPT

## 2018-06-27 NOTE — PROGRESS NOTES
[facility] GROUP OUTPATIENT FOLLOW UP CLINIC VISIT    REASON FOR FOLLOW-UP:    1.  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  Gleevec 400 mg daily initiated around 10/12/2017, stopped on 11/15/2017 due to intolerance (noah-orbital edema, nausea/vomiting, fatigue).  Resumed at 200 mg daily but, again, not tolerated.    3.  Nilotinib 150 mg twice daily started in late March, 2018.  Held due to intolerance and QTc prolongation.     HISTORY OF PRESENT ILLNESS:  Nicole Lofton is a 72 y.o. female with the above medical history who is here today for scheduled lab review and assessment.     She has been holding the nilotinib for several weeks.  She continues to feel quite well as a result.  Her energy level is good.  Her spirits are good.She is eating and drinking well. She denies fevers or chills. She denies shortness of breath. She denies swelling.    She has an appointment with cardiology on Tuesday of next week for  QTc interval today remains prolonged at 487 ms.  She denies chest pain, heart palpitations.    Her platelets have increased  today to 582,000 vs. 322,000.  She does not desire to go back on the nilotinib due to adverse effects at this time and would like to continue to monitor her counts.     ONCOLOGIC HISTORY:  For about 6 months she has noticed bilateral arm tingling and weakness in her hands.  This has occurred about 5 or 6 times over the past 6 months.  She is vague in her description of this.  She is globally weak and is quite inactive as she lives by herself.  She was completing physical therapy with some improvement but is now quite unsteady on her feet.  She has not had any falls.  She denies any bleeding.  She has chronic fatigue.  She has osteoarthritis pain and reports pain in the right side and in her neck.  She denies any acute low back pain but does have chronic low back pain.  She does have orthostatic symptoms and she is lightheaded when she  stands.  She has had a decreased appetite and some weight loss over the past 9 months.     Labs performed on 8/28/2017 showed a white blood cell count of 39.1 with a differential showing 61% neutrophils and 32% lymphocytes, hemoglobin 13.9 and platelets 565,000.         She had further labs done at [facility] on 8/20/2017 showing a white blood cell count of 24.4, hemoglobin 13.7, and platelets 1795.  A differential showed 64% neutrophils, 7% lymphocytes, and 12% basophils.  Peripheral blood flow cytometry was also done on this date showing 11% basophils and less than 1% myeloblasts.  There was no monoclonal B-cell, T-cell, plasma cell, or NK cell population.  FISH for BCR-ABL and ESSENCE 2 mutation analysis were done as well.     Her CBC was normal as of 7/8/2016.     Of note, she has a history of bilateral breast cancer diagnosed in 1988.  She had bilateral mastectomy with reconstruction.  We do not have any records regarding this at this time.  She apparently completed at least 6 months of adjuvant chemotherapy but again does not know the details..    FISH for BCR-ABL performed at [facility] was actually positive.  As of 9/6/2017 ESSENCE 2 is still pending.    She returned on 9/6/2017 for follow-up.  Platelets at 1.9 million with hydroxyurea 1000 mg daily.  She will increase the dose to 2000 mg daily.  She will have a bone marrow aspiration biopsy performed.  Weekly CBCs.    As of 9/6/2017 peripheral blood PCR was positive at 89.238% with a major break point mutation.    ESSENCE 2 and CALR mutation negative.    Bone marrow aspiration and biopsy performed on 9/15/2017.  Flow cytometry showed no increase in blasts.  Morphology showed no increase in blasts.  Bone marrow FISH showed BCR/ABL rearrangement in 93% of cells.  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    Labs as of 9/20/2017 show white blood cell count of 4.7 with hemoglobin of 12.7 and platelets 1.1 million.    On 9/27/2017 white blood cells and hemoglobin are normal.   Platelets are down to 722,000.  We plan to start Gleevec at 400 mg daily if she tolerates it.  In the meantime she will decrease the hydroxyurea dose to 1000 mg daily.    Blood counts normalized.  Hydroxyurea stopped.    Gleevec initiated around 10/12/2017, stopped on 11/15/2017 due to intolerance (noah-orbital edema, nausea/vomiting, fatigue).    Symptoms improving as of 11/29/2017.  Blood counts normal.  Hold further therapy at this time until she improves more clinically.      Plan to resume Gleevec at 200 mg daily as of 1/11/2018.  Only took it for less than a week but also did not tolerate this dose.    As of 2/21/2018 her platelet count is again elevated at greater than 700,000.  Resume hydroxyurea 1000 mg daily.  Considering nilotinib at a decreased dose.    Platelets continued to rise in March 2018.  Hydroxyurea increased to 1500 mg daily with stabilization of her platelet count and some improvement as of 3/21/2018.  Plan for nilotinib.    Nilotinib initiated in late March 2018.    Subsequently held due to QTC prolongation.      PAST MEDICAL, SURGICAL, FAMILY, AND SOCIAL HISTORIES were reviewed with the patient and in the electronic medical record, and were updated if indicated.    ALLERGIES:  No Known Allergies    MEDICATIONS:  The medication list has been reviewed with the patient by the medical assistant, and the list has been updated in the electronic medical record, which I reviewed.  Medication dosages and frequencies were confirmed to be accurate.    I have reviewed the patient's medical history in detail and updated the computerized patient record.    REVIEW OF SYSTEMS:  PAIN:  See Vital Signs below.  GENERAL:  Fatigue and global weakness improved.  SKIN:  No rashes or non-healing lesions.   HEME/LYMPH:  See HPI  EYES:  Periorbital edema no longer an issue  ENT:  No sore throat or difficulty swallowing.  dry mouth  RESPIRATORY:  No cough, shortness of breath, hemoptysis, or wheezing.  CARDIOVASCULAR:  "No chest pain or palpitations  GASTROINTESTINAL: Appetite improving.  No nausea, vomiting, or diarrhea.    GENITOURINARY:  No dysuria or hematuria.  MUSCULOSKELETAL:  Arthralgias, back pain, abnormal gait, neck pain.  Right arm pain secondary to humerus fracture has improved. Improved.  NEUROLOGIC:  No dizziness, loss of consciousness, or seizures.  PSYCHIATRIC:  insomnia and depression, Improved.    Vitals:    06/27/18 1104   BP: 170/80   Pulse: 69   Resp: 16   Temp: 98.7 °F (37.1 °C)   TempSrc: Oral   SpO2: 95%   Weight: 61.2 kg (135 lb)   Height: 165 cm (64.96\")   PainSc: 0-No pain       PHYSICAL EXAMINATION:  GENERAL:  Well developed female; awake, alert and oriented, in no acute distress. Accompanied by her daughter.  SKIN:  Warm and dry, without rashes, purpura, or petechiae.  HEAD:  Normocephalic, atraumatic.  EYES:  Pupils equally round and reactive to light, anicteric sclera.  EARS:  Hearing intact.  CHEST:  Lungs are clear to auscultation bilaterally.  No wheezes, rales, or rhonchi.  HEART:  Regular rate; normal rhythm.  No murmurs, gallops or rubs.  ABDOMEN:  Soft, non-tender, non-distended.  Normal active bowel sounds.  No organomegaly.  EXTREMITIES:  Trace bilateral ankle edema left greater than right   NEUROLOGICAL:  No focal neurologic deficits.      DIAGNOSTIC DATA:  Results for orders placed or performed in visit on 06/27/18   CBC Auto Differential   Result Value Ref Range    WBC 9.57 4.50 - 10.70 10*3/mm3    RBC 4.57 3.90 - 5.20 10*6/mm3    Hemoglobin 14.4 11.9 - 15.5 g/dL    Hematocrit 42.6 35.6 - 45.5 %    MCV 93.2 80.5 - 98.2 fL    MCH 31.5 26.9 - 32.0 pg    MCHC 33.8 32.4 - 36.3 g/dL    RDW 13.0 11.7 - 13.0 %    RDW-SD 44.1 37.0 - 54.0 fl    MPV 9.8 6.0 - 12.0 fL    Platelets 582 (H) 140 - 500 10*3/mm3    Neutrophil % 54.9 42.7 - 76.0 %    Lymphocyte % 32.6 19.6 - 45.3 %    Monocyte % 7.5 5.0 - 12.0 %    Eosinophil % 2.2 0.3 - 6.2 %    Basophil % 1.9 (H) 0.0 - 1.5 %    Immature Grans % 0.9 (H) " 0.0 - 0.5 %    Neutrophils, Absolute 5.25 1.90 - 8.10 10*3/mm3    Lymphocytes, Absolute 3.12 0.90 - 4.80 10*3/mm3    Monocytes, Absolute 0.72 0.20 - 1.20 10*3/mm3    Eosinophils, Absolute 0.21 0.00 - 0.70 10*3/mm3    Basophils, Absolute 0.18 0.00 - 0.20 10*3/mm3    Immature Grans, Absolute 0.09 (H) 0.00 - 0.03 10*3/mm3    nRBC 0.0 0.0 - 0.0 /100 WBC         IMAGING:  EKG, QTc interval 487 ms    ASSESSMENT:  This is a 72 y.o. female with:  1.  History of bilateral breast cancer in 1988 status post bilateral mastectomy.  She apparently completed 6 months of adjuvant therapy.  We have no details regarding this.    2.  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  Started on Hydrea 1000 mg daily.  This was discontinued and she started Gleevec on approximately 10/12/2017.  Gleevec discontinued due to intolerance on 11/15/2017.  We started Gleevec at a lower dose of 200 mg but she was also intolerant of this dose.  Her side effects were as severe with a lower dose and she therefore stopped taking the drug.  Symptoms improved significantly.  Platelet count subsequently elevated again.    She started nilotinib at 150mg twice daily at the end of March 2018.  This was held in mid-May due to QTc prolongation.  Her QTc interval has not normalized enough to resume the medication.  She also reports significant adverse effects with fatigue and anorexia with the medication.  Therefore, she has not tolerated this medication well either.  We will not plan to resume it.  Her platelet count has increased to [id number] today.  We may consider hydroxyurea if her platelet count continues to increase at this point she declines treatment.    3.  Thrombocytosis:  Her platelet count today is 582,000.   4.  Hypothyroidism:Levothyroxine 100 mcg.  5.  Nausea and vomiting: Resolved off Gleevec.   6. Periorbital edema: Resolved off Gleevec.    7.  Hypokalemia:  Potassium is 3.4 on 6/6/2018.  8.   Osteoarthritis pain: she reports this became worse sine starting nilotinib. This has improved over the past few weeks.  9.  Depression: She had been on Prozac for years, however, drug interaction with nilotinib prompted us to change this to Paxil. Her depression is stable.  10.  Insomnia.  This had been treated with trazodone but as mentioned above this also caused a potential for QT interval prolongation.  She was therefore changed to temazepam.  Increased to 30 mg as necessary. Improved.  11.  QTc prolongation: Her QTc on 6/6/2018 . She has an appointment with  Dr. Ordoñez with [facility] next Tuesday.  12.  Tobacco use: She is unwilling to quit smoking although was advised to today.    PLAN:  1.  Continue to hold any medication for CML at this time.She will return as already scheduled to see Dr Merida on 7/24/2018. I have instructed her to call the office should she experience any new or worsening symptoms.  2.  If we do need to resume something in the future for thrombocytosis we will consider hydroxyurea.  3.  Follow up with cardiology as already scheduled next Tuesday.         FERNANDO Duke

## 2018-07-02 RX ORDER — PAROXETINE HYDROCHLORIDE 20 MG/1
TABLET, FILM COATED ORAL
Qty: 30 TABLET | Refills: 4 | Status: SHIPPED | OUTPATIENT
Start: 2018-07-02 | End: 2018-09-06

## 2018-07-02 RX ORDER — LEVOTHYROXINE SODIUM 0.1 MG/1
100 TABLET ORAL DAILY
Qty: 30 TABLET | Refills: 0 | Status: SHIPPED | OUTPATIENT
Start: 2018-07-02 | End: 2018-08-04 | Stop reason: SDUPTHER

## 2018-07-02 NOTE — PROGRESS NOTES
Date of Office Visit: 2018  Encounter Provider: Galdino Duque MD  Place of Service: [facility] CARDIOLOGY  Patient Name: Nicole Lofton  :1945    Chief Complaint   Patient presents with   • Abnormal ECG     PROLONGED QT    :     HPI: Nicole Lofton is a 72 y.o. female who presents today at the request of Dr. Burt Merida regarding QT prolongation on EKG.      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     On 2018, she started nilotinib.  One week later, her fluoxetine and trazodone were discontinued; she was placed on paroxetine.  An EKG at that time reported at QTc of 477 msec, but it looks normal to my eyes, and is clearly less than 1/2 the R-R interval.    On , her QTc was reported as 483 msec.  It does visually appear mildly prolonged.      On May 9, the QTc was reported as 500 msec, and the treatment was stopped.  The QT interval was clearly prolonged to my eyes.    On May 24, The QTc was reported as 460 msec.  It looks visually normal to me, but the study is somewhat difficult because there are frequent PACs.    On , the QTc was minimally prolonged, but was improving.    She denies palpitations, lightheadedness, syncope, orthopnea, PND, or chest pain.  She denies any family history of sudden cardiac death or of SIDS.  In 2016, she had a normal echo in our office, as well as a submaximal treadmill stress test (which was stopped due to fatigue); she was never seen by a cardiologist and denies that she was having any chest pain.    Past Medical History:   Diagnosis Date   • Breast cancer (CMS/HCC)        • Cervical spondylosis    • CML (chronic myelocytic leukemia) (CMS/HCC)    • Depression    • H/O Closed fracture of right proximal humerus    • H/O Distal radius fracture, left    • Hyperlipidemia    • Hypertension    • Hypothyroidism    •  Lumbar spondylosis    • Osteoarthritis    • Thrombocytosis (CMS/HCC)        Past Surgical History:   Procedure Laterality Date   • BACK SURGERY  1980; 1992    ruptured herniated disc   • MASTECTOMY Bilateral 1988       Social History     Social History   • Marital status:      Spouse name: N/A   • Number of children: 3   • Years of education: High school     Occupational History   •  Retired     Social History Main Topics   • Smoking status: Current Every Day Smoker     Packs/day: 1.00     Years: 60.00     Types: Cigarettes   • Smokeless tobacco: Never Used   • Alcohol use No   • Drug use: No   • Sexual activity: Defer     Other Topics Concern   • Not on file     Social History Narrative   • No narrative on file       Family History   Problem Relation Age of Onset   • Breast cancer Sister         Diagnosed in her 40s   • Breast cancer Paternal Grandmother    • Lung cancer Brother         Diagnosed in his 50s   • Other Brother         Substance abuse   • Lung cancer Brother         Diagnosed in his 50s   • Liver disease Brother    • Lung cancer Brother         Diagnosed in his 50s   • Breast cancer Sister         Diagnosed in her 40s   • No Known Problems Sister    • No Known Problems Sister    • Dementia Mother    • Heart disease Father        Review of Systems   Cardiovascular: Negative for chest pain and palpitations.   Respiratory: Negative for shortness of breath.    Neurological: Positive for dizziness.       No Known Allergies      Current Outpatient Prescriptions:   •  amLODIPine (NORVASC) 10 MG tablet, Take 1 tablet by mouth Daily., Disp: 30 tablet, Rfl: 0  •  atorvastatin (LIPITOR) 40 MG tablet, Take 1 tablet by mouth Daily., Disp: 30 tablet, Rfl: 0  •  FLUZONE HIGH-DOSE 0.5 ML suspension prefilled syringe injection, ADM 0.5ML IM UTD, Disp: , Rfl: 0  •  Ibuprofen (ADVIL PO), Take  by mouth As Needed., Disp: , Rfl:   •  levothyroxine (SYNTHROID, LEVOTHROID) 100 MCG tablet, TAKE 1 TABLET BY MOUTH  "DAILY, Disp: 30 tablet, Rfl: 0  •  PARoxetine (PAXIL) 20 MG tablet, TAKE ONE TABLET BY MOUTH EVERY MORNING- FIRST 2 WEEKS ALTERNATE EVERY OTHER DAY AS INSTRUCTED, Disp: 30 tablet, Rfl: 4  •  potassium chloride (K-DUR,KLOR-CON) 20 MEQ CR tablet, Take 1 tablet by mouth 2 (Two) Times a Day., Disp: 60 tablet, Rfl: 2  •  temazepam (RESTORIL) 15 MG capsule, Take 1 capsule by mouth At Night As Needed for Sleep., Disp: 30 capsule, Rfl: 2  •  HYDROcodone-acetaminophen (NORCO) 5-325 MG per tablet, TK 1 T PO Q 6 H PRN FOR UP TO 10 DAYS, Disp: , Rfl: 0  •  nilotinib (TASIGNA) 150 MG capsule capsule, Take 1 capsule by mouth 2 (Two) Times a Day., Disp: 56 capsule, Rfl: 6  •  ondansetron (ZOFRAN) 8 MG tablet, Take 1 tablet by mouth Every 8 (Eight) Hours As Needed for Nausea or Vomiting., Disp: 30 tablet, Rfl: 2  •  prochlorperazine (COMPAZINE) 10 MG tablet, TAKE 1 TABLET BY MOUTH EVERY 6 HOURS AS NEEDED FOR NAUSEA OR VOMITING, Disp: 60 tablet, Rfl: 0  •  promethazine (PHENERGAN) 12.5 MG tablet, Take 1 tablet by mouth Every 8 (Eight) Hours As Needed for Nausea or Vomiting., Disp: 4 tablet, Rfl: 0      Objective:     Vitals:    07/03/18 1151   BP: 166/84   BP Location: Right arm   Pulse: 65   Weight: 61.5 kg (135 lb 9.6 oz)   Height: 165 cm (64.96\")     Body mass index is 22.59 kg/m².    Physical Exam      ECG 12 Lead  Date/Time: 7/3/2018 12:37 PM  Performed by: ADRIANA YANES  Authorized by: ADRIANA YANES   Comparison: compared with previous ECG   Comparison to previous ECG: QT is less long  Rhythm comments: Ectopic atrial rhythm  Conduction: conduction normal  ST Segments: ST segments normal  T Waves: T waves normal  QRS axis: normal  Other: no other findings  Clinical impression: normal ECG  Comments: QT by my measurement is between 400-420 ms (it reports 453); QT is clearly <1/2 RR              Assessment:       Diagnosis Plan   1. Prolonged Q-T interval on ECG     2. CML (chronic myeloid leukemia) (CMS/HCC)            Plan:     "   Her QTc interval on the EKG today is reported to be 472 ms.  When I measure her QT interval with calipers, I get a value between 400-410 msec, which gives a QTc of 427 msec.  Visually it looks completely normal.    Unfortunately though, she did develop documented QT prolongation with nilotinib, and will not be able to take this medication in the future.  She says she tolerated it so poorly that she doesn't ever want to consider taking it again.      As her QT is now normal, and as she has no worrisome family history of symptoms suggestive of inherited LQTS, I will see her as needed.    Sincerely,       Galdino Duque MD

## 2018-07-03 ENCOUNTER — OFFICE VISIT (OUTPATIENT)
Dept: CARDIOLOGY | Facility: CLINIC | Age: 73
End: 2018-07-03

## 2018-07-03 VITALS
DIASTOLIC BLOOD PRESSURE: 84 MMHG | HEIGHT: 65 IN | BODY MASS INDEX: 22.59 KG/M2 | WEIGHT: 135.6 LBS | HEART RATE: 65 BPM | SYSTOLIC BLOOD PRESSURE: 166 MMHG

## 2018-07-03 DIAGNOSIS — C92.10 CML (CHRONIC MYELOID LEUKEMIA) (HCC): ICD-10-CM

## 2018-07-03 DIAGNOSIS — R94.31 PROLONGED Q-T INTERVAL ON ECG: Primary | ICD-10-CM

## 2018-07-03 PROCEDURE — 93000 ELECTROCARDIOGRAM COMPLETE: CPT | Performed by: INTERNAL MEDICINE

## 2018-07-03 PROCEDURE — 99204 OFFICE O/P NEW MOD 45 MIN: CPT | Performed by: INTERNAL MEDICINE

## 2018-07-16 RX ORDER — PROCHLORPERAZINE MALEATE 10 MG
TABLET ORAL
Qty: 360 TABLET | Refills: 0 | Status: SHIPPED | OUTPATIENT
Start: 2018-07-16 | End: 2018-09-06

## 2018-07-16 RX ORDER — PROCHLORPERAZINE MALEATE 10 MG
TABLET ORAL
Qty: 60 TABLET | Refills: 0 | Status: SHIPPED | OUTPATIENT
Start: 2018-07-16 | End: 2018-07-16 | Stop reason: SDUPTHER

## 2018-07-24 ENCOUNTER — LAB (OUTPATIENT)
Dept: OTHER | Facility: HOSPITAL | Age: 73
End: 2018-07-24

## 2018-07-24 ENCOUNTER — OFFICE VISIT (OUTPATIENT)
Dept: ONCOLOGY | Facility: CLINIC | Age: 73
End: 2018-07-24

## 2018-07-24 VITALS
RESPIRATION RATE: 16 BRPM | OXYGEN SATURATION: 94 % | BODY MASS INDEX: 23.32 KG/M2 | HEIGHT: 65 IN | HEART RATE: 69 BPM | DIASTOLIC BLOOD PRESSURE: 76 MMHG | TEMPERATURE: 98 F | WEIGHT: 140 LBS | SYSTOLIC BLOOD PRESSURE: 122 MMHG

## 2018-07-24 DIAGNOSIS — C92.10 CML (CHRONIC MYELOID LEUKEMIA) (HCC): ICD-10-CM

## 2018-07-24 DIAGNOSIS — C92.10 CML (CHRONIC MYELOID LEUKEMIA) (HCC): Primary | ICD-10-CM

## 2018-07-24 LAB
BASOPHILS # BLD MANUAL: 1.62 10*3/MM3 (ref 0–0.2)
BASOPHILS NFR BLD AUTO: 8 % (ref 0–2)
DEPRECATED RDW RBC AUTO: 44.3 FL (ref 37–54)
EOSINOPHIL # BLD MANUAL: 0.41 10*3/MM3 (ref 0–0.7)
EOSINOPHIL NFR BLD MANUAL: 2 % (ref 0–7)
ERYTHROCYTE [DISTWIDTH] IN BLOOD BY AUTOMATED COUNT: 13.1 % (ref 11.7–13)
HCT VFR BLD AUTO: 41.4 % (ref 35.6–45.5)
HGB BLD-MCNC: 13.6 G/DL (ref 11.9–15.5)
LYMPHOCYTES # BLD MANUAL: 5.89 10*3/MM3 (ref 0.8–7)
LYMPHOCYTES NFR BLD MANUAL: 2 % (ref 0–12)
LYMPHOCYTES NFR BLD MANUAL: 29 % (ref 24–44)
MCH RBC QN AUTO: 30.6 PG (ref 26.9–32)
MCHC RBC AUTO-ENTMCNC: 32.9 G/DL (ref 32.4–36.3)
MCV RBC AUTO: 93 FL (ref 80.5–98.2)
METAMYELOCYTES NFR BLD MANUAL: 1 % (ref 0–0)
MONOCYTES # BLD AUTO: 0.41 10*3/MM3 (ref 0–1)
MYELOCYTES NFR BLD MANUAL: 2 % (ref 0–0)
NEUTROPHILS # BLD AUTO: 11.37 10*3/MM3 (ref 1.9–8.1)
NEUTROPHILS NFR BLD MANUAL: 56 % (ref 42.7–76)
PLAT MORPH BLD: NORMAL
PLATELET # BLD AUTO: 989 10*3/MM3 (ref 140–500)
PMV BLD AUTO: 10.9 FL (ref 6–12)
RBC # BLD AUTO: 4.45 10*6/MM3 (ref 3.9–5.2)
RBC MORPH BLD: NORMAL
SCAN SLIDE: NORMAL
WBC MORPH BLD: NORMAL
WBC NRBC COR # BLD: 20.31 10*3/MM3 (ref 4.5–10.7)

## 2018-07-24 PROCEDURE — 99215 OFFICE O/P EST HI 40 MIN: CPT | Performed by: INTERNAL MEDICINE

## 2018-07-24 PROCEDURE — 85025 COMPLETE CBC W/AUTO DIFF WBC: CPT | Performed by: INTERNAL MEDICINE

## 2018-07-24 PROCEDURE — 85007 BL SMEAR W/DIFF WBC COUNT: CPT | Performed by: INTERNAL MEDICINE

## 2018-07-24 PROCEDURE — 36415 COLL VENOUS BLD VENIPUNCTURE: CPT

## 2018-07-24 NOTE — PROGRESS NOTES
[facility] CBC GROUP OUTPATIENT FOLLOW UP CLINIC VISIT    REASON FOR FOLLOW-UP:    1.  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  Gleevec 400 mg daily initiated around 10/12/2017, stopped on 11/15/2017 due to intolerance (noah-orbital edema, nausea/vomiting, fatigue).  Resumed at 200 mg daily but, again, not tolerated.    3.  Nilotinib 150 mg twice daily started in late March, 2018.  Held due to intolerance and QTc prolongation.     HISTORY OF PRESENT ILLNESS:  Nicole Lofton is a 72 y.o. female with the above medical history who is here today for scheduled follow up.    She has been off the nilotinib.  She saw Dr. Duque with cardiology, with a normal QTc interval at that time.      As of 6/27/2018 her white blood cell count remained normal at 9.57 with a normal hemoglobin of 14.4 and her platelets increased to 582,000.    She reports worsening fatigue over the past couple of weeks.  She is very inactive.  However, she does report an improved appetite and she is gaining some weight.    She also reports some unsteadiness on her feet.  She has experienced some nasal and otic congestion and decreased hearing.  She has been taking Sudafed for this.  This is been ongoing for about 2 weeks.    ONCOLOGIC HISTORY:  For about 6 months she has noticed bilateral arm tingling and weakness in her hands.  This has occurred about 5 or 6 times over the past 6 months.  She is vague in her description of this.  She is globally weak and is quite inactive as she lives by herself.  She was completing physical therapy with some improvement but is now quite unsteady on her feet.  She has not had any falls.  She denies any bleeding.  She has chronic fatigue.  She has osteoarthritis pain and reports pain in the right side and in her neck.  She denies any acute low back pain but does have chronic low back pain.  She does have orthostatic symptoms and she is lightheaded when she stands.  She has  had a decreased appetite and some weight loss over the past 9 months.     Labs performed on 8/28/2017 showed a white blood cell count of 39.1 with a differential showing 61% neutrophils and 32% lymphocytes, hemoglobin 13.9 and platelets 565,000.         She had further labs done at [place] on 8/20/2017 showing a white blood cell count of 24.4, hemoglobin 13.7, and platelets 1795.  A differential showed 64% neutrophils, 7% lymphocytes, and 12% basophils.  Peripheral blood flow cytometry was also done on this date showing 11% basophils and less than 1% myeloblasts.  There was no monoclonal B-cell, T-cell, plasma cell, or NK cell population.  FISH for BCR-ABL and ESSENCE 2 mutation analysis were done as well.     Her CBC was normal as of 7/8/2016.     Of note, she has a history of bilateral breast cancer diagnosed in 1988.  She had bilateral mastectomy with reconstruction.  We do not have any records regarding this at this time.  She apparently completed at least 6 months of adjuvant chemotherapy but again does not know the details..    FISH for BCR-ABL performed at [place] was actually positive.  As of 9/6/2017 ESSENCE 2 is still pending.    She returned on 9/6/2017 for follow-up.  Platelets at 1.9 million with hydroxyurea 1000 mg daily.  She will increase the dose to 2000 mg daily.  She will have a bone marrow aspiration biopsy performed.  Weekly CBCs.    As of 9/6/2017 peripheral blood PCR was positive at 89.238% with a major break point mutation.    ESSENCE 2 and CALR mutation negative.    Bone marrow aspiration and biopsy performed on 9/15/2017.  Flow cytometry showed no increase in blasts.  Morphology showed no increase in blasts.  Bone marrow FISH showed BCR/ABL rearrangement in 93% of cells.  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    Labs as of 9/20/2017 show white blood cell count of 4.7 with hemoglobin of 12.7 and platelets 1.1 million.    On 9/27/2017 white blood cells and hemoglobin are normal.  Platelets are  down to 722,000.  We plan to start Gleevec at 400 mg daily if she tolerates it.  In the meantime she will decrease the hydroxyurea dose to 1000 mg daily.    Blood counts normalized.  Hydroxyurea stopped.    Gleevec initiated around 10/12/2017, stopped on 11/15/2017 due to intolerance (noah-orbital edema, nausea/vomiting, fatigue).    Symptoms improving as of 11/29/2017.  Blood counts normal.  Hold further therapy at this time until she improves more clinically.      Plan to resume Gleevec at 200 mg daily as of 1/11/2018.  Only took it for less than a week but also did not tolerate this dose.    As of 2/21/2018 her platelet count is again elevated at greater than 700,000.  Resume hydroxyurea 1000 mg daily.  Considering nilotinib at a decreased dose.    Platelets continued to rise in March 2018.  Hydroxyurea increased to 1500 mg daily with stabilization of her platelet count and some improvement as of 3/21/2018.  Plan for nilotinib.    Nilotinib initiated in late March 2018.    Subsequently held due to QTC prolongation.      PAST MEDICAL, SURGICAL, FAMILY, AND SOCIAL HISTORIES were reviewed with the patient and in the electronic medical record, and were updated if indicated.    ALLERGIES:  No Known Allergies    MEDICATIONS:  The medication list has been reviewed with the patient by the medical assistant, and the list has been updated in the electronic medical record, which I reviewed.  Medication dosages and frequencies were confirmed to be accurate.    I have reviewed the patient's medical history in detail and updated the computerized patient record.    REVIEW OF SYSTEMS:  PAIN:  See Vital Signs below.  GENERAL:  Fatigue and global weakness are worsening.  SKIN:  No rashes or non-healing lesions.   HEME/LYMPH:  No lymphadenopathy.    EYES:  Periorbital edema no longer an issue  ENT:  No sore throat or difficulty swallowing.  dry mouth  RESPIRATORY:  No cough, shortness of breath, hemoptysis, or  "wheezing.  CARDIOVASCULAR: No chest pain or palpitations  GASTROINTESTINAL: Appetite improving.  No nausea, vomiting, or diarrhea.    GENITOURINARY:  No dysuria or hematuria.  MUSCULOSKELETAL:  Arthralgias, back pain, abnormal gait, neck pain.  Right arm pain secondary to humerus fracture has improved. Improved.  NEUROLOGIC:  No dizziness, loss of consciousness, or seizures.  PSYCHIATRIC:  insomnia and depression, Improved.    Vitals:    07/24/18 1254   BP: 122/76   Pulse: 69   Resp: 16   Temp: 98 °F (36.7 °C)   TempSrc: Oral   SpO2: 94%   Weight: 63.5 kg (140 lb)   Height: 165 cm (64.96\")   PainSc:   6   PainLoc: Knee  Comment: and back       PHYSICAL EXAMINATION:  GENERAL:  Well developed female; awake, alert and oriented, in no acute distress.  SKIN:  Warm and dry, without rashes, purpura, or petechiae.  HEAD:  Normocephalic, atraumatic.  EYES:  Pupils equally round and reactive to light, anicteric sclera.  EARS:  Both auditory canals are occluded with cerumen.  CHEST:  Lungs are clear to auscultation bilaterally.  No wheezes, rales, or rhonchi.  HEART:  Regular rate; normal rhythm.  No murmurs, gallops or rubs.  ABDOMEN:  Soft, non-tender, non-distended.  Normal active bowel sounds.  No organomegaly.  EXTREMITIES:  Trace bilateral ankle edema left greater than right   NEUROLOGICAL:  No focal neurologic deficits.      DIAGNOSTIC DATA:  Results for orders placed or performed in visit on 07/24/18   CBC Auto Differential   Result Value Ref Range    WBC 20.31 (H) 4.50 - 10.70 10*3/mm3    RBC 4.45 3.90 - 5.20 10*6/mm3    Hemoglobin 13.6 11.9 - 15.5 g/dL    Hematocrit 41.4 35.6 - 45.5 %    MCV 93.0 80.5 - 98.2 fL    MCH 30.6 26.9 - 32.0 pg    MCHC 32.9 32.4 - 36.3 g/dL    RDW 13.1 (H) 11.7 - 13.0 %    RDW-SD 44.3 37.0 - 54.0 fl    MPV 10.9 6.0 - 12.0 fL    Platelets 989 (H) 140 - 500 10*3/mm3   Scan Slide   Result Value Ref Range    Scan Slide     Manual Differential   Result Value Ref Range    Neutrophil % 56.0 42.7 - " 76.0 %    Lymphocyte % 29.0 24.0 - 44.0 %    Monocyte % 2.0 0.0 - 12.0 %    Eosinophil % 2.0 0.0 - 7.0 %    Basophil % 8.0 (H) 0.0 - 2.0 %    Metamyelocyte % 1.0 (H) 0.0 - 0.0 %    Myelocyte % 2.0 (H) 0.0 - 0.0 %    Neutrophils Absolute 11.37 (H) 1.90 - 8.10 10*3/mm3    Lymphocytes Absolute 5.89 0.80 - 7.00 10*3/mm3    Monocytes Absolute 0.41 0.00 - 1.00 10*3/mm3    Eosinophils Absolute 0.41 0.00 - 0.70 10*3/mm3    Basophils Absolute 1.62 (H) 0.00 - 0.20 10*3/mm3    RBC Morphology Normal Normal    WBC Morphology Normal Normal    Platelet Morphology Normal Normal         IMAGING:  EKG, QTc interval 487 ms    ASSESSMENT:  This is a 72 y.o. female with:  1.  History of bilateral breast cancer in 1988 status post bilateral mastectomy.  She apparently completed 6 months of adjuvant therapy.  We have no details regarding this.    2.  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  Started on Hydrea 1000 mg daily.  This was discontinued and she started Gleevec on approximately 10/12/2017.  Gleevec discontinued due to intolerance on 11/15/2017.  We started Gleevec at a lower dose of 200 mg but she was also intolerant of this dose.  Her side effects were as severe with a lower dose and she therefore stopped taking the drug.  Symptoms improved significantly.  Platelet count subsequently elevated again.    She started nilotinib at 150mg twice daily at the end of March 2018.  This was held in mid-May due to QTc prolongation.  The QTc interval did subsequently normalized.  However, due to this and other adverse effects we are not able to resume the nilotinib nor does she desire to.    Her blood counts are higher at this point.  She will resume hydroxyurea at 1000 mg daily and I will see her back in about 2 weeks to see what her blood counts are doing.  We discussed other treatment options for the CML at this point.  She is not interested in any further TKIs.  We discussed the  possibility of injectable Synribo but she does not want an injectable medication either.  3.  Thrombocytosis:  Due to CML.  This resolved all she was on imatinib and nilotinib but it is not worsening again.  4.  Hypothyroidism:  Levothyroxine 100 mcg.  5.  Nausea and vomiting: Resolved off Gleevec.   6. Periorbital edema: Resolved off Gleevec.    7.  Hypokalemia:  Potassium not checked today  8.  Osteoarthritis pain: she reports this became worse sine starting nilotinib. This has improved over the past few weeks.  9.  Depression: She had been on Prozac for years, however, drug interaction with nilotinib prompted us to change this to Paxil. Her depression is stable.  10.  Insomnia.  This had been treated with trazodone but as mentioned above this also caused a potential for QT interval prolongation.  She was therefore changed to temazepam.  Increased to 30 mg as necessary. Improved.  11.  QTc prolongation: Due to nilotinib.  This resolved.  She saw Dr. Duque with cardiology.    12.  Tobacco use: She is unwilling to quit smoking  13.  Nasal and otic congestion: I advised discontinuing the Sudafed and starting a nasal steroid and nonsedating antihistamine.  In addition, both of her auditory canals are occluded with cerumen.  I advised Debrox and she'll follow up with primary care regarding this.  She could also see otolaryngology as well.    PLAN:  1.  Resume hydroxyurea 1000 mg daily.  2.  We discussed the possibility of injectable Synribo but she is not interested in this at this time.  I did advise her that if her blood counts continue to increase in spite of hydroxyurea that we will need to explore other treatment options for the CML.  3.  I will see her back in 2 weeks for follow-up with a CBC and CMP at that time.  4.  Use Debrox in her ears and follow-up with primary care.  Stop Sudafed and start a nasal steroid and antihistamine if needed.     Burt Merida MD

## 2018-08-06 RX ORDER — LEVOTHYROXINE SODIUM 0.1 MG/1
100 TABLET ORAL DAILY
Qty: 30 TABLET | Refills: 0 | Status: SHIPPED | OUTPATIENT
Start: 2018-08-06 | End: 2018-09-10 | Stop reason: SDUPTHER

## 2018-08-07 ENCOUNTER — OFFICE VISIT (OUTPATIENT)
Dept: ONCOLOGY | Facility: CLINIC | Age: 73
End: 2018-08-07

## 2018-08-07 ENCOUNTER — LAB (OUTPATIENT)
Dept: OTHER | Facility: HOSPITAL | Age: 73
End: 2018-08-07

## 2018-08-07 VITALS
HEART RATE: 77 BPM | RESPIRATION RATE: 14 BRPM | BODY MASS INDEX: 23.19 KG/M2 | WEIGHT: 139.2 LBS | DIASTOLIC BLOOD PRESSURE: 82 MMHG | TEMPERATURE: 97.9 F | HEIGHT: 65 IN | OXYGEN SATURATION: 92 % | SYSTOLIC BLOOD PRESSURE: 149 MMHG

## 2018-08-07 DIAGNOSIS — C92.10 CML (CHRONIC MYELOID LEUKEMIA) (HCC): ICD-10-CM

## 2018-08-07 DIAGNOSIS — R30.0 DYSURIA: Primary | ICD-10-CM

## 2018-08-07 DIAGNOSIS — R30.0 DYSURIA: ICD-10-CM

## 2018-08-07 LAB
ALBUMIN SERPL-MCNC: 4.5 G/DL (ref 3.5–5.2)
ALBUMIN/GLOB SERPL: 1.2 G/DL
ALP SERPL-CCNC: 99 U/L (ref 39–117)
ALT SERPL W P-5'-P-CCNC: 18 U/L (ref 1–33)
ANION GAP SERPL CALCULATED.3IONS-SCNC: 12.1 MMOL/L
AST SERPL-CCNC: 26 U/L (ref 1–32)
BACTERIA UR QL AUTO: ABNORMAL /HPF
BASOPHILS # BLD AUTO: 0.59 10*3/MM3 (ref 0–0.2)
BASOPHILS NFR BLD AUTO: 4 % (ref 0–1.5)
BILIRUB SERPL-MCNC: 0.3 MG/DL (ref 0.1–1.2)
BILIRUB UR QL STRIP: NEGATIVE
BUN BLD-MCNC: 12 MG/DL (ref 8–23)
BUN/CREAT SERPL: 14.8 (ref 7–25)
CALCIUM SPEC-SCNC: 9.6 MG/DL (ref 8.6–10.5)
CHLORIDE SERPL-SCNC: 98 MMOL/L (ref 98–107)
CLARITY UR: CLEAR
CO2 SERPL-SCNC: 27.9 MMOL/L (ref 22–29)
COLOR UR: YELLOW
CREAT BLD-MCNC: 0.81 MG/DL (ref 0.57–1)
DEPRECATED RDW RBC AUTO: 46.5 FL (ref 37–54)
EOSINOPHIL # BLD AUTO: 0.46 10*3/MM3 (ref 0–0.7)
EOSINOPHIL NFR BLD AUTO: 3.1 % (ref 0.3–6.2)
ERYTHROCYTE [DISTWIDTH] IN BLOOD BY AUTOMATED COUNT: 14 % (ref 11.7–13)
GFR SERPL CREATININE-BSD FRML MDRD: 69 ML/MIN/1.73
GLOBULIN UR ELPH-MCNC: 3.7 GM/DL
GLUCOSE BLD-MCNC: 104 MG/DL (ref 65–99)
GLUCOSE UR STRIP-MCNC: NEGATIVE MG/DL
HCT VFR BLD AUTO: 42.6 % (ref 35.6–45.5)
HGB BLD-MCNC: 13.8 G/DL (ref 11.9–15.5)
HGB UR QL STRIP.AUTO: NEGATIVE
HYALINE CASTS UR QL AUTO: ABNORMAL /LPF
IMM GRANULOCYTES # BLD: 0.28 10*3/MM3 (ref 0–0.03)
IMM GRANULOCYTES NFR BLD: 1.9 % (ref 0–0.5)
KETONES UR QL STRIP: ABNORMAL
LEUKOCYTE ESTERASE UR QL STRIP.AUTO: ABNORMAL
LYMPHOCYTES # BLD AUTO: 2.78 10*3/MM3 (ref 0.9–4.8)
LYMPHOCYTES NFR BLD AUTO: 19 % (ref 19.6–45.3)
MCH RBC QN AUTO: 30.5 PG (ref 26.9–32)
MCHC RBC AUTO-ENTMCNC: 32.4 G/DL (ref 32.4–36.3)
MCV RBC AUTO: 94 FL (ref 80.5–98.2)
MONOCYTES # BLD AUTO: 0.42 10*3/MM3 (ref 0.2–1.2)
MONOCYTES NFR BLD AUTO: 2.9 % (ref 5–12)
NEUTROPHILS # BLD AUTO: 10.09 10*3/MM3 (ref 1.9–8.1)
NEUTROPHILS NFR BLD AUTO: 69.1 % (ref 42.7–76)
NITRITE UR QL STRIP: NEGATIVE
NRBC BLD MANUAL-RTO: 0 /100 WBC (ref 0–0)
PH UR STRIP.AUTO: 7 [PH] (ref 5–8)
PLAT MORPH BLD: NORMAL
PLATELET # BLD AUTO: 1340 10*3/MM3 (ref 140–500)
PMV BLD AUTO: 9.8 FL (ref 6–12)
POTASSIUM BLD-SCNC: 3.9 MMOL/L (ref 3.5–5.2)
PROT SERPL-MCNC: 8.2 G/DL (ref 6–8.5)
PROT UR QL STRIP: ABNORMAL
RBC # BLD AUTO: 4.53 10*6/MM3 (ref 3.9–5.2)
RBC # UR: ABNORMAL /HPF
REF LAB TEST METHOD: ABNORMAL
SODIUM BLD-SCNC: 138 MMOL/L (ref 136–145)
SP GR UR STRIP: 1.01 (ref 1–1.03)
SQUAMOUS #/AREA URNS HPF: ABNORMAL /HPF
STOMATOCYTES BLD QL SMEAR: NORMAL
UROBILINOGEN UR QL STRIP: ABNORMAL
WBC MORPH BLD: NORMAL
WBC NRBC COR # BLD: 14.62 10*3/MM3 (ref 4.5–10.7)
WBC UR QL AUTO: ABNORMAL /HPF

## 2018-08-07 PROCEDURE — 85025 COMPLETE CBC W/AUTO DIFF WBC: CPT | Performed by: INTERNAL MEDICINE

## 2018-08-07 PROCEDURE — 81001 URINALYSIS AUTO W/SCOPE: CPT | Performed by: INTERNAL MEDICINE

## 2018-08-07 PROCEDURE — 85007 BL SMEAR W/DIFF WBC COUNT: CPT | Performed by: INTERNAL MEDICINE

## 2018-08-07 PROCEDURE — 36415 COLL VENOUS BLD VENIPUNCTURE: CPT

## 2018-08-07 PROCEDURE — 80053 COMPREHEN METABOLIC PANEL: CPT | Performed by: INTERNAL MEDICINE

## 2018-08-07 PROCEDURE — 87086 URINE CULTURE/COLONY COUNT: CPT | Performed by: INTERNAL MEDICINE

## 2018-08-07 PROCEDURE — 99215 OFFICE O/P EST HI 40 MIN: CPT | Performed by: INTERNAL MEDICINE

## 2018-08-07 RX ORDER — MELOXICAM 7.5 MG/1
7.5 TABLET ORAL DAILY
Qty: 30 TABLET | Refills: 5 | Status: SHIPPED | OUTPATIENT
Start: 2018-08-07 | End: 2018-11-20

## 2018-08-07 NOTE — PROGRESS NOTES
[facility] OUTPATIENT FOLLOW UP CLINIC VISIT    REASON FOR FOLLOW-UP:    1.  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  Gleevec 400 mg daily initiated around 10/12/2017, stopped on 11/15/2017 due to intolerance (noah-orbital edema, nausea/vomiting, fatigue).  Resumed at 200 mg daily but, again, not tolerated.    3.  Nilotinib 150 mg twice daily started in late March, 2018.  Held due to intolerance and QTc prolongation.     HISTORY OF PRESENT ILLNESS:  Nicole Lofton is a 72 y.o. female with the above medical history who is here today for scheduled follow up.    When I saw HER-2 weeks ago we resumed hydroxyurea 1000 mg daily.  She tolerates this well.  Energy level is reasonable.  She continues to complain of some pain in her neck back and shoulder since her fall around the first of the year.  She has been a little bit more active recently, flying to and from [place] to visit family.    She does complain today of increased urinary frequency and some burning on urination for the past few days.  She has a history of urinary tract infections once or twice per year.  She has not had one recently.  No fevers or chills.  No abdominal pain.    ONCOLOGIC HISTORY:  For about 6 months she has noticed bilateral arm tingling and weakness in her hands.  This has occurred about 5 or 6 times over the past 6 months.  She is vague in her description of this.  She is globally weak and is quite inactive as she lives by herself.  She was completing physical therapy with some improvement but is now quite unsteady on her feet.  She has not had any falls.  She denies any bleeding.  She has chronic fatigue.  She has osteoarthritis pain and reports pain in the right side and in her neck.  She denies any acute low back pain but does have chronic low back pain.  She does have orthostatic symptoms and she is lightheaded when she stands.  She has had a decreased appetite and some weight  loss over the past 9 months.     Labs performed on 8/28/2017 showed a white blood cell count of 39.1 with a differential showing 61% neutrophils and 32% lymphocytes, hemoglobin 13.9 and platelets 565,000.         She had further labs done at [facility] on 8/20/2017 showing a white blood cell count of 24.4, hemoglobin 13.7, and platelets 1795.  A differential showed 64% neutrophils, 7% lymphocytes, and 12% basophils.  Peripheral blood flow cytometry was also done on this date showing 11% basophils and less than 1% myeloblasts.  There was no monoclonal B-cell, T-cell, plasma cell, or NK cell population.  FISH for BCR-ABL and ESSENCE 2 mutation analysis were done as well.     Her CBC was normal as of 7/8/2016.     Of note, she has a history of bilateral breast cancer diagnosed in 1988.  She had bilateral mastectomy with reconstruction.  We do not have any records regarding this at this time.  She apparently completed at least 6 months of adjuvant chemotherapy but again does not know the details..    FISH for BCR-ABL performed at [facility] was actually positive.  As of 9/6/2017 SESENCE 2 is still pending.    She returned on 9/6/2017 for follow-up.  Platelets at 1.9 million with hydroxyurea 1000 mg daily.  She will increase the dose to 2000 mg daily.  She will have a bone marrow aspiration biopsy performed.  Weekly CBCs.    As of 9/6/2017 peripheral blood PCR was positive at 89.238% with a major break point mutation.    ESSENCE 2 and CALR mutation negative.    Bone marrow aspiration and biopsy performed on 9/15/2017.  Flow cytometry showed no increase in blasts.  Morphology showed no increase in blasts.  Bone marrow FISH showed BCR/ABL rearrangement in 93% of cells.  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    Labs as of 9/20/2017 show white blood cell count of 4.7 with hemoglobin of 12.7 and platelets 1.1 million.    On 9/27/2017 white blood cells and hemoglobin are normal.  Platelets are down to 722,000.  We plan to start Gleevec  at 400 mg daily if she tolerates it.  In the meantime she will decrease the hydroxyurea dose to 1000 mg daily.    Blood counts normalized.  Hydroxyurea stopped.    Gleevec initiated around 10/12/2017, stopped on 11/15/2017 due to intolerance (noah-orbital edema, nausea/vomiting, fatigue).    Symptoms improving as of 11/29/2017.  Blood counts normal.  Hold further therapy at this time until she improves more clinically.      Plan to resume Gleevec at 200 mg daily as of 1/11/2018.  Only took it for less than a week but also did not tolerate this dose.    As of 2/21/2018 her platelet count is again elevated at greater than 700,000.  Resume hydroxyurea 1000 mg daily.  Considering nilotinib at a decreased dose.    Platelets continued to rise in March 2018.  Hydroxyurea increased to 1500 mg daily with stabilization of her platelet count and some improvement as of 3/21/2018.  Plan for nilotinib.    Nilotinib initiated in late March 2018.    Subsequently held due to QTC prolongation.    Hydroxyurea 1000 mg daily resumed at the end of July 2018.  Increased the dose to 1000 mg twice daily as of 8/7/2018 for a rising platelet count of 1.3 million.    PAST MEDICAL, SURGICAL, FAMILY, AND SOCIAL HISTORIES were reviewed with the patient and in the electronic medical record, and were updated if indicated.    ALLERGIES:  No Known Allergies    MEDICATIONS:  The medication list has been reviewed with the patient by the medical assistant, and the list has been updated in the electronic medical record, which I reviewed.  Medication dosages and frequencies were confirmed to be accurate.    I have reviewed the patient's medical history in detail and updated the computerized patient record.    REVIEW OF SYSTEMS:  PAIN:  See Vital Signs below.  GENERAL:  Fatigue and global weakness are worsening.  SKIN:  No rashes or non-healing lesions.   HEME/LYMPH:  No lymphadenopathy.    EYES:  Periorbital edema no longer an issue  ENT:  No sore throat  "or difficulty swallowing.  dry mouth  RESPIRATORY:  No cough, shortness of breath, hemoptysis, or wheezing.  CARDIOVASCULAR: No chest pain or palpitations  GASTROINTESTINAL: Appetite improving.  No nausea, vomiting, or diarrhea.    GENITOURINARY:  Dysuria is present  MUSCULOSKELETAL:  Arthralgias, back pain, abnormal gait, neck pain.  Right arm pain secondary to humerus fracture has improved. Improved.  NEUROLOGIC:  No dizziness, loss of consciousness, or seizures.  PSYCHIATRIC:  insomnia and depression, Improved.    Vitals:    08/07/18 1116   BP: 149/82   Pulse: 77   Resp: 14   Temp: 97.9 °F (36.6 °C)   TempSrc: Oral   SpO2: 92%   Weight: 63.1 kg (139 lb 3.2 oz)   Height: 165 cm (64.96\")   PainSc:   6       PHYSICAL EXAMINATION:  GENERAL:  Well developed female; awake, alert and oriented, in no acute distress.  SKIN:  Warm and dry, without rashes, purpura, or petechiae.  HEAD:  Normocephalic, atraumatic.  EYES:  Pupils equally round and reactive to light, anicteric sclera.  EARS:  Both auditory canals are occluded with cerumen.  CHEST:  Lungs are clear to auscultation bilaterally.  No wheezes, rales, or rhonchi.  HEART:  Regular rate; normal rhythm.  No murmurs, gallops or rubs.  ABDOMEN:  Soft, non-tender, non-distended.  Normal active bowel sounds.  No organomegaly.  EXTREMITIES:  No clubbing cyanosis or edema  NEUROLOGICAL:  No focal neurologic deficits.      DIAGNOSTIC DATA:  Results for orders placed or performed in visit on 08/07/18   CBC Auto Differential   Result Value Ref Range    WBC 14.62 (H) 4.50 - 10.70 10*3/mm3    RBC 4.53 3.90 - 5.20 10*6/mm3    Hemoglobin 13.8 11.9 - 15.5 g/dL    Hematocrit 42.6 35.6 - 45.5 %    MCV 94.0 80.5 - 98.2 fL    MCH 30.5 26.9 - 32.0 pg    MCHC 32.4 32.4 - 36.3 g/dL    RDW 14.0 (H) 11.7 - 13.0 %    RDW-SD 46.5 37.0 - 54.0 fl    MPV 9.8 6.0 - 12.0 fL    Platelets 1,340 (H) 140 - 500 10*3/mm3    Neutrophil % 69.1 42.7 - 76.0 %    Lymphocyte % 19.0 (L) 19.6 - 45.3 %    " Monocyte % 2.9 (L) 5.0 - 12.0 %    Eosinophil % 3.1 0.3 - 6.2 %    Basophil % 4.0 (H) 0.0 - 1.5 %    Immature Grans % 1.9 (H) 0.0 - 0.5 %    Neutrophils, Absolute 10.09 (H) 1.90 - 8.10 10*3/mm3    Lymphocytes, Absolute 2.78 0.90 - 4.80 10*3/mm3    Monocytes, Absolute 0.42 0.20 - 1.20 10*3/mm3    Eosinophils, Absolute 0.46 0.00 - 0.70 10*3/mm3    Basophils, Absolute 0.59 (H) 0.00 - 0.20 10*3/mm3    Immature Grans, Absolute 0.28 (H) 0.00 - 0.03 10*3/mm3    nRBC 0.0 0.0 - 0.0 /100 WBC   Scan Slide   Result Value Ref Range    Stomatocytes Slight/1+ None Seen    WBC Morphology Normal Normal    Platelet Morphology Normal Normal         IMAGING:  EKG, QTc interval 487 ms    ASSESSMENT:  This is a 72 y.o. female with:  1.  History of bilateral breast cancer in 1988 status post bilateral mastectomy.  She apparently completed 6 months of adjuvant therapy.  We have no details regarding this.    2.  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  Started on Hydrea 1000 mg daily.  This was discontinued and she started Gleevec on approximately 10/12/2017.  Gleevec discontinued due to intolerance on 11/15/2017.  We started Gleevec at a lower dose of 200 mg but she was also intolerant of this dose.  Her side effects were as severe with a lower dose and she therefore stopped taking the drug.  Symptoms improved significantly.  Platelet count subsequently elevated again.    She started nilotinib at 150mg twice daily at the end of March 2018.  This was held in mid-May due to QTc prolongation.  The QTc interval did subsequently normalized.  However, due to this and other adverse effects we are not able to resume the nilotinib nor does she desire to.    In July we resumed hydroxyurea 1000 mg daily.  In spite of this, her platelet count has gone up from 900,000 to 1.3 million.  Her hemoglobin is stable.  Her white blood cell count has decreased from 20,000 to 14,000.  We will increase the  hydroxyurea to 1000 mg twice daily at this point.  She remains reluctant to try any other medications although we did again today discussed the possibility of starting injectable Synribo.      3.  Thrombocytosis:  Due to CML.  This resolved while she was on imatinib and nilotinib but it is worsening again.  4.  Hypothyroidism:  Levothyroxine 100 mcg.  5.  Nausea and vomiting: Resolved off Gleevec.   6. Periorbital edema: Resolved off Gleevec.    7.  Hypokalemia:  Potassium pending today  8.  Osteoarthritis pain: she reports this became worse sine starting nilotinib. She is taking acetaminophen for this.  She inquires about a stronger pain medication.  I will prescribe meloxicam for her.  9.  Depression: She had been on Prozac for years, however, drug interaction with nilotinib prompted us to change this to Paxil. Her depression is stable.  10.  Insomnia.  This had been treated with trazodone but as mentioned above this also caused a potential for QT interval prolongation.  She was therefore changed to temazepam.  Increased to 30 mg as necessary. Improved.  11.  QTc prolongation: Due to nilotinib.  This resolved.  She saw Dr. Duque with cardiology.    12.  Tobacco use: She is unwilling to quit smoking  13.  Nasal and otic congestion: I advised discontinuing the Sudafed and starting a nasal steroid and nonsedating antihistamine, which she did and this has helped.  In addition, both of her auditory canals remain occluded with cerumen.  I advised continuing Debrox and she'll follow up with primary care regarding this.   14.  Dysuria:  U/a and culture today    PLAN:  1.  Increase hydroxyurea to 1000 mg twice daily.  2.  We discussed the possibility of injectable Synribo but she is not interested in this at this time.  I did advise her that if her blood counts continue to increase in spite of hydroxyurea that we will need to explore other treatment options for the CML.  3.  U/a and culture today for dysuria.  I'll  prescribe an antibiotic based on the result.    4.  Start meloxicam 7.5 mg daily  5.  NP visit with a CBC in two weeks.  Further plans based on the result of her CBC.       Burt Merida MD    ADDENDUM:  Urinalysis with microscopy reviewed.  Not consistent with urinary tract infection.  We await culture results, however.  I discussed the result with her by phone.

## 2018-08-08 LAB — BACTERIA SPEC AEROBE CULT: NORMAL

## 2018-08-13 RX ORDER — POTASSIUM CHLORIDE 20 MEQ/1
TABLET, EXTENDED RELEASE ORAL
Qty: 60 TABLET | Refills: 0 | Status: SHIPPED | OUTPATIENT
Start: 2018-08-13 | End: 2019-03-06

## 2018-08-21 ENCOUNTER — LAB (OUTPATIENT)
Dept: OTHER | Facility: HOSPITAL | Age: 73
End: 2018-08-21

## 2018-08-21 ENCOUNTER — OFFICE VISIT (OUTPATIENT)
Dept: ONCOLOGY | Facility: CLINIC | Age: 73
End: 2018-08-21

## 2018-08-21 VITALS
HEIGHT: 65 IN | BODY MASS INDEX: 23.43 KG/M2 | RESPIRATION RATE: 16 BRPM | DIASTOLIC BLOOD PRESSURE: 85 MMHG | WEIGHT: 140.6 LBS | HEART RATE: 69 BPM | SYSTOLIC BLOOD PRESSURE: 124 MMHG | TEMPERATURE: 98.4 F | OXYGEN SATURATION: 96 %

## 2018-08-21 DIAGNOSIS — D75.839 THROMBOCYTOSIS: Primary | ICD-10-CM

## 2018-08-21 DIAGNOSIS — C92.10 CML (CHRONIC MYELOID LEUKEMIA) (HCC): ICD-10-CM

## 2018-08-21 LAB
ANISOCYTOSIS BLD QL: ABNORMAL
BASOPHILS # BLD MANUAL: 0.35 10*3/MM3 (ref 0–0.2)
BASOPHILS NFR BLD AUTO: 5 % (ref 0–2)
DEPRECATED RDW RBC AUTO: 53.6 FL (ref 37–54)
EOSINOPHIL # BLD MANUAL: 0.49 10*3/MM3 (ref 0–0.7)
EOSINOPHIL NFR BLD MANUAL: 7 % (ref 0–7)
ERYTHROCYTE [DISTWIDTH] IN BLOOD BY AUTOMATED COUNT: 17.2 % (ref 11.7–13)
HCT VFR BLD AUTO: 39.1 % (ref 35.6–45.5)
HGB BLD-MCNC: 12.9 G/DL (ref 11.9–15.5)
LYMPHOCYTES # BLD MANUAL: 1.94 10*3/MM3 (ref 0.8–7)
LYMPHOCYTES NFR BLD MANUAL: 28 % (ref 24–44)
LYMPHOCYTES NFR BLD MANUAL: 4 % (ref 0–12)
MCH RBC QN AUTO: 31.2 PG (ref 26.9–32)
MCHC RBC AUTO-ENTMCNC: 33 G/DL (ref 32.4–36.3)
MCV RBC AUTO: 94.4 FL (ref 80.5–98.2)
MICROCYTES BLD QL: ABNORMAL
MONOCYTES # BLD AUTO: 0.28 10*3/MM3 (ref 0–1)
NEUTROPHILS # BLD AUTO: 3.81 10*3/MM3 (ref 1.9–8.1)
NEUTROPHILS NFR BLD MANUAL: 55 % (ref 42.7–76)
PLAT MORPH BLD: NORMAL
PLATELET # BLD AUTO: 975 10*3/MM3 (ref 140–500)
PMV BLD AUTO: 9.8 FL (ref 6–12)
RBC # BLD AUTO: 4.14 10*6/MM3 (ref 3.9–5.2)
SCAN SLIDE: NORMAL
STOMATOCYTES BLD QL SMEAR: ABNORMAL
VARIANT LYMPHS NFR BLD MANUAL: 1 % (ref 0–5)
WBC MORPH BLD: NORMAL
WBC NRBC COR # BLD: 6.93 10*3/MM3 (ref 4.5–10.7)

## 2018-08-21 PROCEDURE — 85007 BL SMEAR W/DIFF WBC COUNT: CPT | Performed by: INTERNAL MEDICINE

## 2018-08-21 PROCEDURE — 99213 OFFICE O/P EST LOW 20 MIN: CPT | Performed by: NURSE PRACTITIONER

## 2018-08-21 PROCEDURE — 36415 COLL VENOUS BLD VENIPUNCTURE: CPT

## 2018-08-21 PROCEDURE — 85025 COMPLETE CBC W/AUTO DIFF WBC: CPT | Performed by: INTERNAL MEDICINE

## 2018-08-21 RX ORDER — CETIRIZINE HYDROCHLORIDE 10 MG/1
10 TABLET ORAL
COMMUNITY
End: 2019-02-04

## 2018-08-21 RX ORDER — AMOXICILLIN 875 MG/1
875 TABLET, COATED ORAL
COMMUNITY
Start: 2018-08-13 | End: 2018-08-23

## 2018-08-23 ENCOUNTER — TELEPHONE (OUTPATIENT)
Dept: ONCOLOGY | Facility: CLINIC | Age: 73
End: 2018-08-23

## 2018-08-23 NOTE — TELEPHONE ENCOUNTER
----- Message from Gavino Soto sent at 8/23/2018  1:21 PM EDT -----  Contact: [phone]  Pt is calling to get a note from the doctor she is not able to fly

## 2018-08-23 NOTE — TELEPHONE ENCOUNTER
Pt requesting note from doctor, stating that she cannot fly.  Discussed with Maryse KING and Maryse to contact patient and take care of letter.

## 2018-09-06 ENCOUNTER — OFFICE VISIT (OUTPATIENT)
Dept: INTERNAL MEDICINE | Facility: CLINIC | Age: 73
End: 2018-09-06

## 2018-09-06 VITALS
SYSTOLIC BLOOD PRESSURE: 126 MMHG | BODY MASS INDEX: 22.59 KG/M2 | HEIGHT: 65 IN | HEART RATE: 75 BPM | WEIGHT: 135.6 LBS | OXYGEN SATURATION: 97 % | DIASTOLIC BLOOD PRESSURE: 84 MMHG

## 2018-09-06 DIAGNOSIS — F32.2 SEVERE SINGLE CURRENT EPISODE OF MAJOR DEPRESSIVE DISORDER, WITHOUT PSYCHOTIC FEATURES (HCC): Primary | ICD-10-CM

## 2018-09-06 DIAGNOSIS — F51.01 PRIMARY INSOMNIA: ICD-10-CM

## 2018-09-06 PROCEDURE — 99204 OFFICE O/P NEW MOD 45 MIN: CPT | Performed by: FAMILY MEDICINE

## 2018-09-06 RX ORDER — HYDROXYUREA 500 MG/1
3 CAPSULE ORAL 2 TIMES DAILY
Refills: 2 | COMMUNITY
Start: 2018-08-27 | End: 2018-12-18 | Stop reason: SDUPTHER

## 2018-09-06 RX ORDER — AZELASTINE 1 MG/ML
2 SPRAY, METERED NASAL DAILY
COMMUNITY
End: 2019-11-15 | Stop reason: SDDI

## 2018-09-06 RX ORDER — ACETAMINOPHEN 325 MG/1
650 TABLET ORAL EVERY 6 HOURS PRN
COMMUNITY
End: 2019-03-06 | Stop reason: ALTCHOICE

## 2018-09-06 RX ORDER — ASPIRIN 81 MG/1
81 TABLET ORAL DAILY
COMMUNITY
End: 2019-11-15 | Stop reason: SDDI

## 2018-09-07 ENCOUNTER — OFFICE VISIT (OUTPATIENT)
Dept: ONCOLOGY | Facility: CLINIC | Age: 73
End: 2018-09-07

## 2018-09-07 ENCOUNTER — LAB (OUTPATIENT)
Dept: OTHER | Facility: HOSPITAL | Age: 73
End: 2018-09-07

## 2018-09-07 ENCOUNTER — TELEPHONE (OUTPATIENT)
Dept: INTERNAL MEDICINE | Facility: CLINIC | Age: 73
End: 2018-09-07

## 2018-09-07 VITALS
SYSTOLIC BLOOD PRESSURE: 137 MMHG | OXYGEN SATURATION: 97 % | HEIGHT: 65 IN | HEART RATE: 67 BPM | WEIGHT: 138 LBS | DIASTOLIC BLOOD PRESSURE: 77 MMHG | RESPIRATION RATE: 18 BRPM | TEMPERATURE: 97.8 F | BODY MASS INDEX: 22.99 KG/M2

## 2018-09-07 DIAGNOSIS — C92.10 CML (CHRONIC MYELOID LEUKEMIA) (HCC): Primary | ICD-10-CM

## 2018-09-07 DIAGNOSIS — D75.839 THROMBOCYTOSIS: ICD-10-CM

## 2018-09-07 DIAGNOSIS — C92.10 CML (CHRONIC MYELOID LEUKEMIA) (HCC): ICD-10-CM

## 2018-09-07 LAB
ANISOCYTOSIS BLD QL: NORMAL
BASOPHILS # BLD AUTO: 0.15 10*3/MM3 (ref 0–0.2)
BASOPHILS NFR BLD AUTO: 3 % (ref 0–1.5)
DEPRECATED RDW RBC AUTO: 71 FL (ref 37–54)
EOSINOPHIL # BLD AUTO: 0.19 10*3/MM3 (ref 0–0.7)
EOSINOPHIL NFR BLD AUTO: 3.8 % (ref 0.3–6.2)
ERYTHROCYTE [DISTWIDTH] IN BLOOD BY AUTOMATED COUNT: 21 % (ref 11.7–13)
HCT VFR BLD AUTO: 38.7 % (ref 35.6–45.5)
HGB BLD-MCNC: 12.8 G/DL (ref 11.9–15.5)
IMM GRANULOCYTES # BLD: 0.02 10*3/MM3 (ref 0–0.03)
IMM GRANULOCYTES NFR BLD: 0.4 % (ref 0–0.5)
LYMPHOCYTES # BLD AUTO: 1.47 10*3/MM3 (ref 0.9–4.8)
LYMPHOCYTES NFR BLD AUTO: 29.5 % (ref 19.6–45.3)
MCH RBC QN AUTO: 31.8 PG (ref 26.9–32)
MCHC RBC AUTO-ENTMCNC: 33.1 G/DL (ref 32.4–36.3)
MCV RBC AUTO: 96 FL (ref 80.5–98.2)
MONOCYTES # BLD AUTO: 0.27 10*3/MM3 (ref 0.2–1.2)
MONOCYTES NFR BLD AUTO: 5.4 % (ref 5–12)
NEUTROPHILS # BLD AUTO: 2.88 10*3/MM3 (ref 1.9–8.1)
NEUTROPHILS NFR BLD AUTO: 57.9 % (ref 42.7–76)
NRBC BLD MANUAL-RTO: 0 /100 WBC (ref 0–0)
PLAT MORPH BLD: NORMAL
PLATELET # BLD AUTO: 509 10*3/MM3 (ref 140–500)
PMV BLD AUTO: 9.8 FL (ref 6–12)
RBC # BLD AUTO: 4.03 10*6/MM3 (ref 3.9–5.2)
WBC MORPH BLD: NORMAL
WBC NRBC COR # BLD: 4.98 10*3/MM3 (ref 4.5–10.7)

## 2018-09-07 PROCEDURE — 85007 BL SMEAR W/DIFF WBC COUNT: CPT | Performed by: NURSE PRACTITIONER

## 2018-09-07 PROCEDURE — 85025 COMPLETE CBC W/AUTO DIFF WBC: CPT | Performed by: NURSE PRACTITIONER

## 2018-09-07 PROCEDURE — 99214 OFFICE O/P EST MOD 30 MIN: CPT | Performed by: INTERNAL MEDICINE

## 2018-09-07 PROCEDURE — 36415 COLL VENOUS BLD VENIPUNCTURE: CPT

## 2018-09-07 NOTE — TELEPHONE ENCOUNTER
Prior authorization for Belsomra has been approved by [employer] (Medicare) from 6/7/18 to 9/6/19. Authorization number is [id number]. Pharmacy notified.

## 2018-09-07 NOTE — PROGRESS NOTES
[facility] GROUP OUTPATIENT FOLLOW UP CLINIC VISIT    REASON FOR FOLLOW-UP:    1.  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  Gleevec 400 mg daily initiated around 10/12/2017, stopped on 11/15/2017 due to intolerance (noah-orbital edema, nausea/vomiting, fatigue).  Resumed at 200 mg daily but, again, not tolerated.    3.  Nilotinib 150 mg twice daily started in late March, 2018.  Held due to intolerance and QTc prolongation.     HISTORY OF PRESENT ILLNESS:  Nicole Lofton is a 73 y.o. female with the above-mentioned history who is here today for reevaluation.  She continues hydroxyurea 1000 mg twice daily which she tolerates well.  She remains weak.  She has been very depressed and not sleeping well.  Her primary care provider recently started her on some additional medication for this.  She did see otolaryngology and had earwax cleaned out from her years and she is hearing much better as result of this.    ONCOLOGIC HISTORY:  For about 6 months she has noticed bilateral arm tingling and weakness in her hands.  This has occurred about 5 or 6 times over the past 6 months.  She is vague in her description of this.  She is globally weak and is quite inactive as she lives by herself.  She was completing physical therapy with some improvement but is now quite unsteady on her feet.  She has not had any falls.  She denies any bleeding.  She has chronic fatigue.  She has osteoarthritis pain and reports pain in the right side and in her neck.  She denies any acute low back pain but does have chronic low back pain.  She does have orthostatic symptoms and she is lightheaded when she stands.  She has had a decreased appetite and some weight loss over the past 9 months.     Labs performed on 8/28/2017 showed a white blood cell count of 39.1 with a differential showing 61% neutrophils and 32% lymphocytes, hemoglobin 13.9 and platelets 565,000.         She had further labs done at  [facility] on 8/20/2017 showing a white blood cell count of 24.4, hemoglobin 13.7, and platelets 1795.  A differential showed 64% neutrophils, 7% lymphocytes, and 12% basophils.  Peripheral blood flow cytometry was also done on this date showing 11% basophils and less than 1% myeloblasts.  There was no monoclonal B-cell, T-cell, plasma cell, or NK cell population.  FISH for BCR-ABL and ESSENCE 2 mutation analysis were done as well.     Her CBC was normal as of 7/8/2016.     Of note, she has a history of bilateral breast cancer diagnosed in 1988.  She had bilateral mastectomy with reconstruction.  We do not have any records regarding this at this time.  She apparently completed at least 6 months of adjuvant chemotherapy but again does not know the details..    FISH for BCR-ABL performed at [facility] was actually positive.  As of 9/6/2017 ESSENCE 2 is still pending.    She returned on 9/6/2017 for follow-up.  Platelets at 1.9 million with hydroxyurea 1000 mg daily.  She will increase the dose to 2000 mg daily.  She will have a bone marrow aspiration biopsy performed.  Weekly CBCs.    As of 9/6/2017 peripheral blood PCR was positive at 89.238% with a major break point mutation.    ESSENCE 2 and CALR mutation negative.    Bone marrow aspiration and biopsy performed on 9/15/2017.  Flow cytometry showed no increase in blasts.  Morphology showed no increase in blasts.  Bone marrow FISH showed BCR/ABL rearrangement in 93% of cells.  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    Labs as of 9/20/2017 show white blood cell count of 4.7 with hemoglobin of 12.7 and platelets 1.1 million.    On 9/27/2017 white blood cells and hemoglobin are normal.  Platelets are down to 722,000.  We plan to start Gleevec at 400 mg daily if she tolerates it.  In the meantime she will decrease the hydroxyurea dose to 1000 mg daily.    Blood counts normalized.  Hydroxyurea stopped.    Gleevec initiated around 10/12/2017, stopped on 11/15/2017 due to intolerance  (noah-orbital edema, nausea/vomiting, fatigue).    Symptoms improving as of 11/29/2017.  Blood counts normal.  Hold further therapy at this time until she improves more clinically.      Plan to resume Gleevec at 200 mg daily as of 1/11/2018.  Only took it for less than a week but also did not tolerate this dose.    As of 2/21/2018 her platelet count is again elevated at greater than 700,000.  Resume hydroxyurea 1000 mg daily.  Considering nilotinib at a decreased dose.    Platelets continued to rise in March 2018.  Hydroxyurea increased to 1500 mg daily with stabilization of her platelet count and some improvement as of 3/21/2018.  Plan for nilotinib.    Nilotinib initiated in late March 2018.    Subsequently held due to QTC prolongation.    Hydroxyurea 1000 mg daily resumed at the end of July 2018.  Increased the dose to 1000 mg twice daily as of 8/7/2018 for a rising platelet count of 1.3 million.    Platelets as of 9/7/2018 500,000.  Continue hydroxyurea 1000 mg twice daily.    PAST MEDICAL, SURGICAL, FAMILY, AND SOCIAL HISTORIES were reviewed with the patient and in the electronic medical record, and were updated if indicated.    ALLERGIES:  No Known Allergies    MEDICATIONS:  The medication list has been reviewed with the patient by the medical assistant, and the list has been updated in the electronic medical record, which I reviewed.  Medication dosages and frequencies were confirmed to be accurate.    I have reviewed the patient's medical history in detail and updated the computerized patient record.    Review of Systems   Constitutional: Negative for appetite change, chills, fatigue, fever and unexpected weight change.   HENT:   Negative for hearing loss, mouth sores, nosebleeds, sore throat and trouble swallowing.    Respiratory: Negative for cough and shortness of breath.    Cardiovascular: Negative for chest pain and leg swelling.   Gastrointestinal: Negative for abdominal pain, constipation, diarrhea,  "nausea and vomiting.   Endocrine: Negative for hot flashes.   Genitourinary: Negative for difficulty urinating.    Musculoskeletal: Positive for arthralgias and back pain. Negative for myalgias.   Skin: Negative for rash.   Neurological: Positive for dizziness. Negative for extremity weakness.   Hematological: Negative for adenopathy. Does not bruise/bleed easily.   Psychiatric/Behavioral: Positive for depression and sleep disturbance. The patient is nervous/anxious.        Vitals:    09/07/18 0927   BP: 137/77   Pulse: 67   Resp: 18   Temp: 97.8 °F (36.6 °C)   TempSrc: Oral   SpO2: 97%   Weight: 62.6 kg (138 lb)   Height: 165 cm (64.96\")   PainSc: 5  Comment: arthrtitis pain   PainLoc: Generalized       Physical Exam   Constitutional: She is oriented to person, place, and time. She appears well-developed and well-nourished. No distress.   HENT:   Head: Normocephalic and atraumatic.   Mouth/Throat: Oropharynx is clear and moist and mucous membranes are normal. No oropharyngeal exudate.   Left canal with cerumen impaction although less is present and compared to prior examinations.    Eyes: Pupils are equal, round, and reactive to light.   Neck: Normal range of motion.   Cardiovascular: Normal rate, regular rhythm and normal heart sounds.    No murmur heard.  Pulmonary/Chest: Effort normal and breath sounds normal. No respiratory distress. She has no wheezes. She has no rhonchi. She has no rales.   Abdominal: Soft. Normal appearance and bowel sounds are normal. She exhibits no distension. There is no hepatosplenomegaly.   Musculoskeletal: Normal range of motion. She exhibits no edema.   Neurological: She is alert and oriented to person, place, and time.   Skin: Skin is warm and dry. No rash noted.   Psychiatric: She has a normal mood and affect.   Vitals reviewed.      DIAGNOSTIC DATA:  Results for orders placed or performed in visit on 09/07/18   CBC Auto Differential   Result Value Ref Range    WBC 4.98 4.50 - " 10.70 10*3/mm3    RBC 4.03 3.90 - 5.20 10*6/mm3    Hemoglobin 12.8 11.9 - 15.5 g/dL    Hematocrit 38.7 35.6 - 45.5 %    MCV 96.0 80.5 - 98.2 fL    MCH 31.8 26.9 - 32.0 pg    MCHC 33.1 32.4 - 36.3 g/dL    RDW 21.0 (H) 11.7 - 13.0 %    RDW-SD 71.0 (H) 37.0 - 54.0 fl    MPV 9.8 6.0 - 12.0 fL    Platelets 509 (H) 140 - 500 10*3/mm3    Neutrophil % 57.9 42.7 - 76.0 %    Lymphocyte % 29.5 19.6 - 45.3 %    Monocyte % 5.4 5.0 - 12.0 %    Eosinophil % 3.8 0.3 - 6.2 %    Basophil % 3.0 (H) 0.0 - 1.5 %    Immature Grans % 0.4 0.0 - 0.5 %    Neutrophils, Absolute 2.88 1.90 - 8.10 10*3/mm3    Lymphocytes, Absolute 1.47 0.90 - 4.80 10*3/mm3    Monocytes, Absolute 0.27 0.20 - 1.20 10*3/mm3    Eosinophils, Absolute 0.19 0.00 - 0.70 10*3/mm3    Basophils, Absolute 0.15 0.00 - 0.20 10*3/mm3    Immature Grans, Absolute 0.02 0.00 - 0.03 10*3/mm3    nRBC 0.0 0.0 - 0.0 /100 WBC   Scan Slide   Result Value Ref Range    Anisocytosis Mod/2+ None Seen    WBC Morphology Normal Normal    Platelet Morphology Normal Normal         IMAGING:  None reviewed    ASSESSMENT:  This is a 73 y.o. female with:  1.  History of bilateral breast cancer in 1988 status post bilateral mastectomy.  She apparently completed 6 months of adjuvant therapy.  We have no details regarding this.      2.  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  Started on Hydrea 1000 mg daily.  This was discontinued and she started Gleevec on approximately 10/12/2017.  Gleevec discontinued due to intolerance on 11/15/2017.  We started Gleevec at a lower dose of 200 mg but she was also intolerant of this dose.  Her side effects were as severe with a lower dose and she therefore stopped taking the drug.  Symptoms improved significantly.  Platelet count subsequently elevated again.    She started nilotinib at 150mg twice daily at the end of March 2018.  This was held in mid-May due to QTc prolongation.  The QTc interval did  subsequently normalized.  However, due to this and other adverse effects we are not able to resume the nilotinib nor does she desire to.    In July we resumed hydroxyurea 1000 mg daily.  Her platelet count increased.  Her dose was increased to 1000 mg twice daily.  Blood counts have improved nicely.  Continue this dose.    3.  Thrombocytosis:  Due to CML.  This resolved while she was on imatinib and nilotinib but it again worsened.  It is now improving on the hydroxyurea.  4.  Hypothyroidism:  Levothyroxine 100 mcg.  5.  Nausea and vomiting: Resolved off Gleevec.   6. Periorbital edema: Resolved off Gleevec.    7.  Hypokalemia:  Potassium 3.9 on 8/7/2018.  Recheck I see her back next.  8.  Osteoarthritis pain: she reports this became worse sine starting nilotinib. She is taking acetaminophen for this.  Prescribed meloxicam on 8/7/2018.    9.  Depression: She had been on Prozac for years, however, drug interaction with nilotinib prompted us to change this to Paxil. Her depression is worse.  Primary care managing at this point.    10.  Insomnia.  This had been treated with trazodone but as mentioned above this also caused a potential for QT interval prolongation.  She was therefore changed to temazepam.  Primary care now managing.    11.  QTc prolongation: Due to nilotinib.  This resolved.  She saw Dr. Duque with cardiology.    12.  Tobacco use: She is unwilling to quit smoking  13.  Nasal and otic congestion: I patient was diagnosed with a sinus infection and started on amoxicillin.  She is about 2 days remaining of this antibiotic.  She did follow-up with ENT for cerumen disimpaction.     PLAN:  1.  Continue hydroxyurea to 1000 mg twice daily  2.  I will see her back in 1 month for follow-up with a CBC and CMP.    Burt Merida MD

## 2018-09-10 RX ORDER — LEVOTHYROXINE SODIUM 0.1 MG/1
100 TABLET ORAL DAILY
Qty: 30 TABLET | Refills: 1 | Status: SHIPPED | OUTPATIENT
Start: 2018-09-10 | End: 2018-11-07 | Stop reason: SDUPTHER

## 2018-10-01 ENCOUNTER — OFFICE VISIT (OUTPATIENT)
Dept: INTERNAL MEDICINE | Facility: CLINIC | Age: 73
End: 2018-10-01

## 2018-10-01 VITALS
BODY MASS INDEX: 24.34 KG/M2 | HEART RATE: 64 BPM | OXYGEN SATURATION: 98 % | WEIGHT: 146.1 LBS | SYSTOLIC BLOOD PRESSURE: 140 MMHG | DIASTOLIC BLOOD PRESSURE: 78 MMHG | HEIGHT: 65 IN

## 2018-10-01 DIAGNOSIS — F32.9 MAJOR DEPRESSIVE DISORDER WITH CURRENT ACTIVE EPISODE, UNSPECIFIED DEPRESSION EPISODE SEVERITY, UNSPECIFIED WHETHER RECURRENT: Primary | ICD-10-CM

## 2018-10-01 DIAGNOSIS — F51.01 PRIMARY INSOMNIA: ICD-10-CM

## 2018-10-01 PROBLEM — F32.A DEPRESSION: Status: ACTIVE | Noted: 2018-10-01

## 2018-10-01 PROCEDURE — 99213 OFFICE O/P EST LOW 20 MIN: CPT | Performed by: FAMILY MEDICINE

## 2018-10-01 RX ORDER — ZOLPIDEM TARTRATE 5 MG/1
5 TABLET ORAL NIGHTLY PRN
Qty: 30 TABLET | Refills: 0 | Status: SHIPPED | OUTPATIENT
Start: 2018-10-01 | End: 2018-10-30

## 2018-10-02 NOTE — PROGRESS NOTES
Subjective   Nicole Lofton is a 73 y.o. female.     Chief Complaint   Patient presents with   • Depression   • Insomnia         History of Present Illness     Patient notes that her depression is doing well.  Patient states that the depression is under well control. Patient is currently taking trintellix and states that its working well for her.    Patient notes that she continues to have trouble sleeping. Patient states that the belsomra isnt working for her. Patient is interested in trying ambien.    The following portions of the patient's history were reviewed and updated as appropriate: allergies, current medications, past family history, past medical history, past social history, past surgical history and problem list.    Review of Systems   Constitutional: Negative.    HENT: Negative.    Respiratory: Negative.    Cardiovascular: Negative.    Gastrointestinal: Negative.    Musculoskeletal: Negative.    Psychiatric/Behavioral: Positive for sleep disturbance. Negative for decreased concentration and dysphoric mood. The patient is not nervous/anxious.        Objective   Physical Exam   Constitutional: She is oriented to person, place, and time. She appears well-developed and well-nourished.   HENT:   Head: Normocephalic and atraumatic.   Eyes: EOM are normal.   Neck: Normal range of motion. Neck supple.   Cardiovascular: Normal rate, regular rhythm and normal heart sounds.    Pulmonary/Chest: Effort normal and breath sounds normal. No respiratory distress.   Neurological: She is alert and oriented to person, place, and time.   Psychiatric: She has a normal mood and affect. Her behavior is normal.   Nursing note and vitals reviewed.      Assessment/Plan   Nicole was seen today for depression and insomnia.    Diagnoses and all orders for this visit:    Major depressive disorder with current active episode, unspecified depression episode severity, unspecified whether recurrent        -     Continue  trintellix.     Primary insomnia  -     zolpidem (AMBIEN) 5 MG tablet; Take 1 tablet by mouth At Night As Needed for Sleep.          No Follow-up on file.    Dictated utilizing Dragon Voice Recognition Software

## 2018-10-04 ENCOUNTER — TELEPHONE (OUTPATIENT)
Dept: ONCOLOGY | Facility: HOSPITAL | Age: 73
End: 2018-10-04

## 2018-10-04 NOTE — TELEPHONE ENCOUNTER
----- Message from Leticia Hook sent at 10/4/2018 11:12 AM EDT -----  [phone]/ daughter Clarissa  Can she take the flu shot

## 2018-10-04 NOTE — TELEPHONE ENCOUNTER
Spoke with pt's daughter Clarissa and told her pt's WBC is normal, she has no allergies, so she can have the flu vaccine.

## 2018-10-05 ENCOUNTER — TELEPHONE (OUTPATIENT)
Dept: INTERNAL MEDICINE | Facility: CLINIC | Age: 73
End: 2018-10-05

## 2018-10-05 DIAGNOSIS — G47.09 OTHER INSOMNIA: Primary | ICD-10-CM

## 2018-10-05 NOTE — TELEPHONE ENCOUNTER
Patient called stating that the Ambien Dr. Campbell gave her is not agreeing with her. Patient stated she still has trouble falling asleep early and she cannot stay asleep. Patient stated she also wakes up sweating heavily. Patient wanted to know if there was anything else she could do. Per Dr. Campbell refer patient to sleep medicine. Patient notified and voiced understanding. Order entered into EPIC.

## 2018-10-12 ENCOUNTER — OFFICE VISIT (OUTPATIENT)
Dept: ONCOLOGY | Facility: CLINIC | Age: 73
End: 2018-10-12

## 2018-10-12 ENCOUNTER — LAB (OUTPATIENT)
Dept: OTHER | Facility: HOSPITAL | Age: 73
End: 2018-10-12

## 2018-10-12 VITALS
WEIGHT: 141 LBS | SYSTOLIC BLOOD PRESSURE: 130 MMHG | RESPIRATION RATE: 20 BRPM | TEMPERATURE: 98.5 F | BODY MASS INDEX: 23.49 KG/M2 | HEIGHT: 65 IN | HEART RATE: 67 BPM | OXYGEN SATURATION: 97 % | DIASTOLIC BLOOD PRESSURE: 80 MMHG

## 2018-10-12 DIAGNOSIS — C92.10 CML (CHRONIC MYELOID LEUKEMIA) (HCC): Primary | ICD-10-CM

## 2018-10-12 DIAGNOSIS — C92.10 CML (CHRONIC MYELOID LEUKEMIA) (HCC): ICD-10-CM

## 2018-10-12 LAB
ALBUMIN SERPL-MCNC: 4.1 G/DL (ref 3.5–5.2)
ALBUMIN/GLOB SERPL: 1.2 G/DL
ALP SERPL-CCNC: 82 U/L (ref 39–117)
ALT SERPL W P-5'-P-CCNC: 16 U/L (ref 1–33)
ANION GAP SERPL CALCULATED.3IONS-SCNC: 9.4 MMOL/L
ANISOCYTOSIS BLD QL: NORMAL
AST SERPL-CCNC: 21 U/L (ref 1–32)
BASOPHILS # BLD AUTO: 0.05 10*3/MM3 (ref 0–0.2)
BASOPHILS NFR BLD AUTO: 1.4 % (ref 0–1.5)
BILIRUB SERPL-MCNC: 0.6 MG/DL (ref 0.1–1.2)
BUN BLD-MCNC: 15 MG/DL (ref 8–23)
BUN/CREAT SERPL: 17.6 (ref 7–25)
CALCIUM SPEC-SCNC: 9.6 MG/DL (ref 8.6–10.5)
CHLORIDE SERPL-SCNC: 99 MMOL/L (ref 98–107)
CO2 SERPL-SCNC: 30.6 MMOL/L (ref 22–29)
CREAT BLD-MCNC: 0.85 MG/DL (ref 0.57–1)
DEPRECATED RDW RBC AUTO: 83.3 FL (ref 37–54)
EOSINOPHIL # BLD AUTO: 0.06 10*3/MM3 (ref 0–0.7)
EOSINOPHIL NFR BLD AUTO: 1.7 % (ref 0.3–6.2)
ERYTHROCYTE [DISTWIDTH] IN BLOOD BY AUTOMATED COUNT: 21.5 % (ref 11.7–13)
GFR SERPL CREATININE-BSD FRML MDRD: 65 ML/MIN/1.73
GLOBULIN UR ELPH-MCNC: 3.5 GM/DL
GLUCOSE BLD-MCNC: 96 MG/DL (ref 65–99)
HCT VFR BLD AUTO: 38.1 % (ref 35.6–45.5)
HGB BLD-MCNC: 12.8 G/DL (ref 11.9–15.5)
IMM GRANULOCYTES # BLD: 0.01 10*3/MM3 (ref 0–0.03)
IMM GRANULOCYTES NFR BLD: 0.3 % (ref 0–0.5)
LYMPHOCYTES # BLD AUTO: 1.38 10*3/MM3 (ref 0.9–4.8)
LYMPHOCYTES NFR BLD AUTO: 39.2 % (ref 19.6–45.3)
MCH RBC QN AUTO: 35.6 PG (ref 26.9–32)
MCHC RBC AUTO-ENTMCNC: 33.6 G/DL (ref 32.4–36.3)
MCV RBC AUTO: 105.8 FL (ref 80.5–98.2)
MONOCYTES # BLD AUTO: 0.1 10*3/MM3 (ref 0.2–1.2)
MONOCYTES NFR BLD AUTO: 2.8 % (ref 5–12)
NEUTROPHILS # BLD AUTO: 1.92 10*3/MM3 (ref 1.9–8.1)
NEUTROPHILS NFR BLD AUTO: 54.6 % (ref 42.7–76)
NEUTS HYPERSEG # BLD: NORMAL 10*3/UL
NRBC BLD MANUAL-RTO: 0 /100 WBC (ref 0–0)
OVALOCYTES BLD QL SMEAR: NORMAL
PLAT MORPH BLD: NORMAL
PLATELET # BLD AUTO: 242 10*3/MM3 (ref 140–500)
PMV BLD AUTO: 9.1 FL (ref 6–12)
POTASSIUM BLD-SCNC: 3.6 MMOL/L (ref 3.5–5.2)
PROT SERPL-MCNC: 7.6 G/DL (ref 6–8.5)
RBC # BLD AUTO: 3.6 10*6/MM3 (ref 3.9–5.2)
SODIUM BLD-SCNC: 139 MMOL/L (ref 136–145)
SPHEROCYTES BLD QL SMEAR: NORMAL
STOMATOCYTES BLD QL SMEAR: NORMAL
WBC NRBC COR # BLD: 3.52 10*3/MM3 (ref 4.5–10.7)

## 2018-10-12 PROCEDURE — 99214 OFFICE O/P EST MOD 30 MIN: CPT | Performed by: INTERNAL MEDICINE

## 2018-10-12 PROCEDURE — 85025 COMPLETE CBC W/AUTO DIFF WBC: CPT | Performed by: INTERNAL MEDICINE

## 2018-10-12 PROCEDURE — 85007 BL SMEAR W/DIFF WBC COUNT: CPT | Performed by: INTERNAL MEDICINE

## 2018-10-12 PROCEDURE — 80053 COMPREHEN METABOLIC PANEL: CPT | Performed by: INTERNAL MEDICINE

## 2018-10-12 PROCEDURE — 36415 COLL VENOUS BLD VENIPUNCTURE: CPT

## 2018-10-12 NOTE — PROGRESS NOTES
[facility] GROUP OUTPATIENT FOLLOW UP CLINIC VISIT    REASON FOR FOLLOW-UP:    1.  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  Gleevec 400 mg daily initiated around 10/12/2017, stopped on 11/15/2017 due to intolerance (noah-orbital edema, nausea/vomiting, fatigue).  Resumed at 200 mg daily but, again, not tolerated.    3.  Nilotinib 150 mg twice daily started in late March, 2018.  Held due to intolerance and QTc prolongation.   4.  Hydroxyurea initiated in July 2018.     HISTORY OF PRESENT ILLNESS:  Nicole Lofton is a 73 y.o. female with the above-mentioned history who is here today for reevaluation.  She continues hydroxyurea 1000 mg twice daily which she tolerates well.  She complains of worsening arthritis pain in her knees and she is having some arthralgias in her hands at this point which is new.  No fevers or chills.      ONCOLOGIC HISTORY:  For about 6 months she has noticed bilateral arm tingling and weakness in her hands.  This has occurred about 5 or 6 times over the past 6 months.  She is vague in her description of this.  She is globally weak and is quite inactive as she lives by herself.  She was completing physical therapy with some improvement but is now quite unsteady on her feet.  She has not had any falls.  She denies any bleeding.  She has chronic fatigue.  She has osteoarthritis pain and reports pain in the right side and in her neck.  She denies any acute low back pain but does have chronic low back pain.  She does have orthostatic symptoms and she is lightheaded when she stands.  She has had a decreased appetite and some weight loss over the past 9 months.     Labs performed on 8/28/2017 showed a white blood cell count of 39.1 with a differential showing 61% neutrophils and 32% lymphocytes, hemoglobin 13.9 and platelets 565,000.         She had further labs done at [place] on 8/20/2017 showing a white blood cell count of 24.4, hemoglobin 13.7,  and platelets 1795.  A differential showed 64% neutrophils, 7% lymphocytes, and 12% basophils.  Peripheral blood flow cytometry was also done on this date showing 11% basophils and less than 1% myeloblasts.  There was no monoclonal B-cell, T-cell, plasma cell, or NK cell population.  FISH for BCR-ABL and ESSENCE 2 mutation analysis were done as well.     Her CBC was normal as of 7/8/2016.     Of note, she has a history of bilateral breast cancer diagnosed in 1988.  She had bilateral mastectomy with reconstruction.  We do not have any records regarding this at this time.  She apparently completed at least 6 months of adjuvant chemotherapy but again does not know the details..    FISH for BCR-ABL performed at [place] was actually positive.  As of 9/6/2017 ESSENCE 2 is still pending.    She returned on 9/6/2017 for follow-up.  Platelets at 1.9 million with hydroxyurea 1000 mg daily.  She will increase the dose to 2000 mg daily.  She will have a bone marrow aspiration biopsy performed.  Weekly CBCs.    As of 9/6/2017 peripheral blood PCR was positive at 89.238% with a major break point mutation.    ESSENCE 2 and CALR mutation negative.    Bone marrow aspiration and biopsy performed on 9/15/2017.  Flow cytometry showed no increase in blasts.  Morphology showed no increase in blasts.  Bone marrow FISH showed BCR/ABL rearrangement in 93% of cells.  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    Labs as of 9/20/2017 show white blood cell count of 4.7 with hemoglobin of 12.7 and platelets 1.1 million.    On 9/27/2017 white blood cells and hemoglobin are normal.  Platelets are down to 722,000.  We plan to start Gleevec at 400 mg daily if she tolerates it.  In the meantime she will decrease the hydroxyurea dose to 1000 mg daily.    Blood counts normalized.  Hydroxyurea stopped.    Gleevec initiated around 10/12/2017, stopped on 11/15/2017 due to intolerance (noah-orbital edema, nausea/vomiting, fatigue).    Symptoms improving as of  11/29/2017.  Blood counts normal.  Hold further therapy at this time until she improves more clinically.      Plan to resume Gleevec at 200 mg daily as of 1/11/2018.  Only took it for less than a week but also did not tolerate this dose.    As of 2/21/2018 her platelet count is again elevated at greater than 700,000.  Resume hydroxyurea 1000 mg daily.  Considering nilotinib at a decreased dose.    Platelets continued to rise in March 2018.  Hydroxyurea increased to 1500 mg daily with stabilization of her platelet count and some improvement as of 3/21/2018.  Plan for nilotinib.    Nilotinib initiated in late March 2018.    Subsequently held due to QTC prolongation.    Hydroxyurea 1000 mg daily resumed at the end of July 2018.  Increased the dose to 1000 mg twice daily as of 8/7/2018 for a rising platelet count of 1.3 million.    Platelets as of 9/7/2018 500,000.  Continue hydroxyurea 1000 mg twice daily.    As of 10/12/2018 her CBC has improved significantly.  Platelets 242,000 with a white blood cell count of 3.52.  Decrease hydroxyurea to 1001 g daily.    PAST MEDICAL, SURGICAL, FAMILY, AND SOCIAL HISTORIES were reviewed with the patient and in the electronic medical record, and were updated if indicated.    ALLERGIES:  No Known Allergies    MEDICATIONS:  The medication list has been reviewed with the patient by the medical assistant, and the list has been updated in the electronic medical record, which I reviewed.  Medication dosages and frequencies were confirmed to be accurate.    I have reviewed the patient's medical history in detail and updated the computerized patient record.    Review of Systems   Constitutional: Negative for appetite change, chills, fatigue, fever and unexpected weight change.   HENT:   Negative for hearing loss, mouth sores, nosebleeds, sore throat and trouble swallowing.    Respiratory: Negative for cough and shortness of breath.    Cardiovascular: Negative for chest pain and leg  "swelling.   Gastrointestinal: Negative for abdominal pain, constipation, diarrhea, nausea and vomiting.   Endocrine: Negative for hot flashes.   Genitourinary: Negative for difficulty urinating.    Musculoskeletal: Positive for arthralgias and back pain. Negative for myalgias.   Skin: Negative for rash.   Neurological: Positive for dizziness. Negative for extremity weakness.   Hematological: Negative for adenopathy. Does not bruise/bleed easily.   Psychiatric/Behavioral: Positive for depression and sleep disturbance. The patient is nervous/anxious.        Vitals:    10/12/18 1127   BP: 130/80   Pulse: 67   Resp: 20   Temp: 98.5 °F (36.9 °C)   TempSrc: Oral   SpO2: 97%   Weight: 64 kg (141 lb)   Height: 165 cm (64.96\")   PainSc:   6   PainLoc: Generalized       Physical Exam   Constitutional: She is oriented to person, place, and time. She appears well-developed and well-nourished. No distress.   HENT:   Head: Normocephalic and atraumatic.   Mouth/Throat: Oropharynx is clear and moist and mucous membranes are normal. No oropharyngeal exudate.   Left canal with cerumen impaction although less is present and compared to prior examinations.    Eyes: Pupils are equal, round, and reactive to light.   Neck: Normal range of motion.   Cardiovascular: Normal rate, regular rhythm and normal heart sounds.    No murmur heard.  Pulmonary/Chest: Effort normal and breath sounds normal. No respiratory distress. She has no wheezes. She has no rhonchi. She has no rales.   Abdominal: Soft. Normal appearance and bowel sounds are normal. She exhibits no distension. There is no hepatosplenomegaly.   Musculoskeletal: Normal range of motion. She exhibits no edema.   Neurological: She is alert and oriented to person, place, and time.   Skin: Skin is warm and dry. No rash noted.   Psychiatric: She has a normal mood and affect.   Vitals reviewed.      DIAGNOSTIC DATA:  Results for orders placed or performed in visit on 10/12/18   Comprehensive " Metabolic Panel   Result Value Ref Range    Glucose 96 65 - 99 mg/dL    BUN 15 8 - 23 mg/dL    Creatinine 0.85 0.57 - 1.00 mg/dL    Sodium 139 136 - 145 mmol/L    Potassium 3.6 3.5 - 5.2 mmol/L    Chloride 99 98 - 107 mmol/L    CO2 30.6 (H) 22.0 - 29.0 mmol/L    Calcium 9.6 8.6 - 10.5 mg/dL    Total Protein 7.6 6.0 - 8.5 g/dL    Albumin 4.10 3.50 - 5.20 g/dL    ALT (SGPT) 16 1 - 33 U/L    AST (SGOT) 21 1 - 32 U/L    Alkaline Phosphatase 82 39 - 117 U/L    Total Bilirubin 0.6 0.1 - 1.2 mg/dL    eGFR Non African Amer 65 >60 mL/min/1.73    Globulin 3.5 gm/dL    A/G Ratio 1.2 g/dL    BUN/Creatinine Ratio 17.6 7.0 - 25.0    Anion Gap 9.4 mmol/L   CBC Auto Differential   Result Value Ref Range    WBC 3.52 (L) 4.50 - 10.70 10*3/mm3    RBC 3.60 (L) 3.90 - 5.20 10*6/mm3    Hemoglobin 12.8 11.9 - 15.5 g/dL    Hematocrit 38.1 35.6 - 45.5 %    .8 (H) 80.5 - 98.2 fL    MCH 35.6 (H) 26.9 - 32.0 pg    MCHC 33.6 32.4 - 36.3 g/dL    RDW 21.5 (H) 11.7 - 13.0 %    RDW-SD 83.3 (H) 37.0 - 54.0 fl    MPV 9.1 6.0 - 12.0 fL    Platelets 242 140 - 500 10*3/mm3    Neutrophil % 54.6 42.7 - 76.0 %    Lymphocyte % 39.2 19.6 - 45.3 %    Monocyte % 2.8 (L) 5.0 - 12.0 %    Eosinophil % 1.7 0.3 - 6.2 %    Basophil % 1.4 0.0 - 1.5 %    Immature Grans % 0.3 0.0 - 0.5 %    Neutrophils, Absolute 1.92 1.90 - 8.10 10*3/mm3    Lymphocytes, Absolute 1.38 0.90 - 4.80 10*3/mm3    Monocytes, Absolute 0.10 (L) 0.20 - 1.20 10*3/mm3    Eosinophils, Absolute 0.06 0.00 - 0.70 10*3/mm3    Basophils, Absolute 0.05 0.00 - 0.20 10*3/mm3    Immature Grans, Absolute 0.01 0.00 - 0.03 10*3/mm3    nRBC 0.0 0.0 - 0.0 /100 WBC   Scan Slide   Result Value Ref Range    Anisocytosis Slight/1+ None Seen    Ovalocytes Slight/1+ None Seen    Spherocytes Slight/1+ None Seen    Stomatocytes Slight/1+ None Seen    Hypersegmented Neutrophils Slight/1+ None Seen    Platelet Morphology Normal Normal         IMAGING:  None reviewed    ASSESSMENT:  This is a 73 y.o. female  with:  1.  History of bilateral breast cancer in 1988 status post bilateral mastectomy.  She apparently completed 6 months of adjuvant therapy.  We have no details regarding this.      2.  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  Started on Hydrea 1000 mg daily.  This was discontinued and she started Gleevec on approximately 10/12/2017.  Gleevec discontinued due to intolerance on 11/15/2017.  We started Gleevec at a lower dose of 200 mg but she was also intolerant of this dose.  Her side effects were as severe with a lower dose and she therefore stopped taking the drug.  Symptoms improved significantly.  Platelet count subsequently elevated again.    She started nilotinib at 150mg twice daily at the end of March 2018.  This was held in mid-May due to QTc prolongation.  The QTc interval did subsequently normalized.  However, due to this and other adverse effects we are not able to resume the nilotinib nor does she desire to.    In July we resumed hydroxyurea 1000 mg daily.  Her platelet count increased.  Her dose was increased to 1000 mg twice daily.  Blood counts have improved nicely.  Platelet count has normalized but her white blood cell count is now low.  Decrease hydroxyurea to 1000 g daily.    3.  Thrombocytosis:  Due to CML.  This resolved while she was on imatinib and nilotinib but it again worsened.  It is now improving on the hydroxyurea.  4.  Hypothyroidism:  Levothyroxine 100 mcg.  5.  Nausea and vomiting: Resolved off Gleevec.   6. Periorbital edema: Resolved off Gleevec.    7.  Hypokalemia:  Potassium 3.9 on 8/7/2018.  Recheck I see her back next.  8.  Osteoarthritis pain: she reports this became worse after starting nilotinib. She is taking acetaminophen for this.  Prescribed meloxicam on 8/7/2018.  No longer on nilotinib.  She will continue meloxicam although she has not been taking it.  Hopefully will improve with decreasing the hydroxyurea dose  as well.  9.  Depression: She had been on Prozac for years, however, drug interaction with nilotinib prompted us to change this to Paxil. Her depression is worse.  Primary care managing at this point.    10.  Insomnia.  This had been treated with trazodone but as mentioned above this also caused a potential for QT interval prolongation.  She was therefore changed to temazepam.  Primary care now managing.    11.  QTc prolongation: Due to nilotinib.  This resolved.  She saw Dr. Duque with cardiology.    12.  Tobacco use: She is unwilling to quit smoking  13.  Nasal and otic congestion: I patient was diagnosed with a sinus infection and was treated with amoxicillin with improvement.  She did follow-up with ENT for cerumen disimpaction.  She continues to have some earwax in the left ear canal.  She will follow-up with ENT.    PLAN:  1.  Decrease hydroxyurea to 1000 mg daily  2.  Meloxicam for osteoarthritis pain.    3.  I will see her back in 1 month for follow-up with a CBC      Burt Merida MD

## 2018-10-16 RX ORDER — ATORVASTATIN CALCIUM 40 MG/1
40 TABLET, FILM COATED ORAL DAILY
Qty: 90 TABLET | Refills: 1 | Status: SHIPPED | OUTPATIENT
Start: 2018-10-16 | End: 2019-04-18 | Stop reason: SDUPTHER

## 2018-10-30 ENCOUNTER — OFFICE VISIT (OUTPATIENT)
Dept: INTERNAL MEDICINE | Facility: CLINIC | Age: 73
End: 2018-10-30

## 2018-10-30 VITALS
SYSTOLIC BLOOD PRESSURE: 124 MMHG | TEMPERATURE: 98.7 F | WEIGHT: 138 LBS | OXYGEN SATURATION: 97 % | HEIGHT: 65 IN | DIASTOLIC BLOOD PRESSURE: 74 MMHG | BODY MASS INDEX: 22.99 KG/M2 | HEART RATE: 74 BPM

## 2018-10-30 DIAGNOSIS — F51.01 PRIMARY INSOMNIA: Primary | ICD-10-CM

## 2018-10-30 PROCEDURE — 99213 OFFICE O/P EST LOW 20 MIN: CPT | Performed by: FAMILY MEDICINE

## 2018-10-30 RX ORDER — TRAZODONE HYDROCHLORIDE 150 MG/1
150 TABLET ORAL NIGHTLY
Qty: 30 TABLET | Refills: 5 | Status: SHIPPED | OUTPATIENT
Start: 2018-10-30 | End: 2019-02-04

## 2018-10-30 NOTE — PROGRESS NOTES
Subjective   Nicole Lofton is a 73 y.o. female.     Chief Complaint   Patient presents with   • Itching     x1 week   • Cough     dry   • Balance Issues     x1 week   • Dizziness     x1 week         History of Present Illness     Patient notes that she'll like to come off the Ambien.  Patient states that she was on trazodone before.  She's taking trazodone 100 mg before.  Patient will like to have a higher dose of trazodone.  Patient states that the Ambien is not working well for her.  Patient states that she feels uneasy will be on Ambien.    The following portions of the patient's history were reviewed and updated as appropriate: allergies, current medications, past family history, past medical history, past social history, past surgical history and problem list.    Review of Systems   Constitutional: Negative.    HENT: Negative.    Respiratory: Negative.    Cardiovascular: Negative.    Psychiatric/Behavioral: Positive for sleep disturbance.       Objective   Physical Exam   Constitutional: She is oriented to person, place, and time. She appears well-developed and well-nourished.   HENT:   Head: Normocephalic and atraumatic.   Eyes: EOM are normal.   Neck: Normal range of motion. Neck supple.   Cardiovascular: Normal rate, regular rhythm and normal heart sounds.    Pulmonary/Chest: Effort normal and breath sounds normal.   Neurological: She is alert and oriented to person, place, and time.   Psychiatric: She has a normal mood and affect. Her behavior is normal.   Nursing note and vitals reviewed.      Assessment/Plan   Nicole was seen today for itching, cough, balance issues and dizziness.    Diagnoses and all orders for this visit:    Primary insomnia  -     traZODone (DESYREL) 150 MG tablet; Take 1 tablet by mouth Every Night.  -     We'll stop Ambien.  We'll start patient on trazodone 150 mg daily.          No Follow-up on file.    Dictated utilizing Dragon Voice Recognition Software

## 2018-11-06 ENCOUNTER — OFFICE VISIT (OUTPATIENT)
Dept: INTERNAL MEDICINE | Facility: CLINIC | Age: 73
End: 2018-11-06

## 2018-11-06 VITALS
OXYGEN SATURATION: 96 % | HEIGHT: 65 IN | WEIGHT: 142.6 LBS | DIASTOLIC BLOOD PRESSURE: 78 MMHG | SYSTOLIC BLOOD PRESSURE: 122 MMHG | HEART RATE: 63 BPM | BODY MASS INDEX: 23.76 KG/M2

## 2018-11-06 DIAGNOSIS — E03.9 ACQUIRED HYPOTHYROIDISM: ICD-10-CM

## 2018-11-06 DIAGNOSIS — F32.9 MAJOR DEPRESSIVE DISORDER WITH CURRENT ACTIVE EPISODE, UNSPECIFIED DEPRESSION EPISODE SEVERITY, UNSPECIFIED WHETHER RECURRENT: ICD-10-CM

## 2018-11-06 DIAGNOSIS — M19.90 OSTEOARTHRITIS, UNSPECIFIED OSTEOARTHRITIS TYPE, UNSPECIFIED SITE: Primary | ICD-10-CM

## 2018-11-06 PROCEDURE — 99214 OFFICE O/P EST MOD 30 MIN: CPT | Performed by: FAMILY MEDICINE

## 2018-11-06 NOTE — PROGRESS NOTES
Subjective   Nicole Lofton is a 73 y.o. female.     Chief Complaint   Patient presents with   • Insomnia   • Hypertension   • Hypothyroidism         History of Present Illness     Patient's past medical history osteoarthritis.  Patient states that she's currently taken meloxicam 7.5 mg daily.  Meloxicam is not helping as well as she would like.  However patient states that she does not want take any more medications.  Patient is interested in thinking of other natural ways as well as herbal remedies that may help her.  Patient is interested in physical therapy, however she does not want to drive to get to these places.  Patient states that she will try a therapy program at the [facility] is currently offering, and it is convenient to her home location.    Patient states that she has a past medical history for major depressive disorder.  Patient states that the medication she is currently taking trintellix 10mg is working well for her.  She denies any side effects of medication.  She states that she feels better on this medicine.    Patient has past medical history for hypothyroidism.  Patient's currently taken levothyroxine 100 µg daily.  She denies any side effects of medication.    The following portions of the patient's history were reviewed and updated as appropriate: allergies, current medications, past family history, past medical history, past social history, past surgical history and problem list.    Review of Systems   Constitutional: Negative for chills and fever.   HENT: Negative for congestion, rhinorrhea, sinus pain and sore throat.    Eyes: Negative for photophobia and visual disturbance.   Respiratory: Negative for cough, chest tightness and shortness of breath.    Cardiovascular: Negative for chest pain and palpitations.   Gastrointestinal: Negative for diarrhea, nausea and vomiting.   Genitourinary: Negative for dysuria, frequency and urgency.   Musculoskeletal: Positive for arthralgias.   Skin:  Negative for rash and wound.   Neurological: Negative for dizziness and syncope.   Psychiatric/Behavioral: Negative for behavioral problems and confusion.       Objective   Physical Exam   Constitutional: She is oriented to person, place, and time. She appears well-developed and well-nourished.   HENT:   Head: Normocephalic and atraumatic.   Right Ear: External ear normal.   Left Ear: External ear normal.   Eyes: EOM are normal.   Neck: Normal range of motion. Neck supple.   Cardiovascular: Normal rate, regular rhythm and normal heart sounds.    Pulmonary/Chest: Effort normal and breath sounds normal. No respiratory distress.   Musculoskeletal: Normal range of motion.   Lymphadenopathy:     She has no cervical adenopathy.   Neurological: She is alert and oriented to person, place, and time.   Skin: Skin is warm.   Psychiatric: She has a normal mood and affect. Her behavior is normal.   Nursing note and vitals reviewed.      Assessment/Plan   Nicole was seen today for insomnia, hypertension and hypothyroidism.    Diagnoses and all orders for this visit:    Osteoarthritis, unspecified osteoarthritis type, unspecified site        -     Continue meloxicam daily.  Recommended physical therapy.    Major depressive disorder with current active episode, unspecified depression episode severity, unspecified whether recurrent        -     Continue Trintellix.  The patient doing well on medication.    Acquired hypothyroidism  -     Thyroid Panel With TSH   -     Continue levothyroxine 100 µg daily.          No Follow-up on file.    Dictated utilizing Dragon Voice Recognition Software          H/O- [id number]

## 2018-11-07 LAB
FT4I SERPL CALC-MCNC: 2.8 (ref 1.2–4.9)
T3RU NFR SERPL: 29 % (ref 24–39)
T4 SERPL-MCNC: 9.8 UG/DL (ref 4.5–12)
TSH SERPL DL<=0.005 MIU/L-ACNC: 2.24 UIU/ML (ref 0.45–4.5)

## 2018-11-07 RX ORDER — LEVOTHYROXINE SODIUM 0.1 MG/1
100 TABLET ORAL DAILY
Qty: 30 TABLET | Refills: 1 | Status: SHIPPED | OUTPATIENT
Start: 2018-11-07 | End: 2019-01-14 | Stop reason: SDUPTHER

## 2018-11-15 ENCOUNTER — TELEPHONE (OUTPATIENT)
Dept: INTERNAL MEDICINE | Facility: CLINIC | Age: 73
End: 2018-11-15

## 2018-11-15 NOTE — TELEPHONE ENCOUNTER
----- Message from Keith Campbell MD sent at 11/15/2018  4:04 PM EST -----  The labs were reviewed. Please inform patient that labs were normal.  
Patient notified of results, and expressed understanding.    
none

## 2018-11-20 ENCOUNTER — APPOINTMENT (OUTPATIENT)
Dept: OTHER | Facility: HOSPITAL | Age: 73
End: 2018-11-20

## 2018-11-20 ENCOUNTER — APPOINTMENT (OUTPATIENT)
Dept: ONCOLOGY | Facility: CLINIC | Age: 73
End: 2018-11-20

## 2018-11-20 ENCOUNTER — OFFICE VISIT (OUTPATIENT)
Dept: ONCOLOGY | Facility: CLINIC | Age: 73
End: 2018-11-20

## 2018-11-20 ENCOUNTER — LAB (OUTPATIENT)
Dept: OTHER | Facility: HOSPITAL | Age: 73
End: 2018-11-20

## 2018-11-20 VITALS
BODY MASS INDEX: 22.99 KG/M2 | WEIGHT: 138 LBS | HEIGHT: 65 IN | SYSTOLIC BLOOD PRESSURE: 143 MMHG | TEMPERATURE: 99.2 F | OXYGEN SATURATION: 95 % | HEART RATE: 74 BPM | RESPIRATION RATE: 16 BRPM | DIASTOLIC BLOOD PRESSURE: 84 MMHG

## 2018-11-20 DIAGNOSIS — C92.10 CML (CHRONIC MYELOID LEUKEMIA) (HCC): ICD-10-CM

## 2018-11-20 DIAGNOSIS — C92.10 CML (CHRONIC MYELOID LEUKEMIA) (HCC): Primary | ICD-10-CM

## 2018-11-20 LAB
BASOPHILS # BLD MANUAL: 0.66 10*3/MM3 (ref 0–0.2)
BASOPHILS NFR BLD AUTO: 7 % (ref 0–2)
DEPRECATED RDW RBC AUTO: 54.4 FL (ref 37–54)
EOSINOPHIL # BLD MANUAL: 0.19 10*3/MM3 (ref 0–0.7)
EOSINOPHIL NFR BLD MANUAL: 2 % (ref 0–7)
ERYTHROCYTE [DISTWIDTH] IN BLOOD BY AUTOMATED COUNT: 14.2 % (ref 11.7–13)
HCT VFR BLD AUTO: 41.1 % (ref 35.6–45.5)
HGB BLD-MCNC: 14.1 G/DL (ref 11.9–15.5)
LYMPHOCYTES # BLD MANUAL: 3.22 10*3/MM3 (ref 0.8–7)
LYMPHOCYTES NFR BLD MANUAL: 34 % (ref 24–44)
LYMPHOCYTES NFR BLD MANUAL: 6 % (ref 0–12)
MCH RBC QN AUTO: 36.2 PG (ref 26.9–32)
MCHC RBC AUTO-ENTMCNC: 34.3 G/DL (ref 32.4–36.3)
MCV RBC AUTO: 105.4 FL (ref 80.5–98.2)
MONOCYTES # BLD AUTO: 0.57 10*3/MM3 (ref 0–1)
NEUTROPHILS # BLD AUTO: 4.83 10*3/MM3 (ref 1.9–8.1)
NEUTROPHILS NFR BLD MANUAL: 51 % (ref 42.7–76)
PLAT MORPH BLD: NORMAL
PLATELET # BLD AUTO: 752 10*3/MM3 (ref 140–500)
PMV BLD AUTO: 9.9 FL (ref 6–12)
RBC # BLD AUTO: 3.9 10*6/MM3 (ref 3.9–5.2)
RBC MORPH BLD: NORMAL
SCAN SLIDE: NORMAL
SPHEROCYTES BLD QL SMEAR: NORMAL
STOMATOCYTES BLD QL SMEAR: NORMAL
WBC MORPH BLD: NORMAL
WBC NRBC COR # BLD: 9.48 10*3/MM3 (ref 4.5–10.7)

## 2018-11-20 PROCEDURE — 36415 COLL VENOUS BLD VENIPUNCTURE: CPT

## 2018-11-20 PROCEDURE — 85007 BL SMEAR W/DIFF WBC COUNT: CPT | Performed by: INTERNAL MEDICINE

## 2018-11-20 PROCEDURE — 85025 COMPLETE CBC W/AUTO DIFF WBC: CPT | Performed by: INTERNAL MEDICINE

## 2018-11-20 PROCEDURE — 99214 OFFICE O/P EST MOD 30 MIN: CPT | Performed by: INTERNAL MEDICINE

## 2018-11-20 NOTE — PROGRESS NOTES
[facility] OUTPATIENT FOLLOW UP CLINIC VISIT    REASON FOR FOLLOW-UP:    1.  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  Gleevec 400 mg daily initiated around 10/12/2017, stopped on 11/15/2017 due to intolerance (noah-orbital edema, nausea/vomiting, fatigue).  Resumed at 200 mg daily but, again, not tolerated.    3.  Nilotinib 150 mg twice daily started in late March, 2018.  Held due to intolerance and QTc prolongation.   4.  Hydroxyurea initiated in July 2018.     HISTORY OF PRESENT ILLNESS:  Nicole Lofton is a 73 y.o. female with the above-mentioned history who is here today for reevaluation.  She continues hydroxyurea 1000 mg once daily and if she tolerates this well.  She feels depressed as she does not like the winter.  She continues to eat reasonably well.    ONCOLOGIC HISTORY:  For about 6 months she has noticed bilateral arm tingling and weakness in her hands.  This has occurred about 5 or 6 times over the past 6 months.  She is vague in her description of this.  She is globally weak and is quite inactive as she lives by herself.  She was completing physical therapy with some improvement but is now quite unsteady on her feet.  She has not had any falls.  She denies any bleeding.  She has chronic fatigue.  She has osteoarthritis pain and reports pain in the right side and in her neck.  She denies any acute low back pain but does have chronic low back pain.  She does have orthostatic symptoms and she is lightheaded when she stands.  She has had a decreased appetite and some weight loss over the past 9 months.     Labs performed on 8/28/2017 showed a white blood cell count of 39.1 with a differential showing 61% neutrophils and 32% lymphocytes, hemoglobin 13.9 and platelets 565,000.         She had further labs done at [facility] on 8/20/2017 showing a white blood cell count of 24.4, hemoglobin 13.7, and platelets 1795.  A differential showed 64% neutrophils,  7% lymphocytes, and 12% basophils.  Peripheral blood flow cytometry was also done on this date showing 11% basophils and less than 1% myeloblasts.  There was no monoclonal B-cell, T-cell, plasma cell, or NK cell population.  FISH for BCR-ABL and ESSENCE 2 mutation analysis were done as well.     Her CBC was normal as of 7/8/2016.     Of note, she has a history of bilateral breast cancer diagnosed in 1988.  She had bilateral mastectomy with reconstruction.  We do not have any records regarding this at this time.  She apparently completed at least 6 months of adjuvant chemotherapy but again does not know the details..    FISH for BCR-ABL performed at [place] was actually positive.  As of 9/6/2017 ESSENCE 2 is still pending.    She returned on 9/6/2017 for follow-up.  Platelets at 1.9 million with hydroxyurea 1000 mg daily.  She will increase the dose to 2000 mg daily.  She will have a bone marrow aspiration biopsy performed.  Weekly CBCs.    As of 9/6/2017 peripheral blood PCR was positive at 89.238% with a major break point mutation.    ESSENCE 2 and CALR mutation negative.    Bone marrow aspiration and biopsy performed on 9/15/2017.  Flow cytometry showed no increase in blasts.  Morphology showed no increase in blasts.  Bone marrow FISH showed BCR/ABL rearrangement in 93% of cells.  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    Labs as of 9/20/2017 show white blood cell count of 4.7 with hemoglobin of 12.7 and platelets 1.1 million.    On 9/27/2017 white blood cells and hemoglobin are normal.  Platelets are down to 722,000.  We plan to start Gleevec at 400 mg daily if she tolerates it.  In the meantime she will decrease the hydroxyurea dose to 1000 mg daily.    Blood counts normalized.  Hydroxyurea stopped.    Gleevec initiated around 10/12/2017, stopped on 11/15/2017 due to intolerance (noah-orbital edema, nausea/vomiting, fatigue).    Symptoms improving as of 11/29/2017.  Blood counts normal.  Hold further therapy at this  time until she improves more clinically.      Plan to resume Gleevec at 200 mg daily as of 1/11/2018.  Only took it for less than a week but also did not tolerate this dose.    As of 2/21/2018 her platelet count is again elevated at greater than 700,000.  Resume hydroxyurea 1000 mg daily.  Considering nilotinib at a decreased dose.    Platelets continued to rise in March 2018.  Hydroxyurea increased to 1500 mg daily with stabilization of her platelet count and some improvement as of 3/21/2018.  Plan for nilotinib.    Nilotinib initiated in late March 2018.    Subsequently held due to QTC prolongation.    Hydroxyurea 1000 mg daily resumed at the end of July 2018.  Increased the dose to 1000 mg twice daily as of 8/7/2018 for a rising platelet count of 1.3 million.    Platelets as of 9/7/2018 500,000.  Continue hydroxyurea 1000 mg twice daily.    As of 10/12/2018 her CBC has improved significantly.  Platelets 242,000 with a white blood cell count of 3.52.  Decrease hydroxyurea to 1000 mg daily.    Platelets increased as of 11/20/2018.  Increase hydroxyurea to 1000 mg in the morning and 500 mg in the evening.    PAST MEDICAL, SURGICAL, FAMILY, AND SOCIAL HISTORIES were reviewed with the patient and in the electronic medical record, and were updated if indicated.    ALLERGIES:  No Known Allergies    MEDICATIONS:  The medication list has been reviewed with the patient by the medical assistant, and the list has been updated in the electronic medical record, which I reviewed.  Medication dosages and frequencies were confirmed to be accurate.    I have reviewed the patient's medical history in detail and updated the computerized patient record.    Review of Systems   Constitutional: Negative for appetite change, chills, fatigue, fever and unexpected weight change.   HENT:   Negative for hearing loss, mouth sores, nosebleeds, sore throat and trouble swallowing.    Respiratory: Negative for cough and shortness of breath.   "  Cardiovascular: Negative for chest pain and leg swelling.   Gastrointestinal: Negative for abdominal pain, constipation, diarrhea, nausea and vomiting.   Endocrine: Negative for hot flashes.   Genitourinary: Negative for difficulty urinating.    Musculoskeletal: Positive for arthralgias and back pain. Negative for myalgias.   Skin: Negative for rash.   Neurological: Positive for dizziness. Negative for extremity weakness.   Hematological: Negative for adenopathy. Does not bruise/bleed easily.   Psychiatric/Behavioral: Positive for depression and sleep disturbance. The patient is nervous/anxious.        Vitals:    11/20/18 1305   BP: 143/84   Pulse: 74   Resp: 16   Temp: 99.2 °F (37.3 °C)   TempSrc: Oral   SpO2: 95%   Weight: 62.6 kg (138 lb)   Height: 165 cm (64.96\")   PainSc:   6   PainLoc: Generalized       Physical Exam   Constitutional: She is oriented to person, place, and time. She appears well-developed and well-nourished. No distress.   HENT:   Head: Normocephalic and atraumatic.   Mouth/Throat: Oropharynx is clear and moist and mucous membranes are normal. No oropharyngeal exudate.   Eyes: Pupils are equal, round, and reactive to light.   Neck: Normal range of motion.   Cardiovascular: Normal rate, regular rhythm and normal heart sounds.   No murmur heard.  Pulmonary/Chest: Effort normal and breath sounds normal. No respiratory distress. She has no wheezes. She has no rhonchi. She has no rales.   Abdominal: Soft. Normal appearance and bowel sounds are normal. She exhibits no distension. There is no hepatosplenomegaly.   Musculoskeletal: Normal range of motion. She exhibits no edema.   Neurological: She is alert and oriented to person, place, and time.   Skin: Skin is warm and dry. No rash noted.   Psychiatric: She has a normal mood and affect.   Vitals reviewed.      DIAGNOSTIC DATA:  Results for orders placed or performed in visit on 11/20/18   CBC Auto Differential   Result Value Ref Range    WBC 9.48 " 4.50 - 10.70 10*3/mm3    RBC 3.90 3.90 - 5.20 10*6/mm3    Hemoglobin 14.1 11.9 - 15.5 g/dL    Hematocrit 41.1 35.6 - 45.5 %    .4 (H) 80.5 - 98.2 fL    MCH 36.2 (H) 26.9 - 32.0 pg    MCHC 34.3 32.4 - 36.3 g/dL    RDW 14.2 (H) 11.7 - 13.0 %    RDW-SD 54.4 (H) 37.0 - 54.0 fl    MPV 9.9 6.0 - 12.0 fL    Platelets 752 (H) 140 - 500 10*3/mm3   Scan Slide   Result Value Ref Range    Spherocytes Slight/1+ None Seen    Stomatocytes Slight/1+ None Seen    Scan Slide     Manual Differential   Result Value Ref Range    Neutrophil % 51.0 42.7 - 76.0 %    Lymphocyte % 34.0 24.0 - 44.0 %    Monocyte % 6.0 0.0 - 12.0 %    Eosinophil % 2.0 0.0 - 7.0 %    Basophil % 7.0 (H) 0.0 - 2.0 %    Neutrophils Absolute 4.83 1.90 - 8.10 10*3/mm3    Lymphocytes Absolute 3.22 0.80 - 7.00 10*3/mm3    Monocytes Absolute 0.57 0.00 - 1.00 10*3/mm3    Eosinophils Absolute 0.19 0.00 - 0.70 10*3/mm3    Basophils Absolute 0.66 (H) 0.00 - 0.20 10*3/mm3    RBC Morphology Normal Normal    WBC Morphology Normal Normal    Platelet Morphology Normal Normal         IMAGING:  None reviewed    ASSESSMENT:  This is a 73 y.o. female with:  1.  History of bilateral breast cancer in 1988 status post bilateral mastectomy.  She apparently completed 6 months of adjuvant therapy.  We have no details regarding this.      2.  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  Started on Hydrea 1000 mg daily.  This was discontinued and she started Gleevec on approximately 10/12/2017.  Gleevec discontinued due to intolerance on 11/15/2017.  We started Gleevec at a lower dose of 200 mg but she was also intolerant of this dose.  Her side effects were as severe with a lower dose and she therefore stopped taking the drug.  Symptoms improved significantly.  Platelet count subsequently elevated again.    She started nilotinib at 150mg twice daily at the end of March 2018.  This was held in mid-May due to QTc prolongation.  The  QTc interval did subsequently normalized.  However, due to this and other adverse effects we are not able to resume the nilotinib nor does she desire to.    In July we resumed hydroxyurea 1000 mg daily.  Her platelet count increased.  Her dose was increased to 1000 mg twice daily.  Blood counts improved nicely.  Platelet count normalized but her white blood cell count dropped to below normal.  Decreased hydroxyurea to 1000 mg daily.    Platelets today are up again.  We will increase the hydroxyurea to 1000 mg in the mornings and 500 mg in the evenings.    3.  Thrombocytosis:  Due to CML.  See above.  4.  Hypothyroidism:  Levothyroxine 100 mcg.  5.  Nausea and vomiting: Resolved off Gleevec.   6. Periorbital edema: Resolved off Gleevec.    7.  Hypokalemia:  Potassium normal at 3.6 as of 10/12/2018.  8.  Osteoarthritis pain: she reports this became worse after starting nilotinib. She is taking acetaminophen for this.  Prescribed meloxicam on 8/7/2018.  No longer on nilotinib.  She will continue meloxicam although she has not been taking it.  Hopefully will improve with decreasing the hydroxyurea dose as well.  9.  Depression: She had been on Prozac for years, however, drug interaction with nilotinib prompted us to change this to Paxil. Her depression is worse.  Primary care managing at this point.    10.  Insomnia.  This had been treated with trazodone but as mentioned above this also caused a potential for QT interval prolongation.  She was therefore changed to temazepam.  Primary care now managing.    11.  QTc prolongation: Due to nilotinib.  This resolved.  She saw Dr. Duque with cardiology.    12.  Tobacco use: She is unwilling to quit smoking  13.  Nasal and otic congestion: I patient was diagnosed with a sinus infection and was treated with amoxicillin with improvement.  She did follow-up with ENT for cerumen disimpaction.      PLAN:  1.  Increase hydroxyurea to 1000 mg in the mornings and 500 mg in the  evenings.  2.  Meloxicam for osteoarthritis pain.    3.  Discussed depression and any necessary medication changes with her primary care provider  4.  I will have my nurse practitioner see her back in 1 month for follow-up with a CBC (I will be available to discuss her blood counts and hydroxyurea dosing as I am rounding at the hospital) and I'll see her back in 2 months for follow-up with a CBC.      Burt Merida MD

## 2018-11-28 ENCOUNTER — OFFICE VISIT (OUTPATIENT)
Dept: INTERNAL MEDICINE | Facility: CLINIC | Age: 73
End: 2018-11-28

## 2018-11-28 VITALS
SYSTOLIC BLOOD PRESSURE: 138 MMHG | HEART RATE: 81 BPM | OXYGEN SATURATION: 94 % | BODY MASS INDEX: 23.26 KG/M2 | DIASTOLIC BLOOD PRESSURE: 64 MMHG | WEIGHT: 139.6 LBS | HEIGHT: 65 IN

## 2018-11-28 DIAGNOSIS — R91.1 INCIDENTAL LUNG NODULE, GREATER THAN OR EQUAL TO 8MM: Primary | ICD-10-CM

## 2018-11-28 DIAGNOSIS — R53.1 WEAKNESS: ICD-10-CM

## 2018-11-28 PROCEDURE — 99213 OFFICE O/P EST LOW 20 MIN: CPT | Performed by: FAMILY MEDICINE

## 2018-11-28 NOTE — PROGRESS NOTES
Subjective   Nicole Lofton is a 73 y.o. female.     Chief Complaint   Patient presents with   • ER Follow Up     DIZZINESS/WEAKNESS         History of Present Illness     Patient went to the ER on Saturday because of excessive sweating and weakness. Patient in the ER was found to have elevated bp. Multiple testing was done and patient was found to be normal. However, patient had a ct scan of the lung, which shows a 8 mm left lower lobe lung nodule. Patient ct scan of head was normal.     Patient notes that she does have some weakness and dizziness. Patient notes that she is not eating much as well.     The following portions of the patient's history were reviewed and updated as appropriate: allergies, current medications, past family history, past medical history, past social history, past surgical history and problem list.    Review of Systems   Constitutional: Negative for chills and fever.   HENT: Negative for congestion, rhinorrhea, sinus pain and sore throat.    Eyes: Negative for photophobia and visual disturbance.   Respiratory: Negative for cough, chest tightness and shortness of breath.    Cardiovascular: Negative for chest pain and palpitations.   Gastrointestinal: Negative for diarrhea, nausea and vomiting.   Genitourinary: Negative for dysuria, frequency and urgency.   Skin: Negative for rash and wound.   Neurological: Positive for dizziness and weakness. Negative for syncope.   Psychiatric/Behavioral: Negative for behavioral problems and confusion.       Objective   Physical Exam   Constitutional: She is oriented to person, place, and time. She appears well-developed and well-nourished.   HENT:   Head: Normocephalic and atraumatic.   Right Ear: External ear normal.   Left Ear: External ear normal.   Mouth/Throat: Oropharynx is clear and moist.   Eyes: EOM are normal.   Neck: Normal range of motion. Neck supple.   Cardiovascular: Normal rate, regular rhythm and normal heart sounds.    Pulmonary/Chest: Effort normal and breath sounds normal. No respiratory distress.   Musculoskeletal: Normal range of motion.   Lymphadenopathy:     She has no cervical adenopathy.   Neurological: She is alert and oriented to person, place, and time.   Skin: Skin is warm.   Psychiatric: She has a normal mood and affect. Her behavior is normal.   Nursing note and vitals reviewed.      Assessment/Plan   Nicole was seen today for er follow up.    Diagnoses and all orders for this visit:    Incidental lung nodule, greater than or equal to 8mm  -     CT Chest With Contrast; Future  -     Will get a ct chest in 6 mos.     Weakness        -     Discussed with patient to eat more calories as she is not eating much food during the day.       No Follow-up on file.    Dictated utilizing Dragon Voice Recognition Software

## 2018-12-18 ENCOUNTER — OFFICE VISIT (OUTPATIENT)
Dept: ONCOLOGY | Facility: CLINIC | Age: 73
End: 2018-12-18

## 2018-12-18 ENCOUNTER — LAB (OUTPATIENT)
Dept: OTHER | Facility: HOSPITAL | Age: 73
End: 2018-12-18

## 2018-12-18 VITALS
BODY MASS INDEX: 22.66 KG/M2 | OXYGEN SATURATION: 98 % | DIASTOLIC BLOOD PRESSURE: 77 MMHG | HEIGHT: 65 IN | TEMPERATURE: 98.1 F | HEART RATE: 64 BPM | SYSTOLIC BLOOD PRESSURE: 168 MMHG | WEIGHT: 136 LBS | RESPIRATION RATE: 16 BRPM

## 2018-12-18 DIAGNOSIS — D75.839 THROMBOCYTOSIS: ICD-10-CM

## 2018-12-18 DIAGNOSIS — C92.10 CML (CHRONIC MYELOID LEUKEMIA) (HCC): ICD-10-CM

## 2018-12-18 DIAGNOSIS — C92.10 CML (CHRONIC MYELOID LEUKEMIA) (HCC): Primary | ICD-10-CM

## 2018-12-18 LAB
BASOPHILS # BLD MANUAL: 0.9 10*3/MM3 (ref 0–0.2)
BASOPHILS NFR BLD AUTO: 9 % (ref 0–2)
DEPRECATED RDW RBC AUTO: 53 FL (ref 37–54)
EOSINOPHIL # BLD MANUAL: 0.8 10*3/MM3 (ref 0–0.7)
EOSINOPHIL NFR BLD MANUAL: 8 % (ref 0–7)
ERYTHROCYTE [DISTWIDTH] IN BLOOD BY AUTOMATED COUNT: 13.8 % (ref 11.7–13)
HCT VFR BLD AUTO: 40 % (ref 35.6–45.5)
HGB BLD-MCNC: 13.8 G/DL (ref 11.9–15.5)
LYMPHOCYTES # BLD MANUAL: 2 10*3/MM3 (ref 0.8–7)
LYMPHOCYTES NFR BLD MANUAL: 10 % (ref 0–12)
LYMPHOCYTES NFR BLD MANUAL: 20 % (ref 24–44)
MCH RBC QN AUTO: 36.2 PG (ref 26.9–32)
MCHC RBC AUTO-ENTMCNC: 34.5 G/DL (ref 32.4–36.3)
MCV RBC AUTO: 105 FL (ref 80.5–98.2)
MONOCYTES # BLD AUTO: 1 10*3/MM3 (ref 0–1)
NEUTROPHILS # BLD AUTO: 5.29 10*3/MM3 (ref 1.9–8.1)
NEUTROPHILS NFR BLD MANUAL: 53 % (ref 42.7–76)
PLAT MORPH BLD: NORMAL
PLATELET # BLD AUTO: 808 10*3/MM3 (ref 140–500)
PMV BLD AUTO: 9.8 FL (ref 6–12)
RBC # BLD AUTO: 3.81 10*6/MM3 (ref 3.9–5.2)
SCAN SLIDE: NORMAL
SPHEROCYTES BLD QL SMEAR: ABNORMAL
STOMATOCYTES BLD QL SMEAR: ABNORMAL
WBC MORPH BLD: NORMAL
WBC NRBC COR # BLD: 9.98 10*3/MM3 (ref 4.5–10.7)

## 2018-12-18 PROCEDURE — 36415 COLL VENOUS BLD VENIPUNCTURE: CPT

## 2018-12-18 PROCEDURE — 85007 BL SMEAR W/DIFF WBC COUNT: CPT | Performed by: INTERNAL MEDICINE

## 2018-12-18 PROCEDURE — 85025 COMPLETE CBC W/AUTO DIFF WBC: CPT | Performed by: INTERNAL MEDICINE

## 2018-12-18 PROCEDURE — 99213 OFFICE O/P EST LOW 20 MIN: CPT | Performed by: NURSE PRACTITIONER

## 2018-12-18 RX ORDER — HYDROXYUREA 500 MG/1
CAPSULE ORAL
Qty: 90 CAPSULE | Refills: 2 | Status: SHIPPED | OUTPATIENT
Start: 2018-12-18 | End: 2019-02-04 | Stop reason: SDUPTHER

## 2018-12-18 NOTE — PROGRESS NOTES
[facility] CBC GROUP OUTPATIENT FOLLOW UP CLINIC VISIT    REASON FOR FOLLOW-UP:    1.  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  Gleevec 400 mg daily initiated around 10/12/2017, stopped on 11/15/2017 due to intolerance (noah-orbital edema, nausea/vomiting, fatigue).  Resumed at 200 mg daily but, again, not tolerated.    3.  Nilotinib 150 mg twice daily started in late March, 2018.  Held due to intolerance and QTc prolongation.   4.  Hydroxyurea initiated in July 2018.     HISTORY OF PRESENT ILLNESS:  Nicole Lofton is a 73 y.o. female with the above-mentioned history here today for reevaluation.  She continues on hydroxyurea 1000 mg in the morning, and 500 mg in the evening.  This was increased in November.  She is tolerating this well.  Currently, she is struggling with a sinus infection for the past couple weeks.  She was seen by her primary care provider on the 12th, and started on amoxicillin.  She feels that her symptoms are slightly improving.  She continues to have significant sinus congestion, sinus headaches, and sore throat.  As a result, she has had not much of an appetite.  She has been drinking ensure, but thinks that it may be causing her to have diarrhea.  She denies fevers or chills.      ONCOLOGIC HISTORY:  For about 6 months she has noticed bilateral arm tingling and weakness in her hands.  This has occurred about 5 or 6 times over the past 6 months.  She is vague in her description of this.  She is globally weak and is quite inactive as she lives by herself.  She was completing physical therapy with some improvement but is now quite unsteady on her feet.  She has not had any falls.  She denies any bleeding.  She has chronic fatigue.  She has osteoarthritis pain and reports pain in the right side and in her neck.  She denies any acute low back pain but does have chronic low back pain.  She does have orthostatic symptoms and she is lightheaded when  she stands.  She has had a decreased appetite and some weight loss over the past 9 months.     Labs performed on 8/28/2017 showed a white blood cell count of 39.1 with a differential showing 61% neutrophils and 32% lymphocytes, hemoglobin 13.9 and platelets 565,000.         She had further labs done at [place] on 8/20/2017 showing a white blood cell count of 24.4, hemoglobin 13.7, and platelets 1795.  A differential showed 64% neutrophils, 7% lymphocytes, and 12% basophils.  Peripheral blood flow cytometry was also done on this date showing 11% basophils and less than 1% myeloblasts.  There was no monoclonal B-cell, T-cell, plasma cell, or NK cell population.  FISH for BCR-ABL and ESSENCE 2 mutation analysis were done as well.     Her CBC was normal as of 7/8/2016.     Of note, she has a history of bilateral breast cancer diagnosed in 1988.  She had bilateral mastectomy with reconstruction.  We do not have any records regarding this at this time.  She apparently completed at least 6 months of adjuvant chemotherapy but again does not know the details..    FISH for BCR-ABL performed at [place] was actually positive.  As of 9/6/2017 ESSENCE 2 is still pending.    She returned on 9/6/2017 for follow-up.  Platelets at 1.9 million with hydroxyurea 1000 mg daily.  She will increase the dose to 2000 mg daily.  She will have a bone marrow aspiration biopsy performed.  Weekly CBCs.    As of 9/6/2017 peripheral blood PCR was positive at 89.238% with a major break point mutation.    ESSENCE 2 and CALR mutation negative.    Bone marrow aspiration and biopsy performed on 9/15/2017.  Flow cytometry showed no increase in blasts.  Morphology showed no increase in blasts.  Bone marrow FISH showed BCR/ABL rearrangement in 93% of cells.  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    Labs as of 9/20/2017 show white blood cell count of 4.7 with hemoglobin of 12.7 and platelets 1.1 million.    On 9/27/2017 white blood cells and hemoglobin are  normal.  Platelets are down to 722,000.  We plan to start Gleevec at 400 mg daily if she tolerates it.  In the meantime she will decrease the hydroxyurea dose to 1000 mg daily.    Blood counts normalized.  Hydroxyurea stopped.    Gleevec initiated around 10/12/2017, stopped on 11/15/2017 due to intolerance (noah-orbital edema, nausea/vomiting, fatigue).    Symptoms improving as of 11/29/2017.  Blood counts normal.  Hold further therapy at this time until she improves more clinically.      Plan to resume Gleevec at 200 mg daily as of 1/11/2018.  Only took it for less than a week but also did not tolerate this dose.    As of 2/21/2018 her platelet count is again elevated at greater than 700,000.  Resume hydroxyurea 1000 mg daily.  Considering nilotinib at a decreased dose.    Platelets continued to rise in March 2018.  Hydroxyurea increased to 1500 mg daily with stabilization of her platelet count and some improvement as of 3/21/2018.  Plan for nilotinib.    Nilotinib initiated in late March 2018.    Subsequently held due to QTC prolongation.    Hydroxyurea 1000 mg daily resumed at the end of July 2018.  Increased the dose to 1000 mg twice daily as of 8/7/2018 for a rising platelet count of 1.3 million.    Platelets as of 9/7/2018 500,000.  Continue hydroxyurea 1000 mg twice daily.    As of 10/12/2018 her CBC has improved significantly.  Platelets 242,000 with a white blood cell count of 3.52.  Decrease hydroxyurea to 1000 mg daily.    Platelets increased as of 11/20/2018.  Increase hydroxyurea to 1000 mg in the morning and 500 mg in the evening.    PAST MEDICAL, SURGICAL, FAMILY, AND SOCIAL HISTORIES were reviewed with the patient and in the electronic medical record, and were updated if indicated.    ALLERGIES:  No Known Allergies    MEDICATIONS:  The medication list has been reviewed with the patient by the medical assistant, and the list has been updated in the electronic medical record, which I reviewed.   "Medication dosages and frequencies were confirmed to be accurate.    I have reviewed the patient's medical history in detail and updated the computerized patient record.    Review of Systems   Constitutional: Positive for appetite change and fatigue. Negative for chills, fever and unexpected weight change.   HENT:   Positive for sore throat. Negative for hearing loss, mouth sores, nosebleeds and trouble swallowing.         See HPI   Respiratory: Negative for cough and shortness of breath.    Cardiovascular: Negative for chest pain and leg swelling.   Gastrointestinal: Negative for abdominal pain, constipation, diarrhea, nausea and vomiting.   Endocrine: Negative for hot flashes.   Genitourinary: Negative for difficulty urinating.    Musculoskeletal: Positive for arthralgias and back pain. Negative for myalgias.   Skin: Negative for rash.   Neurological: Positive for dizziness. Negative for extremity weakness.   Hematological: Negative for adenopathy. Does not bruise/bleed easily.   Psychiatric/Behavioral: Positive for depression and sleep disturbance. The patient is nervous/anxious.    12/18/2018 review of systems is unchanged from above except as noted.    Vitals:    12/18/18 1131   BP: 168/77   Pulse: 64   Resp: 16   Temp: 98.1 °F (36.7 °C)   TempSrc: Oral   SpO2: 98%   Weight: 61.7 kg (136 lb)   Height: 165 cm (64.96\")   PainSc:   6   PainLoc: Generalized       Physical Exam   Constitutional: She is oriented to person, place, and time. She appears well-developed and well-nourished. No distress.   HENT:   Head: Normocephalic and atraumatic.   Nose: Right sinus exhibits maxillary sinus tenderness. Left sinus exhibits maxillary sinus tenderness.   Mouth/Throat: Oropharynx is clear and moist and mucous membranes are normal. No oropharyngeal exudate.   Eyes: Pupils are equal, round, and reactive to light.   Neck: Normal range of motion.   Cardiovascular: Normal rate, regular rhythm and normal heart sounds.   No murmur " heard.  Pulmonary/Chest: Effort normal and breath sounds normal. No respiratory distress. She has no wheezes. She has no rhonchi. She has no rales.   Abdominal: Soft. Normal appearance and bowel sounds are normal. She exhibits no distension. There is no hepatosplenomegaly.   Musculoskeletal: Normal range of motion. She exhibits no edema.   Neurological: She is alert and oriented to person, place, and time.   Skin: Skin is warm and dry. No rash noted.   Psychiatric: She has a normal mood and affect.   Vitals reviewed.      DIAGNOSTIC DATA:  Results for orders placed or performed in visit on 12/18/18   CBC Auto Differential   Result Value Ref Range    WBC 9.98 4.50 - 10.70 10*3/mm3    RBC 3.81 (L) 3.90 - 5.20 10*6/mm3    Hemoglobin 13.8 11.9 - 15.5 g/dL    Hematocrit 40.0 35.6 - 45.5 %    .0 (H) 80.5 - 98.2 fL    MCH 36.2 (H) 26.9 - 32.0 pg    MCHC 34.5 32.4 - 36.3 g/dL    RDW 13.8 (H) 11.7 - 13.0 %    RDW-SD 53.0 37.0 - 54.0 fl    MPV 9.8 6.0 - 12.0 fL    Platelets 808 (H) 140 - 500 10*3/mm3   Scan Slide   Result Value Ref Range    Scan Slide     Manual Differential   Result Value Ref Range    Neutrophil % 53.0 42.7 - 76.0 %    Lymphocyte % 20.0 (L) 24.0 - 44.0 %    Monocyte % 10.0 0.0 - 12.0 %    Eosinophil % 8.0 (H) 0.0 - 7.0 %    Basophil % 9.0 (H) 0.0 - 2.0 %    Neutrophils Absolute 5.29 1.90 - 8.10 10*3/mm3    Lymphocytes Absolute 2.00 0.80 - 7.00 10*3/mm3    Monocytes Absolute 1.00 0.00 - 1.00 10*3/mm3    Eosinophils Absolute 0.80 (H) 0.00 - 0.70 10*3/mm3    Basophils Absolute 0.90 (H) 0.00 - 0.20 10*3/mm3    Spherocytes Slight/1+ None Seen    Stomatocytes Slight/1+ None Seen    WBC Morphology Normal Normal    Platelet Morphology Normal Normal         IMAGING:  None reviewed    ASSESSMENT:  This is a 73 y.o. female with:  1.  History of bilateral breast cancer in 1988 status post bilateral mastectomy.  She apparently completed 6 months of adjuvant therapy.  We have no details regarding this.      2.   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  Started on Hydrea 1000 mg daily.  This was discontinued and she started Gleevec on approximately 10/12/2017.  Gleevec discontinued due to intolerance on 11/15/2017.  We started Gleevec at a lower dose of 200 mg but she was also intolerant of this dose.  Her side effects were as severe with a lower dose and she therefore stopped taking the drug.  Symptoms improved significantly.  Platelet count subsequently elevated again.    She started nilotinib at 150mg twice daily at the end of March 2018.  This was held in mid-May due to QTc prolongation.  The QTc interval did subsequently normalized.  However, due to this and other adverse effects we are not able to resume the nilotinib nor does she desire to.    In July we resumed hydroxyurea 1000 mg daily.  Her platelet count increased.  Her dose was increased to 1000 mg twice daily.  Blood counts improved nicely.  Platelet count normalized but her white blood cell count dropped to below normal.  Decreased hydroxyurea to 1000 mg daily.    11/20/18 platelets are up again.  We will increase the hydroxyurea to 1000 mg in the mornings and 500 mg in the evenings.    12/18/2019 patient returns continuing on Hydrea 1011 g in the morning, and 500 mg in the evening.  Her platelet count is 808 today.  I have reviewed with Dr. Merida.  She will continue on her current dose.    3.  Thrombocytosis:  Due to CML.  See above.  4.  Hypothyroidism:  Levothyroxine 100 mcg.  5.  Nausea and vomiting: Resolved off Gleevec.   6. Periorbital edema: Resolved off Gleevec.    7.  Hypokalemia:  Potassium normal at 3.6 as of 10/12/2018.  8.  Osteoarthritis pain: she reports this became worse after starting nilotinib. She is taking acetaminophen for this.  Prescribed meloxicam on 8/7/2018.  No longer on nilotinib.  She will continue meloxicam although she has not been taking it.  Hopefully will improve with  decreasing the hydroxyurea dose as well.  9.  Depression: She had been on Prozac for years, however, drug interaction with nilotinib prompted us to change this to Paxil. Her depression is worse.  Primary care managing at this point.    10.  Insomnia.  This had been treated with trazodone but as mentioned above this also caused a potential for QT interval prolongation.  She was therefore changed to temazepam.  Primary care now managing.    11.  QTc prolongation: Due to nilotinib.  This resolved.  She saw Dr. Duque with cardiology.    12.  Tobacco use: She is unwilling to quit smoking  13.  Nasal and otic congestion: Patient currently being treated for a sinus infection with amoxicillin.      PLAN:  1.  Continue Hydrea 1000 mg in the morning, and 500 mg in the evenings.  2.  Continue antibiotics as prescribed by her primary care provider.    3.  Return for follow-up visit in 1 month with Dr. Merida with a repeat CBC at that time.       FERNANDO Stevens

## 2018-12-19 RX ORDER — AMLODIPINE BESYLATE 10 MG/1
10 TABLET ORAL DAILY
Qty: 90 TABLET | Refills: 1 | Status: SHIPPED | OUTPATIENT
Start: 2018-12-19 | End: 2019-06-20 | Stop reason: SDUPTHER

## 2019-01-14 RX ORDER — LEVOTHYROXINE SODIUM 0.1 MG/1
100 TABLET ORAL DAILY
Qty: 30 TABLET | Refills: 0 | Status: SHIPPED | OUTPATIENT
Start: 2019-01-14 | End: 2019-02-19 | Stop reason: SDUPTHER

## 2019-01-15 ENCOUNTER — OFFICE VISIT (OUTPATIENT)
Dept: ONCOLOGY | Facility: CLINIC | Age: 74
End: 2019-01-15

## 2019-01-15 ENCOUNTER — LAB (OUTPATIENT)
Dept: OTHER | Facility: HOSPITAL | Age: 74
End: 2019-01-15

## 2019-01-15 VITALS
OXYGEN SATURATION: 99 % | DIASTOLIC BLOOD PRESSURE: 99 MMHG | BODY MASS INDEX: 21.89 KG/M2 | RESPIRATION RATE: 16 BRPM | TEMPERATURE: 99.4 F | SYSTOLIC BLOOD PRESSURE: 142 MMHG | WEIGHT: 131.4 LBS | HEIGHT: 65 IN | HEART RATE: 84 BPM

## 2019-01-15 DIAGNOSIS — C92.10 CML (CHRONIC MYELOID LEUKEMIA) (HCC): Primary | ICD-10-CM

## 2019-01-15 DIAGNOSIS — C92.10 CML (CHRONIC MYELOID LEUKEMIA) (HCC): ICD-10-CM

## 2019-01-15 LAB
BASOPHILS # BLD MANUAL: 1.17 10*3/MM3 (ref 0–0.2)
BASOPHILS NFR BLD AUTO: 8 % (ref 0–2)
DEPRECATED RDW RBC AUTO: 57.2 FL (ref 37–54)
EOSINOPHIL # BLD MANUAL: 0.15 10*3/MM3 (ref 0–0.7)
EOSINOPHIL NFR BLD MANUAL: 1 % (ref 0–7)
ERYTHROCYTE [DISTWIDTH] IN BLOOD BY AUTOMATED COUNT: 14.8 % (ref 11.7–13)
HCT VFR BLD AUTO: 43 % (ref 35.6–45.5)
HGB BLD-MCNC: 14.8 G/DL (ref 11.9–15.5)
LYMPHOCYTES # BLD MANUAL: 3.23 10*3/MM3 (ref 0.8–7)
LYMPHOCYTES NFR BLD MANUAL: 22 % (ref 24–44)
LYMPHOCYTES NFR BLD MANUAL: 7 % (ref 0–12)
MACROCYTES BLD QL SMEAR: ABNORMAL
MCH RBC QN AUTO: 36.2 PG (ref 26.9–32)
MCHC RBC AUTO-ENTMCNC: 34.4 G/DL (ref 32.4–36.3)
MCV RBC AUTO: 105.1 FL (ref 80.5–98.2)
METAMYELOCYTES NFR BLD MANUAL: 1 % (ref 0–0)
MONOCYTES # BLD AUTO: 1.03 10*3/MM3 (ref 0–1)
NEUTROPHILS # BLD AUTO: 8.94 10*3/MM3 (ref 1.9–8.1)
NEUTROPHILS NFR BLD MANUAL: 61 % (ref 42.7–76)
PLAT MORPH BLD: NORMAL
PLATELET # BLD AUTO: 1227 10*3/MM3 (ref 140–500)
PMV BLD AUTO: 9.9 FL (ref 6–12)
RBC # BLD AUTO: 4.09 10*6/MM3 (ref 3.9–5.2)
SCAN SLIDE: NORMAL
STOMATOCYTES BLD QL SMEAR: ABNORMAL
WBC MORPH BLD: NORMAL
WBC NRBC COR # BLD: 14.66 10*3/MM3 (ref 4.5–10.7)

## 2019-01-15 PROCEDURE — 85007 BL SMEAR W/DIFF WBC COUNT: CPT | Performed by: INTERNAL MEDICINE

## 2019-01-15 PROCEDURE — 36415 COLL VENOUS BLD VENIPUNCTURE: CPT

## 2019-01-15 PROCEDURE — 99215 OFFICE O/P EST HI 40 MIN: CPT | Performed by: INTERNAL MEDICINE

## 2019-01-15 PROCEDURE — 85025 COMPLETE CBC W/AUTO DIFF WBC: CPT | Performed by: INTERNAL MEDICINE

## 2019-01-15 RX ORDER — AMOXICILLIN 875 MG/1
TABLET, COATED ORAL
Refills: 0 | COMMUNITY
Start: 2018-12-12 | End: 2019-02-04

## 2019-01-15 RX ORDER — BENZONATATE 100 MG/1
100 CAPSULE ORAL
COMMUNITY
Start: 2018-12-12 | End: 2019-02-04

## 2019-01-15 RX ORDER — BENZONATATE 100 MG/1
CAPSULE ORAL
Refills: 0 | COMMUNITY
Start: 2018-12-12 | End: 2019-02-04

## 2019-01-15 RX ORDER — ALPRAZOLAM 0.5 MG/1
0.5 TABLET ORAL 2 TIMES DAILY PRN
Qty: 30 TABLET | Refills: 0 | Status: SHIPPED | OUTPATIENT
Start: 2019-01-15 | End: 2019-02-04

## 2019-01-15 NOTE — PROGRESS NOTES
[facility] GROUP OUTPATIENT FOLLOW UP CLINIC VISIT    REASON FOR FOLLOW-UP:    1.  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  Gleevec 400 mg daily initiated around 10/12/2017, stopped on 11/15/2017 due to intolerance (noah-orbital edema, nausea/vomiting, fatigue).  Resumed at 200 mg daily but, again, not tolerated.    3.  Nilotinib 150 mg twice daily started in late March, 2018.  Held due to intolerance and QTc prolongation.   4.  Hydroxyurea initiated in July 2018.     HISTORY OF PRESENT ILLNESS:  Nicole Lofton is a 73 y.o. female with the above-mentioned history here today for reevaluation.  She continues on hydroxyurea 1000 mg in the morning, and 500 mg in the evening.  This was increased in November.      She tolerates the medication well.  She reports some depression.  She reports diffuse joint stiffness.  She reports anxiety and requests alprazolam today.  She does not believe that her antidepressant is working.    ONCOLOGIC HISTORY:  For about 6 months she has noticed bilateral arm tingling and weakness in her hands.  This has occurred about 5 or 6 times over the past 6 months.  She is vague in her description of this.  She is globally weak and is quite inactive as she lives by herself.  She was completing physical therapy with some improvement but is now quite unsteady on her feet.  She has not had any falls.  She denies any bleeding.  She has chronic fatigue.  She has osteoarthritis pain and reports pain in the right side and in her neck.  She denies any acute low back pain but does have chronic low back pain.  She does have orthostatic symptoms and she is lightheaded when she stands.  She has had a decreased appetite and some weight loss over the past 9 months.     Labs performed on 8/28/2017 showed a white blood cell count of 39.1 with a differential showing 61% neutrophils and 32% lymphocytes, hemoglobin 13.9 and platelets 565,000.         She had  further labs done at [facility] on 8/20/2017 showing a white blood cell count of 24.4, hemoglobin 13.7, and platelets 1795.  A differential showed 64% neutrophils, 7% lymphocytes, and 12% basophils.  Peripheral blood flow cytometry was also done on this date showing 11% basophils and less than 1% myeloblasts.  There was no monoclonal B-cell, T-cell, plasma cell, or NK cell population.  FISH for BCR-ABL and ESSENCE 2 mutation analysis were done as well.     Her CBC was normal as of 7/8/2016.     Of note, she has a history of bilateral breast cancer diagnosed in 1988.  She had bilateral mastectomy with reconstruction.  We do not have any records regarding this at this time.  She apparently completed at least 6 months of adjuvant chemotherapy but again does not know the details..    FISH for BCR-ABL performed at [facility] was actually positive.  As of 9/6/2017 ESSENCE 2 is still pending.    She returned on 9/6/2017 for follow-up.  Platelets at 1.9 million with hydroxyurea 1000 mg daily.  She will increase the dose to 2000 mg daily.  She will have a bone marrow aspiration biopsy performed.  Weekly CBCs.    As of 9/6/2017 peripheral blood PCR was positive at 89.238% with a major break point mutation.    ESSENCE 2 and CALR mutation negative.    Bone marrow aspiration and biopsy performed on 9/15/2017.  Flow cytometry showed no increase in blasts.  Morphology showed no increase in blasts.  Bone marrow FISH showed BCR/ABL rearrangement in 93% of cells.  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    Labs as of 9/20/2017 show white blood cell count of 4.7 with hemoglobin of 12.7 and platelets 1.1 million.    On 9/27/2017 white blood cells and hemoglobin are normal.  Platelets are down to 722,000.  We plan to start Gleevec at 400 mg daily if she tolerates it.  In the meantime she will decrease the hydroxyurea dose to 1000 mg daily.    Blood counts normalized.  Hydroxyurea stopped.    Gleevec initiated around 10/12/2017, stopped on  11/15/2017 due to intolerance (noah-orbital edema, nausea/vomiting, fatigue).    Symptoms improving as of 11/29/2017.  Blood counts normal.  Hold further therapy at this time until she improves more clinically.      Plan to resume Gleevec at 200 mg daily as of 1/11/2018.  Only took it for less than a week but also did not tolerate this dose.    As of 2/21/2018 her platelet count is again elevated at greater than 700,000.  Resume hydroxyurea 1000 mg daily.  Considering nilotinib at a decreased dose.    Platelets continued to rise in March 2018.  Hydroxyurea increased to 1500 mg daily with stabilization of her platelet count and some improvement as of 3/21/2018.  Plan for nilotinib.    Nilotinib initiated in late March 2018.    Subsequently held due to QTC prolongation.    Hydroxyurea 1000 mg daily resumed at the end of July 2018.  Increased the dose to 1000 mg twice daily as of 8/7/2018 for a rising platelet count of 1.3 million.    Platelets as of 9/7/2018 500,000.  Continue hydroxyurea 1000 mg twice daily.    As of 10/12/2018 her CBC has improved significantly.  Platelets 242,000 with a white blood cell count of 3.52.  Decrease hydroxyurea to 1000 mg daily.    Platelets increased as of 11/20/2018.  Increased hydroxyurea to 1000 mg in the morning and 500 mg in the evening.    PAST MEDICAL, SURGICAL, FAMILY, AND SOCIAL HISTORIES were reviewed with the patient and in the electronic medical record, and were updated if indicated.    ALLERGIES:  No Known Allergies    MEDICATIONS:  The medication list has been reviewed with the patient by the medical assistant, and the list has been updated in the electronic medical record, which I reviewed.  Medication dosages and frequencies were confirmed to be accurate.    I have reviewed the patient's medical history in detail and updated the computerized patient record.    Review of Systems   Constitutional: Positive for appetite change and fatigue. Negative for chills, fever and  "unexpected weight change.   HENT:   Negative for hearing loss, mouth sores, nosebleeds and trouble swallowing.         See HPI   Respiratory: Negative for cough and shortness of breath.    Cardiovascular: Negative for chest pain and leg swelling.   Gastrointestinal: Negative for abdominal pain, constipation, diarrhea, nausea and vomiting.   Endocrine: Negative for hot flashes.   Genitourinary: Negative for difficulty urinating.    Musculoskeletal: Positive for arthralgias and back pain. Negative for myalgias.   Skin: Negative for rash.   Neurological: Positive for dizziness. Negative for extremity weakness.   Hematological: Negative for adenopathy. Does not bruise/bleed easily.   Psychiatric/Behavioral: Positive for depression and sleep disturbance. The patient is nervous/anxious.    12/18/2018 review of systems is unchanged from above except as noted.    Vitals:    01/15/19 1106   BP: 142/99   Pulse: 84   Resp: 16   Temp: 99.4 °F (37.4 °C)   TempSrc: Oral   SpO2: 99%   Weight: 59.6 kg (131 lb 6.4 oz)   Height: 165 cm (64.96\")   PainSc: 0-No pain  Comment: leukemia       Physical Exam   Constitutional: She is oriented to person, place, and time. She appears well-developed and well-nourished. No distress.   HENT:   Head: Normocephalic and atraumatic.   Mouth/Throat: Oropharynx is clear and moist and mucous membranes are normal. No oropharyngeal exudate.   Eyes: Pupils are equal, round, and reactive to light.   Neck: Normal range of motion.   Cardiovascular: Normal rate, regular rhythm and normal heart sounds.   No murmur heard.  Pulmonary/Chest: Effort normal and breath sounds normal. No respiratory distress. She has no wheezes. She has no rhonchi. She has no rales.   Abdominal: Soft. Normal appearance and bowel sounds are normal. She exhibits no distension. There is no hepatosplenomegaly.   Musculoskeletal: Normal range of motion. She exhibits no edema.   Neurological: She is alert and oriented to person, place, and " time.   Skin: Skin is warm and dry. No rash noted.   Psychiatric: She has a normal mood and affect.   Vitals reviewed.      DIAGNOSTIC DATA:  Results for orders placed or performed in visit on 01/15/19   CBC Auto Differential   Result Value Ref Range    WBC 14.66 (H) 4.50 - 10.70 10*3/mm3    RBC 4.09 3.90 - 5.20 10*6/mm3    Hemoglobin 14.8 11.9 - 15.5 g/dL    Hematocrit 43.0 35.6 - 45.5 %    .1 (H) 80.5 - 98.2 fL    MCH 36.2 (H) 26.9 - 32.0 pg    MCHC 34.4 32.4 - 36.3 g/dL    RDW 14.8 (H) 11.7 - 13.0 %    RDW-SD 57.2 (H) 37.0 - 54.0 fl    MPV 9.9 6.0 - 12.0 fL    Platelets 1,227 (H) 140 - 500 10*3/mm3   Scan Slide   Result Value Ref Range    Scan Slide     Manual Differential   Result Value Ref Range    Neutrophil % 61.0 42.7 - 76.0 %    Lymphocyte % 22.0 (L) 24.0 - 44.0 %    Monocyte % 7.0 0.0 - 12.0 %    Eosinophil % 1.0 0.0 - 7.0 %    Basophil % 8.0 (H) 0.0 - 2.0 %    Metamyelocyte % 1.0 (H) 0.0 - 0.0 %    Neutrophils Absolute 8.94 (H) 1.90 - 8.10 10*3/mm3    Lymphocytes Absolute 3.23 0.80 - 7.00 10*3/mm3    Monocytes Absolute 1.03 (H) 0.00 - 1.00 10*3/mm3    Eosinophils Absolute 0.15 0.00 - 0.70 10*3/mm3    Basophils Absolute 1.17 (H) 0.00 - 0.20 10*3/mm3    Macrocytes Mod/2+ None Seen    Stomatocytes Slight/1+ None Seen    WBC Morphology Normal Normal    Platelet Morphology Normal Normal         IMAGING:  None reviewed    ASSESSMENT:  This is a 73 y.o. female with:  1.  History of bilateral breast cancer in 1988 status post bilateral mastectomy.  She apparently completed 6 months of adjuvant therapy.  We have no details regarding this.      2.  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  Started on Hydrea 1000 mg daily.  This was discontinued and she started Gleevec on approximately 10/12/2017.  Gleevec discontinued due to intolerance on 11/15/2017.  We started Gleevec at a lower dose of 200 mg but she was also intolerant of this dose.  Her side  effects were as severe with a lower dose and she therefore stopped taking the drug.  Symptoms improved significantly.  Platelet count subsequently elevated again.    She started nilotinib at 150mg twice daily at the end of March 2018.  This was held in mid-May due to QTc prolongation.  The QTc interval did subsequently normalized.  However, due to this and other adverse effects we are not able to resume the nilotinib nor does she desire to.    In July we resumed hydroxyurea 1000 mg daily.  Her platelet count increased.  Her dose was increased to 1000 mg twice daily.  Blood counts improved nicely.  Platelet count normalized but her white blood cell count dropped to below normal.  Decreased hydroxyurea to 1000 mg daily.    11/20/18 platelets were up again.  We increased the hydroxyurea to 1000 mg in the mornings and 500 mg in the evenings.    As of 1/15/2019 her platelet count is again elevated to 1.2 million.  Increase hydroxyurea to 1000 mg twice daily.  She will follow-up very soon and if her platelet count has not improved she does desire to consider additional medication at this point.    3.  Thrombocytosis:  Due to CML.  See above.  4.  Hypothyroidism:  Levothyroxine 100 mcg.  5.  Nausea and vomiting: Resolved off Gleevec.   6. Periorbital edema: Resolved off Gleevec.    7.  Hypokalemia:  Potassium normal at 3.6 as of 10/12/2018.  8.  Osteoarthritis pain: she reports this became worse after starting nilotinib. She is taking acetaminophen for this.  Prescribed meloxicam on 8/7/2018.  No longer on nilotinib.  She will continue meloxicam although she has not been taking it.  Hopefully will improve with decreasing the hydroxyurea dose as well.  9.  Depression: She had been on Prozac for years, however, drug interaction with nilotinib prompted us to change this to Paxil. Her depression is worse.  Primary care managing at this point.  She is now on virtioxetine (Trintellix).     10.  Insomnia.  This had been treated  with trazodone but as mentioned above this also caused a potential for QT interval prolongation.  She was therefore changed to temazepam.  Primary care now managing.    11.  QTc prolongation: Due to nilotinib.  This resolved.  She saw Dr. Duque with cardiology.    12.  Tobacco use: She is unwilling to quit smoking  13.  Anxiety: Prescription for alprazolam written today.    PLAN:  1.  Increase hydroxyurea to 1000 twice daily.  2.  Prescription for alprazolam sent to her pharmacy  3.  I will see her back next Friday.  If her platelet count has not improved she does indicate that she is willing to perhaps try a different medication to treat his CML at this point.   Previously discussed injectable Synribo as she was not interested in other TKIs.        Burt Merida MD

## 2019-01-25 ENCOUNTER — OFFICE VISIT (OUTPATIENT)
Dept: ONCOLOGY | Facility: CLINIC | Age: 74
End: 2019-01-25

## 2019-01-25 ENCOUNTER — LAB (OUTPATIENT)
Dept: OTHER | Facility: HOSPITAL | Age: 74
End: 2019-01-25

## 2019-01-25 VITALS
HEART RATE: 80 BPM | SYSTOLIC BLOOD PRESSURE: 109 MMHG | WEIGHT: 130.2 LBS | DIASTOLIC BLOOD PRESSURE: 70 MMHG | OXYGEN SATURATION: 97 % | BODY MASS INDEX: 21.69 KG/M2 | TEMPERATURE: 97.9 F | RESPIRATION RATE: 16 BRPM | HEIGHT: 65 IN

## 2019-01-25 DIAGNOSIS — R30.0 DYSURIA: Primary | ICD-10-CM

## 2019-01-25 DIAGNOSIS — C92.10 CML (CHRONIC MYELOID LEUKEMIA) (HCC): ICD-10-CM

## 2019-01-25 LAB
BASOPHILS # BLD MANUAL: 0.41 10*3/MM3 (ref 0–0.2)
BASOPHILS NFR BLD AUTO: 5 % (ref 0–2)
DEPRECATED RDW RBC AUTO: 59.7 FL (ref 37–54)
EOSINOPHIL # BLD MANUAL: 0.33 10*3/MM3 (ref 0–0.7)
EOSINOPHIL NFR BLD MANUAL: 4 % (ref 0–7)
ERYTHROCYTE [DISTWIDTH] IN BLOOD BY AUTOMATED COUNT: 15.6 % (ref 11.7–13)
HCT VFR BLD AUTO: 39.5 % (ref 35.6–45.5)
HGB BLD-MCNC: 13.9 G/DL (ref 11.9–15.5)
LYMPHOCYTES # BLD MANUAL: 1.73 10*3/MM3 (ref 0.8–7)
LYMPHOCYTES NFR BLD MANUAL: 21 % (ref 24–44)
LYMPHOCYTES NFR BLD MANUAL: 5 % (ref 0–12)
MCH RBC QN AUTO: 36.8 PG (ref 26.9–32)
MCHC RBC AUTO-ENTMCNC: 35.2 G/DL (ref 32.4–36.3)
MCV RBC AUTO: 104.5 FL (ref 80.5–98.2)
MONOCYTES # BLD AUTO: 0.41 10*3/MM3 (ref 0–1)
NEUTROPHILS # BLD AUTO: 5.35 10*3/MM3 (ref 1.9–8.1)
NEUTROPHILS NFR BLD MANUAL: 65 % (ref 42.7–76)
PLAT MORPH BLD: NORMAL
PLATELET # BLD AUTO: 1083 10*3/MM3 (ref 140–500)
PMV BLD AUTO: 9.7 FL (ref 6–12)
POLYCHROMASIA BLD QL SMEAR: ABNORMAL
RBC # BLD AUTO: 3.78 10*6/MM3 (ref 3.9–5.2)
SCAN SLIDE: NORMAL
STOMATOCYTES BLD QL SMEAR: ABNORMAL
WBC MORPH BLD: NORMAL
WBC NRBC COR # BLD: 8.23 10*3/MM3 (ref 4.5–10.7)

## 2019-01-25 PROCEDURE — 85025 COMPLETE CBC W/AUTO DIFF WBC: CPT | Performed by: INTERNAL MEDICINE

## 2019-01-25 PROCEDURE — 36415 COLL VENOUS BLD VENIPUNCTURE: CPT

## 2019-01-25 PROCEDURE — 85007 BL SMEAR W/DIFF WBC COUNT: CPT | Performed by: INTERNAL MEDICINE

## 2019-01-25 PROCEDURE — 99215 OFFICE O/P EST HI 40 MIN: CPT | Performed by: INTERNAL MEDICINE

## 2019-01-25 NOTE — PROGRESS NOTES
[facility] GROUP OUTPATIENT FOLLOW UP CLINIC VISIT    REASON FOR FOLLOW-UP:    1.  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  Gleevec 400 mg daily initiated around 10/12/2017, stopped on 11/15/2017 due to intolerance (noah-orbital edema, nausea/vomiting, fatigue).  Resumed at 200 mg daily but, again, not tolerated.    3.  Nilotinib 150 mg twice daily started in late March, 2018.  Held due to intolerance and QTc prolongation.   4.  Hydroxyurea initiated in July 2018.     HISTORY OF PRESENT ILLNESS:  Nicole Lofton is a 73 y.o. female with the above-mentioned history here today for reevaluation.  She continues on hydroxyurea 1000 mg twice daily.    She notes a decreased appetite on this dose.  She notes ongoing fatigue.  She has felt depressed since her daughter was gone but her daughter is now back and plans to keep a closer eye on her.  No bleeding.  No fevers or chills.  She also has had increased urinary frequency and burning with urination over the past 3 weeks or so.  No fevers or chills.    ONCOLOGIC HISTORY:  For about 6 months she has noticed bilateral arm tingling and weakness in her hands.  This has occurred about 5 or 6 times over the past 6 months.  She is vague in her description of this.  She is globally weak and is quite inactive as she lives by herself.  She was completing physical therapy with some improvement but is now quite unsteady on her feet.  She has not had any falls.  She denies any bleeding.  She has chronic fatigue.  She has osteoarthritis pain and reports pain in the right side and in her neck.  She denies any acute low back pain but does have chronic low back pain.  She does have orthostatic symptoms and she is lightheaded when she stands.  She has had a decreased appetite and some weight loss over the past 9 months.     Labs performed on 8/28/2017 showed a white blood cell count of 39.1 with a differential showing 61% neutrophils and  32% lymphocytes, hemoglobin 13.9 and platelets 565,000.         She had further labs done at [facility] on 8/20/2017 showing a white blood cell count of 24.4, hemoglobin 13.7, and platelets 1795.  A differential showed 64% neutrophils, 7% lymphocytes, and 12% basophils.  Peripheral blood flow cytometry was also done on this date showing 11% basophils and less than 1% myeloblasts.  There was no monoclonal B-cell, T-cell, plasma cell, or NK cell population.  FISH for BCR-ABL and ESSENCE 2 mutation analysis were done as well.     Her CBC was normal as of 7/8/2016.     Of note, she has a history of bilateral breast cancer diagnosed in 1988.  She had bilateral mastectomy with reconstruction.  We do not have any records regarding this at this time.  She apparently completed at least 6 months of adjuvant chemotherapy but again does not know the details..    FISH for BCR-ABL performed at [facility] was actually positive.  As of 9/6/2017 ESSENCE 2 is still pending.    She returned on 9/6/2017 for follow-up.  Platelets at 1.9 million with hydroxyurea 1000 mg daily.  She will increase the dose to 2000 mg daily.  She will have a bone marrow aspiration biopsy performed.  Weekly CBCs.    As of 9/6/2017 peripheral blood PCR was positive at 89.238% with a major break point mutation.    ESSENCE 2 and CALR mutation negative.    Bone marrow aspiration and biopsy performed on 9/15/2017.  Flow cytometry showed no increase in blasts.  Morphology showed no increase in blasts.  Bone marrow FISH showed BCR/ABL rearrangement in 93% of cells.  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    Labs as of 9/20/2017 show white blood cell count of 4.7 with hemoglobin of 12.7 and platelets 1.1 million.    On 9/27/2017 white blood cells and hemoglobin are normal.  Platelets are down to 722,000.  We plan to start Gleevec at 400 mg daily if she tolerates it.  In the meantime she will decrease the hydroxyurea dose to 1000 mg daily.    Blood counts normalized.   Hydroxyurea stopped.    Gleevec initiated around 10/12/2017, stopped on 11/15/2017 due to intolerance (noah-orbital edema, nausea/vomiting, fatigue).    Symptoms improving as of 11/29/2017.  Blood counts normal.  Hold further therapy at this time until she improves more clinically.      Plan to resume Gleevec at 200 mg daily as of 1/11/2018.  Only took it for less than a week but also did not tolerate this dose.    As of 2/21/2018 her platelet count is again elevated at greater than 700,000.  Resume hydroxyurea 1000 mg daily.  Considering nilotinib at a decreased dose.    Platelets continued to rise in March 2018.  Hydroxyurea increased to 1500 mg daily with stabilization of her platelet count and some improvement as of 3/21/2018.  Plan for nilotinib.    Nilotinib initiated in late March 2018.    Subsequently held due to QTC prolongation.    Hydroxyurea 1000 mg daily resumed at the end of July 2018.  Increased the dose to 1000 mg twice daily as of 8/7/2018 for a rising platelet count of 1.3 million.    Platelets as of 9/7/2018 500,000.  Continue hydroxyurea 1000 mg twice daily.    As of 10/12/2018 her CBC has improved significantly.  Platelets 242,000 with a white blood cell count of 3.52.  Decrease hydroxyurea to 1000 mg daily.    Platelets increased as of 11/20/2018.  Increased hydroxyurea to 1000 mg in the morning and 500 mg in the evening.    Platelets were increased to 1.2 million on 1/15/2019.  Hydroxyurea increased 1000 mg twice daily.        PAST MEDICAL, SURGICAL, FAMILY, AND SOCIAL HISTORIES were reviewed with the patient and in the electronic medical record, and were updated if indicated.    ALLERGIES:  No Known Allergies    MEDICATIONS:  The medication list has been reviewed with the patient by the medical assistant, and the list has been updated in the electronic medical record, which I reviewed.  Medication dosages and frequencies were confirmed to be accurate.    I have reviewed the patient's medical  "history in detail and updated the computerized patient record.    Review of Systems   Constitutional: Positive for appetite change and fatigue. Negative for chills, fever and unexpected weight change.   HENT:   Negative for hearing loss, mouth sores, nosebleeds and trouble swallowing.    Respiratory: Negative for cough and shortness of breath.    Cardiovascular: Negative for chest pain and leg swelling.   Gastrointestinal: Negative for abdominal pain, constipation, diarrhea, nausea and vomiting.   Endocrine: Negative for hot flashes.   Genitourinary: Positive for dysuria and frequency. Negative for difficulty urinating.    Musculoskeletal: Positive for arthralgias and back pain. Negative for myalgias.   Skin: Negative for rash.   Neurological: Positive for dizziness. Negative for extremity weakness.   Hematological: Negative for adenopathy. Does not bruise/bleed easily.   Psychiatric/Behavioral: Positive for depression and sleep disturbance. The patient is nervous/anxious.    12/18/2018 review of systems is unchanged from above except as noted.    Vitals:    01/25/19 1513   BP: 109/70   Pulse: 80   Resp: 16   Temp: 97.9 °F (36.6 °C)   TempSrc: Oral   SpO2: 97%   Weight: 59.1 kg (130 lb 3.2 oz)   Height: 165 cm (64.96\")   PainSc: 0-No pain  Comment: leukemia       Physical Exam   Constitutional: She is oriented to person, place, and time. She appears well-developed and well-nourished. No distress.   Chronically ill appearing   HENT:   Head: Normocephalic and atraumatic.   Mouth/Throat: Oropharynx is clear and moist and mucous membranes are normal. No oropharyngeal exudate.   Eyes: Pupils are equal, round, and reactive to light.   Neck: Normal range of motion.   Cardiovascular: Normal rate, regular rhythm and normal heart sounds.   No murmur heard.  Pulmonary/Chest: Effort normal and breath sounds normal. No respiratory distress. She has no wheezes. She has no rhonchi. She has no rales.   Abdominal: Soft. Normal " appearance and bowel sounds are normal. She exhibits no distension. There is no hepatosplenomegaly.   Musculoskeletal: Normal range of motion. She exhibits no edema.   Neurological: She is alert and oriented to person, place, and time.   Skin: Skin is warm and dry. No rash noted.   Psychiatric: She has a normal mood and affect.   Vitals reviewed.      DIAGNOSTIC DATA:  Results for orders placed or performed in visit on 01/25/19   CBC Auto Differential   Result Value Ref Range    WBC 8.23 4.50 - 10.70 10*3/mm3    RBC 3.78 (L) 3.90 - 5.20 10*6/mm3    Hemoglobin 13.9 11.9 - 15.5 g/dL    Hematocrit 39.5 35.6 - 45.5 %    .5 (H) 80.5 - 98.2 fL    MCH 36.8 (H) 26.9 - 32.0 pg    MCHC 35.2 32.4 - 36.3 g/dL    RDW 15.6 (H) 11.7 - 13.0 %    RDW-SD 59.7 (H) 37.0 - 54.0 fl    MPV 9.7 6.0 - 12.0 fL    Platelets 1,083 (H) 140 - 500 10*3/mm3   Scan Slide   Result Value Ref Range    Scan Slide     Manual Differential   Result Value Ref Range    Neutrophil % 65.0 42.7 - 76.0 %    Lymphocyte % 21.0 (L) 24.0 - 44.0 %    Monocyte % 5.0 0.0 - 12.0 %    Eosinophil % 4.0 0.0 - 7.0 %    Basophil % 5.0 (H) 0.0 - 2.0 %    Neutrophils Absolute 5.35 1.90 - 8.10 10*3/mm3    Lymphocytes Absolute 1.73 0.80 - 7.00 10*3/mm3    Monocytes Absolute 0.41 0.00 - 1.00 10*3/mm3    Eosinophils Absolute 0.33 0.00 - 0.70 10*3/mm3    Basophils Absolute 0.41 (H) 0.00 - 0.20 10*3/mm3    Polychromasia Slight/1+ None Seen    Stomatocytes Slight/1+ None Seen    WBC Morphology Normal Normal    Platelet Morphology Normal Normal         IMAGING:  None reviewed    ASSESSMENT:  This is a 73 y.o. female with:  1.  History of bilateral breast cancer in 1988 status post bilateral mastectomy.  She apparently completed 6 months of adjuvant therapy.  We have no details regarding this.      2.  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  Started on Hydrea 1000 mg daily.  This was discontinued and she started Gleevec  on approximately 10/12/2017.  Gleevec discontinued due to intolerance on 11/15/2017.  We started Gleevec at a lower dose of 200 mg but she was also intolerant of this dose.  Her side effects were as severe with a lower dose and she therefore stopped taking the drug.  Symptoms improved significantly.  Platelet count subsequently elevated again.    She started nilotinib at 150mg twice daily at the end of March 2018.  This was held in mid-May due to QTc prolongation.  The QTc interval did subsequently normalized.  However, due to this and other adverse effects we are not able to resume the nilotinib nor does she desire to.    In July we resumed hydroxyurea 1000 mg daily.  Her platelet count increased.  Her dose was increased to 1000 mg twice daily.  Blood counts improved nicely.  Platelet count normalized but her white blood cell count dropped to below normal.  Decreased hydroxyurea to 1000 mg daily.    11/20/18 platelets were up again.  We increased the hydroxyurea to 1000 mg in the mornings and 500 mg in the evenings.    As of 1/15/2019 her platelet count was again elevated to 1.2 million.  Increased hydroxyurea to 1000 mg twice daily.      Platelets improved today.  Continue hydroxyurea 1000 mg twice daily.    I'm afraid she will not tolerate any further TKIs given her intolerance of the others.  We again discussed the possibility of Synribo today, which is injectable.  We would need to work out the logistics of this.  In the meantime, we did decide today that getting another opinion would be useful.  I have referred her down to the [facility] to see Dr. Cifuentes for assistance.      3.  Thrombocytosis:  Due to CML.  See above.  4.  Hypothyroidism:  Levothyroxine 100 mcg.  5.  Nausea and vomiting: Resolved off Gleevec.   6. Periorbital edema: Resolved off Gleevec.    7.  Hypokalemia:  Potassium previously normalized.  8.  Osteoarthritis pain: she reports this became worse after starting nilotinib. She is  taking acetaminophen for this.  Prescribed meloxicam on 8/7/2018.  No longer on nilotinib.  She will continue meloxicam.  9.  Depression: She had been on Prozac for years, however, drug interaction with nilotinib prompted us to change this to Paxil. Her depression is worse.  Primary care managing at this point.  She is now on virtioxetine (Trintellix).     10.  Insomnia.  This had been treated with trazodone but as mentioned above this also caused a potential for QT interval prolongation.  She was therefore changed to temazepam.  Primary care now managing.   11.  QTc prolongation: Due to nilotinib.  This resolved.  She saw Dr. Duque with cardiology.    12.  Tobacco use: She is unwilling to quit smoking  13.  Anxiety: Prescription for alprazolam written at her last visit.   14.  Dysuria:  History of frequent UTIs.  Urinalysis today.      PLAN:  1.  Continue hydroxyurea at 1000 twice daily.  2.  Continue alprazolam  3.  Urinalysis  4.  Referred to the [facility] to see Dr. Cifuentes for another opinion.  5.  I will see her back in about 3 weeks for follow-up with a CBC.      Burt Merida MD

## 2019-01-28 RX ORDER — ALPRAZOLAM 0.5 MG/1
TABLET ORAL
Qty: 30 TABLET | Refills: 0 | OUTPATIENT
Start: 2019-01-28

## 2019-01-31 RX ORDER — HYDROXYUREA 500 MG/1
1000 CAPSULE ORAL 2 TIMES DAILY
Qty: 120 CAPSULE | Refills: 0 | Status: SHIPPED | OUTPATIENT
Start: 2019-01-31 | End: 2019-03-06

## 2019-02-04 ENCOUNTER — OFFICE VISIT (OUTPATIENT)
Dept: INTERNAL MEDICINE | Facility: CLINIC | Age: 74
End: 2019-02-04

## 2019-02-04 VITALS
SYSTOLIC BLOOD PRESSURE: 122 MMHG | OXYGEN SATURATION: 98 % | HEART RATE: 76 BPM | WEIGHT: 130.6 LBS | HEIGHT: 65 IN | DIASTOLIC BLOOD PRESSURE: 64 MMHG | BODY MASS INDEX: 21.76 KG/M2

## 2019-02-04 DIAGNOSIS — R30.0 BURNING WITH URINATION: ICD-10-CM

## 2019-02-04 DIAGNOSIS — E03.9 ACQUIRED HYPOTHYROIDISM: Primary | ICD-10-CM

## 2019-02-04 DIAGNOSIS — F32.9 MAJOR DEPRESSIVE DISORDER WITH CURRENT ACTIVE EPISODE, UNSPECIFIED DEPRESSION EPISODE SEVERITY, UNSPECIFIED WHETHER RECURRENT: ICD-10-CM

## 2019-02-04 DIAGNOSIS — R35.0 URINARY FREQUENCY: ICD-10-CM

## 2019-02-04 DIAGNOSIS — F51.01 PRIMARY INSOMNIA: ICD-10-CM

## 2019-02-04 LAB
BILIRUB BLD-MCNC: NEGATIVE MG/DL
CLARITY, POC: CLEAR
COLOR UR: YELLOW
GLUCOSE UR STRIP-MCNC: NEGATIVE MG/DL
KETONES UR QL: NEGATIVE
LEUKOCYTE EST, POC: NEGATIVE
NITRITE UR-MCNC: NEGATIVE MG/ML
PH UR: 6.5 [PH] (ref 5–8)
PROT UR STRIP-MCNC: NEGATIVE MG/DL
RBC # UR STRIP: NEGATIVE /UL
SP GR UR: 1.01 (ref 1–1.03)
UROBILINOGEN UR QL: NORMAL

## 2019-02-04 PROCEDURE — 99214 OFFICE O/P EST MOD 30 MIN: CPT | Performed by: FAMILY MEDICINE

## 2019-02-04 PROCEDURE — 81003 URINALYSIS AUTO W/O SCOPE: CPT | Performed by: FAMILY MEDICINE

## 2019-02-04 RX ORDER — LORAZEPAM 0.5 MG/1
0.5 TABLET ORAL NIGHTLY PRN
Qty: 30 TABLET | Refills: 0 | Status: SHIPPED | OUTPATIENT
Start: 2019-02-04 | End: 2019-03-04 | Stop reason: SDUPTHER

## 2019-02-04 RX ORDER — SENNOSIDES 8.6 MG
1 TABLET ORAL NIGHTLY
COMMUNITY
End: 2019-03-06

## 2019-02-04 RX ORDER — DIPHENHYDRAMINE HCL 25 MG
25 TABLET ORAL DAILY
COMMUNITY
End: 2019-03-06

## 2019-02-05 LAB
FT4I SERPL CALC-MCNC: 2.9 (ref 1.2–4.9)
T3RU NFR SERPL: 28 % (ref 24–39)
T4 SERPL-MCNC: 10.3 UG/DL (ref 4.5–12)
TSH SERPL DL<=0.005 MIU/L-ACNC: 0.85 UIU/ML (ref 0.45–4.5)

## 2019-02-08 ENCOUNTER — TELEPHONE (OUTPATIENT)
Dept: INTERNAL MEDICINE | Facility: CLINIC | Age: 74
End: 2019-02-08

## 2019-02-08 NOTE — TELEPHONE ENCOUNTER
Attempted to contact patient- no answer on home phone. Option to LVM was not available. Will try again later.

## 2019-02-08 NOTE — TELEPHONE ENCOUNTER
----- Message from Keith Campbell MD sent at 2/5/2019  8:40 PM EST -----  The labs were reviewed. Please inform patient that labs were normal.

## 2019-02-16 NOTE — PROGRESS NOTES
Subjective   Nicole Lofton is a 73 y.o. female.     Chief Complaint   Patient presents with   • Hypothyroidism   • Depression   • Insomnia         History of Present Illness     Has a past medical history for hypothyroidism.  Patient is currently taking levothyroxine 100 mg daily.  She denies any side effects of the medication.    Patient does have a past medical history for primary insomnia.  Patient has used some old trazodone to help her sleep.  Patient states that she is still having trouble sleeping.  Patient has failed Belsomra, and does not want to take Ambien.    Patient states that her major depressive disorder has been well controlled on being on trintellix 10mg daily.  She denies any side effects of the medication.  She states her medicine seems to be helping her well.    The following portions of the patient's history were reviewed and updated as appropriate: allergies, current medications, past family history, past medical history, past social history, past surgical history and problem list.    Review of Systems   Constitutional: Negative for chills and fever.   HENT: Negative for congestion, rhinorrhea, sinus pain and sore throat.    Eyes: Negative for photophobia and visual disturbance.   Respiratory: Negative for cough, chest tightness and shortness of breath.    Cardiovascular: Negative for chest pain and palpitations.   Gastrointestinal: Negative for diarrhea, nausea and vomiting.   Genitourinary: Negative for dysuria, frequency and urgency.   Skin: Negative for rash and wound.   Neurological: Negative for dizziness and syncope.   Psychiatric/Behavioral: Positive for sleep disturbance. Negative for behavioral problems and confusion.       Objective   Physical Exam   Constitutional: She is oriented to person, place, and time. She appears well-developed and well-nourished.   HENT:   Head: Normocephalic and atraumatic.   Right Ear: External ear normal.   Left Ear: External ear normal.   Eyes:  EOM are normal.   Neck: Normal range of motion. Neck supple.   Cardiovascular: Normal rate, regular rhythm and normal heart sounds.   Pulmonary/Chest: Effort normal and breath sounds normal. No respiratory distress.   Musculoskeletal: Normal range of motion.   Lymphadenopathy:     She has no cervical adenopathy.   Neurological: She is alert and oriented to person, place, and time.   Skin: Skin is warm.   Psychiatric: She has a normal mood and affect. Her behavior is normal.   Nursing note and vitals reviewed.      Assessment/Plan   Nicole was seen today for hypothyroidism, depression and insomnia.    Diagnoses and all orders for this visit:    Acquired hypothyroidism  -     Continue levothyroxine 100 mcg daily.  -     Thyroid Panel With TSH    Primary insomnia  -     LORazepam (ATIVAN) 0.5 MG tablet; Take 1 tablet by mouth At Night As Needed for Anxiety or Sedation.  -     We will start patient on Ativan nightly to help her for her sleep.  Patient has failed many other options.    Major depressive disorder with current active episode, unspecified depression episode severity, unspecified whether recurrent        -     Stable on Trintellix 10mg daily.  To continue the current medication.    Urinary frequency  -     POCT urinalysis dipstick, automated    Burning with urination  -     POCT urinalysis dipstick, automated          No Follow-up on file.    Dictated utilizing Dragon Voice Recognition Software

## 2019-02-19 RX ORDER — LEVOTHYROXINE SODIUM 0.1 MG/1
100 TABLET ORAL DAILY
Qty: 30 TABLET | Refills: 1 | Status: SHIPPED | OUTPATIENT
Start: 2019-02-19 | End: 2019-03-21 | Stop reason: SDUPTHER

## 2019-03-01 ENCOUNTER — APPOINTMENT (OUTPATIENT)
Dept: OTHER | Facility: HOSPITAL | Age: 74
End: 2019-03-01

## 2019-03-04 DIAGNOSIS — E03.9 ACQUIRED HYPOTHYROIDISM: ICD-10-CM

## 2019-03-04 DIAGNOSIS — F51.01 PRIMARY INSOMNIA: ICD-10-CM

## 2019-03-04 RX ORDER — LORAZEPAM 0.5 MG/1
0.5 TABLET ORAL NIGHTLY PRN
Qty: 30 TABLET | Refills: 0 | OUTPATIENT
Start: 2019-03-04 | End: 2019-03-06 | Stop reason: SDUPTHER

## 2019-03-04 NOTE — TELEPHONE ENCOUNTER
Minnie faxed our office requesting a refill on patient's Ativan prescription. Patient's last office visit was on 2/4/19. Patient's next office visit is scheduled for 3/6/19. Patient's last refill was on 2/4/19. Jose is up to date and CSC has been initiated for patient to sign at next office visit.

## 2019-03-06 ENCOUNTER — OFFICE VISIT (OUTPATIENT)
Dept: INTERNAL MEDICINE | Facility: CLINIC | Age: 74
End: 2019-03-06

## 2019-03-06 VITALS
BODY MASS INDEX: 21.46 KG/M2 | HEIGHT: 65 IN | SYSTOLIC BLOOD PRESSURE: 108 MMHG | WEIGHT: 128.8 LBS | HEART RATE: 68 BPM | OXYGEN SATURATION: 96 % | DIASTOLIC BLOOD PRESSURE: 64 MMHG

## 2019-03-06 DIAGNOSIS — F51.01 PRIMARY INSOMNIA: ICD-10-CM

## 2019-03-06 PROCEDURE — 99213 OFFICE O/P EST LOW 20 MIN: CPT | Performed by: FAMILY MEDICINE

## 2019-03-06 RX ORDER — LORAZEPAM 0.5 MG/1
0.5 TABLET ORAL NIGHTLY PRN
Qty: 30 TABLET | Refills: 0 | OUTPATIENT
Start: 2019-03-06 | End: 2019-03-29 | Stop reason: SDUPTHER

## 2019-03-06 RX ORDER — NAPROXEN SODIUM 220 MG
220 TABLET ORAL DAILY PRN
COMMUNITY
End: 2019-09-09

## 2019-03-06 RX ORDER — POTASSIUM CHLORIDE 1.5 G/1.77G
20 POWDER, FOR SOLUTION ORAL 2 TIMES DAILY
Qty: 60 PACKET | Refills: 5 | Status: SHIPPED | OUTPATIENT
Start: 2019-03-06 | End: 2019-11-15 | Stop reason: SDDI

## 2019-03-08 PROBLEM — E87.6 LOW BLOOD POTASSIUM: Status: ACTIVE | Noted: 2019-03-08

## 2019-03-09 NOTE — PROGRESS NOTES
Subjective   Nicole Lofton is a 73 y.o. female.     Chief Complaint   Patient presents with   • Hypothyroidism   • Insomnia   • Depression   • Medication Question     patient stated that she stopped taking her potassium 20 meq bid due to the size of the pill, she wanted to see if she could get the packets to dissolve in water instead         History of Present Illness     Patient presents today for primary insomnia.  Patient states that after trying multiple different medications, the Ativan seems to help her.  She denies any side effects of the medication.  Patient states that she is able to sleep well with the medicine.    The following portions of the patient's history were reviewed and updated as appropriate: allergies, current medications, past family history, past medical history, past social history, past surgical history and problem list.    Review of Systems   Constitutional: Negative.    HENT: Negative.    Respiratory: Negative.    Cardiovascular: Negative.    Genitourinary: Negative.    Psychiatric/Behavioral: Negative.        Objective   Physical Exam   Constitutional: She is oriented to person, place, and time. She appears well-developed and well-nourished.   HENT:   Head: Normocephalic and atraumatic.   Eyes: EOM are normal.   Neck: Normal range of motion. Neck supple.   Cardiovascular: Normal rate, regular rhythm and normal heart sounds.   Pulmonary/Chest: Effort normal and breath sounds normal. No respiratory distress.   Neurological: She is alert and oriented to person, place, and time.   Psychiatric: She has a normal mood and affect. Her behavior is normal.   Nursing note and vitals reviewed.      Assessment/Plan   Nicole was seen today for hypothyroidism, insomnia, depression and medication question.    Diagnoses and all orders for this visit:    Primary insomnia  -     LORazepam (ATIVAN) 0.5 MG tablet; Take 1 tablet by mouth At Night As Needed for Anxiety or Sedation.  -     Currently  stable, will continue patient on current medication.     Other orders  -     potassium chloride (KLOR-CON) 20 MEQ packet; Take 20 mEq by mouth 2 (Two) Times a Day.          No Follow-up on file.    Dictated utilizing Dragon Voice Recognition Software

## 2019-03-12 ENCOUNTER — TELEPHONE (OUTPATIENT)
Dept: INTERNAL MEDICINE | Facility: CLINIC | Age: 74
End: 2019-03-12

## 2019-03-12 NOTE — TELEPHONE ENCOUNTER
Prior authorization for Abhijeetor Con 20 MEQ packets has been approved by [employer] from 1/1/19 to 3/10/20. The authorization number is [id number].

## 2019-03-21 RX ORDER — LEVOTHYROXINE SODIUM 0.1 MG/1
100 TABLET ORAL DAILY
Qty: 30 TABLET | Refills: 0 | Status: SHIPPED | OUTPATIENT
Start: 2019-03-21 | End: 2019-04-18 | Stop reason: SDUPTHER

## 2019-03-22 RX ORDER — LEVOTHYROXINE SODIUM 0.1 MG/1
TABLET ORAL
Qty: 90 TABLET | Refills: 0 | OUTPATIENT
Start: 2019-03-22

## 2019-03-29 DIAGNOSIS — F51.01 PRIMARY INSOMNIA: ICD-10-CM

## 2019-03-29 NOTE — TELEPHONE ENCOUNTER
Minnie faxed our office requesting a refill on patient's Ativan prescription.  Patient's last office visit was on 3/6/19.  Patient's last refill was on 3/6/19.  Jose and MYRNA are up to date.  [facility]  Please sign order.

## 2019-03-30 RX ORDER — LORAZEPAM 0.5 MG/1
0.5 TABLET ORAL NIGHTLY PRN
Qty: 30 TABLET | Refills: 0 | OUTPATIENT
Start: 2019-03-30 | End: 2019-04-30 | Stop reason: SDUPTHER

## 2019-04-18 ENCOUNTER — TELEPHONE (OUTPATIENT)
Dept: PHARMACY | Facility: HOSPITAL | Age: 74
End: 2019-04-18

## 2019-04-18 DIAGNOSIS — E78.5 HYPERLIPIDEMIA, UNSPECIFIED HYPERLIPIDEMIA TYPE: Primary | ICD-10-CM

## 2019-04-18 RX ORDER — LEVOTHYROXINE SODIUM 0.1 MG/1
TABLET ORAL
Qty: 30 TABLET | Refills: 0 | Status: SHIPPED | OUTPATIENT
Start: 2019-04-18 | End: 2019-05-24 | Stop reason: SDUPTHER

## 2019-04-18 RX ORDER — ATORVASTATIN CALCIUM 40 MG/1
40 TABLET, FILM COATED ORAL DAILY
Qty: 90 TABLET | Refills: 1 | Status: SHIPPED | OUTPATIENT
Start: 2019-04-18 | End: 2019-10-28 | Stop reason: SDUPTHER

## 2019-04-18 NOTE — TELEPHONE ENCOUNTER
----- Message from Nadege Joe sent at 4/18/2019  2:42 PM EDT -----  Please call pt for a follow up with Dr Merida. She cancelled her last appt bc she was referred to [facility] and she never rescheduled her appt. She is now requesting a refill, I approved it once before with a note to pharmacy to have her call us. I don't know if she got the message or not. I am approving a month again in hopes that you will have better luck than the pharmacy. Thanks Nadege.

## 2019-04-18 NOTE — TELEPHONE ENCOUNTER
----- Message from Mally Crowell sent at 4/18/2019  3:45 PM EDT -----  The patient doesn't want to come back to Dr. Merida until she is released by Dr. Cifuentes which she is scheduled to see at the end of May.      Mally  ----- Message -----  From: Nadege Joe  Sent: 4/18/2019   2:42 PM  To: Kristal Onc Anjelica Pina  Pool    Please call pt for a follow up with Dr Merida. She cancelled her last appt bc she was referred to [facility] and she never rescheduled her appt. She is now requesting a refill, I approved it once before with a note to pharmacy to have her call us. I don't know if she got the message or not. I am approving a month again in hopes that you will have better luck than the pharmacy. Thanks Nadege.

## 2019-04-18 NOTE — TELEPHONE ENCOUNTER
Approving one more month of Synthroid. This time sending message to  to get appt made. Pt had cancelled last appt due to referral to [facility].

## 2019-04-30 DIAGNOSIS — F51.01 PRIMARY INSOMNIA: ICD-10-CM

## 2019-04-30 RX ORDER — LORAZEPAM 0.5 MG/1
0.5 TABLET ORAL NIGHTLY PRN
Qty: 90 TABLET | Refills: 0 | OUTPATIENT
Start: 2019-04-30 | End: 2019-06-11 | Stop reason: SDUPTHER

## 2019-04-30 NOTE — TELEPHONE ENCOUNTER
Minnie faxed our office requesting a refill on patient's Ativan prescription. Patient's last office visit was on 3/6/19. Patient's last refill was on 3/30/19. Jose and MYRNA are up to date. Please advise.

## 2019-05-08 RX ORDER — ALPRAZOLAM 0.5 MG/1
TABLET ORAL
Qty: 30 TABLET | Refills: 0 | OUTPATIENT
Start: 2019-05-08

## 2019-05-20 DIAGNOSIS — F32.2 SEVERE SINGLE CURRENT EPISODE OF MAJOR DEPRESSIVE DISORDER, WITHOUT PSYCHOTIC FEATURES (HCC): ICD-10-CM

## 2019-05-21 ENCOUNTER — TELEPHONE (OUTPATIENT)
Dept: ONCOLOGY | Facility: CLINIC | Age: 74
End: 2019-05-21

## 2019-05-24 RX ORDER — LEVOTHYROXINE SODIUM 0.1 MG/1
TABLET ORAL
Qty: 30 TABLET | Refills: 0 | Status: SHIPPED | OUTPATIENT
Start: 2019-05-24 | End: 2019-06-26 | Stop reason: SDUPTHER

## 2019-05-31 ENCOUNTER — OFFICE VISIT (OUTPATIENT)
Dept: ONCOLOGY | Facility: CLINIC | Age: 74
End: 2019-05-31

## 2019-05-31 ENCOUNTER — LAB (OUTPATIENT)
Dept: OTHER | Facility: HOSPITAL | Age: 74
End: 2019-05-31

## 2019-05-31 VITALS
TEMPERATURE: 98.1 F | HEART RATE: 64 BPM | DIASTOLIC BLOOD PRESSURE: 75 MMHG | HEIGHT: 65 IN | RESPIRATION RATE: 16 BRPM | BODY MASS INDEX: 21.63 KG/M2 | OXYGEN SATURATION: 97 % | SYSTOLIC BLOOD PRESSURE: 159 MMHG | WEIGHT: 129.8 LBS

## 2019-05-31 DIAGNOSIS — C92.10 CML (CHRONIC MYELOID LEUKEMIA) (HCC): ICD-10-CM

## 2019-05-31 DIAGNOSIS — C92.10 CML (CHRONIC MYELOID LEUKEMIA) (HCC): Primary | ICD-10-CM

## 2019-05-31 LAB
BASOPHILS # BLD AUTO: 0.02 10*3/MM3 (ref 0–0.2)
BASOPHILS NFR BLD AUTO: 0.3 % (ref 0–1.5)
DEPRECATED RDW RBC AUTO: 46.4 FL (ref 37–54)
EOSINOPHIL # BLD AUTO: 0.19 10*3/MM3 (ref 0–0.4)
EOSINOPHIL NFR BLD AUTO: 2.5 % (ref 0.3–6.2)
ERYTHROCYTE [DISTWIDTH] IN BLOOD BY AUTOMATED COUNT: 13.7 % (ref 12.3–15.4)
HCT VFR BLD AUTO: 36.8 % (ref 34–46.6)
HGB BLD-MCNC: 12.5 G/DL (ref 12–15.9)
IMM GRANULOCYTES # BLD AUTO: 0.02 10*3/MM3 (ref 0–0.05)
IMM GRANULOCYTES NFR BLD AUTO: 0.3 % (ref 0–0.5)
LYMPHOCYTES # BLD AUTO: 2.69 10*3/MM3 (ref 0.7–3.1)
LYMPHOCYTES NFR BLD AUTO: 35.4 % (ref 19.6–45.3)
MCH RBC QN AUTO: 31.1 PG (ref 26.6–33)
MCHC RBC AUTO-ENTMCNC: 34 G/DL (ref 31.5–35.7)
MCV RBC AUTO: 91.5 FL (ref 79–97)
MONOCYTES # BLD AUTO: 0.56 10*3/MM3 (ref 0.1–0.9)
MONOCYTES NFR BLD AUTO: 7.4 % (ref 5–12)
NEUTROPHILS # BLD AUTO: 4.11 10*3/MM3 (ref 1.7–7)
NEUTROPHILS NFR BLD AUTO: 54.1 % (ref 42.7–76)
NRBC BLD AUTO-RTO: 0 /100 WBC (ref 0–0.2)
PLATELET # BLD AUTO: 146 10*3/MM3 (ref 140–450)
PMV BLD AUTO: 10.2 FL (ref 6–12)
RBC # BLD AUTO: 4.02 10*6/MM3 (ref 3.77–5.28)
WBC NRBC COR # BLD: 7.59 10*3/MM3 (ref 3.4–10.8)

## 2019-05-31 PROCEDURE — 36415 COLL VENOUS BLD VENIPUNCTURE: CPT

## 2019-05-31 PROCEDURE — 85025 COMPLETE CBC W/AUTO DIFF WBC: CPT | Performed by: INTERNAL MEDICINE

## 2019-05-31 PROCEDURE — 99215 OFFICE O/P EST HI 40 MIN: CPT | Performed by: INTERNAL MEDICINE

## 2019-05-31 RX ORDER — PROMETHAZINE HYDROCHLORIDE 12.5 MG/1
TABLET ORAL
Refills: 3 | COMMUNITY
Start: 2019-03-21 | End: 2020-06-19

## 2019-05-31 RX ORDER — OLANZAPINE 5 MG/1
TABLET ORAL
Refills: 3 | COMMUNITY
Start: 2019-03-22 | End: 2019-07-15 | Stop reason: SDUPTHER

## 2019-05-31 NOTE — PROGRESS NOTES
[facility] CBC GROUP OUTPATIENT FOLLOW UP CLINIC VISIT    REASON FOR FOLLOW-UP:    1.  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  Gleevec 400 mg daily initiated around 10/12/2017, stopped on 11/15/2017 due to intolerance (noah-orbital edema, nausea/vomiting, fatigue).  Resumed at 200 mg daily but, again, not tolerated.    3.  Nilotinib 150 mg twice daily started in late March, 2018.  Held due to intolerance and QTc prolongation.   4.  Hydroxyurea initiated in July 2018.     HISTORY OF PRESENT ILLNESS:  Nicole Lofton is a 73 y.o. female with the above-mentioned history here today for reevaluation.      Due to her poor tolerance of TKI therapy in the past she had been on hydroxyurea 1000 mg twice daily.  Her blood counts were suboptimally controlled on this but she was doing okay.  Because of her poor tolerance of prior TKA I therapy, we referred her to Dr. Ciufentes at the [facility] for an opinion regarding therapy.  She discontinued hydroxyurea and started bosutinib 400 mg daily in February 2019 and she actually has been tolerating this well with taking this along with olanzapine at night.  On 4/15/2019 her white blood cell count was 4.1 with a hemoglobin of 11.9 and platelets 109,000.  Liver labs were normal.  Creatinine was 0.9.    She tolerates the medication very well.  Energy level remains low but is slowly improving and she is becoming more active.  No bleeding.  No palpitations.  Nausea is well controlled.  Appetite has improved significantly.    ONCOLOGIC HISTORY:  For about 6 months she has noticed bilateral arm tingling and weakness in her hands.  This has occurred about 5 or 6 times over the past 6 months.  She is vague in her description of this.  She is globally weak and is quite inactive as she lives by herself.  She was completing physical therapy with some improvement but is now quite unsteady on her feet.  She has not had any falls.  She  denies any bleeding.  She has chronic fatigue.  She has osteoarthritis pain and reports pain in the right side and in her neck.  She denies any acute low back pain but does have chronic low back pain.  She does have orthostatic symptoms and she is lightheaded when she stands.  She has had a decreased appetite and some weight loss over the past 9 months.     Labs performed on 8/28/2017 showed a white blood cell count of 39.1 with a differential showing 61% neutrophils and 32% lymphocytes, hemoglobin 13.9 and platelets 565,000.         She had further labs done at [place] on 8/20/2017 showing a white blood cell count of 24.4, hemoglobin 13.7, and platelets 1795.  A differential showed 64% neutrophils, 7% lymphocytes, and 12% basophils.  Peripheral blood flow cytometry was also done on this date showing 11% basophils and less than 1% myeloblasts.  There was no monoclonal B-cell, T-cell, plasma cell, or NK cell population.  FISH for BCR-ABL and ESSENCE 2 mutation analysis were done as well.     Her CBC was normal as of 7/8/2016.     Of note, she has a history of bilateral breast cancer diagnosed in 1988.  She had bilateral mastectomy with reconstruction.  We do not have any records regarding this at this time.  She apparently completed at least 6 months of adjuvant chemotherapy but again does not know the details..    FISH for BCR-ABL performed at [place] was actually positive.  As of 9/6/2017 ESSENCE 2 is still pending.    She returned on 9/6/2017 for follow-up.  Platelets at 1.9 million with hydroxyurea 1000 mg daily.  She will increase the dose to 2000 mg daily.  She will have a bone marrow aspiration biopsy performed.  Weekly CBCs.    As of 9/6/2017 peripheral blood PCR was positive at 89.238% with a major break point mutation.    ESSENCE 2 and CALR mutation negative.    Bone marrow aspiration and biopsy performed on 9/15/2017.  Flow cytometry showed no increase in blasts.  Morphology showed no increase in blasts.  Bone marrow  FISH showed BCR/ABL rearrangement in 93% of cells.  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    Labs as of 9/20/2017 show white blood cell count of 4.7 with hemoglobin of 12.7 and platelets 1.1 million.    On 9/27/2017 white blood cells and hemoglobin are normal.  Platelets are down to 722,000.  We plan to start Gleevec at 400 mg daily if she tolerates it.  In the meantime she will decrease the hydroxyurea dose to 1000 mg daily.    Blood counts normalized.  Hydroxyurea stopped.    Gleevec initiated around 10/12/2017, stopped on 11/15/2017 due to intolerance (noah-orbital edema, nausea/vomiting, fatigue).    Symptoms improving as of 11/29/2017.  Blood counts normal.  Hold further therapy at this time until she improves more clinically.      Plan to resume Gleevec at 200 mg daily as of 1/11/2018.  Only took it for less than a week but also did not tolerate this dose.    As of 2/21/2018 her platelet count is again elevated at greater than 700,000.  Resume hydroxyurea 1000 mg daily.  Considering nilotinib at a decreased dose.    Platelets continued to rise in March 2018.  Hydroxyurea increased to 1500 mg daily with stabilization of her platelet count and some improvement as of 3/21/2018.  Plan for nilotinib.    Nilotinib initiated in late March 2018.    Subsequently held due to QTC prolongation.    Hydroxyurea 1000 mg daily resumed at the end of July 2018.  Increased the dose to 1000 mg twice daily as of 8/7/2018 for a rising platelet count of 1.3 million.    Platelets as of 9/7/2018 500,000.  Continue hydroxyurea 1000 mg twice daily.    As of 10/12/2018 her CBC has improved significantly.  Platelets 242,000 with a white blood cell count of 3.52.  Decrease hydroxyurea to 1000 mg daily.    Platelets increased as of 11/20/2018.  Increased hydroxyurea to 1000 mg in the morning and 500 mg in the evening.    Platelets were increased to 1.2 million on 1/15/2019.  Hydroxyurea increased 1000 mg twice daily.    She was  "referred to the [facility] in mid February started bosutinib 400 mg daily which she has been taking at night along with olanzapine and tolerating it well.  Blood counts improved.    PAST MEDICAL, SURGICAL, FAMILY, AND SOCIAL HISTORIES were reviewed with the patient and in the electronic medical record, and were updated if indicated.    ALLERGIES:  No Known Allergies    MEDICATIONS:  The medication list has been reviewed with the patient by the medical assistant, and the list has been updated in the electronic medical record, which I reviewed.  Medication dosages and frequencies were confirmed to be accurate.    I have reviewed the patient's medical history in detail and updated the computerized patient record.    Review of Systems   Constitutional: Positive for appetite change (improved significantly) and fatigue (slowly improving). Negative for chills, fever and unexpected weight change.   HENT:   Negative for hearing loss, mouth sores, nosebleeds and trouble swallowing.    Respiratory: Negative for cough and shortness of breath.    Cardiovascular: Negative for chest pain and leg swelling.   Gastrointestinal: Negative for abdominal pain, constipation, diarrhea, nausea and vomiting.   Endocrine: Negative for hot flashes.   Genitourinary: Negative for difficulty urinating, dysuria and frequency.    Musculoskeletal: Positive for arthralgias and back pain. Negative for myalgias.   Skin: Negative for rash.   Neurological: Negative for dizziness and extremity weakness.   Hematological: Negative for adenopathy. Does not bruise/bleed easily.   Psychiatric/Behavioral: Positive for depression and sleep disturbance. The patient is nervous/anxious.        Vitals:    05/31/19 1052   BP: 159/75   Pulse: 64   Resp: 16   Temp: 98.1 °F (36.7 °C)   TempSrc: Oral   SpO2: 97%   Weight: 58.9 kg (129 lb 12.8 oz)   Height: 165 cm (64.96\")   PainSc: 0-No pain  Comment: LEUKEMIA       Physical Exam   Constitutional: She is " oriented to person, place, and time. She appears well-developed and well-nourished. No distress.   Chronically ill appearing   HENT:   Head: Normocephalic and atraumatic.   Mouth/Throat: Oropharynx is clear and moist and mucous membranes are normal. No oropharyngeal exudate.   Eyes: Pupils are equal, round, and reactive to light.   Neck: Normal range of motion.   Cardiovascular: Normal rate and normal heart sounds. A regularly irregular rhythm present.   No murmur heard.  Pulmonary/Chest: Effort normal and breath sounds normal. No respiratory distress. She has no wheezes. She has no rhonchi. She has no rales.   Abdominal: Soft. Normal appearance and bowel sounds are normal. She exhibits no distension. There is no hepatosplenomegaly.   Musculoskeletal: Normal range of motion. She exhibits no edema.   Neurological: She is alert and oriented to person, place, and time.   Skin: Skin is warm and dry. No rash noted.   Psychiatric: She has a normal mood and affect.   Vitals reviewed.      DIAGNOSTIC DATA:  Results for orders placed or performed in visit on 05/31/19   CBC Auto Differential   Result Value Ref Range    WBC 7.59 3.40 - 10.80 10*3/mm3    RBC 4.02 3.77 - 5.28 10*6/mm3    Hemoglobin 12.5 12.0 - 15.9 g/dL    Hematocrit 36.8 34.0 - 46.6 %    MCV 91.5 79.0 - 97.0 fL    MCH 31.1 26.6 - 33.0 pg    MCHC 34.0 31.5 - 35.7 g/dL    RDW 13.7 12.3 - 15.4 %    RDW-SD 46.4 37.0 - 54.0 fl    MPV 10.2 6.0 - 12.0 fL    Platelets 146 140 - 450 10*3/mm3    Neutrophil % 54.1 42.7 - 76.0 %    Lymphocyte % 35.4 19.6 - 45.3 %    Monocyte % 7.4 5.0 - 12.0 %    Eosinophil % 2.5 0.3 - 6.2 %    Basophil % 0.3 0.0 - 1.5 %    Immature Grans % 0.3 0.0 - 0.5 %    Neutrophils, Absolute 4.11 1.70 - 7.00 10*3/mm3    Lymphocytes, Absolute 2.69 0.70 - 3.10 10*3/mm3    Monocytes, Absolute 0.56 0.10 - 0.90 10*3/mm3    Eosinophils, Absolute 0.19 0.00 - 0.40 10*3/mm3    Basophils, Absolute 0.02 0.00 - 0.20 10*3/mm3    Immature Grans, Absolute 0.02 0.00  - 0.05 10*3/mm3    nRBC 0.0 0.0 - 0.2 /100 WBC         IMAGING:  None reviewed    ASSESSMENT:  This is a 73 y.o. female with:  1.  History of bilateral breast cancer in 1988 status post bilateral mastectomy.  She apparently completed 6 months of adjuvant therapy.  We have no details regarding this.    2.  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  Started on Hydrea 1000 mg daily.  This was discontinued and she started Gleevec on approximately 10/12/2017.  Gleevec discontinued due to intolerance on 11/15/2017.  We started Gleevec at a lower dose of 200 mg but she was also intolerant of this dose.  Her side effects were as severe with a lower dose and she therefore stopped taking the drug.  Symptoms improved significantly.  Platelet count subsequently elevated again.    She started nilotinib at 150mg twice daily at the end of March 2018.  This was held in mid-May due to QTc prolongation.  The QTc interval did subsequently normalized.  However, due to this and other adverse effects we are not able to resume the nilotinib nor does she desire to.    In July we resumed hydroxyurea 1000 mg daily.  Her platelet count increased.  Her dose was increased to 1000 mg twice daily.  Blood counts improved nicely.  Platelet count normalized but her white blood cell count dropped to below normal.  Decreased hydroxyurea to 1000 mg daily.    11/20/18 platelets were up again.  We increased the hydroxyurea to 1000 mg in the mornings and 500 mg in the evenings.    As of 1/15/2019 her platelet count was again elevated to 1.2 million.  Increased hydroxyurea to 1000 mg twice daily.      Platelets improved but remained far from normal.    Due to her history I referred her down to the [facility] to see Dr. Cifuentes for assistance and recommendations on further therapy.    In mid-February 2019 she started bosutinib 400 mg daily which she takes along with olanzapine at night and  tolerates well.  Blood counts improved.        3.  Thrombocytosis:  Due to CML.  See above.  Plts normal today.    4.  Hypothyroidism:  Levothyroxine 100 mcg.  5.  Nausea and vomiting: Resolved off Gleevec.   6. Periorbital edema: Resolved off Gleevec.    7.  Hypokalemia:  Potassium previously normalized.  8.  Osteoarthritis pain: she reports this became worse after starting nilotinib. She is taking acetaminophen for this.  Prescribed meloxicam on 8/7/2018.  No longer on nilotinib.  She will continue meloxicam.  9.  Depression and anxiety: per PCP at this point.    10.  Insomnia.  This had been treated with trazodone but as mentioned above this also caused a potential for QT interval prolongation.  She was therefore changed to temazepam.  Primary care now managing. On olanzapine now.    11.  QTc prolongation: Due to nilotinib.  This resolved.  She saw Dr. Duque with cardiology.    12.  Tobacco use: She is unwilling to quit smoking  13.  Today, she has a regularly irregular heart rhythm.  This is new.  I suggested an EKG but she declined at this time.  She is asymptomatic.  We will continue to monitor this.    PLAN:  1.  Continue bosutinib 400 mg daily for now with consideration of an increase to 500 mg in the next few months.  2.  Continue olanzapine at night  3.  I will see her back in about 2 months for follow-up with a CBC, CMP, and PCR for BCR/ABL    High risk medication requiring intensive monitoring      Burt Merida MD

## 2019-06-11 DIAGNOSIS — F51.01 PRIMARY INSOMNIA: ICD-10-CM

## 2019-06-11 NOTE — TELEPHONE ENCOUNTER
Minnie faxed our office requesting a refill on patient's Ativan prescription.  Patient's last office visit was on 3/6/19.  Patient's last refill was on 4/30/19.  Jose has been updated and [facility] is up to date.  Prescription left on voicemail at [facility].  Patient needs appointment before next refill, pharmacy notified.  Please sign order.

## 2019-06-12 RX ORDER — LORAZEPAM 0.5 MG/1
0.5 TABLET ORAL NIGHTLY PRN
Qty: 90 TABLET | Refills: 0 | OUTPATIENT
Start: 2019-06-12 | End: 2019-10-07 | Stop reason: SDUPTHER

## 2019-06-20 RX ORDER — AMLODIPINE BESYLATE 10 MG/1
10 TABLET ORAL DAILY
Qty: 90 TABLET | Refills: 1 | Status: SHIPPED | OUTPATIENT
Start: 2019-06-20 | End: 2019-12-30 | Stop reason: SDUPTHER

## 2019-06-25 RX ORDER — LEVOTHYROXINE SODIUM 0.1 MG/1
TABLET ORAL
Qty: 30 TABLET | Refills: 0 | Status: CANCELLED | OUTPATIENT
Start: 2019-06-25

## 2019-06-26 RX ORDER — TEMAZEPAM 15 MG/1
15 CAPSULE ORAL NIGHTLY PRN
Qty: 30 CAPSULE | Refills: 0 | OUTPATIENT
Start: 2019-06-26

## 2019-06-26 RX ORDER — LEVOTHYROXINE SODIUM 0.1 MG/1
100 TABLET ORAL DAILY
Qty: 30 TABLET | Refills: 0 | Status: SHIPPED | OUTPATIENT
Start: 2019-06-26 | End: 2019-07-29 | Stop reason: SDUPTHER

## 2019-07-01 ENCOUNTER — DOCUMENTATION (OUTPATIENT)
Dept: ONCOLOGY | Facility: CLINIC | Age: 74
End: 2019-07-01

## 2019-07-01 NOTE — PROGRESS NOTES
Per pts daughter, Dr Cifuentes has released pt and she needs more Bosulif. Staff message sent to Dr Merida. See message chain about Bosulif below.    Burt Merida MD sent to Marifer Arvizu.             Yes thanks!  400 mg for now.      Previous Messages      ----- Message -----   From: Marifer Arvizu   Sent: 6/27/2019   2:45 PM   To: Burt Merida MD   Subject: Bosulif                                           Dr Merida,     I rec a call from Nicole's daughter, Clarissa-pt has been released from Dr Cifuentes and she will be needing a refill of Bosulif. Per your last note she is to continue 400 mg with possible increase to 500 mg. Are we to refill under your name?     Thank you,   Marifer         Pt has been getting this filled through [facility]. I contacted them [phone] [place]. They need the new rx under Dr. Merida's name. I have faxed this to [phone]

## 2019-07-15 ENCOUNTER — TELEPHONE (OUTPATIENT)
Dept: ONCOLOGY | Facility: HOSPITAL | Age: 74
End: 2019-07-15

## 2019-07-15 RX ORDER — OLANZAPINE 5 MG/1
5 TABLET ORAL NIGHTLY
Qty: 30 TABLET | Refills: 2 | Status: SHIPPED | OUTPATIENT
Start: 2019-07-15 | End: 2019-09-24 | Stop reason: SDUPTHER

## 2019-07-15 NOTE — TELEPHONE ENCOUNTER
Needing rf on olanzapine. Will send to pharmacy     ----- Message from Leticia Hook sent at 7/15/2019  1:42 PM EDT -----  [phone] / Clarissa her daughter   needs to order meds, a refill but has never been filled by Dr. Merida. Was filled by .

## 2019-07-26 ENCOUNTER — LAB (OUTPATIENT)
Dept: OTHER | Facility: HOSPITAL | Age: 74
End: 2019-07-26

## 2019-07-26 ENCOUNTER — OFFICE VISIT (OUTPATIENT)
Dept: ONCOLOGY | Facility: CLINIC | Age: 74
End: 2019-07-26

## 2019-07-26 VITALS
DIASTOLIC BLOOD PRESSURE: 82 MMHG | TEMPERATURE: 98.4 F | BODY MASS INDEX: 21.02 KG/M2 | HEIGHT: 65 IN | WEIGHT: 126.2 LBS | HEART RATE: 63 BPM | RESPIRATION RATE: 16 BRPM | SYSTOLIC BLOOD PRESSURE: 171 MMHG | OXYGEN SATURATION: 98 %

## 2019-07-26 DIAGNOSIS — C92.10 CML (CHRONIC MYELOID LEUKEMIA) (HCC): ICD-10-CM

## 2019-07-26 DIAGNOSIS — C92.10 CML (CHRONIC MYELOID LEUKEMIA) (HCC): Primary | ICD-10-CM

## 2019-07-26 LAB
ALBUMIN SERPL-MCNC: 4.6 G/DL (ref 3.5–5.2)
ALBUMIN/GLOB SERPL: 1.5 G/DL
ALP SERPL-CCNC: 94 U/L (ref 39–117)
ALT SERPL W P-5'-P-CCNC: 14 U/L (ref 1–33)
ANION GAP SERPL CALCULATED.3IONS-SCNC: 9.3 MMOL/L (ref 5–15)
AST SERPL-CCNC: 20 U/L (ref 1–32)
BASOPHILS # BLD AUTO: 0.02 10*3/MM3 (ref 0–0.2)
BASOPHILS NFR BLD AUTO: 0.3 % (ref 0–1.5)
BILIRUB SERPL-MCNC: 0.3 MG/DL (ref 0.1–1.2)
BUN BLD-MCNC: 12 MG/DL (ref 8–23)
BUN/CREAT SERPL: 13 (ref 7–25)
CALCIUM SPEC-SCNC: 9.5 MG/DL (ref 8.6–10.5)
CHLORIDE SERPL-SCNC: 104 MMOL/L (ref 98–107)
CO2 SERPL-SCNC: 26.7 MMOL/L (ref 22–29)
CREAT BLD-MCNC: 0.92 MG/DL (ref 0.57–1)
DEPRECATED RDW RBC AUTO: 46.2 FL (ref 37–54)
EOSINOPHIL # BLD AUTO: 0.1 10*3/MM3 (ref 0–0.4)
EOSINOPHIL NFR BLD AUTO: 1.4 % (ref 0.3–6.2)
ERYTHROCYTE [DISTWIDTH] IN BLOOD BY AUTOMATED COUNT: 13.8 % (ref 12.3–15.4)
GFR SERPL CREATININE-BSD FRML MDRD: 60 ML/MIN/1.73
GLOBULIN UR ELPH-MCNC: 3 GM/DL
GLUCOSE BLD-MCNC: 109 MG/DL (ref 65–99)
HCT VFR BLD AUTO: 37.7 % (ref 34–46.6)
HGB BLD-MCNC: 12.1 G/DL (ref 12–15.9)
IMM GRANULOCYTES # BLD AUTO: 0.01 10*3/MM3 (ref 0–0.05)
IMM GRANULOCYTES NFR BLD AUTO: 0.1 % (ref 0–0.5)
LYMPHOCYTES # BLD AUTO: 2.41 10*3/MM3 (ref 0.7–3.1)
LYMPHOCYTES NFR BLD AUTO: 33 % (ref 19.6–45.3)
MCH RBC QN AUTO: 29.1 PG (ref 26.6–33)
MCHC RBC AUTO-ENTMCNC: 32.1 G/DL (ref 31.5–35.7)
MCV RBC AUTO: 90.6 FL (ref 79–97)
MONOCYTES # BLD AUTO: 0.59 10*3/MM3 (ref 0.1–0.9)
MONOCYTES NFR BLD AUTO: 8.1 % (ref 5–12)
NEUTROPHILS # BLD AUTO: 4.17 10*3/MM3 (ref 1.7–7)
NEUTROPHILS NFR BLD AUTO: 57.1 % (ref 42.7–76)
NRBC BLD AUTO-RTO: 0 /100 WBC (ref 0–0.2)
PLATELET # BLD AUTO: 185 10*3/MM3 (ref 140–450)
PMV BLD AUTO: 9.6 FL (ref 6–12)
POTASSIUM BLD-SCNC: 3.6 MMOL/L (ref 3.5–5.2)
PROT SERPL-MCNC: 7.6 G/DL (ref 6–8.5)
RBC # BLD AUTO: 4.16 10*6/MM3 (ref 3.77–5.28)
SODIUM BLD-SCNC: 140 MMOL/L (ref 136–145)
WBC NRBC COR # BLD: 7.3 10*3/MM3 (ref 3.4–10.8)

## 2019-07-26 PROCEDURE — 85025 COMPLETE CBC W/AUTO DIFF WBC: CPT | Performed by: INTERNAL MEDICINE

## 2019-07-26 PROCEDURE — 99215 OFFICE O/P EST HI 40 MIN: CPT | Performed by: INTERNAL MEDICINE

## 2019-07-26 PROCEDURE — 80053 COMPREHEN METABOLIC PANEL: CPT | Performed by: INTERNAL MEDICINE

## 2019-07-26 PROCEDURE — 36415 COLL VENOUS BLD VENIPUNCTURE: CPT

## 2019-07-26 NOTE — PROGRESS NOTES
[facility] GROUP OUTPATIENT FOLLOW UP CLINIC VISIT    REASON FOR FOLLOW-UP:    1.  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  Gleevec 400 mg daily initiated around 10/12/2017, stopped on 11/15/2017 due to intolerance (noah-orbital edema, nausea/vomiting, fatigue).  Resumed at 200 mg daily but, again, not tolerated.    3.  Nilotinib 150 mg twice daily started in late March, 2018.  Held due to intolerance and QTc prolongation.   4.  Hydroxyurea initiated in July 2018.   5.  Bosutinib 400 mg daily started Feb 2019    HISTORY OF PRESENT ILLNESS:  Nicole Lofton is a 73 y.o. female with the above-mentioned history here today for reevaluation.      She tolerates bosutinib relatively well.  She has a decreased appetite and some ongoing weight loss.  She is not using supplements daily at this point like she was doing.  She complains of some chronic pain issues.  Otherwise, she is doing well and tolerating the medication reasonably well.      ONCOLOGIC HISTORY:  For about 6 months she has noticed bilateral arm tingling and weakness in her hands.  This has occurred about 5 or 6 times over the past 6 months.  She is vague in her description of this.  She is globally weak and is quite inactive as she lives by herself.  She was completing physical therapy with some improvement but is now quite unsteady on her feet.  She has not had any falls.  She denies any bleeding.  She has chronic fatigue.  She has osteoarthritis pain and reports pain in the right side and in her neck.  She denies any acute low back pain but does have chronic low back pain.  She does have orthostatic symptoms and she is lightheaded when she stands.  She has had a decreased appetite and some weight loss over the past 9 months.     Labs performed on 8/28/2017 showed a white blood cell count of 39.1 with a differential showing 61% neutrophils and 32% lymphocytes, hemoglobin 13.9 and platelets 565,000.          She had further labs done at [place] on 8/20/2017 showing a white blood cell count of 24.4, hemoglobin 13.7, and platelets 1795.  A differential showed 64% neutrophils, 7% lymphocytes, and 12% basophils.  Peripheral blood flow cytometry was also done on this date showing 11% basophils and less than 1% myeloblasts.  There was no monoclonal B-cell, T-cell, plasma cell, or NK cell population.  FISH for BCR-ABL and ESSENCE 2 mutation analysis were done as well.     Her CBC was normal as of 7/8/2016.     Of note, she has a history of bilateral breast cancer diagnosed in 1988.  She had bilateral mastectomy with reconstruction.  We do not have any records regarding this at this time.  She apparently completed at least 6 months of adjuvant chemotherapy but again does not know the details..    FISH for BCR-ABL performed at [place] was actually positive.  As of 9/6/2017 ESSENCE 2 is still pending.    She returned on 9/6/2017 for follow-up.  Platelets at 1.9 million with hydroxyurea 1000 mg daily.  She will increase the dose to 2000 mg daily.  She will have a bone marrow aspiration biopsy performed.  Weekly CBCs.    As of 9/6/2017 peripheral blood PCR was positive at 89.238% with a major break point mutation.    ESSENCE 2 and CALR mutation negative.    Bone marrow aspiration and biopsy performed on 9/15/2017.  Flow cytometry showed no increase in blasts.  Morphology showed no increase in blasts.  Bone marrow FISH showed BCR/ABL rearrangement in 93% of cells.  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    Labs as of 9/20/2017 show white blood cell count of 4.7 with hemoglobin of 12.7 and platelets 1.1 million.    On 9/27/2017 white blood cells and hemoglobin are normal.  Platelets are down to 722,000.  We plan to start Gleevec at 400 mg daily if she tolerates it.  In the meantime she will decrease the hydroxyurea dose to 1000 mg daily.    Blood counts normalized.  Hydroxyurea stopped.    Gleevec initiated around 10/12/2017, stopped  on 11/15/2017 due to intolerance (noah-orbital edema, nausea/vomiting, fatigue).    Symptoms improving as of 11/29/2017.  Blood counts normal.  Hold further therapy at this time until she improves more clinically.      Plan to resume Gleevec at 200 mg daily as of 1/11/2018.  Only took it for less than a week but also did not tolerate this dose.    As of 2/21/2018 her platelet count is again elevated at greater than 700,000.  Resume hydroxyurea 1000 mg daily.  Considering nilotinib at a decreased dose.    Platelets continued to rise in March 2018.  Hydroxyurea increased to 1500 mg daily with stabilization of her platelet count and some improvement as of 3/21/2018.  Plan for nilotinib.    Nilotinib initiated in late March 2018.    Subsequently held due to QTC prolongation.    Hydroxyurea 1000 mg daily resumed at the end of July 2018.  Increased the dose to 1000 mg twice daily as of 8/7/2018 for a rising platelet count of 1.3 million.    Platelets as of 9/7/2018 500,000.  Continue hydroxyurea 1000 mg twice daily.    As of 10/12/2018 her CBC has improved significantly.  Platelets 242,000 with a white blood cell count of 3.52.  Decrease hydroxyurea to 1000 mg daily.    Platelets increased as of 11/20/2018.  Increased hydroxyurea to 1000 mg in the morning and 500 mg in the evening.    Platelets were increased to 1.2 million on 1/15/2019.  Hydroxyurea increased 1000 mg twice daily.    She was referred to the [facility] in mid February started bosutinib 400 mg daily which she has been taking at night along with olanzapine and tolerating it well.  Blood counts improved.    PAST MEDICAL, SURGICAL, FAMILY, AND SOCIAL HISTORIES were reviewed with the patient and in the electronic medical record, and were updated if indicated.    ALLERGIES:  No Known Allergies    MEDICATIONS:  The medication list has been reviewed with the patient by the medical assistant, and the list has been updated in the electronic medical record,  "which I reviewed.  Medication dosages and frequencies were confirmed to be accurate.    I have reviewed the patient's medical history in detail and updated the computerized patient record.    Review of Systems   Constitutional: Positive for appetite change (improved significantly) and fatigue (slowly improving). Negative for chills, fever and unexpected weight change.   HENT:   Negative for hearing loss, mouth sores, nosebleeds and trouble swallowing.    Respiratory: Negative for cough and shortness of breath.    Cardiovascular: Negative for chest pain and leg swelling.   Gastrointestinal: Negative for abdominal pain, constipation, diarrhea, nausea and vomiting.   Endocrine: Negative for hot flashes.   Genitourinary: Negative for difficulty urinating, dysuria and frequency.    Musculoskeletal: Positive for arthralgias and back pain. Negative for myalgias.   Skin: Negative for rash.   Neurological: Negative for dizziness and extremity weakness.   Hematological: Negative for adenopathy. Does not bruise/bleed easily.   Psychiatric/Behavioral: Positive for depression and sleep disturbance. The patient is nervous/anxious.        Vitals:    07/26/19 1041   BP: 171/82   Pulse: 63   Resp: 16   Temp: 98.4 °F (36.9 °C)   TempSrc: Oral   SpO2: 98%   Weight: 57.2 kg (126 lb 3.2 oz)   Height: 165 cm (64.96\")   PainSc: 5  Comment: ALL OVER PAIN       Physical Exam   Constitutional: She is oriented to person, place, and time. She appears well-developed and well-nourished. No distress.   Chronically ill appearing   HENT:   Head: Normocephalic and atraumatic.   Mouth/Throat: Oropharynx is clear and moist and mucous membranes are normal. No oropharyngeal exudate.   Eyes: Pupils are equal, round, and reactive to light.   Neck: Normal range of motion.   Cardiovascular: Normal rate, regular rhythm and normal heart sounds.   No murmur heard.  Pulmonary/Chest: Effort normal and breath sounds normal. No respiratory distress. She has no " wheezes. She has no rhonchi. She has no rales.   Abdominal: Soft. Normal appearance and bowel sounds are normal. She exhibits no distension. There is no hepatosplenomegaly.   Musculoskeletal: Normal range of motion. She exhibits no edema.   Neurological: She is alert and oriented to person, place, and time.   Skin: Skin is warm and dry. No rash noted.   Psychiatric: She has a normal mood and affect.   Vitals reviewed.      DIAGNOSTIC DATA:  Results for orders placed or performed in visit on 07/26/19   CBC Auto Differential   Result Value Ref Range    WBC 7.30 3.40 - 10.80 10*3/mm3    RBC 4.16 3.77 - 5.28 10*6/mm3    Hemoglobin 12.1 12.0 - 15.9 g/dL    Hematocrit 37.7 34.0 - 46.6 %    MCV 90.6 79.0 - 97.0 fL    MCH 29.1 26.6 - 33.0 pg    MCHC 32.1 31.5 - 35.7 g/dL    RDW 13.8 12.3 - 15.4 %    RDW-SD 46.2 37.0 - 54.0 fl    MPV 9.6 6.0 - 12.0 fL    Platelets 185 140 - 450 10*3/mm3    Neutrophil % 57.1 42.7 - 76.0 %    Lymphocyte % 33.0 19.6 - 45.3 %    Monocyte % 8.1 5.0 - 12.0 %    Eosinophil % 1.4 0.3 - 6.2 %    Basophil % 0.3 0.0 - 1.5 %    Immature Grans % 0.1 0.0 - 0.5 %    Neutrophils, Absolute 4.17 1.70 - 7.00 10*3/mm3    Lymphocytes, Absolute 2.41 0.70 - 3.10 10*3/mm3    Monocytes, Absolute 0.59 0.10 - 0.90 10*3/mm3    Eosinophils, Absolute 0.10 0.00 - 0.40 10*3/mm3    Basophils, Absolute 0.02 0.00 - 0.20 10*3/mm3    Immature Grans, Absolute 0.01 0.00 - 0.05 10*3/mm3    nRBC 0.0 0.0 - 0.2 /100 WBC         IMAGING:  None reviewed    ASSESSMENT:  This is a 73 y.o. female with:  1.  History of bilateral breast cancer in 1988 status post bilateral mastectomy.  She apparently completed 6 months of adjuvant therapy.  We have no details regarding this.    2.  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  Started on Hydrea 1000 mg daily.  This was discontinued and she started Gleevec on approximately 10/12/2017.  Gleevec discontinued due to intolerance on 11/15/2017.   We started Gleevec at a lower dose of 200 mg but she was also intolerant of this dose.  Her side effects were as severe with a lower dose and she therefore stopped taking the drug.  Symptoms improved significantly.  Platelet count subsequently elevated again.    She started nilotinib at 150mg twice daily at the end of March 2018.  This was held in mid-May due to QTc prolongation.  The QTc interval did subsequently normalized.  However, due to this and other adverse effects we are not able to resume the nilotinib nor does she desire to.    In July we resumed hydroxyurea 1000 mg daily.  Her platelet count increased.  Her dose was increased to 1000 mg twice daily.  Blood counts improved nicely.  Platelet count normalized but her white blood cell count dropped to below normal.  Decreased hydroxyurea to 1000 mg daily.    11/20/18 platelets were up again.  We increased the hydroxyurea to 1000 mg in the mornings and 500 mg in the evenings.    As of 1/15/2019 her platelet count was again elevated to 1.2 million.  Increased hydroxyurea to 1000 mg twice daily.      Platelets improved but remained far from normal.    Due to her history I referred her down to the [facility] to see Dr. Cifuentes for assistance and recommendations on further therapy.    In mid-February 2019 she started bosutinib 400 mg daily which she takes along with olanzapine at night and tolerates well.  Blood counts improved.        3.  Thrombocytosis:  Due to CML.  See above.  Plts normal today.    4.  Hypothyroidism:  Levothyroxine 100 mcg.  5.  Nausea and vomiting: Resolved off Gleevec.   6. Periorbital edema: Resolved off Gleevec.    7.  Hypokalemia:  Potassium previously normalized.  Repeat today.  8.  Osteoarthritis pain: she reports this became worse after starting nilotinib. She is taking acetaminophen for this.  Prescribed meloxicam on 8/7/2018.  No longer on nilotinib.  She will continue meloxicam.  9.  Depression and anxiety: per PCP at  this point.    10.  Insomnia.  This had been treated with trazodone but as mentioned above this also caused a potential for QT interval prolongation.  She was therefore changed to temazepam.  Primary care now managing. On olanzapine now.    11.  QTc prolongation: Due to nilotinib.  This resolved.  She saw Dr. Duque with cardiology.    12.  Tobacco use: She is unwilling to quit smoking  13.  Decreased appetite with weight loss: I advised more supplements.  I suggested seeing our nutritionist today that she is not interested.    PLAN:  1.  Continue bosutinib 400 mg daily.  I do not think that she would tolerate 500 mg.  2.  Continue olanzapine at night  3.  Nurse practitioner in 2 months and I will see her back in 4 months  4.  Follow-up BCR/ABL PCR from today.  5.  Increase oral caloric intake.    High risk medication requiring intensive monitoring      Burt Merida MD

## 2019-07-29 ENCOUNTER — DOCUMENTATION (OUTPATIENT)
Dept: ONCOLOGY | Facility: CLINIC | Age: 74
End: 2019-07-29

## 2019-07-29 RX ORDER — LEVOTHYROXINE SODIUM 0.1 MG/1
100 TABLET ORAL DAILY
Qty: 30 TABLET | Refills: 2 | Status: SHIPPED | OUTPATIENT
Start: 2019-07-29 | End: 2019-10-26 | Stop reason: SDUPTHER

## 2019-07-29 RX ORDER — OLANZAPINE 5 MG/1
5 TABLET ORAL NIGHTLY
Qty: 30 TABLET | Refills: 0 | OUTPATIENT
Start: 2019-07-29

## 2019-08-05 ENCOUNTER — OFFICE VISIT (OUTPATIENT)
Dept: INTERNAL MEDICINE | Facility: CLINIC | Age: 74
End: 2019-08-05

## 2019-08-05 VITALS
DIASTOLIC BLOOD PRESSURE: 72 MMHG | RESPIRATION RATE: 18 BRPM | HEART RATE: 63 BPM | SYSTOLIC BLOOD PRESSURE: 160 MMHG | HEIGHT: 65 IN | TEMPERATURE: 99.5 F | OXYGEN SATURATION: 96 % | BODY MASS INDEX: 21.54 KG/M2 | WEIGHT: 129.3 LBS

## 2019-08-05 DIAGNOSIS — F32.9 MAJOR DEPRESSIVE DISORDER WITH CURRENT ACTIVE EPISODE, UNSPECIFIED DEPRESSION EPISODE SEVERITY, UNSPECIFIED WHETHER RECURRENT: Primary | ICD-10-CM

## 2019-08-05 DIAGNOSIS — F32.2 SEVERE SINGLE CURRENT EPISODE OF MAJOR DEPRESSIVE DISORDER, WITHOUT PSYCHOTIC FEATURES (HCC): ICD-10-CM

## 2019-08-05 DIAGNOSIS — G89.4 CHRONIC PAIN SYNDROME: ICD-10-CM

## 2019-08-05 DIAGNOSIS — I10 ESSENTIAL HYPERTENSION: ICD-10-CM

## 2019-08-05 PROCEDURE — 99214 OFFICE O/P EST MOD 30 MIN: CPT | Performed by: FAMILY MEDICINE

## 2019-08-05 RX ORDER — MELOXICAM 7.5 MG/1
7.5 TABLET ORAL DAILY
Refills: 5 | COMMUNITY
Start: 2019-07-29 | End: 2019-09-09

## 2019-08-05 RX ORDER — LISINOPRIL 10 MG/1
10 TABLET ORAL DAILY
Qty: 30 TABLET | Refills: 11 | Status: SHIPPED | OUTPATIENT
Start: 2019-08-05 | End: 2019-11-15 | Stop reason: SINTOL

## 2019-08-05 RX ORDER — TRAMADOL HYDROCHLORIDE 50 MG/1
50 TABLET ORAL DAILY
Qty: 30 TABLET | Refills: 0 | Status: SHIPPED | OUTPATIENT
Start: 2019-08-05 | End: 2019-09-09

## 2019-08-05 RX ORDER — OMEGA-3 FATTY ACIDS/FISH OIL 300-1000MG
2 CAPSULE ORAL DAILY PRN
COMMUNITY
End: 2019-09-09

## 2019-08-06 LAB — REF LAB TEST METHOD: NORMAL

## 2019-08-17 NOTE — PROGRESS NOTES
Subjective   Nicole Lofton is a 73 y.o. female.     Chief Complaint   Patient presents with   • loss of memory         History of Present Illness     Patient's past medical history for essential hypertension.  Is currently on amlodipine 10 mg daily.  Her blood pressure is elevated at 160/72.  Her blood pressures have been running little bit high over the last few visits.  She denies any side effects of her current medication.    Patient also has chronic pain syndrome.  Currently not taking any medication for this.  Patient patient states that she would like to try some stronger than regular Tylenol for her pain.    Patient states that she is taking Trintellix 10 mg daily for depression.  Patient believes that the medication is working.  Patient was not sure if there was an generic substitution.  Her daughter is present at today's office visit and believes the patient has done substantially well since being on this medication.  They believe that her memory and concentration has improved.    The following portions of the patient's history were reviewed and updated as appropriate: allergies, current medications, past family history, past medical history, past social history, past surgical history and problem list.    Review of Systems   Constitutional: Negative for chills and fever.   HENT: Negative for congestion, rhinorrhea, sinus pain and sore throat.    Eyes: Negative for photophobia and visual disturbance.   Respiratory: Negative for cough, chest tightness and shortness of breath.    Cardiovascular: Negative for chest pain and palpitations.   Gastrointestinal: Negative for diarrhea, nausea and vomiting.   Genitourinary: Negative for dysuria, frequency and urgency.   Skin: Negative for rash and wound.   Neurological: Negative for dizziness and syncope.   Psychiatric/Behavioral: Negative for behavioral problems and confusion.       Objective   Physical Exam   Constitutional: She is oriented to person, place,  and time. She appears well-developed and well-nourished.   HENT:   Head: Normocephalic and atraumatic.   Right Ear: External ear normal.   Left Ear: External ear normal.   Eyes: EOM are normal.   Neck: Normal range of motion. Neck supple.   Cardiovascular: Normal rate, regular rhythm and normal heart sounds.   Pulmonary/Chest: Effort normal and breath sounds normal. No respiratory distress.   Musculoskeletal: Normal range of motion.   Lymphadenopathy:     She has no cervical adenopathy.   Neurological: She is alert and oriented to person, place, and time.   Skin: Skin is warm.   Psychiatric: She has a normal mood and affect. Her behavior is normal.   Nursing note and vitals reviewed.      Assessment/Plan   Nicole was seen today for loss of memory.    Diagnoses and all orders for this visit:    Major depressive disorder with current active episode, unspecified depression episode severity, unspecified whether recurrent  -     Vortioxetine HBr (TRINTELLIX) 10 MG tablet; Take 10 mg by mouth Daily.  -     We will continue patient on Trintellix 10 mg daily.    Essential hypertension  -     lisinopril (PRINIVIL,ZESTRIL) 10 MG tablet; Take 1 tablet by mouth Daily.  -     Continue amlodipine, will add lisinopril 10 mg daily.    Chronic pain syndrome  -     traMADol (ULTRAM) 50 MG tablet; Take 1 tablet by mouth Daily.  -     We will start patient on tramadol.    Severe single current episode of major depressive disorder, without psychotic features (CMS/HCC)  -     Vortioxetine HBr (TRINTELLIX) 10 MG tablet; Take 10 mg by mouth Daily.          No Follow-up on file.    Dictated utilizing Dragon Voice Recognition Software

## 2019-09-09 ENCOUNTER — OFFICE VISIT (OUTPATIENT)
Dept: INTERNAL MEDICINE | Facility: CLINIC | Age: 74
End: 2019-09-09

## 2019-09-09 VITALS
DIASTOLIC BLOOD PRESSURE: 60 MMHG | OXYGEN SATURATION: 99 % | WEIGHT: 130.3 LBS | SYSTOLIC BLOOD PRESSURE: 140 MMHG | HEART RATE: 60 BPM | BODY MASS INDEX: 21.71 KG/M2

## 2019-09-09 DIAGNOSIS — E78.5 HYPERLIPIDEMIA, UNSPECIFIED HYPERLIPIDEMIA TYPE: ICD-10-CM

## 2019-09-09 DIAGNOSIS — I10 ESSENTIAL HYPERTENSION: ICD-10-CM

## 2019-09-09 DIAGNOSIS — G89.29 OTHER CHRONIC PAIN: ICD-10-CM

## 2019-09-09 DIAGNOSIS — F32.9 MAJOR DEPRESSIVE DISORDER WITH CURRENT ACTIVE EPISODE, UNSPECIFIED DEPRESSION EPISODE SEVERITY, UNSPECIFIED WHETHER RECURRENT: ICD-10-CM

## 2019-09-09 DIAGNOSIS — Z00.00 MEDICARE ANNUAL WELLNESS VISIT, SUBSEQUENT: Primary | ICD-10-CM

## 2019-09-09 DIAGNOSIS — E03.9 ACQUIRED HYPOTHYROIDISM: ICD-10-CM

## 2019-09-09 DIAGNOSIS — Z12.11 ENCOUNTER FOR SCREENING COLONOSCOPY: ICD-10-CM

## 2019-09-09 DIAGNOSIS — M19.90 OSTEOARTHRITIS, UNSPECIFIED OSTEOARTHRITIS TYPE, UNSPECIFIED SITE: ICD-10-CM

## 2019-09-09 PROCEDURE — 99214 OFFICE O/P EST MOD 30 MIN: CPT | Performed by: FAMILY MEDICINE

## 2019-09-09 PROCEDURE — G0439 PPPS, SUBSEQ VISIT: HCPCS | Performed by: FAMILY MEDICINE

## 2019-09-09 RX ORDER — TRAMADOL HYDROCHLORIDE 50 MG/1
50 TABLET ORAL EVERY 8 HOURS PRN
Qty: 60 TABLET | Refills: 0 | Status: SHIPPED | OUTPATIENT
Start: 2019-09-09 | End: 2019-10-28 | Stop reason: SDUPTHER

## 2019-09-09 NOTE — PROGRESS NOTES
The ABCs of the Annual Wellness Visit  Subsequent Medicare Wellness Visit    Chief Complaint   Patient presents with   • follow up to hypertension   • follow up to hyperlipidemia   • follow up to hypothyroidism   • follow up to depression   • Medicare Wellness-subsequent       Subjective   History of Present Illness:  Nicole Lofton is a 74 y.o. female who presents for a Subsequent Medicare Wellness Visit.    Patient has a past medical history for essential hypertension.  She currently takes amlodipine 10 mg daily and lisinopril 10 mg daily.  Her blood pressure today's office is 140/60.  She denies any side effects of the medication.  She notes that she feels much better now that her blood pressure has gotten lower than what it was before.  Her blood pressure last month was 160/72.    She has a history of having hypothyroidism.  Patient is currently taking levothyroxine 100 mcg daily.  Patient denies any side effects of the medication.    Patient has a past medical history for hyperlipidemia.  Patient is currently on Lipitor 40 mg daily.  Patient denies any side effects of the medication.    Patient has a history of having depression.  Patient is currently taking Trintellix 10 mg daily.  Patient states that Trintellix seems to be working well for her, she notes that her overall mood has improved significantly.  Patient would like to continue with the Trintellix.    Last office visit for her chronic pain, patient was given tramadol.  Patient notes that the tramadol seems to be working well for her pain.  Patient is currently out of the medication, but notes that she did well with this medicine.  She states that she is able to ambulate with the tramadol.  She states that the medicine does not make her drowsy.    HEALTH RISK ASSESSMENT    Recent Hospitalizations:  No hospitalization(s) within the last year.    Current Medical Providers:  Patient Care Team:  Keith Campbell MD as PCP - General (Family  Medicine)  Burt Merida MD as PCP - Claims Attributed  Burt Merida MD as Consulting Physician (Hematology and Oncology)    Smoking Status:  Social History     Tobacco Use   Smoking Status Current Every Day Smoker   • Packs/day: 1.00   • Types: Cigarettes   Smokeless Tobacco Never Used   Tobacco Comment    since age 18   1ppd       Alcohol Consumption:  Social History     Substance and Sexual Activity   Alcohol Use Yes    Comment: SOCIALLY       Depression Screen:   PHQ-2/PHQ-9 Depression Screening 9/9/2019   Little interest or pleasure in doing things 0   Feeling down, depressed, or hopeless 0   Trouble falling or staying asleep, or sleeping too much 0   Feeling tired or having little energy 0   Poor appetite or overeating 0   Feeling bad about yourself - or that you are a failure or have let yourself or your family down 0   Trouble concentrating on things, such as reading the newspaper or watching television 0   Moving or speaking so slowly that other people could have noticed. Or the opposite - being so fidgety or restless that you have been moving around a lot more than usual 0   Thoughts that you would be better off dead, or of hurting yourself in some way 0   Total Score 0   If you checked off any problems, how difficult have these problems made it for you to do your work, take care of things at home, or get along with other people? Not difficult at all       Fall Risk Screen:  STEADI Fall Risk Assessment was completed, and patient is at LOW risk for falls.Assessment completed on:8/5/2019    Health Habits and Functional and Cognitive Screening:  Functional & Cognitive Status 9/9/2019   Do you have difficulty preparing food and eating? No   Do you have difficulty bathing yourself, getting dressed or grooming yourself? No   Do you have difficulty using the toilet? No   Do you have difficulty moving around from place to place? No   Do you have trouble with steps or getting out of a bed or a chair? No   Current  Diet Well Balanced Diet   Dental Exam Up to date   Eye Exam Up to date   Exercise (times per week) 7 times per week   Current Exercise Activities Include Walking   Do you need help using the phone?  No   Are you deaf or do you have serious difficulty hearing?  No   Do you need help with transportation? No   Do you need help shopping? No   Do you need help preparing meals?  No   Do you need help with housework?  No   Do you need help with laundry? No   Do you need help taking your medications? No   Do you need help managing money? No   Do you ever drive or ride in a car without wearing a seat belt? No   Have you felt unusual stress, anger or loneliness in the last month? No   Who do you live with? Alone   If you need help, do you have trouble finding someone available to you? No   Have you been bothered in the last four weeks by sexual problems? No   Do you have difficulty concentrating, remembering or making decisions? No         Does the patient have evidence of cognitive impairment? No    Asprin use counseling:Taking ASA appropriately as indicated    Age-appropriate Screening Schedule:  Refer to the list below for future screening recommendations based on patient's age, sex and/or medical conditions. Orders for these recommended tests are listed in the plan section. The patient has been provided with a written plan.    Health Maintenance   Topic Date Due   • ZOSTER VACCINE (1 of 2) 09/07/1993   • PNEUMOCOCCAL VACCINES (65+ LOW/MEDIUM RISK) (1 of 2 - PCV13) 09/07/2008   • LIPID PANEL  08/28/2017   • COLONOSCOPY  08/28/2017   • INFLUENZA VACCINE  08/01/2019   • TDAP/TD VACCINES (3 - Td) 08/16/2025   • MAMMOGRAM  Discontinued          The following portions of the patient's history were reviewed and updated as appropriate: allergies, current medications, past family history, past medical history, past social history, past surgical history and problem list.    Outpatient Medications Prior to Visit   Medication Sig  Dispense Refill   • amLODIPine (NORVASC) 10 MG tablet Take 1 tablet by mouth Daily. 90 tablet 1   • aspirin 81 MG EC tablet Take 81 mg by mouth Daily.     • atorvastatin (LIPITOR) 40 MG tablet Take 1 tablet by mouth Daily. 90 tablet 1   • azelastine (ASTELIN) 0.1 % nasal spray 2 sprays into the nostril(s) as directed by provider Daily. Use in each nostril as directed     • bosutinib (BOSULIF) 100 MG chemo tablet Take 4 tablets by mouth Daily. 120 tablet 11   • levothyroxine (SYNTHROID, LEVOTHROID) 100 MCG tablet TAKE 1 TABLET BY MOUTH DAILY 30 tablet 2   • lisinopril (PRINIVIL,ZESTRIL) 10 MG tablet Take 1 tablet by mouth Daily. 30 tablet 11   • LORazepam (ATIVAN) 0.5 MG tablet Take 1 tablet by mouth At Night As Needed for Anxiety or Sedation. 90 tablet 0   • OLANZapine (zyPREXA) 5 MG tablet Take 1 tablet by mouth Every Night. 30 tablet 2   • ondansetron (ZOFRAN) 8 MG tablet Take 1 tablet by mouth Every 8 (Eight) Hours As Needed for Nausea or Vomiting. 30 tablet 2   • promethazine (PHENERGAN) 12.5 MG tablet TK 1 T PO TID PRN  3   • Ibuprofen (ADVIL) 200 MG capsule Take 2 tablets by mouth Daily As Needed.     • meloxicam (MOBIC) 7.5 MG tablet Take 7.5 mg by mouth Daily.  5   • naproxen sodium (ALEVE) 220 MG tablet Take 220 mg by mouth Daily As Needed.     • traMADol (ULTRAM) 50 MG tablet Take 1 tablet by mouth Daily. 30 tablet 0   • Vortioxetine HBr (TRINTELLIX) 10 MG tablet Take 10 mg by mouth Daily. 90 tablet 1   • potassium chloride (KLOR-CON) 20 MEQ packet Take 20 mEq by mouth 2 (Two) Times a Day. 60 packet 5     No facility-administered medications prior to visit.        Patient Active Problem List   Diagnosis   • Thrombocytosis (CMS/HCC)   • CML (chronic myeloid leukemia) (CMS/HCC)   • Hypothyroidism   • Medication monitoring encounter   • Prolonged Q-T interval on ECG   • Dysuria   • Severe single current episode of major depressive disorder, without psychotic features (CMS/HCC)   • Primary insomnia   •  Depression   • Osteoarthritis   • Incidental lung nodule, greater than or equal to 8mm   • Weakness   • Low blood potassium   • Hypertension   • Chronic pain syndrome   • Hyperlipidemia   • Other chronic pain       Advanced Care Planning:  Patient has an advance directive - a copy has been provided and is visible in patient header    Review of Systems   Constitutional: Negative for chills and fever.   HENT: Negative for congestion, rhinorrhea, sinus pain and sore throat.    Eyes: Negative for photophobia and visual disturbance.   Respiratory: Negative for cough, chest tightness and shortness of breath.    Cardiovascular: Negative for chest pain and palpitations.   Gastrointestinal: Negative for diarrhea, nausea and vomiting.   Genitourinary: Negative for dysuria, frequency and urgency.   Musculoskeletal: Positive for arthralgias.   Skin: Negative for rash and wound.   Neurological: Negative for dizziness and syncope.   Psychiatric/Behavioral: Negative for behavioral problems and confusion.       Compared to one year ago, the patient feels her physical health is better.  Compared to one year ago, the patient feels her mental health is better.    Reviewed chart for potential of high risk medication in the elderly: yes  Reviewed chart for potential of harmful drug interactions in the elderly:yes    Objective         Vitals:    09/09/19 1239   BP: 140/60   BP Location: Left arm   Patient Position: Sitting   Cuff Size: Adult   Pulse: 60   SpO2: 99%   Weight: 59.1 kg (130 lb 4.8 oz)   PainSc:   6       Body mass index is 21.71 kg/m².  Discussed the patient's BMI with her. The BMI is in the acceptable range.    Physical Exam   Constitutional: She is oriented to person, place, and time. She appears well-developed and well-nourished.   HENT:   Head: Normocephalic and atraumatic.   Right Ear: External ear normal.   Left Ear: External ear normal.   Eyes: EOM are normal.   Neck: Normal range of motion. Neck supple.    Cardiovascular: Normal rate, regular rhythm and normal heart sounds.   Pulmonary/Chest: Effort normal and breath sounds normal. No respiratory distress.   Musculoskeletal: Normal range of motion.   Lymphadenopathy:     She has no cervical adenopathy.   Neurological: She is alert and oriented to person, place, and time.   Skin: Skin is warm.   Psychiatric: She has a normal mood and affect. Her behavior is normal.   Nursing note and vitals reviewed.            Assessment/Plan   Medicare Risks and Personalized Health Plan  CMS Preventative Services Quick Reference  Cardiovascular risk  Depression/Dysphoria    The above risks/problems have been discussed with the patient.  Pertinent information has been shared with the patient in the After Visit Summary.  Follow up plans and orders are seen below in the Assessment/Plan Section.    Diagnoses and all orders for this visit:    1. Medicare annual wellness visit, subsequent (Primary)    2. Encounter for screening colonoscopy  -     Ambulatory Referral For Screening Colonoscopy    3. Major depressive disorder with current active episode, unspecified depression episode severity, unspecified whether recurrent  -     Vortioxetine HBr (TRINTELLIX) 10 MG tablet; Take 10 mg by mouth Daily.  Dispense: 30 tablet; Refill: 9  -     We will continue patient on Trintellix 10 mg daily.    4. Essential hypertension  -     Comprehensive Metabolic Panel  -     CBC & Differential  -     CBC & Differential; Future  -     Comprehensive Metabolic Panel; Future  -     Continue patient on amlodipine and lisinopril.    5. Hyperlipidemia, unspecified hyperlipidemia type  -     Lipid Panel With LDL / HDL Ratio  -     Comprehensive Metabolic Panel  -     Comprehensive Metabolic Panel; Future  -     Lipid Panel With LDL / HDL Ratio; Future  -     Continue atorvastatin 40 mg daily.    6. Acquired hypothyroidism  -     Thyroid Panel With TSH  -     Thyroid Panel With TSH; Future  -     Continue  levothyroxine 100 mcg daily.    7. Osteoarthritis, unspecified osteoarthritis type, unspecified site        -     Patient notes that the NSAIDs do not really help her for chronic pain.  However patient states that the tramadol was just fine to help with the pain.  Patient states that she did well with the medication.    8. Other chronic pain  -     traMADol (ULTRAM) 50 MG tablet; Take 1 tablet by mouth Every 8 (Eight) Hours As Needed for Moderate Pain  or Severe Pain .  Dispense: 60 tablet; Refill: 0      Follow Up:  No Follow-up on file.     An After Visit Summary and PPPS were given to the patient.

## 2019-09-10 ENCOUNTER — RESULTS ENCOUNTER (OUTPATIENT)
Dept: INTERNAL MEDICINE | Facility: CLINIC | Age: 74
End: 2019-09-10

## 2019-09-10 DIAGNOSIS — I10 ESSENTIAL HYPERTENSION: ICD-10-CM

## 2019-09-10 DIAGNOSIS — E03.9 ACQUIRED HYPOTHYROIDISM: ICD-10-CM

## 2019-09-10 DIAGNOSIS — E78.5 HYPERLIPIDEMIA, UNSPECIFIED HYPERLIPIDEMIA TYPE: ICD-10-CM

## 2019-09-10 LAB
ALBUMIN SERPL-MCNC: 4.7 G/DL (ref 3.5–5.2)
ALBUMIN/GLOB SERPL: 2 G/DL
ALP SERPL-CCNC: 79 U/L (ref 39–117)
ALT SERPL-CCNC: 13 U/L (ref 1–33)
AST SERPL-CCNC: 18 U/L (ref 1–32)
BASOPHILS # BLD AUTO: 0.04 10*3/MM3 (ref 0–0.2)
BASOPHILS NFR BLD AUTO: 0.6 % (ref 0–1.5)
BILIRUB SERPL-MCNC: 0.3 MG/DL (ref 0.2–1.2)
BUN SERPL-MCNC: 14 MG/DL (ref 8–23)
BUN/CREAT SERPL: 15.9 (ref 7–25)
CALCIUM SERPL-MCNC: 9.4 MG/DL (ref 8.6–10.5)
CHLORIDE SERPL-SCNC: 99 MMOL/L (ref 98–107)
CHOLEST SERPL-MCNC: 161 MG/DL (ref 0–200)
CO2 SERPL-SCNC: 25.4 MMOL/L (ref 22–29)
CREAT SERPL-MCNC: 0.88 MG/DL (ref 0.57–1)
EOSINOPHIL # BLD AUTO: 0.1 10*3/MM3 (ref 0–0.4)
EOSINOPHIL NFR BLD AUTO: 1.4 % (ref 0.3–6.2)
ERYTHROCYTE [DISTWIDTH] IN BLOOD BY AUTOMATED COUNT: 15 % (ref 12.3–15.4)
FT4I SERPL CALC-MCNC: 2.4 (ref 1.2–4.9)
GLOBULIN SER CALC-MCNC: 2.4 GM/DL
GLUCOSE SERPL-MCNC: 80 MG/DL (ref 65–99)
HCT VFR BLD AUTO: 41.3 % (ref 34–46.6)
HDLC SERPL-MCNC: 55 MG/DL (ref 40–60)
HGB BLD-MCNC: 12.6 G/DL (ref 12–15.9)
IMM GRANULOCYTES # BLD AUTO: 0.02 10*3/MM3 (ref 0–0.05)
IMM GRANULOCYTES NFR BLD AUTO: 0.3 % (ref 0–0.5)
LDLC SERPL CALC-MCNC: 78 MG/DL (ref 0–100)
LDLC/HDLC SERPL: 1.41 {RATIO}
LYMPHOCYTES # BLD AUTO: 2.39 10*3/MM3 (ref 0.7–3.1)
LYMPHOCYTES NFR BLD AUTO: 33.5 % (ref 19.6–45.3)
MCH RBC QN AUTO: 28.2 PG (ref 26.6–33)
MCHC RBC AUTO-ENTMCNC: 30.5 G/DL (ref 31.5–35.7)
MCV RBC AUTO: 92.4 FL (ref 79–97)
MONOCYTES # BLD AUTO: 0.56 10*3/MM3 (ref 0.1–0.9)
MONOCYTES NFR BLD AUTO: 7.9 % (ref 5–12)
NEUTROPHILS # BLD AUTO: 4.02 10*3/MM3 (ref 1.7–7)
NEUTROPHILS NFR BLD AUTO: 56.3 % (ref 42.7–76)
NRBC BLD AUTO-RTO: 0 /100 WBC (ref 0–0.2)
PLATELET # BLD AUTO: 196 10*3/MM3 (ref 140–450)
POTASSIUM SERPL-SCNC: 4.4 MMOL/L (ref 3.5–5.2)
PROT SERPL-MCNC: 7.1 G/DL (ref 6–8.5)
RBC # BLD AUTO: 4.47 10*6/MM3 (ref 3.77–5.28)
SODIUM SERPL-SCNC: 138 MMOL/L (ref 136–145)
T3RU NFR SERPL: 24 % (ref 24–39)
T4 SERPL-MCNC: 10.2 UG/DL (ref 4.5–12)
TRIGL SERPL-MCNC: 142 MG/DL (ref 0–150)
TSH SERPL DL<=0.005 MIU/L-ACNC: 2.34 UIU/ML (ref 0.45–4.5)
VLDLC SERPL CALC-MCNC: 28.4 MG/DL
WBC # BLD AUTO: 7.13 10*3/MM3 (ref 3.4–10.8)

## 2019-09-20 ENCOUNTER — APPOINTMENT (OUTPATIENT)
Dept: OTHER | Facility: HOSPITAL | Age: 74
End: 2019-09-20

## 2019-09-20 ENCOUNTER — APPOINTMENT (OUTPATIENT)
Dept: ONCOLOGY | Facility: CLINIC | Age: 74
End: 2019-09-20

## 2019-09-24 RX ORDER — OLANZAPINE 5 MG/1
5 TABLET ORAL NIGHTLY
Qty: 30 TABLET | Refills: 3 | Status: SHIPPED | OUTPATIENT
Start: 2019-09-24 | End: 2020-01-20

## 2019-09-27 ENCOUNTER — OFFICE VISIT (OUTPATIENT)
Dept: ONCOLOGY | Facility: CLINIC | Age: 74
End: 2019-09-27

## 2019-09-27 ENCOUNTER — LAB (OUTPATIENT)
Dept: OTHER | Facility: HOSPITAL | Age: 74
End: 2019-09-27

## 2019-09-27 VITALS
TEMPERATURE: 98.9 F | HEIGHT: 65 IN | HEART RATE: 59 BPM | WEIGHT: 129.3 LBS | SYSTOLIC BLOOD PRESSURE: 157 MMHG | BODY MASS INDEX: 21.54 KG/M2 | RESPIRATION RATE: 14 BRPM | DIASTOLIC BLOOD PRESSURE: 74 MMHG | OXYGEN SATURATION: 98 %

## 2019-09-27 DIAGNOSIS — D50.9 IRON DEFICIENCY ANEMIA, UNSPECIFIED IRON DEFICIENCY ANEMIA TYPE: ICD-10-CM

## 2019-09-27 DIAGNOSIS — C92.10 CML (CHRONIC MYELOID LEUKEMIA) (HCC): Primary | ICD-10-CM

## 2019-09-27 DIAGNOSIS — E03.9 HYPOTHYROIDISM, UNSPECIFIED TYPE: ICD-10-CM

## 2019-09-27 DIAGNOSIS — D75.839 THROMBOCYTOSIS: ICD-10-CM

## 2019-09-27 LAB
ALBUMIN SERPL-MCNC: 4.3 G/DL (ref 3.5–5.2)
ALBUMIN/GLOB SERPL: 1.5 G/DL
ALP SERPL-CCNC: 91 U/L (ref 39–117)
ALT SERPL W P-5'-P-CCNC: 13 U/L (ref 1–33)
ANION GAP SERPL CALCULATED.3IONS-SCNC: 9.8 MMOL/L (ref 5–15)
AST SERPL-CCNC: 19 U/L (ref 1–32)
BASOPHILS # BLD AUTO: 0.02 10*3/MM3 (ref 0–0.2)
BASOPHILS NFR BLD AUTO: 0.3 % (ref 0–1.5)
BILIRUB SERPL-MCNC: 0.3 MG/DL (ref 0.1–1.2)
BUN BLD-MCNC: 14 MG/DL (ref 8–23)
BUN/CREAT SERPL: 14.7 (ref 7–25)
CALCIUM SPEC-SCNC: 9.6 MG/DL (ref 8.6–10.5)
CHLORIDE SERPL-SCNC: 103 MMOL/L (ref 98–107)
CO2 SERPL-SCNC: 27.2 MMOL/L (ref 22–29)
CREAT BLD-MCNC: 0.95 MG/DL (ref 0.57–1)
DEPRECATED RDW RBC AUTO: 49.2 FL (ref 37–54)
EOSINOPHIL # BLD AUTO: 0.06 10*3/MM3 (ref 0–0.4)
EOSINOPHIL NFR BLD AUTO: 0.9 % (ref 0.3–6.2)
ERYTHROCYTE [DISTWIDTH] IN BLOOD BY AUTOMATED COUNT: 15.3 % (ref 12.3–15.4)
FERRITIN SERPL-MCNC: 20.7 NG/ML (ref 13–150)
FOLATE SERPL-MCNC: 15.82 NG/ML (ref 4.78–24.2)
GFR SERPL CREATININE-BSD FRML MDRD: 57 ML/MIN/1.73
GLOBULIN UR ELPH-MCNC: 2.9 GM/DL
GLUCOSE BLD-MCNC: 103 MG/DL (ref 65–99)
HCT VFR BLD AUTO: 36.1 % (ref 34–46.6)
HGB BLD-MCNC: 11.6 G/DL (ref 12–15.9)
IMM GRANULOCYTES # BLD AUTO: 0.01 10*3/MM3 (ref 0–0.05)
IMM GRANULOCYTES NFR BLD AUTO: 0.1 % (ref 0–0.5)
IRON 24H UR-MRATE: 71 MCG/DL (ref 37–145)
IRON SATN MFR SERPL: 17 % (ref 20–50)
LYMPHOCYTES # BLD AUTO: 2.02 10*3/MM3 (ref 0.7–3.1)
LYMPHOCYTES NFR BLD AUTO: 28.7 % (ref 19.6–45.3)
MCH RBC QN AUTO: 28.4 PG (ref 26.6–33)
MCHC RBC AUTO-ENTMCNC: 32.1 G/DL (ref 31.5–35.7)
MCV RBC AUTO: 88.3 FL (ref 79–97)
MONOCYTES # BLD AUTO: 0.57 10*3/MM3 (ref 0.1–0.9)
MONOCYTES NFR BLD AUTO: 8.1 % (ref 5–12)
NEUTROPHILS # BLD AUTO: 4.37 10*3/MM3 (ref 1.7–7)
NEUTROPHILS NFR BLD AUTO: 61.9 % (ref 42.7–76)
NRBC BLD AUTO-RTO: 0 /100 WBC (ref 0–0.2)
PLATELET # BLD AUTO: 167 10*3/MM3 (ref 140–450)
PMV BLD AUTO: 9.1 FL (ref 6–12)
POTASSIUM BLD-SCNC: 4.3 MMOL/L (ref 3.5–5.2)
PROT SERPL-MCNC: 7.2 G/DL (ref 6–8.5)
RBC # BLD AUTO: 4.09 10*6/MM3 (ref 3.77–5.28)
SODIUM BLD-SCNC: 140 MMOL/L (ref 136–145)
TIBC SERPL-MCNC: 413 MCG/DL (ref 298–536)
TRANSFERRIN SERPL-MCNC: 277 MG/DL (ref 200–360)
VIT B12 BLD-MCNC: 556 PG/ML (ref 211–946)
WBC NRBC COR # BLD: 7.05 10*3/MM3 (ref 3.4–10.8)

## 2019-09-27 PROCEDURE — 82607 VITAMIN B-12: CPT | Performed by: NURSE PRACTITIONER

## 2019-09-27 PROCEDURE — 82728 ASSAY OF FERRITIN: CPT | Performed by: NURSE PRACTITIONER

## 2019-09-27 PROCEDURE — 99214 OFFICE O/P EST MOD 30 MIN: CPT | Performed by: NURSE PRACTITIONER

## 2019-09-27 PROCEDURE — 83540 ASSAY OF IRON: CPT | Performed by: NURSE PRACTITIONER

## 2019-09-27 PROCEDURE — 85025 COMPLETE CBC W/AUTO DIFF WBC: CPT | Performed by: NURSE PRACTITIONER

## 2019-09-27 PROCEDURE — 80053 COMPREHEN METABOLIC PANEL: CPT | Performed by: NURSE PRACTITIONER

## 2019-09-27 PROCEDURE — 84466 ASSAY OF TRANSFERRIN: CPT | Performed by: NURSE PRACTITIONER

## 2019-09-27 PROCEDURE — 36415 COLL VENOUS BLD VENIPUNCTURE: CPT

## 2019-09-27 PROCEDURE — 82746 ASSAY OF FOLIC ACID SERUM: CPT | Performed by: NURSE PRACTITIONER

## 2019-09-27 NOTE — PROGRESS NOTES
[facility] CBC GROUP OUTPATIENT FOLLOW UP CLINIC VISIT    REASON FOR FOLLOW-UP:    1.  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  Gleevec 400 mg daily initiated around 10/12/2017, stopped on 11/15/2017 due to intolerance (noah-orbital edema, nausea/vomiting, fatigue).  Resumed at 200 mg daily but, again, not tolerated.    3.  Nilotinib 150 mg twice daily started in late March, 2018.  Held due to intolerance and QTc prolongation.   4.  Hydroxyurea initiated in July 2018.   5.  Bosutinib 400 mg daily started Feb 2019    HISTORY OF PRESENT ILLNESS:  Nicloe Lofton is a 74 y.o. female with the above-mentioned history here today for reevaluation. She continues on Bosutinib 400 mg daily and is tolerating is quite well. She is eating and drinking better. Her weight is stable. Bowels and urination are regular. Her skin is quite dry, but she denies any rashes.     She denies any new pain, night sweats, or new adenopathies and nodules.     Her CBC is within normal limits with the exception of mild anemia.HGB today is 11.6. She denies any excess bleeding or bruising, chest pain, or shortness of breath. She is fatigued, though this is stable.    She has no other concerns.         ONCOLOGIC HISTORY:  For about 6 months she has noticed bilateral arm tingling and weakness in her hands.  This has occurred about 5 or 6 times over the past 6 months.  She is vague in her description of this.  She is globally weak and is quite inactive as she lives by herself.  She was completing physical therapy with some improvement but is now quite unsteady on her feet.  She has not had any falls.  She denies any bleeding.  She has chronic fatigue.  She has osteoarthritis pain and reports pain in the right side and in her neck.  She denies any acute low back pain but does have chronic low back pain.  She does have orthostatic symptoms and she is lightheaded when she stands.  She has had a decreased  appetite and some weight loss over the past 9 months.     Labs performed on 8/28/2017 showed a white blood cell count of 39.1 with a differential showing 61% neutrophils and 32% lymphocytes, hemoglobin 13.9 and platelets 565,000.         She had further labs done at [place] on 8/20/2017 showing a white blood cell count of 24.4, hemoglobin 13.7, and platelets 1795.  A differential showed 64% neutrophils, 7% lymphocytes, and 12% basophils.  Peripheral blood flow cytometry was also done on this date showing 11% basophils and less than 1% myeloblasts.  There was no monoclonal B-cell, T-cell, plasma cell, or NK cell population.  FISH for BCR-ABL and ESSENCE 2 mutation analysis were done as well.     Her CBC was normal as of 7/8/2016.     Of note, she has a history of bilateral breast cancer diagnosed in 1988.  She had bilateral mastectomy with reconstruction.  We do not have any records regarding this at this time.  She apparently completed at least 6 months of adjuvant chemotherapy but again does not know the details..    FISH for BCR-ABL performed at [place] was actually positive.  As of 9/6/2017 ESSENCE 2 is still pending.    She returned on 9/6/2017 for follow-up.  Platelets at 1.9 million with hydroxyurea 1000 mg daily.  She will increase the dose to 2000 mg daily.  She will have a bone marrow aspiration biopsy performed.  Weekly CBCs.    As of 9/6/2017 peripheral blood PCR was positive at 89.238% with a major break point mutation.    ESSENCE 2 and CALR mutation negative.    Bone marrow aspiration and biopsy performed on 9/15/2017.  Flow cytometry showed no increase in blasts.  Morphology showed no increase in blasts.  Bone marrow FISH showed BCR/ABL rearrangement in 93% of cells.  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    Labs as of 9/20/2017 show white blood cell count of 4.7 with hemoglobin of 12.7 and platelets 1.1 million.    On 9/27/2017 white blood cells and hemoglobin are normal.  Platelets are down to 722,000.   We plan to start Gleevec at 400 mg daily if she tolerates it.  In the meantime she will decrease the hydroxyurea dose to 1000 mg daily.    Blood counts normalized.  Hydroxyurea stopped.    Gleevec initiated around 10/12/2017, stopped on 11/15/2017 due to intolerance (noah-orbital edema, nausea/vomiting, fatigue).    Symptoms improving as of 11/29/2017.  Blood counts normal.  Hold further therapy at this time until she improves more clinically.      Plan to resume Gleevec at 200 mg daily as of 1/11/2018.  Only took it for less than a week but also did not tolerate this dose.    As of 2/21/2018 her platelet count is again elevated at greater than 700,000.  Resume hydroxyurea 1000 mg daily.  Considering nilotinib at a decreased dose.    Platelets continued to rise in March 2018.  Hydroxyurea increased to 1500 mg daily with stabilization of her platelet count and some improvement as of 3/21/2018.  Plan for nilotinib.    Nilotinib initiated in late March 2018.    Subsequently held due to QTC prolongation.    Hydroxyurea 1000 mg daily resumed at the end of July 2018.  Increased the dose to 1000 mg twice daily as of 8/7/2018 for a rising platelet count of 1.3 million.    Platelets as of 9/7/2018 500,000.  Continue hydroxyurea 1000 mg twice daily.    As of 10/12/2018 her CBC has improved significantly.  Platelets 242,000 with a white blood cell count of 3.52.  Decrease hydroxyurea to 1000 mg daily.    Platelets increased as of 11/20/2018.  Increased hydroxyurea to 1000 mg in the morning and 500 mg in the evening.    Platelets were increased to 1.2 million on 1/15/2019.  Hydroxyurea increased 1000 mg twice daily.    She was referred to the [facility] in mid February started bosutinib 400 mg daily which she has been taking at night along with olanzapine and tolerating it well.  Blood counts improved.    PAST MEDICAL, SURGICAL, FAMILY, AND SOCIAL HISTORIES were reviewed with the patient and in the electronic medical  record, and were updated if indicated.    ALLERGIES:  No Known Allergies    MEDICATIONS:  The medication list has been reviewed with the patient by the medical assistant, and the list has been updated in the electronic medical record, which I reviewed.  Medication dosages and frequencies were confirmed to be accurate.    I have reviewed the patient's medical history in detail and updated the computerized patient record.    Review of Systems   Constitutional: Positive for appetite change (improved) and fatigue (stable ). Negative for chills, fever and unexpected weight change.   HENT:   Negative for hearing loss, mouth sores, nosebleeds and trouble swallowing.    Respiratory: Negative for cough and shortness of breath.    Cardiovascular: Negative for chest pain and leg swelling.   Gastrointestinal: Negative for abdominal pain, constipation, diarrhea, nausea and vomiting.   Endocrine: Negative for hot flashes.   Genitourinary: Negative for difficulty urinating, dysuria and frequency.    Musculoskeletal: Positive for arthralgias (stable ) and back pain. Negative for myalgias.   Skin: Negative for rash.   Neurological: Negative for dizziness and extremity weakness.   Hematological: Negative for adenopathy. Does not bruise/bleed easily.   Psychiatric/Behavioral: Positive for depression. Negative for sleep disturbance. The patient is not nervous/anxious.        There were no vitals filed for this visit.    Physical Exam   Constitutional: She is oriented to person, place, and time. She appears well-developed and well-nourished. No distress.   Chronically ill appearing   HENT:   Head: Normocephalic and atraumatic.   Mouth/Throat: Oropharynx is clear and moist and mucous membranes are normal. No oropharyngeal exudate.   Eyes: Pupils are equal, round, and reactive to light.   Neck: Normal range of motion.   Cardiovascular: Normal rate, regular rhythm and normal heart sounds.   No murmur heard.  Pulmonary/Chest: Effort normal  and breath sounds normal. No respiratory distress. She has no wheezes. She has no rhonchi. She has no rales.   Abdominal: Soft. Normal appearance and bowel sounds are normal. She exhibits no distension. There is no hepatosplenomegaly.   Musculoskeletal: Normal range of motion. She exhibits no edema.   Neurological: She is alert and oriented to person, place, and time.   Skin: Skin is warm and dry. No rash noted.   Psychiatric: She has a normal mood and affect.   Vitals reviewed.  Exam unchanged from previous     DIAGNOSTIC DATA:  Results for orders placed or performed in visit on 09/09/19   Thyroid Panel With TSH   Result Value Ref Range    TSH 2.340 0.450 - 4.500 uIU/mL    T4, Total 10.2 4.5 - 12.0 ug/dL    T3 Uptake 24 24 - 39 %    Free Thyroxine Index 2.4 1.2 - 4.9   Lipid Panel With LDL / HDL Ratio   Result Value Ref Range    Total Cholesterol 161 0 - 200 mg/dL    Triglycerides 142 0 - 150 mg/dL    HDL Cholesterol 55 40 - 60 mg/dL    VLDL Cholesterol 28.4 mg/dL    LDL Cholesterol  78 0 - 100 mg/dL    LDL/HDL Ratio 1.41    Comprehensive Metabolic Panel   Result Value Ref Range    Glucose 80 65 - 99 mg/dL    BUN 14 8 - 23 mg/dL    Creatinine 0.88 0.57 - 1.00 mg/dL    eGFR Non African Am 62 >60 mL/min/1.73    eGFR African Am 76 >60 mL/min/1.73    BUN/Creatinine Ratio 15.9 7.0 - 25.0    Sodium 138 136 - 145 mmol/L    Potassium 4.4 3.5 - 5.2 mmol/L    Chloride 99 98 - 107 mmol/L    Total CO2 25.4 22.0 - 29.0 mmol/L    Calcium 9.4 8.6 - 10.5 mg/dL    Total Protein 7.1 6.0 - 8.5 g/dL    Albumin 4.70 3.50 - 5.20 g/dL    Globulin 2.4 gm/dL    A/G Ratio 2.0 g/dL    Total Bilirubin 0.3 0.2 - 1.2 mg/dL    Alkaline Phosphatase 79 39 - 117 U/L    AST (SGOT) 18 1 - 32 U/L    ALT (SGPT) 13 1 - 33 U/L   CBC & Differential   Result Value Ref Range    WBC 7.13 3.40 - 10.80 10*3/mm3    RBC 4.47 3.77 - 5.28 10*6/mm3    Hemoglobin 12.6 12.0 - 15.9 g/dL    Hematocrit 41.3 34.0 - 46.6 %    MCV 92.4 79.0 - 97.0 fL    MCH 28.2 26.6 -  33.0 pg    MCHC 30.5 (L) 31.5 - 35.7 g/dL    RDW 15.0 12.3 - 15.4 %    Platelets 196 140 - 450 10*3/mm3    Neutrophil Rel % 56.3 42.7 - 76.0 %    Lymphocyte Rel % 33.5 19.6 - 45.3 %    Monocyte Rel % 7.9 5.0 - 12.0 %    Eosinophil Rel % 1.4 0.3 - 6.2 %    Basophil Rel % 0.6 0.0 - 1.5 %    Neutrophils Absolute 4.02 1.70 - 7.00 10*3/mm3    Lymphocytes Absolute 2.39 0.70 - 3.10 10*3/mm3    Monocytes Absolute 0.56 0.10 - 0.90 10*3/mm3    Eosinophils Absolute 0.10 0.00 - 0.40 10*3/mm3    Basophils Absolute 0.04 0.00 - 0.20 10*3/mm3    Immature Granulocyte Rel % 0.3 0.0 - 0.5 %    Immature Grans Absolute 0.02 0.00 - 0.05 10*3/mm3    nRBC 0.0 0.0 - 0.2 /100 WBC         IMAGING:  None reviewed    ASSESSMENT:  This is a 74 y.o. female with:  1.  History of bilateral breast cancer in 1988 status post bilateral mastectomy.  She apparently completed 6 months of adjuvant therapy.  We have no details regarding this.    2.  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  Started on Hydrea 1000 mg daily.  This was discontinued and she started Gleevec on approximately 10/12/2017.  Gleevec discontinued due to intolerance on 11/15/2017.  We started Gleevec at a lower dose of 200 mg but she was also intolerant of this dose.  Her side effects were as severe with a lower dose and she therefore stopped taking the drug.  Symptoms improved significantly.  Platelet count subsequently elevated again.    She started nilotinib at 150mg twice daily at the end of March 2018.  This was held in mid-May due to QTc prolongation.  The QTc interval did subsequently normalized.  However, due to this and other adverse effects we are not able to resume the nilotinib nor does she desire to.    In July we resumed hydroxyurea 1000 mg daily.  Her platelet count increased.  Her dose was increased to 1000 mg twice daily.  Blood counts improved nicely.  Platelet count normalized but her white blood cell count  dropped to below normal.  Decreased hydroxyurea to 1000 mg daily.    11/20/18 platelets were up again.  We increased the hydroxyurea to 1000 mg in the mornings and 500 mg in the evenings.    As of 1/15/2019 her platelet count was again elevated to 1.2 million.  Increased hydroxyurea to 1000 mg twice daily.      Platelets improved but remained far from normal.    Due to her history I referred her down to the [facility] to see Dr. Cifuentes for assistance and recommendations on further therapy.    In mid-February 2019 she started bosutinib 400 mg daily which she takes along with olanzapine at night.     The patient comes to the office today for a toxicity check on September 27, 2019.  She continues to tolerate the Bosutinib quite well.  Her appetite has recovered and her weight is stable.  She is anemic today and therefore iron studies, B12, and folate levels were obtained.  This will be further detailed below.  The patient is scheduled to return in November for MD visit with Dr. Merida.  She will continue on her current dose of Bosutinib.         3.  Thrombocytosis:  Due to CML.  See above.  Platelets normal at 167,000 today  4.  Hypothyroidism:  Levothyroxine 100 mcg. TSH level is pending today.   5.  Nausea and vomiting: Resolved off Gleevec.   6. Periorbital edema: Resolved off Gleevec.    7.  Hypokalemia:  Potassium previously normalized.  Potassium 4.3 today   8.  Osteoarthritis pain: she reports this became worse after starting nilotinib. She is taking acetaminophen for this.  Prescribed meloxicam on 8/7/2018.  No longer on nilotinib.  She will continue meloxicam.  9.  Depression and anxiety: per PCP at this point. Stable   10.  Insomnia.  This had been treated with trazodone but as mentioned above this also caused a potential for QT interval prolongation.  She was therefore changed to temazepam.  Primary care now managing. On olanzapine now.stable     11.  QTc prolongation: Due to nilotinib.  This  resolved.  She saw Dr. Duque with cardiology.    12.  Tobacco use: She is unwilling to quit smoking  13.  Decreased appetite with weight loss: Appetite and weight improved  14. Anemia: TSH level currently pending, however iron saturation is mildly low at 17%. I will have her begin one tablet of ferrous sulfate orally a day     PLAN:  1.  Continue bosutinib 400 mg daily.   2.  Continue olanzapine at night  3.  Begin one tablet of ferrous sulfate daily   4.  TSH level pending. May require adjustment of this per PCP   5. The patient is scheduled to follow up with Dr. Merida on 11/15/2019 with repeat lab studies at that time   6. I have asked the patient to call with any new issues or concerns prior to her next office visit. She has v/u    High risk medication requiring intensive monitoring      FERNANDO Arechiga

## 2019-10-07 DIAGNOSIS — F51.01 PRIMARY INSOMNIA: ICD-10-CM

## 2019-10-07 RX ORDER — LORAZEPAM 0.5 MG/1
0.5 TABLET ORAL NIGHTLY PRN
Qty: 90 TABLET | Refills: 0 | OUTPATIENT
Start: 2019-10-07 | End: 2019-12-05 | Stop reason: SDUPTHER

## 2019-10-28 DIAGNOSIS — G89.29 OTHER CHRONIC PAIN: ICD-10-CM

## 2019-10-28 DIAGNOSIS — E78.5 HYPERLIPIDEMIA, UNSPECIFIED HYPERLIPIDEMIA TYPE: ICD-10-CM

## 2019-10-28 RX ORDER — TRAMADOL HYDROCHLORIDE 50 MG/1
50 TABLET ORAL EVERY 8 HOURS PRN
Qty: 60 TABLET | Refills: 0 | Status: SHIPPED | OUTPATIENT
Start: 2019-10-28 | End: 2019-12-06 | Stop reason: SDUPTHER

## 2019-10-28 RX ORDER — ATORVASTATIN CALCIUM 40 MG/1
40 TABLET, FILM COATED ORAL DAILY
Qty: 90 TABLET | Refills: 1 | Status: SHIPPED | OUTPATIENT
Start: 2019-10-28 | End: 2020-05-18

## 2019-10-28 RX ORDER — LEVOTHYROXINE SODIUM 0.1 MG/1
100 TABLET ORAL DAILY
Qty: 90 TABLET | Refills: 0 | Status: SHIPPED | OUTPATIENT
Start: 2019-10-28 | End: 2020-03-06 | Stop reason: SDUPTHER

## 2019-10-28 RX ORDER — LEVOTHYROXINE SODIUM 0.1 MG/1
100 TABLET ORAL DAILY
Qty: 30 TABLET | Refills: 0 | Status: SHIPPED | OUTPATIENT
Start: 2019-10-28 | End: 2019-10-28 | Stop reason: SDUPTHER

## 2019-11-15 ENCOUNTER — LAB (OUTPATIENT)
Dept: OTHER | Facility: HOSPITAL | Age: 74
End: 2019-11-15

## 2019-11-15 ENCOUNTER — OFFICE VISIT (OUTPATIENT)
Dept: ONCOLOGY | Facility: CLINIC | Age: 74
End: 2019-11-15

## 2019-11-15 VITALS
OXYGEN SATURATION: 95 % | RESPIRATION RATE: 16 BRPM | WEIGHT: 126 LBS | HEART RATE: 78 BPM | BODY MASS INDEX: 20.99 KG/M2 | DIASTOLIC BLOOD PRESSURE: 80 MMHG | TEMPERATURE: 98.2 F | SYSTOLIC BLOOD PRESSURE: 143 MMHG | HEIGHT: 65 IN

## 2019-11-15 DIAGNOSIS — C92.10 CML (CHRONIC MYELOID LEUKEMIA) (HCC): ICD-10-CM

## 2019-11-15 DIAGNOSIS — C92.10 CML (CHRONIC MYELOID LEUKEMIA) (HCC): Primary | ICD-10-CM

## 2019-11-15 LAB
ALBUMIN SERPL-MCNC: 4.6 G/DL (ref 3.5–5.2)
ALBUMIN/GLOB SERPL: 1.4 G/DL
ALP SERPL-CCNC: 96 U/L (ref 39–117)
ALT SERPL W P-5'-P-CCNC: 13 U/L (ref 1–33)
ANION GAP SERPL CALCULATED.3IONS-SCNC: 10.1 MMOL/L (ref 5–15)
AST SERPL-CCNC: 18 U/L (ref 1–32)
BASOPHILS # BLD AUTO: 0.02 10*3/MM3 (ref 0–0.2)
BASOPHILS NFR BLD AUTO: 0.3 % (ref 0–1.5)
BILIRUB SERPL-MCNC: 0.3 MG/DL (ref 0.1–1.2)
BUN BLD-MCNC: 10 MG/DL (ref 8–23)
BUN/CREAT SERPL: 9.5 (ref 7–25)
CALCIUM SPEC-SCNC: 9.8 MG/DL (ref 8.6–10.5)
CHLORIDE SERPL-SCNC: 98 MMOL/L (ref 98–107)
CO2 SERPL-SCNC: 31.9 MMOL/L (ref 22–29)
CREAT BLD-MCNC: 1.05 MG/DL (ref 0.57–1)
DEPRECATED RDW RBC AUTO: 50.8 FL (ref 37–54)
EOSINOPHIL # BLD AUTO: 0.11 10*3/MM3 (ref 0–0.4)
EOSINOPHIL NFR BLD AUTO: 1.9 % (ref 0.3–6.2)
ERYTHROCYTE [DISTWIDTH] IN BLOOD BY AUTOMATED COUNT: 15.9 % (ref 12.3–15.4)
GFR SERPL CREATININE-BSD FRML MDRD: 51 ML/MIN/1.73
GLOBULIN UR ELPH-MCNC: 3.3 GM/DL
GLUCOSE BLD-MCNC: 100 MG/DL (ref 65–99)
HCT VFR BLD AUTO: 39.1 % (ref 34–46.6)
HGB BLD-MCNC: 12.9 G/DL (ref 12–15.9)
IMM GRANULOCYTES # BLD AUTO: 0.02 10*3/MM3 (ref 0–0.05)
IMM GRANULOCYTES NFR BLD AUTO: 0.3 % (ref 0–0.5)
LYMPHOCYTES # BLD AUTO: 1.98 10*3/MM3 (ref 0.7–3.1)
LYMPHOCYTES NFR BLD AUTO: 34.1 % (ref 19.6–45.3)
MCH RBC QN AUTO: 29 PG (ref 26.6–33)
MCHC RBC AUTO-ENTMCNC: 33 G/DL (ref 31.5–35.7)
MCV RBC AUTO: 87.9 FL (ref 79–97)
MONOCYTES # BLD AUTO: 0.41 10*3/MM3 (ref 0.1–0.9)
MONOCYTES NFR BLD AUTO: 7.1 % (ref 5–12)
NEUTROPHILS # BLD AUTO: 3.26 10*3/MM3 (ref 1.7–7)
NEUTROPHILS NFR BLD AUTO: 56.3 % (ref 42.7–76)
NRBC BLD AUTO-RTO: 0 /100 WBC (ref 0–0.2)
PLATELET # BLD AUTO: 207 10*3/MM3 (ref 140–450)
PMV BLD AUTO: 9.7 FL (ref 6–12)
POTASSIUM BLD-SCNC: 3.6 MMOL/L (ref 3.5–5.2)
PROT SERPL-MCNC: 7.9 G/DL (ref 6–8.5)
RBC # BLD AUTO: 4.45 10*6/MM3 (ref 3.77–5.28)
SODIUM BLD-SCNC: 140 MMOL/L (ref 136–145)
WBC NRBC COR # BLD: 5.8 10*3/MM3 (ref 3.4–10.8)

## 2019-11-15 PROCEDURE — 80053 COMPREHEN METABOLIC PANEL: CPT | Performed by: INTERNAL MEDICINE

## 2019-11-15 PROCEDURE — 85025 COMPLETE CBC W/AUTO DIFF WBC: CPT | Performed by: INTERNAL MEDICINE

## 2019-11-15 PROCEDURE — 99215 OFFICE O/P EST HI 40 MIN: CPT | Performed by: INTERNAL MEDICINE

## 2019-11-15 PROCEDURE — 36415 COLL VENOUS BLD VENIPUNCTURE: CPT

## 2019-11-15 RX ORDER — INFLUENZA A VIRUS A/MICHIGAN/45/2015 X-275 (H1N1) ANTIGEN (FORMALDEHYDE INACTIVATED), INFLUENZA A VIRUS A/SINGAPORE/INFIMH-16-0019/2016 IVR-186 (H3N2) ANTIGEN (FORMALDEHYDE INACTIVATED), AND INFLUENZA B VIRUS B/MARYLAND/15/2016 BX-69A (A B/COLORADO/6/2017-LIKE VIRUS) ANTIGEN (FORMALDEHYDE INACTIVATED) 60; 60; 60 UG/.5ML; UG/.5ML; UG/.5ML
INJECTION, SUSPENSION INTRAMUSCULAR
Refills: 0 | COMMUNITY
Start: 2019-10-04 | End: 2020-01-17

## 2019-11-15 NOTE — PROGRESS NOTES
[facility] GROUP OUTPATIENT FOLLOW UP CLINIC VISIT    REASON FOR FOLLOW-UP:    1.  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  Gleevec 400 mg daily initiated around 10/12/2017, stopped on 11/15/2017 due to intolerance (noah-orbital edema, nausea/vomiting, fatigue).  Resumed at 200 mg daily but, again, not tolerated.    3.  Nilotinib 150 mg twice daily started in late March, 2018.  Held due to intolerance and QTc prolongation.   4.  Hydroxyurea initiated in July 2018.   5.  Bosutinib 400 mg daily started Feb 2019    HISTORY OF PRESENT ILLNESS:  Nicole Lofton is a 74 y.o. female with the above-mentioned history here today for reevaluation. She continues on Bosutinib 400 mg daily and is tolerating is very well. She is eating and drinking better. Her weight is stable.  She did stop 1 of her blood pressure medications due to adverse effects.  She has also stopped aspirin.    ONCOLOGIC HISTORY:  For about 6 months she has noticed bilateral arm tingling and weakness in her hands.  This has occurred about 5 or 6 times over the past 6 months.  She is vague in her description of this.  She is globally weak and is quite inactive as she lives by herself.  She was completing physical therapy with some improvement but is now quite unsteady on her feet.  She has not had any falls.  She denies any bleeding.  She has chronic fatigue.  She has osteoarthritis pain and reports pain in the right side and in her neck.  She denies any acute low back pain but does have chronic low back pain.  She does have orthostatic symptoms and she is lightheaded when she stands.  She has had a decreased appetite and some weight loss over the past 9 months.     Labs performed on 8/28/2017 showed a white blood cell count of 39.1 with a differential showing 61% neutrophils and 32% lymphocytes, hemoglobin 13.9 and platelets 565,000.         She had further labs done at [facility] on 8/20/2017 showing a white  blood cell count of 24.4, hemoglobin 13.7, and platelets 1795.  A differential showed 64% neutrophils, 7% lymphocytes, and 12% basophils.  Peripheral blood flow cytometry was also done on this date showing 11% basophils and less than 1% myeloblasts.  There was no monoclonal B-cell, T-cell, plasma cell, or NK cell population.  FISH for BCR-ABL and ESSENCE 2 mutation analysis were done as well.     Her CBC was normal as of 7/8/2016.     Of note, she has a history of bilateral breast cancer diagnosed in 1988.  She had bilateral mastectomy with reconstruction.  We do not have any records regarding this at this time.  She apparently completed at least 6 months of adjuvant chemotherapy but again does not know the details..    FISH for BCR-ABL performed at [facility] was actually positive.  As of 9/6/2017 ESSENCE 2 is still pending.    She returned on 9/6/2017 for follow-up.  Platelets at 1.9 million with hydroxyurea 1000 mg daily.  She will increase the dose to 2000 mg daily.  She will have a bone marrow aspiration biopsy performed.  Weekly CBCs.    As of 9/6/2017 peripheral blood PCR was positive at 89.238% with a major break point mutation.    ESSENCE 2 and CALR mutation negative.    Bone marrow aspiration and biopsy performed on 9/15/2017.  Flow cytometry showed no increase in blasts.  Morphology showed no increase in blasts.  Bone marrow FISH showed BCR/ABL rearrangement in 93% of cells.  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    Labs as of 9/20/2017 show white blood cell count of 4.7 with hemoglobin of 12.7 and platelets 1.1 million.    On 9/27/2017 white blood cells and hemoglobin are normal.  Platelets are down to 722,000.  We plan to start Gleevec at 400 mg daily if she tolerates it.  In the meantime she will decrease the hydroxyurea dose to 1000 mg daily.    Blood counts normalized.  Hydroxyurea stopped.    Gleevec initiated around 10/12/2017, stopped on 11/15/2017 due to intolerance (noah-orbital edema,  nausea/vomiting, fatigue).    Symptoms improving as of 11/29/2017.  Blood counts normal.  Hold further therapy at this time until she improves more clinically.      Plan to resume Gleevec at 200 mg daily as of 1/11/2018.  Only took it for less than a week but also did not tolerate this dose.    As of 2/21/2018 her platelet count is again elevated at greater than 700,000.  Resume hydroxyurea 1000 mg daily.  Considering nilotinib at a decreased dose.    Platelets continued to rise in March 2018.  Hydroxyurea increased to 1500 mg daily with stabilization of her platelet count and some improvement as of 3/21/2018.  Plan for nilotinib.    Nilotinib initiated in late March 2018.    Subsequently held due to QTC prolongation.    Hydroxyurea 1000 mg daily resumed at the end of July 2018.  Increased the dose to 1000 mg twice daily as of 8/7/2018 for a rising platelet count of 1.3 million.    Platelets as of 9/7/2018 500,000.  Continue hydroxyurea 1000 mg twice daily.    As of 10/12/2018 her CBC has improved significantly.  Platelets 242,000 with a white blood cell count of 3.52.  Decrease hydroxyurea to 1000 mg daily.    Platelets increased as of 11/20/2018.  Increased hydroxyurea to 1000 mg in the morning and 500 mg in the evening.    Platelets were increased to 1.2 million on 1/15/2019.  Hydroxyurea increased 1000 mg twice daily.    She was referred to the [facility] in mid February started bosutinib 400 mg daily which she has been taking at night along with olanzapine and tolerating it well.  Blood counts improved.    PAST MEDICAL, SURGICAL, FAMILY, AND SOCIAL HISTORIES were reviewed with the patient and in the electronic medical record, and were updated if indicated.    ALLERGIES:  No Known Allergies    MEDICATIONS:  The medication list has been reviewed with the patient by the medical assistant, and the list has been updated in the electronic medical record, which I reviewed.  Medication dosages and frequencies  "were confirmed to be accurate.    I have reviewed the patient's medical history in detail and updated the computerized patient record.    Review of Systems   Constitutional: Positive for appetite change (improved) and fatigue (stable ). Negative for chills, fever and unexpected weight change.   HENT:   Negative for hearing loss, mouth sores, nosebleeds and trouble swallowing.    Respiratory: Negative for cough and shortness of breath.    Cardiovascular: Negative for chest pain and leg swelling.   Gastrointestinal: Negative for abdominal pain, constipation, diarrhea, nausea and vomiting.   Endocrine: Negative for hot flashes.   Genitourinary: Negative for difficulty urinating, dysuria and frequency.    Musculoskeletal: Positive for arthralgias (stable ) and back pain. Negative for myalgias.   Skin: Negative for rash.   Neurological: Negative for dizziness and extremity weakness.   Hematological: Negative for adenopathy. Does not bruise/bleed easily.   Psychiatric/Behavioral: Positive for depression. Negative for sleep disturbance. The patient is not nervous/anxious.        Vitals:    11/15/19 0946   BP: 143/80   Pulse: 78   Resp: 16   Temp: 98.2 °F (36.8 °C)   TempSrc: Oral   SpO2: 95%   Weight: 57.2 kg (126 lb)   Height: 165 cm (64.96\")   PainSc: 0-No pain  Comment: leukemia       Physical Exam   Constitutional: She is oriented to person, place, and time. She appears well-developed and well-nourished. No distress.   Chronically ill appearing   HENT:   Head: Normocephalic and atraumatic.   Mouth/Throat: Oropharynx is clear and moist and mucous membranes are normal. No oropharyngeal exudate.   Eyes: Pupils are equal, round, and reactive to light.   Neck: Normal range of motion.   Cardiovascular: Normal rate, regular rhythm and normal heart sounds.   No murmur heard.  Pulmonary/Chest: Effort normal and breath sounds normal. No respiratory distress. She has no wheezes. She has no rhonchi. She has no rales.   Abdominal: " Soft. Normal appearance and bowel sounds are normal. She exhibits no distension. There is no hepatosplenomegaly.   Musculoskeletal: Normal range of motion. She exhibits no edema.   Neurological: She is alert and oriented to person, place, and time.   Skin: Skin is warm and dry. No rash noted.   Psychiatric: She has a normal mood and affect.   Vitals reviewed.  Exam unchanged from previous except as noted    DIAGNOSTIC DATA:  Results for orders placed or performed in visit on 11/15/19   Comprehensive Metabolic Panel   Result Value Ref Range    Glucose 100 (H) 65 - 99 mg/dL    BUN 10 8 - 23 mg/dL    Creatinine 1.05 (H) 0.57 - 1.00 mg/dL    Sodium 140 136 - 145 mmol/L    Potassium 3.6 3.5 - 5.2 mmol/L    Chloride 98 98 - 107 mmol/L    CO2 31.9 (H) 22.0 - 29.0 mmol/L    Calcium 9.8 8.6 - 10.5 mg/dL    Total Protein 7.9 6.0 - 8.5 g/dL    Albumin 4.60 3.50 - 5.20 g/dL    ALT (SGPT) 13 1 - 33 U/L    AST (SGOT) 18 1 - 32 U/L    Alkaline Phosphatase 96 39 - 117 U/L    Total Bilirubin 0.3 0.1 - 1.2 mg/dL    eGFR Non African Amer 51 (L) >60 mL/min/1.73    Globulin 3.3 gm/dL    A/G Ratio 1.4 g/dL    BUN/Creatinine Ratio 9.5 7.0 - 25.0    Anion Gap 10.1 5.0 - 15.0 mmol/L   CBC Auto Differential   Result Value Ref Range    WBC 5.80 3.40 - 10.80 10*3/mm3    RBC 4.45 3.77 - 5.28 10*6/mm3    Hemoglobin 12.9 12.0 - 15.9 g/dL    Hematocrit 39.1 34.0 - 46.6 %    MCV 87.9 79.0 - 97.0 fL    MCH 29.0 26.6 - 33.0 pg    MCHC 33.0 31.5 - 35.7 g/dL    RDW 15.9 (H) 12.3 - 15.4 %    RDW-SD 50.8 37.0 - 54.0 fl    MPV 9.7 6.0 - 12.0 fL    Platelets 207 140 - 450 10*3/mm3    Neutrophil % 56.3 42.7 - 76.0 %    Lymphocyte % 34.1 19.6 - 45.3 %    Monocyte % 7.1 5.0 - 12.0 %    Eosinophil % 1.9 0.3 - 6.2 %    Basophil % 0.3 0.0 - 1.5 %    Immature Grans % 0.3 0.0 - 0.5 %    Neutrophils, Absolute 3.26 1.70 - 7.00 10*3/mm3    Lymphocytes, Absolute 1.98 0.70 - 3.10 10*3/mm3    Monocytes, Absolute 0.41 0.10 - 0.90 10*3/mm3    Eosinophils, Absolute 0.11  0.00 - 0.40 10*3/mm3    Basophils, Absolute 0.02 0.00 - 0.20 10*3/mm3    Immature Grans, Absolute 0.02 0.00 - 0.05 10*3/mm3    nRBC 0.0 0.0 - 0.2 /100 WBC         IMAGING:  None reviewed    ASSESSMENT:  This is a 74 y.o. female with:  1.  History of bilateral breast cancer in 1988 status post bilateral mastectomy.  She apparently completed 6 months of adjuvant therapy.  We have no details regarding this.    2.  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  Started on Hydrea 1000 mg daily.  This was discontinued and she started Gleevec on approximately 10/12/2017.  Gleevec discontinued due to intolerance on 11/15/2017.  We started Gleevec at a lower dose of 200 mg but she was also intolerant of this dose.  Her side effects were as severe with a lower dose and she therefore stopped taking the drug.  Symptoms improved significantly.  Platelet count subsequently elevated again.    She started nilotinib at 150mg twice daily at the end of March 2018.  This was held in mid-May due to QTc prolongation.  The QTc interval did subsequently normalized.  However, due to this and other adverse effects we are not able to resume the nilotinib nor does she desire to.    In July we resumed hydroxyurea 1000 mg daily.  Her platelet count increased.  Her dose was increased to 1000 mg twice daily.  Blood counts improved nicely.  Platelet count normalized but her white blood cell count dropped to below normal.  Decreased hydroxyurea to 1000 mg daily.    11/20/18 platelets were up again.  We increased the hydroxyurea to 1000 mg in the mornings and 500 mg in the evenings.    As of 1/15/2019 her platelet count was again elevated to 1.2 million.  Increased hydroxyurea to 1000 mg twice daily.      Platelets improved but remained far from normal.    Due to her history I referred her down to the [facility] to see Dr. Cifuentes for assistance and recommendations on further  therapy.    In mid-February 2019 she started bosutinib 400 mg daily which she takes along with olanzapine at night (she takes this as needed now).       3.  Thrombocytosis:  Due to CML.  See above.  Platelets normal today  4.  Hypothyroidism:  Levothyroxine 100 mcg. TSH level is pending today.   5.  Nausea and vomiting: Resolved off Gleevec.   6. Periorbital edema: Resolved off Gleevec.    7.  Hypokalemia:  Potassium previously normalized.  Potassium normal today   8.  Osteoarthritis pain: she reports this became worse after starting nilotinib. She is taking acetaminophen for this.  Prescribed meloxicam on 8/7/2018.  No longer on nilotinib.  She will continue meloxicam.  9.  Depression and anxiety: per PCP at this point. Stable   10.  Insomnia.  This had been treated with trazodone but as mentioned above this also caused a potential for QT interval prolongation.  She was therefore changed to temazepam.  Primary care now managing. On olanzapine now.stable     11.  QTc prolongation: Due to nilotinib.  This resolved.  She saw Dr. Duque with cardiology.    12.  Tobacco use: She is unwilling to quit smoking  13.  Decreased appetite with weight loss: Appetite and weight improved  14.  Hypertension: Per primary care.  She is no longer taking her lisinopril due to adverse effects.    PLAN:  1.  Continue bosutinib 400 mg daily.   2.  Continue olanzapine at night as needed  3.  Continue one tablet of ferrous sulfate daily   4.  I did recommend that she resume aspirin 81 mg daily.  5.  Management of hypertension per primary care  6.  Nurse practitioner visit in about 2 months with a CBC CMP and PCR for BCR/ABL and I will see her back in about 4 months for follow-up with a CBC and CMP.    High risk medication requiring intensive monitoring      Burt Merida MD

## 2019-11-22 ENCOUNTER — DOCUMENTATION (OUTPATIENT)
Dept: ONCOLOGY | Facility: CLINIC | Age: 74
End: 2019-11-22

## 2019-11-22 NOTE — PROGRESS NOTES
VM from pts daughter, Clarissa-She states she was out of town and has spoken with pt. Pt reported to her that she has ONE tablet left for tonights dose.    I have contacted [facility] [phone] and spoke to Jagruti. I informed her that pt will have no medication for tomorrow. She placed the order and checked if pt has Saturday delivery (which she does) pt will rec medication tomorrow, 11/23/19.    I have notified Clarissa [phone]

## 2019-12-02 RX ORDER — LEVOTHYROXINE SODIUM 0.1 MG/1
100 TABLET ORAL DAILY
Qty: 30 TABLET | Refills: 3 | Status: SHIPPED | OUTPATIENT
Start: 2019-12-02 | End: 2020-04-14

## 2019-12-05 DIAGNOSIS — F51.01 PRIMARY INSOMNIA: ICD-10-CM

## 2019-12-05 RX ORDER — LORAZEPAM 0.5 MG/1
0.5 TABLET ORAL NIGHTLY PRN
Qty: 90 TABLET | Refills: 0 | OUTPATIENT
Start: 2019-12-05 | End: 2020-02-24

## 2019-12-06 DIAGNOSIS — G89.29 OTHER CHRONIC PAIN: ICD-10-CM

## 2019-12-06 RX ORDER — TRAMADOL HYDROCHLORIDE 50 MG/1
50 TABLET ORAL EVERY 8 HOURS PRN
Qty: 60 TABLET | Refills: 0 | Status: SHIPPED | OUTPATIENT
Start: 2019-12-06 | End: 2020-01-02 | Stop reason: SDUPTHER

## 2019-12-18 RX ORDER — MELOXICAM 7.5 MG/1
TABLET ORAL
Qty: 30 TABLET | Refills: 5 | Status: SHIPPED | OUTPATIENT
Start: 2019-12-18 | End: 2020-10-08

## 2019-12-23 ENCOUNTER — DOCUMENTATION (OUTPATIENT)
Dept: ONCOLOGY | Facility: CLINIC | Age: 74
End: 2019-12-23

## 2019-12-23 NOTE — PROGRESS NOTES
Staff message rec from Dr Duckworth on 12/21/19 about pts medication. See below.    Pratibha Duckworth MD sent to [facility].             Please make sure she gets her meds delivered Monday she is out of pills      Pt is on Bosulif. This is a common occurrence that pt waits to contact when she is out of medication or has a day left.    I contacted [facility] [phone] and spoke to Arlen. Pt will rec her delivery tomorrow 12/24/19  They will be sending a 2 month supply to get through the re-enrollment process into the program.

## 2020-01-02 DIAGNOSIS — G89.29 OTHER CHRONIC PAIN: ICD-10-CM

## 2020-01-06 RX ORDER — TRAMADOL HYDROCHLORIDE 50 MG/1
50 TABLET ORAL EVERY 8 HOURS PRN
Qty: 60 TABLET | Refills: 0 | Status: SHIPPED | OUTPATIENT
Start: 2020-01-06 | End: 2020-02-11

## 2020-01-10 ENCOUNTER — OFFICE VISIT (OUTPATIENT)
Dept: ONCOLOGY | Facility: CLINIC | Age: 75
End: 2020-01-10

## 2020-01-10 ENCOUNTER — LAB (OUTPATIENT)
Dept: OTHER | Facility: HOSPITAL | Age: 75
End: 2020-01-10

## 2020-01-10 VITALS
RESPIRATION RATE: 16 BRPM | WEIGHT: 126 LBS | DIASTOLIC BLOOD PRESSURE: 75 MMHG | SYSTOLIC BLOOD PRESSURE: 162 MMHG | OXYGEN SATURATION: 97 % | HEIGHT: 65 IN | BODY MASS INDEX: 20.99 KG/M2 | HEART RATE: 62 BPM | TEMPERATURE: 98.5 F

## 2020-01-10 DIAGNOSIS — C92.10 CML (CHRONIC MYELOID LEUKEMIA) (HCC): ICD-10-CM

## 2020-01-10 DIAGNOSIS — E03.9 ACQUIRED HYPOTHYROIDISM: Primary | ICD-10-CM

## 2020-01-10 LAB
ALBUMIN SERPL-MCNC: 4.5 G/DL (ref 3.5–5.2)
ALBUMIN/GLOB SERPL: 1.5 G/DL
ALP SERPL-CCNC: 81 U/L (ref 39–117)
ALT SERPL W P-5'-P-CCNC: 17 U/L (ref 1–33)
ANION GAP SERPL CALCULATED.3IONS-SCNC: 8.8 MMOL/L (ref 5–15)
AST SERPL-CCNC: 19 U/L (ref 1–32)
BASOPHILS # BLD AUTO: 0.04 10*3/MM3 (ref 0–0.2)
BASOPHILS NFR BLD AUTO: 0.5 % (ref 0–1.5)
BILIRUB SERPL-MCNC: 0.2 MG/DL (ref 0.1–1.2)
BUN BLD-MCNC: 15 MG/DL (ref 8–23)
BUN/CREAT SERPL: 13.3 (ref 7–25)
CALCIUM SPEC-SCNC: 9.7 MG/DL (ref 8.6–10.5)
CHLORIDE SERPL-SCNC: 98 MMOL/L (ref 98–107)
CO2 SERPL-SCNC: 29.2 MMOL/L (ref 22–29)
CREAT BLD-MCNC: 1.13 MG/DL (ref 0.57–1)
DEPRECATED RDW RBC AUTO: 51.5 FL (ref 37–54)
EOSINOPHIL # BLD AUTO: 0.14 10*3/MM3 (ref 0–0.4)
EOSINOPHIL NFR BLD AUTO: 1.6 % (ref 0.3–6.2)
ERYTHROCYTE [DISTWIDTH] IN BLOOD BY AUTOMATED COUNT: 15.7 % (ref 12.3–15.4)
GFR SERPL CREATININE-BSD FRML MDRD: 47 ML/MIN/1.73
GLOBULIN UR ELPH-MCNC: 3 GM/DL
GLUCOSE BLD-MCNC: 111 MG/DL (ref 65–99)
HCT VFR BLD AUTO: 37.2 % (ref 34–46.6)
HGB BLD-MCNC: 12.3 G/DL (ref 12–15.9)
IMM GRANULOCYTES # BLD AUTO: 0.07 10*3/MM3 (ref 0–0.05)
IMM GRANULOCYTES NFR BLD AUTO: 0.8 % (ref 0–0.5)
LYMPHOCYTES # BLD AUTO: 1.98 10*3/MM3 (ref 0.7–3.1)
LYMPHOCYTES NFR BLD AUTO: 23 % (ref 19.6–45.3)
MCH RBC QN AUTO: 29.4 PG (ref 26.6–33)
MCHC RBC AUTO-ENTMCNC: 33.1 G/DL (ref 31.5–35.7)
MCV RBC AUTO: 89 FL (ref 79–97)
MONOCYTES # BLD AUTO: 0.73 10*3/MM3 (ref 0.1–0.9)
MONOCYTES NFR BLD AUTO: 8.5 % (ref 5–12)
NEUTROPHILS # BLD AUTO: 5.64 10*3/MM3 (ref 1.7–7)
NEUTROPHILS NFR BLD AUTO: 65.6 % (ref 42.7–76)
NRBC BLD AUTO-RTO: 0 /100 WBC (ref 0–0.2)
PLATELET # BLD AUTO: 183 10*3/MM3 (ref 140–450)
PMV BLD AUTO: 9.5 FL (ref 6–12)
POTASSIUM BLD-SCNC: 4.4 MMOL/L (ref 3.5–5.2)
PROT SERPL-MCNC: 7.5 G/DL (ref 6–8.5)
RBC # BLD AUTO: 4.18 10*6/MM3 (ref 3.77–5.28)
SODIUM BLD-SCNC: 136 MMOL/L (ref 136–145)
T4 FREE SERPL-MCNC: 1.13 NG/DL (ref 0.93–1.7)
TSH SERPL DL<=0.05 MIU/L-ACNC: 10.79 UIU/ML (ref 0.27–4.2)
WBC NRBC COR # BLD: 8.6 10*3/MM3 (ref 3.4–10.8)

## 2020-01-10 PROCEDURE — 85025 COMPLETE CBC W/AUTO DIFF WBC: CPT | Performed by: INTERNAL MEDICINE

## 2020-01-10 PROCEDURE — 36415 COLL VENOUS BLD VENIPUNCTURE: CPT

## 2020-01-10 PROCEDURE — 84443 ASSAY THYROID STIM HORMONE: CPT | Performed by: INTERNAL MEDICINE

## 2020-01-10 PROCEDURE — 80053 COMPREHEN METABOLIC PANEL: CPT | Performed by: INTERNAL MEDICINE

## 2020-01-10 PROCEDURE — 84439 ASSAY OF FREE THYROXINE: CPT | Performed by: INTERNAL MEDICINE

## 2020-01-10 PROCEDURE — 99213 OFFICE O/P EST LOW 20 MIN: CPT | Performed by: NURSE PRACTITIONER

## 2020-01-10 RX ORDER — LISINOPRIL 10 MG/1
10 TABLET ORAL DAILY
COMMUNITY
Start: 2019-12-02 | End: 2020-07-15

## 2020-01-10 NOTE — PROGRESS NOTES
[facility] OUTPATIENT FOLLOW UP CLINIC VISIT    REASON FOR FOLLOW-UP:    1.  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  Gleevec 400 mg daily initiated around 10/12/2017, stopped on 11/15/2017 due to intolerance (noah-orbital edema, nausea/vomiting, fatigue).  Resumed at 200 mg daily but, again, not tolerated.    3.  Nilotinib 150 mg twice daily started in late March, 2018.  Held due to intolerance and QTc prolongation.   4.  Hydroxyurea initiated in July 2018.   5.  Bosutinib 400 mg daily started Feb 2019    HISTORY OF PRESENT ILLNESS:  Nicole Lofton is a 74 y.o. female with the above-mentioned history here today for reevaluation. She continues on Bosutinib 400 mg daily.  She is tolerating it remarkably well.  She is eating and drinking adequately.  Bowels and urination are regular.  At her last office visit, she had discontinued baby aspirin and Dr. Merida asked her to restart it.  Unfortunately, she did not realize this and has not been taking it.  We will ask her to start it today.    CBC today is within normal limits.  Her creatinine is slightly elevated from her prior reading in November at 1.13.  All other labs are unremarkable.    ONCOLOGIC HISTORY:  For about 6 months she has noticed bilateral arm tingling and weakness in her hands.  This has occurred about 5 or 6 times over the past 6 months.  She is vague in her description of this.  She is globally weak and is quite inactive as she lives by herself.  She was completing physical therapy with some improvement but is now quite unsteady on her feet.  She has not had any falls.  She denies any bleeding.  She has chronic fatigue.  She has osteoarthritis pain and reports pain in the right side and in her neck.  She denies any acute low back pain but does have chronic low back pain.  She does have orthostatic symptoms and she is lightheaded when she stands.  She has had a decreased appetite and some weight  loss over the past 9 months.     Labs performed on 8/28/2017 showed a white blood cell count of 39.1 with a differential showing 61% neutrophils and 32% lymphocytes, hemoglobin 13.9 and platelets 565,000.         She had further labs done at [facility] on 8/20/2017 showing a white blood cell count of 24.4, hemoglobin 13.7, and platelets 1795.  A differential showed 64% neutrophils, 7% lymphocytes, and 12% basophils.  Peripheral blood flow cytometry was also done on this date showing 11% basophils and less than 1% myeloblasts.  There was no monoclonal B-cell, T-cell, plasma cell, or NK cell population.  FISH for BCR-ABL and ESSENCE 2 mutation analysis were done as well.     Her CBC was normal as of 7/8/2016.     Of note, she has a history of bilateral breast cancer diagnosed in 1988.  She had bilateral mastectomy with reconstruction.  We do not have any records regarding this at this time.  She apparently completed at least 6 months of adjuvant chemotherapy but again does not know the details..    FISH for BCR-ABL performed at [place] was actually positive.  As of 9/6/2017 ESSENCE 2 is still pending.    She returned on 9/6/2017 for follow-up.  Platelets at 1.9 million with hydroxyurea 1000 mg daily.  She will increase the dose to 2000 mg daily.  She will have a bone marrow aspiration biopsy performed.  Weekly CBCs.    As of 9/6/2017 peripheral blood PCR was positive at 89.238% with a major break point mutation.    ESSENCE 2 and CALR mutation negative.    Bone marrow aspiration and biopsy performed on 9/15/2017.  Flow cytometry showed no increase in blasts.  Morphology showed no increase in blasts.  Bone marrow FISH showed BCR/ABL rearrangement in 93% of cells.  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    Labs as of 9/20/2017 show white blood cell count of 4.7 with hemoglobin of 12.7 and platelets 1.1 million.    On 9/27/2017 white blood cells and hemoglobin are normal.  Platelets are down to 722,000.  We plan to start Gleevec  at 400 mg daily if she tolerates it.  In the meantime she will decrease the hydroxyurea dose to 1000 mg daily.    Blood counts normalized.  Hydroxyurea stopped.    Gleevec initiated around 10/12/2017, stopped on 11/15/2017 due to intolerance (noah-orbital edema, nausea/vomiting, fatigue).    Symptoms improving as of 11/29/2017.  Blood counts normal.  Hold further therapy at this time until she improves more clinically.      Plan to resume Gleevec at 200 mg daily as of 1/11/2018.  Only took it for less than a week but also did not tolerate this dose.    As of 2/21/2018 her platelet count is again elevated at greater than 700,000.  Resume hydroxyurea 1000 mg daily.  Considering nilotinib at a decreased dose.    Platelets continued to rise in March 2018.  Hydroxyurea increased to 1500 mg daily with stabilization of her platelet count and some improvement as of 3/21/2018.  Plan for nilotinib.    Nilotinib initiated in late March 2018.    Subsequently held due to QTC prolongation.    Hydroxyurea 1000 mg daily resumed at the end of July 2018.  Increased the dose to 1000 mg twice daily as of 8/7/2018 for a rising platelet count of 1.3 million.    Platelets as of 9/7/2018 500,000.  Continue hydroxyurea 1000 mg twice daily.    As of 10/12/2018 her CBC has improved significantly.  Platelets 242,000 with a white blood cell count of 3.52.  Decrease hydroxyurea to 1000 mg daily.    Platelets increased as of 11/20/2018.  Increased hydroxyurea to 1000 mg in the morning and 500 mg in the evening.    Platelets were increased to 1.2 million on 1/15/2019.  Hydroxyurea increased 1000 mg twice daily.    She was referred to the [facility] in mid February started bosutinib 400 mg daily which she has been taking at night along with olanzapine and tolerating it well.  Blood counts improved.    PAST MEDICAL, SURGICAL, FAMILY, AND SOCIAL HISTORIES were reviewed with the patient and in the electronic medical record, and were updated  if indicated.    ALLERGIES:  No Known Allergies    MEDICATIONS:  The medication list has been reviewed with the patient by the medical assistant, and the list has been updated in the electronic medical record, which I reviewed.  Medication dosages and frequencies were confirmed to be accurate.    I have reviewed the patient's medical history in detail and updated the computerized patient record.    Review of Systems   Constitutional: Positive for appetite change (eating well now) and fatigue (stable ). Negative for chills, fever and unexpected weight change.   HENT:   Negative for hearing loss, mouth sores, nosebleeds and trouble swallowing.    Respiratory: Negative for cough and shortness of breath.    Cardiovascular: Negative for chest pain and leg swelling.   Gastrointestinal: Negative for abdominal pain, constipation, diarrhea, nausea and vomiting.   Endocrine: Negative for hot flashes.   Genitourinary: Negative for difficulty urinating, dysuria and frequency.    Musculoskeletal: Positive for arthralgias (stable ) and back pain. Negative for myalgias.   Skin: Negative for rash.   Neurological: Negative for dizziness and extremity weakness.   Hematological: Negative for adenopathy. Does not bruise/bleed easily.   Psychiatric/Behavioral: Positive for depression. Negative for sleep disturbance. The patient is not nervous/anxious.        There were no vitals filed for this visit.    Physical Exam   Constitutional: She is oriented to person, place, and time. She appears well-developed and well-nourished. No distress.   Chronically ill appearing   HENT:   Head: Normocephalic and atraumatic.   Mouth/Throat: Oropharynx is clear and moist and mucous membranes are normal. No oropharyngeal exudate.   Eyes: Pupils are equal, round, and reactive to light.   Neck: Normal range of motion.   Cardiovascular: Normal rate, regular rhythm and normal heart sounds.   No murmur heard.  Pulmonary/Chest: Effort normal and breath sounds  normal. No respiratory distress. She has no wheezes. She has no rhonchi. She has no rales.   Abdominal: Soft. Normal appearance and bowel sounds are normal. She exhibits no distension. There is no hepatosplenomegaly.   Musculoskeletal: Normal range of motion. She exhibits no edema.   Neurological: She is alert and oriented to person, place, and time.   Skin: Skin is warm and dry. No rash noted.   Psychiatric: She has a normal mood and affect.   Vitals reviewed.  Exam unchanged from previous except as noted    DIAGNOSTIC DATA:  Results for orders placed or performed in visit on 11/15/19   Comprehensive Metabolic Panel   Result Value Ref Range    Glucose 100 (H) 65 - 99 mg/dL    BUN 10 8 - 23 mg/dL    Creatinine 1.05 (H) 0.57 - 1.00 mg/dL    Sodium 140 136 - 145 mmol/L    Potassium 3.6 3.5 - 5.2 mmol/L    Chloride 98 98 - 107 mmol/L    CO2 31.9 (H) 22.0 - 29.0 mmol/L    Calcium 9.8 8.6 - 10.5 mg/dL    Total Protein 7.9 6.0 - 8.5 g/dL    Albumin 4.60 3.50 - 5.20 g/dL    ALT (SGPT) 13 1 - 33 U/L    AST (SGOT) 18 1 - 32 U/L    Alkaline Phosphatase 96 39 - 117 U/L    Total Bilirubin 0.3 0.1 - 1.2 mg/dL    eGFR Non African Amer 51 (L) >60 mL/min/1.73    Globulin 3.3 gm/dL    A/G Ratio 1.4 g/dL    BUN/Creatinine Ratio 9.5 7.0 - 25.0    Anion Gap 10.1 5.0 - 15.0 mmol/L   CBC Auto Differential   Result Value Ref Range    WBC 5.80 3.40 - 10.80 10*3/mm3    RBC 4.45 3.77 - 5.28 10*6/mm3    Hemoglobin 12.9 12.0 - 15.9 g/dL    Hematocrit 39.1 34.0 - 46.6 %    MCV 87.9 79.0 - 97.0 fL    MCH 29.0 26.6 - 33.0 pg    MCHC 33.0 31.5 - 35.7 g/dL    RDW 15.9 (H) 12.3 - 15.4 %    RDW-SD 50.8 37.0 - 54.0 fl    MPV 9.7 6.0 - 12.0 fL    Platelets 207 140 - 450 10*3/mm3    Neutrophil % 56.3 42.7 - 76.0 %    Lymphocyte % 34.1 19.6 - 45.3 %    Monocyte % 7.1 5.0 - 12.0 %    Eosinophil % 1.9 0.3 - 6.2 %    Basophil % 0.3 0.0 - 1.5 %    Immature Grans % 0.3 0.0 - 0.5 %    Neutrophils, Absolute 3.26 1.70 - 7.00 10*3/mm3    Lymphocytes, Absolute  1.98 0.70 - 3.10 10*3/mm3    Monocytes, Absolute 0.41 0.10 - 0.90 10*3/mm3    Eosinophils, Absolute 0.11 0.00 - 0.40 10*3/mm3    Basophils, Absolute 0.02 0.00 - 0.20 10*3/mm3    Immature Grans, Absolute 0.02 0.00 - 0.05 10*3/mm3    nRBC 0.0 0.0 - 0.2 /100 WBC         IMAGING:  None reviewed    ASSESSMENT:  This is a 74 y.o. female with:  1.  History of bilateral breast cancer in 1988 status post bilateral mastectomy.  She apparently completed 6 months of adjuvant therapy.  We have no details regarding this.    2.  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  Started on Hydrea 1000 mg daily.  This was discontinued and she started Gleevec on approximately 10/12/2017.  Gleevec discontinued due to intolerance on 11/15/2017.  We started Gleevec at a lower dose of 200 mg but she was also intolerant of this dose.  Her side effects were as severe with a lower dose and she therefore stopped taking the drug.  Symptoms improved significantly.  Platelet count subsequently elevated again.    She started nilotinib at 150mg twice daily at the end of March 2018.  This was held in mid-May due to QTc prolongation.  The QTc interval did subsequently normalized.  However, due to this and other adverse effects we are not able to resume the nilotinib nor does she desire to.    In July we resumed hydroxyurea 1000 mg daily.  Her platelet count increased.  Her dose was increased to 1000 mg twice daily.  Blood counts improved nicely.  Platelet count normalized but her white blood cell count dropped to below normal.  Decreased hydroxyurea to 1000 mg daily.    11/20/18 platelets were up again.  We increased the hydroxyurea to 1000 mg in the mornings and 500 mg in the evenings.    As of 1/15/2019 her platelet count was again elevated to 1.2 million.  Increased hydroxyurea to 1000 mg twice daily.      Platelets improved but remained far from normal.    Due to her history Dr. Merida referred her  down to the [facility] to see Dr. Cifuentes for assistance and recommendations on further therapy.    In mid-February 2019 she started bosutinib 400 mg daily which she takes along with olanzapine at night (she takes this as needed now).       The patient returns today, January 10, 2020 for reevaluation.  She continues on the bosutinib with excellent tolerance.  She is taking it at night with the olanzapine and this has been helping with nausea as well as her sleep.  She will continue as instructed.    3.  Thrombocytosis:  Due to CML.  See above.  Platelets normal today  4.  Hypothyroidism:  Levothyroxine 100 mcg.  TSH level pending today  5.  Nausea and vomiting: Resolved off Gleevec.   6. Periorbital edema: Resolved off Gleevec.    7.  Hypokalemia:  Potassium normal today   8.  Osteoarthritis pain: she reports this became worse after starting nilotinib. She is taking acetaminophen for this.  Prescribed meloxicam on 8/7/2018.  No longer on nilotinib.  She will continue meloxicam.  9.  Depression and anxiety: per PCP at this point. Stable   10.  Insomnia.  This had been treated with trazodone but as mentioned above this also caused a potential for QT interval prolongation.  She was therefore changed to temazepam.  Primary care now managing. On olanzapine now.significantly improved with olanzapine  11.  QTc prolongation: Due to nilotinib.  This resolved.  She saw Dr. Duque with cardiology.    12.  Tobacco use: She is unwilling to quit smoking.  Unchanged today  13.  Decreased appetite with weight loss: Appetite and weight improved  14.  Hypertension: Per primary care.  She is no longer taking her lisinopril due to adverse effects.  Blood pressure today is 162/75    PLAN:  1.  Continue bosutinib 400 mg daily.   2.  Continue olanzapine at night as needed  3.  Continue one tablet of ferrous sulfate daily   4.  I have again recommended that she resume aspirin 81 mg daily.  5.  Management of hypertension per  primary care  6.   PCR for BCR/ABL pending today  7.  MD follow-up in March with CBC and CMP prior    High risk medication requiring intensive monitoring      Kera Rebollar, APRN

## 2020-01-13 RX ORDER — OLANZAPINE 5 MG/1
5 TABLET ORAL NIGHTLY
Qty: 30 TABLET | Refills: 3 | OUTPATIENT
Start: 2020-01-13

## 2020-01-13 NOTE — TELEPHONE ENCOUNTER
Per [employer]ict[employer], Zyprexa is managed by PCP. Zyprexa refill denied and message sent to pharmacy to contact PCP.

## 2020-01-14 ENCOUNTER — OFFICE VISIT (OUTPATIENT)
Dept: INTERNAL MEDICINE | Facility: CLINIC | Age: 75
End: 2020-01-14

## 2020-01-14 VITALS
BODY MASS INDEX: 20.99 KG/M2 | TEMPERATURE: 98.7 F | HEART RATE: 67 BPM | WEIGHT: 126 LBS | RESPIRATION RATE: 16 BRPM | HEIGHT: 65 IN | DIASTOLIC BLOOD PRESSURE: 84 MMHG | OXYGEN SATURATION: 98 % | SYSTOLIC BLOOD PRESSURE: 148 MMHG

## 2020-01-14 DIAGNOSIS — J11.1 INFLUENZA: Primary | ICD-10-CM

## 2020-01-14 PROCEDURE — 99213 OFFICE O/P EST LOW 20 MIN: CPT | Performed by: FAMILY MEDICINE

## 2020-01-14 RX ORDER — GUAIFENESIN AND CODEINE PHOSPHATE 100; 10 MG/5ML; MG/5ML
5 SOLUTION ORAL 3 TIMES DAILY PRN
Qty: 150 ML | Refills: 0 | Status: SHIPPED | OUTPATIENT
Start: 2020-01-14 | End: 2020-01-17 | Stop reason: SDUPTHER

## 2020-01-14 NOTE — PROGRESS NOTES
Subjective   Nicole Lofton is a 74 y.o. female.     Chief Complaint   Patient presents with   • possible flu   • Headache   • Diarrhea     3 days   • Cough         Influenza   This is a new problem. The current episode started in the past 7 days. The problem occurs constantly. Associated symptoms include arthralgias, chills, congestion, coughing, fatigue, myalgias, a sore throat and swollen glands. Pertinent negatives include no chest pain, fever, nausea, rash or vomiting. Nothing aggravates the symptoms. She has tried nothing for the symptoms. The treatment provided no relief.        The following portions of the patient's history were reviewed and updated as appropriate: allergies, current medications, past family history, past medical history, past social history, past surgical history and problem list.    Review of Systems   Constitutional: Positive for chills and fatigue. Negative for fever.   HENT: Positive for congestion, rhinorrhea, sinus pressure, sinus pain and sore throat.    Eyes: Negative for photophobia and visual disturbance.   Respiratory: Positive for cough. Negative for chest tightness and shortness of breath.    Cardiovascular: Negative for chest pain and palpitations.   Gastrointestinal: Negative for diarrhea, nausea and vomiting.   Genitourinary: Negative for dysuria, frequency and urgency.   Musculoskeletal: Positive for arthralgias and myalgias.   Skin: Negative for rash and wound.   Neurological: Negative for dizziness and syncope.   Psychiatric/Behavioral: Negative for behavioral problems and confusion.       Objective   Physical Exam   Constitutional: She is oriented to person, place, and time. She appears well-developed and well-nourished.   HENT:   Head: Normocephalic and atraumatic.   Right Ear: External ear normal.   Left Ear: External ear normal.   Mouth/Throat: Oropharynx is clear and moist.   Eyes: EOM are normal.   Neck: Normal range of motion. Neck supple.   Cardiovascular:  Normal rate, regular rhythm and normal heart sounds.   Pulmonary/Chest: Effort normal. No respiratory distress. She has wheezes.   Abdominal: Soft. There is no tenderness. There is no guarding.   Musculoskeletal: Normal range of motion.   Lymphadenopathy:     She has no cervical adenopathy.   Neurological: She is alert and oriented to person, place, and time.   Skin: Skin is warm.   Psychiatric: She has a normal mood and affect. Her behavior is normal.   Nursing note and vitals reviewed.      Vitals:    01/14/20 0953   BP: 148/84   Pulse: 67   Resp: 16   Temp: 98.7 °F (37.1 °C)   SpO2: 98%     Body mass index is 20.99 kg/m².      Assessment/Plan   Nicole was seen today for possible flu, headache, diarrhea and cough.    Diagnoses and all orders for this visit:    Influenza  -     guaiFENesin-codeine (GUAIFENESIN AC) 100-10 MG/5ML liquid; Take 5 mL by mouth 3 (Three) Times a Day As Needed for Cough.  -     Symptomatic treatment.  Not a candidate for Tamiflu.  Start Cheratussin AC.  We will also give Bevespi sample.          No follow-ups on file.    Dictated utilizing Dragon Voice Recognition Software

## 2020-01-17 ENCOUNTER — OFFICE VISIT (OUTPATIENT)
Dept: INTERNAL MEDICINE | Facility: CLINIC | Age: 75
End: 2020-01-17

## 2020-01-17 VITALS
OXYGEN SATURATION: 99 % | HEIGHT: 65 IN | WEIGHT: 125.2 LBS | DIASTOLIC BLOOD PRESSURE: 70 MMHG | HEART RATE: 58 BPM | BODY MASS INDEX: 20.86 KG/M2 | SYSTOLIC BLOOD PRESSURE: 174 MMHG | TEMPERATURE: 98.2 F

## 2020-01-17 DIAGNOSIS — J40 BRONCHITIS: ICD-10-CM

## 2020-01-17 DIAGNOSIS — J11.1 INFLUENZA: Primary | ICD-10-CM

## 2020-01-17 PROCEDURE — 99213 OFFICE O/P EST LOW 20 MIN: CPT | Performed by: FAMILY MEDICINE

## 2020-01-17 RX ORDER — GUAIFENESIN AND CODEINE PHOSPHATE 100; 10 MG/5ML; MG/5ML
5 SOLUTION ORAL 3 TIMES DAILY PRN
Qty: 150 ML | Refills: 0 | Status: SHIPPED | OUTPATIENT
Start: 2020-01-17 | End: 2020-07-15

## 2020-01-17 RX ORDER — PREDNISONE 20 MG/1
40 TABLET ORAL DAILY
Qty: 10 TABLET | Refills: 0 | Status: SHIPPED | OUTPATIENT
Start: 2020-01-17 | End: 2020-01-22

## 2020-01-17 RX ORDER — AZITHROMYCIN 250 MG/1
TABLET, FILM COATED ORAL
Qty: 6 TABLET | Refills: 0 | Status: SHIPPED | OUTPATIENT
Start: 2020-01-17 | End: 2020-07-15

## 2020-01-17 RX ORDER — OSELTAMIVIR PHOSPHATE 75 MG/1
75 CAPSULE ORAL 2 TIMES DAILY
Qty: 10 CAPSULE | Refills: 0 | Status: SHIPPED | OUTPATIENT
Start: 2020-01-17 | End: 2020-07-15

## 2020-01-18 NOTE — PROGRESS NOTES
Subjective   Nicole Lofton is a 74 y.o. female.     Chief Complaint   Patient presents with   • Influenza     checked on tues cough med given, tested neg, weak, cough, diar is better, still feels bad         History of Present Illness     She continues to have bronchitis, and where she is constantly coughing and feeling weak and fatigued.  Patient had clinical signs and symptoms of flu.  She is outside the window to get the Tamiflu when she initially presented.    The following portions of the patient's history were reviewed and updated as appropriate: allergies, current medications, past family history, past medical history, past social history, past surgical history and problem list.    Review of Systems   Constitutional: Positive for fatigue. Negative for chills and fever.   HENT: Negative for congestion, rhinorrhea, sinus pain and sore throat.    Eyes: Negative for photophobia and visual disturbance.   Respiratory: Positive for cough. Negative for chest tightness and shortness of breath.    Cardiovascular: Negative for chest pain and palpitations.   Gastrointestinal: Negative for diarrhea, nausea and vomiting.   Genitourinary: Negative for dysuria, frequency and urgency.   Skin: Negative for rash and wound.   Neurological: Negative for dizziness and syncope.   Psychiatric/Behavioral: Negative for behavioral problems and confusion.       Objective   Physical Exam   Constitutional: She is oriented to person, place, and time. She appears well-developed and well-nourished.   HENT:   Head: Normocephalic and atraumatic.   Right Ear: External ear normal.   Left Ear: External ear normal.   Mouth/Throat: Oropharynx is clear and moist.   Eyes: EOM are normal.   Neck: Normal range of motion. Neck supple.   Cardiovascular: Normal rate, regular rhythm and normal heart sounds.   Pulmonary/Chest: Effort normal. No respiratory distress. She has wheezes.   Musculoskeletal: Normal range of motion.   Lymphadenopathy:     She  has no cervical adenopathy.   Neurological: She is alert and oriented to person, place, and time.   Skin: Skin is warm.   Psychiatric: She has a normal mood and affect. Her behavior is normal.   Nursing note and vitals reviewed.      Vitals:    01/17/20 1006   BP: 174/70   Pulse: 58   Temp: 98.2 °F (36.8 °C)   SpO2: 99%     Body mass index is 20.86 kg/m².      Assessment/Plan   Nicole was seen today for influenza.    Diagnoses and all orders for this visit:    Influenza  -     oseltamivir (TAMIFLU) 75 MG capsule; Take 1 capsule by mouth 2 (Two) Times a Day.  -     guaiFENesin-codeine (GUAIFENESIN AC) 100-10 MG/5ML liquid; Take 5 mL by mouth 3 (Three) Times a Day As Needed for Cough.  -     Believe that the signs and symptoms have worsened, she believes he may have gotten a flu again, and not truly recovered.  I discussed with her that I am happy to give her some Tamiflu, however I do not think her believe that the Tamiflu may help her because of her time duration for when the signs symptoms of started.  We will also like her to take some Cheratussin AC to help her with her cough.  She has been developing some bronchitis.  Patient needs to stay hydrated with Gatorade and fluids.  She may take Tylenol as well.    Bronchitis  -     azithromycin (ZITHROMAX) 250 MG tablet; Take 2 tablets the first day, then 1 tablet daily for 4 days.  -     predniSONE (DELTASONE) 20 MG tablet; Take 2 tablets by mouth Daily for 5 days.  -     Bronchitis patient may use a steroid inhaler such as Dulera, and will start her on prednisone as well as her taking Z-Frank[place].    Have discussed with patient and family that if signs and symptoms were to worsen, she is to go to the emergency room for further evaluation and treatment.  Patient and family have understood and agreed with plan.      No follow-ups on file.    Dictated utilizing Dragon Voice Recognition Software

## 2020-01-20 RX ORDER — OLANZAPINE 5 MG/1
5 TABLET ORAL NIGHTLY
Qty: 30 TABLET | Refills: 3 | Status: SHIPPED | OUTPATIENT
Start: 2020-01-20 | End: 2020-04-30

## 2020-02-11 DIAGNOSIS — G89.29 OTHER CHRONIC PAIN: ICD-10-CM

## 2020-02-14 RX ORDER — TRAMADOL HYDROCHLORIDE 50 MG/1
TABLET ORAL
Qty: 60 TABLET | Refills: 0 | Status: SHIPPED | OUTPATIENT
Start: 2020-02-14 | End: 2020-03-16 | Stop reason: SDUPTHER

## 2020-02-18 ENCOUNTER — TELEPHONE (OUTPATIENT)
Dept: ONCOLOGY | Facility: CLINIC | Age: 75
End: 2020-02-18

## 2020-02-18 LAB — REF LAB TEST METHOD: NORMAL

## 2020-02-19 NOTE — TELEPHONE ENCOUNTER
Patients daughter called, patient will be out in 2 days calling checking the status.    Clarissa [phone]

## 2020-02-19 NOTE — TELEPHONE ENCOUNTER
Message rec from [facility] (yesterday-I was out of the office) that pts daughter is calling for a refill of pts Bosulif.    There was another message rec today that pts daughter was calling to check the status.    I contacted [facility] and spoke to Gladys. I inquired on placing the order for pts Bosulif. She states she does not show pt as active for 2020. I informed her that the renewal application was sent in prior to 1/1/2020. She found the application and will pull it to be worked on ASAP.

## 2020-02-21 DIAGNOSIS — F51.01 PRIMARY INSOMNIA: ICD-10-CM

## 2020-02-24 NOTE — TELEPHONE ENCOUNTER
2/24/20-Pt has been approved for FREE Bosulif until 12/31/2020. She will rec a delivery tomorrow, 2/25/20

## 2020-02-25 RX ORDER — LORAZEPAM 0.5 MG/1
TABLET ORAL
Qty: 90 TABLET | Refills: 0 | Status: SHIPPED | OUTPATIENT
Start: 2020-02-25 | End: 2020-04-01

## 2020-03-03 ENCOUNTER — TELEPHONE (OUTPATIENT)
Dept: ONCOLOGY | Facility: CLINIC | Age: 75
End: 2020-03-03

## 2020-03-03 ENCOUNTER — TELEPHONE (OUTPATIENT)
Dept: ONCOLOGY | Facility: HOSPITAL | Age: 75
End: 2020-03-03

## 2020-03-03 NOTE — TELEPHONE ENCOUNTER
Patient is out of bosulif 100 mg takes 4 a day. Patient wants to know if medication will be at patients house tomorrow. Did not come today         Daughter [phone]

## 2020-03-03 NOTE — TELEPHONE ENCOUNTER
Called patient re medication Bosulif, no answer.  Left message for patient call back in the AM.  Marifer Arvizu takes care of this med shipment and is not available today.

## 2020-03-04 NOTE — TELEPHONE ENCOUNTER
PT'S DAUGHTER CALLED AGAIN TODAY. PT STILL HAS NOT RECEIVED HER MEDICATION BOSULIF.    PLEASE GIVE BRANDY ARAUZ (PT'S DAUGHTER) A CALL -[phone].

## 2020-03-05 ENCOUNTER — TELEPHONE (OUTPATIENT)
Dept: ONCOLOGY | Facility: CLINIC | Age: 75
End: 2020-03-05

## 2020-03-05 NOTE — TELEPHONE ENCOUNTER
HUB message from today forwarded to me by Celia MOSS, RN about pts medication.     I did not get any message yesterday but I see a note from the [facility] in pts chart. She did not get her delivery of Bosulif from [employer].    I contacted [employer] and spoke to Sandra-she states pt is scheduled to rec her delivery tomorrow, 3/6/20.    I left a  for Clarissa, Pts daughter, to let her know of this and provided my direct phone number in the message to call me if pt does not rec her delivery.

## 2020-03-05 NOTE — TELEPHONE ENCOUNTER
Patient's daughter, Clarissa, calling    She would like to speak to Marifer Arvizu.    Left message yesterday about her mother's medication, but did not receive a call back.      Please call her at  [phone]

## 2020-03-06 ENCOUNTER — OFFICE VISIT (OUTPATIENT)
Dept: ONCOLOGY | Facility: CLINIC | Age: 75
End: 2020-03-06

## 2020-03-06 ENCOUNTER — LAB (OUTPATIENT)
Dept: OTHER | Facility: HOSPITAL | Age: 75
End: 2020-03-06

## 2020-03-06 VITALS
OXYGEN SATURATION: 98 % | BODY MASS INDEX: 21.41 KG/M2 | HEART RATE: 63 BPM | SYSTOLIC BLOOD PRESSURE: 173 MMHG | DIASTOLIC BLOOD PRESSURE: 78 MMHG | TEMPERATURE: 98.1 F | HEIGHT: 65 IN | WEIGHT: 128.5 LBS | RESPIRATION RATE: 16 BRPM

## 2020-03-06 DIAGNOSIS — C92.10 CML (CHRONIC MYELOID LEUKEMIA) (HCC): Primary | ICD-10-CM

## 2020-03-06 DIAGNOSIS — C92.10 CML (CHRONIC MYELOID LEUKEMIA) (HCC): ICD-10-CM

## 2020-03-06 LAB
ALBUMIN SERPL-MCNC: 4.6 G/DL (ref 3.5–5.2)
ALBUMIN/GLOB SERPL: 1.5 G/DL
ALP SERPL-CCNC: 124 U/L (ref 39–117)
ALT SERPL W P-5'-P-CCNC: 60 U/L (ref 1–33)
ANION GAP SERPL CALCULATED.3IONS-SCNC: 10.2 MMOL/L (ref 5–15)
AST SERPL-CCNC: 53 U/L (ref 1–32)
BASOPHILS # BLD AUTO: 0.03 10*3/MM3 (ref 0–0.2)
BASOPHILS NFR BLD AUTO: 0.5 % (ref 0–1.5)
BILIRUB SERPL-MCNC: 0.4 MG/DL (ref 0.1–1.2)
BUN BLD-MCNC: 13 MG/DL (ref 8–23)
BUN/CREAT SERPL: 14 (ref 7–25)
CALCIUM SPEC-SCNC: 9.5 MG/DL (ref 8.6–10.5)
CHLORIDE SERPL-SCNC: 98 MMOL/L (ref 98–107)
CO2 SERPL-SCNC: 27.8 MMOL/L (ref 22–29)
CREAT BLD-MCNC: 0.93 MG/DL (ref 0.57–1)
DEPRECATED RDW RBC AUTO: 54.9 FL (ref 37–54)
EOSINOPHIL # BLD AUTO: 0.12 10*3/MM3 (ref 0–0.4)
EOSINOPHIL NFR BLD AUTO: 1.9 % (ref 0.3–6.2)
ERYTHROCYTE [DISTWIDTH] IN BLOOD BY AUTOMATED COUNT: 16 % (ref 12.3–15.4)
GFR SERPL CREATININE-BSD FRML MDRD: 59 ML/MIN/1.73
GLOBULIN UR ELPH-MCNC: 3 GM/DL
GLUCOSE BLD-MCNC: 76 MG/DL (ref 65–99)
HCT VFR BLD AUTO: 38.4 % (ref 34–46.6)
HGB BLD-MCNC: 12.5 G/DL (ref 12–15.9)
IMM GRANULOCYTES # BLD AUTO: 0.03 10*3/MM3 (ref 0–0.05)
IMM GRANULOCYTES NFR BLD AUTO: 0.5 % (ref 0–0.5)
LYMPHOCYTES # BLD AUTO: 1.82 10*3/MM3 (ref 0.7–3.1)
LYMPHOCYTES NFR BLD AUTO: 28.6 % (ref 19.6–45.3)
MCH RBC QN AUTO: 29.9 PG (ref 26.6–33)
MCHC RBC AUTO-ENTMCNC: 32.6 G/DL (ref 31.5–35.7)
MCV RBC AUTO: 91.9 FL (ref 79–97)
MONOCYTES # BLD AUTO: 0.58 10*3/MM3 (ref 0.1–0.9)
MONOCYTES NFR BLD AUTO: 9.1 % (ref 5–12)
NEUTROPHILS # BLD AUTO: 3.79 10*3/MM3 (ref 1.7–7)
NEUTROPHILS NFR BLD AUTO: 59.4 % (ref 42.7–76)
NRBC BLD AUTO-RTO: 0 /100 WBC (ref 0–0.2)
PLATELET # BLD AUTO: 222 10*3/MM3 (ref 140–450)
PMV BLD AUTO: 9.4 FL (ref 6–12)
POTASSIUM BLD-SCNC: 4.1 MMOL/L (ref 3.5–5.2)
PROT SERPL-MCNC: 7.6 G/DL (ref 6–8.5)
RBC # BLD AUTO: 4.18 10*6/MM3 (ref 3.77–5.28)
SODIUM BLD-SCNC: 136 MMOL/L (ref 136–145)
WBC NRBC COR # BLD: 6.37 10*3/MM3 (ref 3.4–10.8)

## 2020-03-06 PROCEDURE — 36415 COLL VENOUS BLD VENIPUNCTURE: CPT

## 2020-03-06 PROCEDURE — 85025 COMPLETE CBC W/AUTO DIFF WBC: CPT | Performed by: INTERNAL MEDICINE

## 2020-03-06 PROCEDURE — 80053 COMPREHEN METABOLIC PANEL: CPT | Performed by: INTERNAL MEDICINE

## 2020-03-06 PROCEDURE — 99215 OFFICE O/P EST HI 40 MIN: CPT | Performed by: INTERNAL MEDICINE

## 2020-03-06 NOTE — PROGRESS NOTES
[facility] GROUP OUTPATIENT FOLLOW UP CLINIC VISIT    REASON FOR FOLLOW-UP:    1.  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  Gleevec 400 mg daily initiated around 10/12/2017, stopped on 11/15/2017 due to intolerance (noah-orbital edema, nausea/vomiting, fatigue).  Resumed at 200 mg daily but, again, not tolerated.    3.  Nilotinib 150 mg twice daily started in late March, 2018.  Held due to intolerance and QTc prolongation.   4.  Hydroxyurea initiated in July 2018.   5.  Bosutinib 400 mg daily started Feb 2019    HISTORY OF PRESENT ILLNESS:  Nicole Lofton is a 74 y.o. female with the above-mentioned history here today for reevaluation. She continues Bosutinib 400 mg daily.  She tolerates this very well.  Energy level is overall much better.  She is eating adequately.  No nausea or vomiting.  No bleeding.  Blood pressure remains a little high but she denies headaches or chest pain.  She does report some mid back pain with standing for long periods of time.  This improves with lying down.    ONCOLOGIC HISTORY:  For about 6 months she has noticed bilateral arm tingling and weakness in her hands.  This has occurred about 5 or 6 times over the past 6 months.  She is vague in her description of this.  She is globally weak and is quite inactive as she lives by herself.  She was completing physical therapy with some improvement but is now quite unsteady on her feet.  She has not had any falls.  She denies any bleeding.  She has chronic fatigue.  She has osteoarthritis pain and reports pain in the right side and in her neck.  She denies any acute low back pain but does have chronic low back pain.  She does have orthostatic symptoms and she is lightheaded when she stands.  She has had a decreased appetite and some weight loss over the past 9 months.     Labs performed on 8/28/2017 showed a white blood cell count of 39.1 with a differential showing 61% neutrophils and 32%  lymphocytes, hemoglobin 13.9 and platelets 565,000.         She had further labs done at [facility] on 8/20/2017 showing a white blood cell count of 24.4, hemoglobin 13.7, and platelets 1795.  A differential showed 64% neutrophils, 7% lymphocytes, and 12% basophils.  Peripheral blood flow cytometry was also done on this date showing 11% basophils and less than 1% myeloblasts.  There was no monoclonal B-cell, T-cell, plasma cell, or NK cell population.  FISH for BCR-ABL and ESSENCE 2 mutation analysis were done as well.     Her CBC was normal as of 7/8/2016.     Of note, she has a history of bilateral breast cancer diagnosed in 1988.  She had bilateral mastectomy with reconstruction.  We do not have any records regarding this at this time.  She apparently completed at least 6 months of adjuvant chemotherapy but again does not know the details..    FISH for BCR-ABL performed at [facility] was actually positive.  As of 9/6/2017 ESSENCE 2 is still pending.    She returned on 9/6/2017 for follow-up.  Platelets at 1.9 million with hydroxyurea 1000 mg daily.  She will increase the dose to 2000 mg daily.  She will have a bone marrow aspiration biopsy performed.  Weekly CBCs.    As of 9/6/2017 peripheral blood PCR was positive at 89.238% with a major break point mutation.    ESSENCE 2 and CALR mutation negative.    Bone marrow aspiration and biopsy performed on 9/15/2017.  Flow cytometry showed no increase in blasts.  Morphology showed no increase in blasts.  Bone marrow FISH showed BCR/ABL rearrangement in 93% of cells.  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    Labs as of 9/20/2017 show white blood cell count of 4.7 with hemoglobin of 12.7 and platelets 1.1 million.    On 9/27/2017 white blood cells and hemoglobin are normal.  Platelets are down to 722,000.  We plan to start Gleevec at 400 mg daily if she tolerates it.  In the meantime she will decrease the hydroxyurea dose to 1000 mg daily.    Blood counts normalized.  Hydroxyurea  stopped.    Gleevec initiated around 10/12/2017, stopped on 11/15/2017 due to intolerance (noah-orbital edema, nausea/vomiting, fatigue).    Symptoms improving as of 11/29/2017.  Blood counts normal.  Hold further therapy at this time until she improves more clinically.      Plan to resume Gleevec at 200 mg daily as of 1/11/2018.  Only took it for less than a week but also did not tolerate this dose.    As of 2/21/2018 her platelet count is again elevated at greater than 700,000.  Resume hydroxyurea 1000 mg daily.  Considering nilotinib at a decreased dose.    Platelets continued to rise in March 2018.  Hydroxyurea increased to 1500 mg daily with stabilization of her platelet count and some improvement as of 3/21/2018.  Plan for nilotinib.    Nilotinib initiated in late March 2018.    Subsequently held due to QTC prolongation.    Hydroxyurea 1000 mg daily resumed at the end of July 2018.  Increased the dose to 1000 mg twice daily as of 8/7/2018 for a rising platelet count of 1.3 million.    Platelets as of 9/7/2018 500,000.  Continue hydroxyurea 1000 mg twice daily.    As of 10/12/2018 her CBC has improved significantly.  Platelets 242,000 with a white blood cell count of 3.52.  Decrease hydroxyurea to 1000 mg daily.    Platelets increased as of 11/20/2018.  Increased hydroxyurea to 1000 mg in the morning and 500 mg in the evening.    Platelets were increased to 1.2 million on 1/15/2019.  Hydroxyurea increased 1000 mg twice daily.    She was referred to the [facility] [place] in mid February started bosutinib 400 mg daily which she has been taking at night along with olanzapine and tolerating it well.  Blood counts improved.    PAST MEDICAL, SURGICAL, FAMILY, AND SOCIAL HISTORIES were reviewed with the patient and in the electronic medical record, and were updated if indicated.    ALLERGIES:  No Known Allergies    MEDICATIONS:  The medication list has been reviewed with the patient by the medical assistant, and the  "list has been updated in the electronic medical record, which I reviewed.  Medication dosages and frequencies were confirmed to be accurate.    I have reviewed the patient's medical history in detail and updated the computerized patient record.    Review of Systems   Musculoskeletal: Positive for back pain.   All other systems reviewed and are negative.      Vitals:    03/06/20 1236   BP: 173/78  Comment: anxious   Pulse: 63   Resp: 16   Temp: 98.1 °F (36.7 °C)   TempSrc: Oral   SpO2: 98%   Weight: 58.3 kg (128 lb 8 oz)   Height: 165 cm (64.96\")   PainSc: 4  Comment: leukemia   PainLoc: Generalized  Comment: arthritis       Physical Exam   Constitutional: She is oriented to person, place, and time. She appears well-developed and well-nourished. No distress.   Chronically ill appearing   HENT:   Head: Normocephalic and atraumatic.   Mouth/Throat: Oropharynx is clear and moist and mucous membranes are normal. No oropharyngeal exudate.   Eyes: Pupils are equal, round, and reactive to light.   Neck: Normal range of motion.   Cardiovascular: Normal rate, regular rhythm and normal heart sounds.   No murmur heard.  Pulmonary/Chest: Effort normal and breath sounds normal. No respiratory distress. She has no wheezes. She has no rhonchi. She has no rales.   Abdominal: Soft. Normal appearance and bowel sounds are normal. She exhibits no distension. There is no hepatosplenomegaly.   Musculoskeletal: Normal range of motion. She exhibits no edema.   Neurological: She is alert and oriented to person, place, and time.   Skin: Skin is warm and dry. No rash noted.   Psychiatric: She has a normal mood and affect.   Vitals reviewed.  Exam unchanged from previous except as noted    DIAGNOSTIC DATA:  Results for orders placed or performed in visit on 03/06/20   CBC Auto Differential   Result Value Ref Range    WBC 6.37 3.40 - 10.80 10*3/mm3    RBC 4.18 3.77 - 5.28 10*6/mm3    Hemoglobin 12.5 12.0 - 15.9 g/dL    Hematocrit 38.4 34.0 - " 46.6 %    MCV 91.9 79.0 - 97.0 fL    MCH 29.9 26.6 - 33.0 pg    MCHC 32.6 31.5 - 35.7 g/dL    RDW 16.0 (H) 12.3 - 15.4 %    RDW-SD 54.9 (H) 37.0 - 54.0 fl    MPV 9.4 6.0 - 12.0 fL    Platelets 222 140 - 450 10*3/mm3    Neutrophil % 59.4 42.7 - 76.0 %    Lymphocyte % 28.6 19.6 - 45.3 %    Monocyte % 9.1 5.0 - 12.0 %    Eosinophil % 1.9 0.3 - 6.2 %    Basophil % 0.5 0.0 - 1.5 %    Immature Grans % 0.5 0.0 - 0.5 %    Neutrophils, Absolute 3.79 1.70 - 7.00 10*3/mm3    Lymphocytes, Absolute 1.82 0.70 - 3.10 10*3/mm3    Monocytes, Absolute 0.58 0.10 - 0.90 10*3/mm3    Eosinophils, Absolute 0.12 0.00 - 0.40 10*3/mm3    Basophils, Absolute 0.03 0.00 - 0.20 10*3/mm3    Immature Grans, Absolute 0.03 0.00 - 0.05 10*3/mm3    nRBC 0.0 0.0 - 0.2 /100 WBC         IMAGING:  None reviewed    ASSESSMENT:  This is a 74 y.o. female with:  1.  History of bilateral breast cancer in 1988 status post bilateral mastectomy.  She apparently completed 6 months of adjuvant therapy.  We have no details regarding this.    2.  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  Started on Hydrea 1000 mg daily.  This was discontinued and she started Gleevec on approximately 10/12/2017.  Gleevec discontinued due to intolerance on 11/15/2017.  We started Gleevec at a lower dose of 200 mg but she was also intolerant of this dose.  Her side effects were as severe with a lower dose and she therefore stopped taking the drug.  Symptoms improved significantly.  Platelet count subsequently elevated again.    She started nilotinib at 150mg twice daily at the end of March 2018.  This was held in mid-May due to QTc prolongation.  The QTc interval did subsequently normalized.  However, due to this and other adverse effects we are not able to resume the nilotinib nor does she desire to.    In July we resumed hydroxyurea 1000 mg daily.  Her platelet count increased.  Her dose was increased to 1000 mg twice daily.   Blood counts improved nicely.  Platelet count normalized but her white blood cell count dropped to below normal.  Decreased hydroxyurea to 1000 mg daily.    11/20/18 platelets were up again.  We increased the hydroxyurea to 1000 mg in the mornings and 500 mg in the evenings.    As of 1/15/2019 her platelet count was again elevated to 1.2 million.  Increased hydroxyurea to 1000 mg twice daily.      Platelets improved but remained far from normal.    Due to her history we referred her down to the [facility] to see Dr. Cifuentes for assistance and recommendations on further therapy.    In mid-February 2019 she started bosutinib 400 mg daily which she takes along with olanzapine at night (she takes this as needed now).       Last PCR was 0.0107%.  She tolerates the medication very well.    3.  History of thrombocytosis:  Due to CML.  See above.  Platelets normal today  4.  Hypothyroidism:  Levothyroxine 100 mcg.   5.  Nausea and vomiting: Resolved off Gleevec.   6. Periorbital edema: Resolved off Gleevec.    7.  Hypokalemia:  Potassium pending today   8.  Osteoarthritis pain: she reports this became worse after starting nilotinib. She is taking acetaminophen for this.  Prescribed meloxicam on 8/7/2018.  No longer on nilotinib.  She will continue meloxicam.  9.  Depression and anxiety: per PCP at this point. Stable   10.  Insomnia.  This had been treated with trazodone but as mentioned above this also caused a potential for QT interval prolongation.  She was therefore changed to temazepam.  Primary care now managing. On olanzapine now.significantly improved with olanzapine  11.  QTc prolongation: Due to nilotinib.  This resolved.  She saw Dr. Duque with cardiology.    12.  Tobacco use: She is unwilling to quit smoking.  Unchanged today  13.  Decreased appetite with weight loss: Appetite and weight improved  14.  Hypertension: Per primary care.  She is no longer taking her lisinopril due to adverse effects.   Blood pressure today remains elevated.    PLAN:  1.  Continue bosutinib 400 mg daily as she is tolerating this very well and it has helped her significantly..   2.  Continue olanzapine at night as needed  3.  Continue one tablet of ferrous sulfate daily   4.  We have recommended that she resume aspirin 81 mg daily.  5.  Management of hypertension per primary care  6.  I will see her back in 3 months for follow-up with a CBC, CMP, and PCR for BCR/ABL done at day.    High risk medication requiring intensive monitoring      Burt Merida MD

## 2020-03-16 DIAGNOSIS — G89.29 OTHER CHRONIC PAIN: ICD-10-CM

## 2020-03-17 RX ORDER — TRAMADOL HYDROCHLORIDE 50 MG/1
50 TABLET ORAL EVERY 8 HOURS PRN
Qty: 60 TABLET | Refills: 0 | Status: SHIPPED | OUTPATIENT
Start: 2020-03-17 | End: 2020-04-15 | Stop reason: SDUPTHER

## 2020-04-01 DIAGNOSIS — F51.01 PRIMARY INSOMNIA: ICD-10-CM

## 2020-04-01 RX ORDER — LORAZEPAM 0.5 MG/1
TABLET ORAL
Qty: 90 TABLET | Refills: 1 | Status: SHIPPED | OUTPATIENT
Start: 2020-04-01 | End: 2020-04-22 | Stop reason: SDUPTHER

## 2020-04-14 RX ORDER — LEVOTHYROXINE SODIUM 0.1 MG/1
100 TABLET ORAL DAILY
Qty: 90 TABLET | Refills: 0 | Status: SHIPPED | OUTPATIENT
Start: 2020-04-14 | End: 2020-07-20

## 2020-04-15 DIAGNOSIS — G89.29 OTHER CHRONIC PAIN: ICD-10-CM

## 2020-04-15 RX ORDER — TRAMADOL HYDROCHLORIDE 50 MG/1
50 TABLET ORAL EVERY 8 HOURS PRN
Qty: 60 TABLET | Refills: 0 | Status: SHIPPED | OUTPATIENT
Start: 2020-04-15 | End: 2020-05-19 | Stop reason: SDUPTHER

## 2020-04-15 NOTE — TELEPHONE ENCOUNTER
Minnie ([phone]) sent a refill request for Tramadol.  Patient was last seen in the office on 1/17/20.

## 2020-04-22 ENCOUNTER — OFFICE VISIT (OUTPATIENT)
Dept: INTERNAL MEDICINE | Facility: CLINIC | Age: 75
End: 2020-04-22

## 2020-04-22 DIAGNOSIS — F32.9 MAJOR DEPRESSIVE DISORDER WITH CURRENT ACTIVE EPISODE, UNSPECIFIED DEPRESSION EPISODE SEVERITY, UNSPECIFIED WHETHER RECURRENT: ICD-10-CM

## 2020-04-22 DIAGNOSIS — F51.01 PRIMARY INSOMNIA: Primary | ICD-10-CM

## 2020-04-22 DIAGNOSIS — F41.9 ANXIETY: ICD-10-CM

## 2020-04-22 PROCEDURE — 99442 PR PHYS/QHP TELEPHONE EVALUATION 11-20 MIN: CPT | Performed by: FAMILY MEDICINE

## 2020-04-22 RX ORDER — LORAZEPAM 0.5 MG/1
0.5 TABLET ORAL 2 TIMES DAILY PRN
Qty: 180 TABLET | Refills: 1 | Status: SHIPPED | OUTPATIENT
Start: 2020-04-22 | End: 2020-07-15 | Stop reason: SDUPTHER

## 2020-04-30 RX ORDER — OLANZAPINE 5 MG/1
5 TABLET ORAL NIGHTLY
Qty: 90 TABLET | Refills: 1 | Status: SHIPPED | OUTPATIENT
Start: 2020-04-30 | End: 2020-09-24

## 2020-05-03 PROBLEM — F41.9 ANXIETY: Status: ACTIVE | Noted: 2020-05-03

## 2020-05-03 PROBLEM — F32.9 MAJOR DEPRESSIVE DISORDER WITH CURRENT ACTIVE EPISODE: Status: ACTIVE | Noted: 2018-10-01

## 2020-05-03 NOTE — PROGRESS NOTES
Subjective   Nicole Lofton is a 74 y.o. female.     No chief complaint on file.  Chief complaint insomnia    This visit has been rescheduled as a phone visit to comply with patient safety concerns in accordance with CDC recommendations. Total time of discussion was 15 minutes.    You have chosen to receive care through a telephone visit. Do you consent to use a telephone visit for your medical care today? Yes      History of Present Illness     Patient notes that she has been experiencing insomnia.  Patient states that her anxiety has gotten worse, as well as her depression.  Patient states that she is having a hard time due to this coronavirus pandemic, as she is very scared that she does not want to catch this virus.  Patient states that she does live by herself, her daughter does come check on her regularly.  However she is frightened due to this.  Sometimes she is so frightened that she is unable to sleep at night.  At the current time, patient is taking Trintellix 10 mg daily which is historically worked extremely well for her as far as anxiety and depression is concerned.  However with the coronavirus pandemic she is having a hard time falling asleep due to anxiety, and notes that she is constantly worrying throughout the day.    The following portions of the patient's history were reviewed and updated as appropriate: allergies, current medications, past family history, past medical history, past social history, past surgical history and problem list.    Review of Systems   Constitutional: Negative for chills and fever.   HENT: Negative for congestion, rhinorrhea, sinus pain and sore throat.    Eyes: Negative for photophobia and visual disturbance.   Respiratory: Negative for cough, chest tightness and shortness of breath.    Cardiovascular: Negative for chest pain and palpitations.   Gastrointestinal: Negative for diarrhea, nausea and vomiting.   Genitourinary: Negative for dysuria, frequency and  urgency.   Skin: Negative for rash and wound.   Neurological: Negative for dizziness and syncope.   Psychiatric/Behavioral: Negative for behavioral problems and confusion.       Objective   Physical Exam   Constitutional: She is oriented to person, place, and time. She appears well-developed and well-nourished.   HENT:   Head: Normocephalic and atraumatic.   Neurological: She is alert and oriented to person, place, and time.   Psychiatric: She has a normal mood and affect. Her behavior is normal.   Nursing note and vitals reviewed.      There were no vitals filed for this visit.  There is no height or weight on file to calculate BMI.      Assessment/Plan   Diagnoses and all orders for this visit:    Primary insomnia  -     LORazepam (ATIVAN) 0.5 MG tablet; Take 1 tablet by mouth 2 (Two) Times a Day As Needed for Anxiety.  -     Patient insomnia is most likely secondary to anxiety.  Discussed with patient that taking Ativan at nighttime will also help.    Anxiety        -     We will continue to take Trintellix daily.  Will start patient on Ativan as needed.    Major depressive disorder with current active episode, unspecified depression episode severity, unspecified whether recurrent        -     We will continue to take Trintellix daily.        No follow-ups on file.    Dictated utilizing Dragon Voice Recognition Software

## 2020-05-05 ENCOUNTER — TELEPHONE (OUTPATIENT)
Dept: INTERNAL MEDICINE | Facility: CLINIC | Age: 75
End: 2020-05-05

## 2020-05-05 NOTE — TELEPHONE ENCOUNTER
Patients daughter calling in stating that the patient went to  her -   Vortioxetine HBr (TRINTELLIX) 10 MG tablet     And it was $600  Wondering if something cheaper could be called in for her, shew only has enough for today and tomorrow

## 2020-05-06 NOTE — TELEPHONE ENCOUNTER
PATIENT IS ON HER LAST PILL TODAY. THIS WAS ORIGINALLY SENT TO THE WRONG OFFICE. PLEASE CALL DAUGHTER WHEN COMPLETED. [phone]

## 2020-05-06 NOTE — TELEPHONE ENCOUNTER
Patient been on other medication in the past, but notes that they have not seem to work as well for her.  I understand the Trintellix medication is expensive, I am sure there are programs with a drug company that may help lower the price of the medicine.  However if patient would like to switch the medication we can switch it to something else, and we can try something like Lexapro.  I think we should see what the patient would like to do, and we can proceed from there.

## 2020-05-07 DIAGNOSIS — F41.9 ANXIETY: Primary | ICD-10-CM

## 2020-05-07 RX ORDER — ESCITALOPRAM OXALATE 10 MG/1
10 TABLET ORAL DAILY
Qty: 30 TABLET | Refills: 11 | Status: SHIPPED | OUTPATIENT
Start: 2020-05-07 | End: 2020-11-20

## 2020-05-07 NOTE — TELEPHONE ENCOUNTER
has called on behalf of  and stated that Lexapro was fine. Her mother has been on it in the past and asks could it be sent today? If so, please contact  and let her know that it has been sent to the pharmacy. Thank you !

## 2020-05-11 ENCOUNTER — DOCUMENTATION (OUTPATIENT)
Dept: ONCOLOGY | Facility: CLINIC | Age: 75
End: 2020-05-11

## 2020-05-11 NOTE — PROGRESS NOTES
VM rec from Clarissa, Pts daughter-she states her mom informed her late Friday that she will be out of Bosulif as of today. She will need a delivery tomorrow.    I contacted [facility] [phone] and spoke to Maria L. I have scheduled a delivery to pts home for tomorrow, 5/12/2020    I notified Clarissa.

## 2020-05-16 DIAGNOSIS — E78.5 HYPERLIPIDEMIA, UNSPECIFIED HYPERLIPIDEMIA TYPE: ICD-10-CM

## 2020-05-18 ENCOUNTER — TELEPHONE (OUTPATIENT)
Dept: ONCOLOGY | Facility: CLINIC | Age: 75
End: 2020-05-18

## 2020-05-18 RX ORDER — ATORVASTATIN CALCIUM 40 MG/1
40 TABLET, FILM COATED ORAL DAILY
Qty: 90 TABLET | Refills: 1 | Status: SHIPPED | OUTPATIENT
Start: 2020-05-18 | End: 2020-12-28 | Stop reason: SDUPTHER

## 2020-05-18 NOTE — TELEPHONE ENCOUNTER
Clarissa, daughter, calling.    Cannot bring patient to appointment on 5/29.  Can she reschedule to week of 6/8?        Clarissa -[phone]

## 2020-05-19 DIAGNOSIS — G89.29 OTHER CHRONIC PAIN: ICD-10-CM

## 2020-05-19 RX ORDER — TRAMADOL HYDROCHLORIDE 50 MG/1
50 TABLET ORAL EVERY 8 HOURS PRN
Qty: 60 TABLET | Refills: 0 | Status: SHIPPED | OUTPATIENT
Start: 2020-05-19 | End: 2020-07-15

## 2020-05-19 NOTE — TELEPHONE ENCOUNTER
Minnie [phone] sent a refill request for Tramadol 50 mg.  Patient was last seen in the office on 4/22/20.  Please advise.

## 2020-05-26 NOTE — TELEPHONE ENCOUNTER
11/9/2020      Sukumar Catalan  [address]  [facility] [address]        Dear Mr. Catalan,    Please carefully read through the enclosed prep instructions at least 7 days prior to your procedure with Dr. Marbin Dietz.  If you have questions regarding the instructions, please call [phone].    Procedure and arrival times may occasionally change.  Dependent on the facility where your procedure is scheduled, you may receive a phone call 3-7 days prior to confirm those times.    Date:  December 11, 2020  Procedure Time:  Someone from the facility will call you 5-7 days prior with your procedure and arrival times.  Location:    [address] (Please check in on the 2nd floor)  [place], [place]  [id number] [phone]      Your Covid testing will be at [facility]  on 12/9 Please arrive at 10:45 am, after the test you will need to self-isolate/quarantine at home and limit all contacts as much as possible to prevent you from any potential infection between the time of the test and the time of the surgery. Please bring your photo id with you.     If you need to cancel or reschedule your procedure, please contact the  at the number listed below.    Thank you,    Chantal [phone]  Surgery Scheduler  Pre-Admit Department        FOLLOW-UP UPPER ENDOSCOPY or EGD    You have been scheduled for your egd with Dr. Marbin Dietz. A Pre-Op nurse will contact you a few days prior to your procedure to get you pre-registered.     Upper Endoscopy or EGD is an examination of your esophagus, stomach, and small bowel with the use of an endoscope.     Please follow the steps below to ensure a complete and safe procedure:    If you are taking the diet drug, Phentermine, you must stop taking this drug 14 days prior to the procedure.     SEVEN (7) DAYS BEFORE THE PROCEDURE:  Do not take any Plavix, Aggrenox, Pepto-Bismol or iron supplements. If you have any questions  Pt. Saw dr. Merida this morning.  She states she was told she had leukemia.  Pt. States she is very nervous and is requesting something for her nerves.  Pt. Sees a psychiatrist who has pt. On Prozac and zyprexa.  Instructed pt. It would be best she call her psychiatrist for something to take for her anxiety.  V/u.   or concerns about holding any of these medications, please contact your cardiologist or primary care physician.    THREE (3) DAYS BEFORE THE PROCEDURE:  Do not take any Coumadin (warfarin). Please contact your cardiologist or primary care physician for any questions or concerns regarding holding this medication.    · Make arrangements for someone to drive you home after the procedure because you will be very drowsy. Please make these arrangements in advance. A taxi is not acceptable transportation. If you do not have transportation arranged, we will not be able to do the procedure.    · Due to everyone's busy schedules, it is important that you keep your appointment.  If you must reschedule or cancel, please notify your physician's office at least 48 hours in advance.    DAY BEFORE THE PROCEDURE:  · You may eat normally throughout the day.  · Do not eat or drink anything after midnight.    THE MORNING OF THE PROCEDURE:    · Do not eat or drink anything until after the procedure.  · Take your heart and blood pressure medication with a sip of water. If possible, please wait to take your remaining medication until after your procedure.        Thank you for entrusting your care with [facility].

## 2020-05-29 ENCOUNTER — APPOINTMENT (OUTPATIENT)
Dept: OTHER | Facility: HOSPITAL | Age: 75
End: 2020-05-29

## 2020-06-01 DIAGNOSIS — I10 ESSENTIAL HYPERTENSION: Primary | ICD-10-CM

## 2020-06-01 RX ORDER — AMLODIPINE BESYLATE 10 MG/1
10 TABLET ORAL DAILY
Qty: 90 TABLET | Refills: 1 | Status: SHIPPED | OUTPATIENT
Start: 2020-06-01 | End: 2020-12-30 | Stop reason: SDUPTHER

## 2020-06-05 ENCOUNTER — LAB (OUTPATIENT)
Dept: OTHER | Facility: HOSPITAL | Age: 75
End: 2020-06-05

## 2020-06-05 ENCOUNTER — OFFICE VISIT (OUTPATIENT)
Dept: ONCOLOGY | Facility: CLINIC | Age: 75
End: 2020-06-05

## 2020-06-05 DIAGNOSIS — C92.10 CML (CHRONIC MYELOID LEUKEMIA) (HCC): Primary | ICD-10-CM

## 2020-06-05 DIAGNOSIS — C92.10 CML (CHRONIC MYELOID LEUKEMIA) (HCC): ICD-10-CM

## 2020-06-05 DIAGNOSIS — E03.9 HYPOTHYROIDISM, UNSPECIFIED TYPE: ICD-10-CM

## 2020-06-05 DIAGNOSIS — E03.9 ACQUIRED HYPOTHYROIDISM: ICD-10-CM

## 2020-06-05 LAB
ALBUMIN SERPL-MCNC: 4.7 G/DL (ref 3.5–5.2)
ALBUMIN/GLOB SERPL: 1.7 G/DL
ALP SERPL-CCNC: 114 U/L (ref 39–117)
ALT SERPL W P-5'-P-CCNC: 19 U/L (ref 1–33)
ANION GAP SERPL CALCULATED.3IONS-SCNC: 9.5 MMOL/L (ref 5–15)
AST SERPL-CCNC: 22 U/L (ref 1–32)
BASOPHILS # BLD AUTO: 0.03 10*3/MM3 (ref 0–0.2)
BASOPHILS NFR BLD AUTO: 0.4 % (ref 0–1.5)
BILIRUB SERPL-MCNC: 0.3 MG/DL (ref 0.1–1.2)
BUN BLD-MCNC: 18 MG/DL (ref 8–23)
BUN/CREAT SERPL: 17.1 (ref 7–25)
CALCIUM SPEC-SCNC: 9.5 MG/DL (ref 8.6–10.5)
CHLORIDE SERPL-SCNC: 102 MMOL/L (ref 98–107)
CO2 SERPL-SCNC: 24.5 MMOL/L (ref 22–29)
CREAT BLD-MCNC: 1.05 MG/DL (ref 0.57–1)
DEPRECATED RDW RBC AUTO: 47.4 FL (ref 37–54)
EOSINOPHIL # BLD AUTO: 0.1 10*3/MM3 (ref 0–0.4)
EOSINOPHIL NFR BLD AUTO: 1.3 % (ref 0.3–6.2)
ERYTHROCYTE [DISTWIDTH] IN BLOOD BY AUTOMATED COUNT: 14.4 % (ref 12.3–15.4)
GFR SERPL CREATININE-BSD FRML MDRD: 51 ML/MIN/1.73
GLOBULIN UR ELPH-MCNC: 2.8 GM/DL
GLUCOSE BLD-MCNC: 93 MG/DL (ref 65–99)
HCT VFR BLD AUTO: 38.4 % (ref 34–46.6)
HGB BLD-MCNC: 12.7 G/DL (ref 12–15.9)
IMM GRANULOCYTES # BLD AUTO: 0.02 10*3/MM3 (ref 0–0.05)
IMM GRANULOCYTES NFR BLD AUTO: 0.3 % (ref 0–0.5)
LYMPHOCYTES # BLD AUTO: 2.43 10*3/MM3 (ref 0.7–3.1)
LYMPHOCYTES NFR BLD AUTO: 32.7 % (ref 19.6–45.3)
MCH RBC QN AUTO: 29.7 PG (ref 26.6–33)
MCHC RBC AUTO-ENTMCNC: 33.1 G/DL (ref 31.5–35.7)
MCV RBC AUTO: 89.7 FL (ref 79–97)
MONOCYTES # BLD AUTO: 0.55 10*3/MM3 (ref 0.1–0.9)
MONOCYTES NFR BLD AUTO: 7.4 % (ref 5–12)
NEUTROPHILS # BLD AUTO: 4.3 10*3/MM3 (ref 1.7–7)
NEUTROPHILS NFR BLD AUTO: 57.9 % (ref 42.7–76)
NRBC BLD AUTO-RTO: 0 /100 WBC (ref 0–0.2)
PLATELET # BLD AUTO: 191 10*3/MM3 (ref 140–450)
PMV BLD AUTO: 9.4 FL (ref 6–12)
POTASSIUM BLD-SCNC: 4.5 MMOL/L (ref 3.5–5.2)
PROT SERPL-MCNC: 7.5 G/DL (ref 6–8.5)
RBC # BLD AUTO: 4.28 10*6/MM3 (ref 3.77–5.28)
SODIUM BLD-SCNC: 136 MMOL/L (ref 136–145)
TSH SERPL DL<=0.05 MIU/L-ACNC: 5.28 UIU/ML (ref 0.27–4.2)
WBC NRBC COR # BLD: 7.43 10*3/MM3 (ref 3.4–10.8)

## 2020-06-05 PROCEDURE — 36415 COLL VENOUS BLD VENIPUNCTURE: CPT

## 2020-06-05 PROCEDURE — 99442 PR PHYS/QHP TELEPHONE EVALUATION 11-20 MIN: CPT | Performed by: INTERNAL MEDICINE

## 2020-06-05 PROCEDURE — 85025 COMPLETE CBC W/AUTO DIFF WBC: CPT | Performed by: INTERNAL MEDICINE

## 2020-06-05 PROCEDURE — 80053 COMPREHEN METABOLIC PANEL: CPT | Performed by: NURSE PRACTITIONER

## 2020-06-05 PROCEDURE — 84443 ASSAY THYROID STIM HORMONE: CPT | Performed by: NURSE PRACTITIONER

## 2020-06-05 NOTE — PROGRESS NOTES
[facility] CBC GROUP OUTPATIENT FOLLOW UP VISIT    REASON FOR FOLLOW-UP:    1.  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  Gleevec 400 mg daily initiated around 10/12/2017, stopped on 11/15/2017 due to intolerance (noah-orbital edema, nausea/vomiting, fatigue).  Resumed at 200 mg daily but, again, not tolerated.    3.  Nilotinib 150 mg twice daily started in late March, 2018.  Held due to intolerance and QTc prolongation.   4.  Hydroxyurea initiated in July 2018.  Subsequently discontinued.     5.  Bosutinib 400 mg daily started Feb 2019    HISTORY OF PRESENT ILLNESS:  Nicole Lofton is a 74 y.o. female with the above-mentioned history.    Telephone visit today to review labs.    She continues Bosutinib 400 mg daily.  She tolerates this very well.      Denies new problems today.  Energy level is excellent.  She has been quarantined due to the pandemic.  She has not had any respiratory symptoms.    ONCOLOGIC HISTORY:  For about 6 months she has noticed bilateral arm tingling and weakness in her hands.  This has occurred about 5 or 6 times over the past 6 months.  She is vague in her description of this.  She is globally weak and is quite inactive as she lives by herself.  She was completing physical therapy with some improvement but is now quite unsteady on her feet.  She has not had any falls.  She denies any bleeding.  She has chronic fatigue.  She has osteoarthritis pain and reports pain in the right side and in her neck.  She denies any acute low back pain but does have chronic low back pain.  She does have orthostatic symptoms and she is lightheaded when she stands.  She has had a decreased appetite and some weight loss over the past 9 months.     Labs performed on 8/28/2017 showed a white blood cell count of 39.1 with a differential showing 61% neutrophils and 32% lymphocytes, hemoglobin 13.9 and platelets 565,000.         She had further labs done at [facility] on  8/20/2017 showing a white blood cell count of 24.4, hemoglobin 13.7, and platelets 1795.  A differential showed 64% neutrophils, 7% lymphocytes, and 12% basophils.  Peripheral blood flow cytometry was also done on this date showing 11% basophils and less than 1% myeloblasts.  There was no monoclonal B-cell, T-cell, plasma cell, or NK cell population.  FISH for BCR-ABL and ESSENCE 2 mutation analysis were done as well.     Her CBC was normal as of 7/8/2016.     Of note, she has a history of bilateral breast cancer diagnosed in 1988.  She had bilateral mastectomy with reconstruction.  We do not have any records regarding this at this time.  She apparently completed at least 6 months of adjuvant chemotherapy but again does not know the details..    FISH for BCR-ABL performed at [place] was actually positive.  As of 9/6/2017 ESSENCE 2 is still pending.    She returned on 9/6/2017 for follow-up.  Platelets at 1.9 million with hydroxyurea 1000 mg daily.  She will increase the dose to 2000 mg daily.  She will have a bone marrow aspiration biopsy performed.  Weekly CBCs.    As of 9/6/2017 peripheral blood PCR was positive at 89.238% with a major break point mutation.    ESSENCE 2 and CALR mutation negative.    Bone marrow aspiration and biopsy performed on 9/15/2017.  Flow cytometry showed no increase in blasts.  Morphology showed no increase in blasts.  Bone marrow FISH showed BCR/ABL rearrangement in 93% of cells.  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    Labs as of 9/20/2017 show white blood cell count of 4.7 with hemoglobin of 12.7 and platelets 1.1 million.    On 9/27/2017 white blood cells and hemoglobin are normal.  Platelets are down to 722,000.  We plan to start Gleevec at 400 mg daily if she tolerates it.  In the meantime she will decrease the hydroxyurea dose to 1000 mg daily.    Blood counts normalized.  Hydroxyurea stopped.    Gleevec initiated around 10/12/2017, stopped on 11/15/2017 due to intolerance  (noah-orbital edema, nausea/vomiting, fatigue).    Symptoms improving as of 11/29/2017.  Blood counts normal.  Hold further therapy at this time until she improves more clinically.      Plan to resume Gleevec at 200 mg daily as of 1/11/2018.  Only took it for less than a week but also did not tolerate this dose.    As of 2/21/2018 her platelet count is again elevated at greater than 700,000.  Resume hydroxyurea 1000 mg daily.  Considering nilotinib at a decreased dose.    Platelets continued to rise in March 2018.  Hydroxyurea increased to 1500 mg daily with stabilization of her platelet count and some improvement as of 3/21/2018.  Plan for nilotinib.    Nilotinib initiated in late March 2018.    Subsequently held due to QTC prolongation.    Hydroxyurea 1000 mg daily resumed at the end of July 2018.  Increased the dose to 1000 mg twice daily as of 8/7/2018 for a rising platelet count of 1.3 million.    Platelets as of 9/7/2018 500,000.  Continue hydroxyurea 1000 mg twice daily.    As of 10/12/2018 her CBC has improved significantly.  Platelets 242,000 with a white blood cell count of 3.52.  Decrease hydroxyurea to 1000 mg daily.    Platelets increased as of 11/20/2018.  Increased hydroxyurea to 1000 mg in the morning and 500 mg in the evening.    Platelets were increased to 1.2 million on 1/15/2019.  Hydroxyurea increased 1000 mg twice daily.    She was referred to the [facility] and in mid February 2019 started bosutinib 400 mg daily which she has been taking at night along with olanzapine and tolerating it well.  Blood counts improved.    PAST MEDICAL, SURGICAL, FAMILY, AND SOCIAL HISTORIES were reviewed with the patient and in the electronic medical record, and were updated if indicated.    ALLERGIES:  No Known Allergies    MEDICATIONS:  The medication list has been reviewed with the patient by the medical assistant, and the list has been updated in the electronic medical record, which I reviewed.   Medication dosages and frequencies were confirmed to be accurate.    I have reviewed the patient's medical history in detail and updated the computerized patient record.    Review of Systems   Musculoskeletal: Positive for back pain.   All other systems reviewed and are negative.      There were no vitals filed for this visit.    Physical Exam   Telephone only visit today      DIAGNOSTIC DATA:  Results for orders placed or performed in visit on 03/06/20   Comprehensive Metabolic Panel   Result Value Ref Range    Glucose 76 65 - 99 mg/dL    BUN 13 8 - 23 mg/dL    Creatinine 0.93 0.57 - 1.00 mg/dL    Sodium 136 136 - 145 mmol/L    Potassium 4.1 3.5 - 5.2 mmol/L    Chloride 98 98 - 107 mmol/L    CO2 27.8 22.0 - 29.0 mmol/L    Calcium 9.5 8.6 - 10.5 mg/dL    Total Protein 7.6 6.0 - 8.5 g/dL    Albumin 4.60 3.50 - 5.20 g/dL    ALT (SGPT) 60 (H) 1 - 33 U/L    AST (SGOT) 53 (H) 1 - 32 U/L    Alkaline Phosphatase 124 (H) 39 - 117 U/L    Total Bilirubin 0.4 0.1 - 1.2 mg/dL    eGFR Non African Amer 59 (L) >60 mL/min/1.73    Globulin 3.0 gm/dL    A/G Ratio 1.5 g/dL    BUN/Creatinine Ratio 14.0 7.0 - 25.0    Anion Gap 10.2 5.0 - 15.0 mmol/L   CBC Auto Differential   Result Value Ref Range    WBC 6.37 3.40 - 10.80 10*3/mm3    RBC 4.18 3.77 - 5.28 10*6/mm3    Hemoglobin 12.5 12.0 - 15.9 g/dL    Hematocrit 38.4 34.0 - 46.6 %    MCV 91.9 79.0 - 97.0 fL    MCH 29.9 26.6 - 33.0 pg    MCHC 32.6 31.5 - 35.7 g/dL    RDW 16.0 (H) 12.3 - 15.4 %    RDW-SD 54.9 (H) 37.0 - 54.0 fl    MPV 9.4 6.0 - 12.0 fL    Platelets 222 140 - 450 10*3/mm3    Neutrophil % 59.4 42.7 - 76.0 %    Lymphocyte % 28.6 19.6 - 45.3 %    Monocyte % 9.1 5.0 - 12.0 %    Eosinophil % 1.9 0.3 - 6.2 %    Basophil % 0.5 0.0 - 1.5 %    Immature Grans % 0.5 0.0 - 0.5 %    Neutrophils, Absolute 3.79 1.70 - 7.00 10*3/mm3    Lymphocytes, Absolute 1.82 0.70 - 3.10 10*3/mm3    Monocytes, Absolute 0.58 0.10 - 0.90 10*3/mm3    Eosinophils, Absolute 0.12 0.00 - 0.40 10*3/mm3     Basophils, Absolute 0.03 0.00 - 0.20 10*3/mm3    Immature Grans, Absolute 0.03 0.00 - 0.05 10*3/mm3    nRBC 0.0 0.0 - 0.2 /100 WBC         IMAGING:  None reviewed    ASSESSMENT:  This is a 74 y.o. female with:  1.  History of bilateral breast cancer in 1988 status post bilateral mastectomy.  She apparently completed 6 months of adjuvant therapy.  We have no details regarding this.    2.  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  Started on Hydrea 1000 mg daily.  This was discontinued and she started Gleevec on approximately 10/12/2017.  Gleevec discontinued due to intolerance on 11/15/2017.  We started Gleevec at a lower dose of 200 mg but she was also intolerant of this dose.  Her side effects were as severe with a lower dose and she therefore stopped taking the drug.  Symptoms improved significantly.  Platelet count subsequently elevated again.    She started nilotinib at 150mg twice daily at the end of March 2018.  This was held in mid-May due to QTc prolongation.  The QTc interval did subsequently normalized.  However, due to this and other adverse effects we are not able to resume the nilotinib nor does she desire to.    In July we resumed hydroxyurea 1000 mg daily.  Her platelet count increased.  Her dose was increased to 1000 mg twice daily.  Blood counts improved nicely.  Platelet count normalized but her white blood cell count dropped to below normal.  Decreased hydroxyurea to 1000 mg daily.    11/20/18 platelets were up again.  We increased the hydroxyurea to 1000 mg in the mornings and 500 mg in the evenings.    As of 1/15/2019 her platelet count was again elevated to 1.2 million.  Increased hydroxyurea to 1000 mg twice daily.      Platelets improved but remained far from normal.    Due to her history we referred her down to the [facility] to see Dr. Cifuentes for assistance and recommendations on further therapy.    In mid-February 2019  she started bosutinib 400 mg daily which she takes along with olanzapine at night (she takes this as needed now).       Last PCR was 0.0107%.  She tolerates the medication very well.    Blood counts look excellent today.    PCR pending from today    3.  History of thrombocytosis:  Due to CML.  See above.  Platelets normal today  4.  Hypothyroidism:  Levothyroxine 100 mcg.  TSH is improving.  5.  Nausea and vomiting: Resolved off Gleevec.   6. Periorbital edema: Resolved off Gleevec.    7.  Hypokalemia:  Potassium normal today   8.  Osteoarthritis pain: she reports this became worse after starting nilotinib. She is taking acetaminophen for this.  Prescribed meloxicam on 8/7/2018.  No longer on nilotinib.  She will continue meloxicam.  9.  Depression and anxiety: per PCP at this point. Stable   10.  Insomnia.  This had been treated with trazodone but as mentioned above this also caused a potential for QT interval prolongation.  She was therefore changed to temazepam.  Primary care now managing. On olanzapine now.significantly improved with olanzapine  11.  QTc prolongation: Due to nilotinib.  This resolved.  She saw Dr. Duque with cardiology.    12.  Tobacco use: She is unwilling to quit smoking.    13.  Decreased appetite with weight loss: Appetite and weight improved  14.  Hypertension: Per primary care.     PLAN:  1.  Continue bosutinib 400 mg daily as she is tolerating this very well and it has helped her significantly..   2.  Continue olanzapine at night as needed  3.  Continue one tablet of ferrous sulfate daily   4.  We have recommended that she resume aspirin 81 mg daily.  5.  I will see her back in 3 months for follow-up with a CBC, CMP, TSH, free T4, and PCR for BCR/ABL done at day.    You have chosen to receive care through a telephone visit. Do you consent to use a telephone visit for your medical care today? Yes    12 minutes        Burt Merida MD

## 2020-06-08 ENCOUNTER — TELEPHONE (OUTPATIENT)
Dept: ONCOLOGY | Facility: CLINIC | Age: 75
End: 2020-06-08

## 2020-06-08 NOTE — TELEPHONE ENCOUNTER
----- Message from Nataliia Ricardo sent at 6/8/2020  7:53 AM EDT -----  Regarding: APTS MADE  I made her apts, can you please mail them and tell her I made the MD apt a telephone visit? Thanks

## 2020-06-09 LAB — REF LAB TEST METHOD: NORMAL

## 2020-06-10 ENCOUNTER — TELEPHONE (OUTPATIENT)
Dept: ONCOLOGY | Facility: CLINIC | Age: 75
End: 2020-06-10

## 2020-06-10 NOTE — TELEPHONE ENCOUNTER
Spoke with patient on the phone and let her know that her PCR for BCR-ABL is negative at 0%, which is fantastic.  She was glad to hear it.  Advised her to continue her medication and that we might be able to consider stopping it in a couple of years if her PCR remains acceptable.

## 2020-06-17 DIAGNOSIS — G89.29 OTHER CHRONIC PAIN: ICD-10-CM

## 2020-06-17 NOTE — TELEPHONE ENCOUNTER
Received a controlled substance refill for Tramadol.  Pt is currently receiving active treatment from Oncology and we cannot prescribe this medication.

## 2020-06-19 ENCOUNTER — DOCUMENTATION (OUTPATIENT)
Dept: ONCOLOGY | Facility: CLINIC | Age: 75
End: 2020-06-19

## 2020-06-19 RX ORDER — PROMETHAZINE HYDROCHLORIDE 12.5 MG/1
TABLET ORAL
Qty: 60 TABLET | Refills: 0 | Status: SHIPPED | OUTPATIENT
Start: 2020-06-19 | End: 2020-09-29

## 2020-06-19 NOTE — PROGRESS NOTES
Fax rec from [facility] stating Minnie has started a PA for pts Promethazine. I have completed the request and submitted to [place].    Request approved until 6/19/2021.    Pharmacy notified.

## 2020-06-26 ENCOUNTER — TELEPHONE (OUTPATIENT)
Dept: INTERNAL MEDICINE | Facility: CLINIC | Age: 75
End: 2020-06-26

## 2020-06-26 NOTE — TELEPHONE ENCOUNTER
BRANDY LOGSDON CALLED ON BEHALF OF HER MOTHER REQUESTING AN APPT TO BE SEEN. WHEN ASKED HER REASON FOR VISIT, STATED THAT HER MOTHER IS NOT SURE... HEADACHES, CONGESTION, FEVER, NOT FEELING WELL IN GENERAL. WHEN ASKED HER HOW HIGH WAS THE FEVER, SHE SAID SHE DOESN'T EVEN KNOW IF SHE HAS A FEVER.  I TOLD HER WE WILL HAVE TO TALK TO THE PATIENT DIRECTLY TO KNOW WHAT THOSE SYMPTOMS ARE.  PATIENT'S # -[phone]    PLEASE ADVISE

## 2020-07-13 ENCOUNTER — DOCUMENTATION (OUTPATIENT)
Dept: ONCOLOGY | Facility: CLINIC | Age: 75
End: 2020-07-13

## 2020-07-13 NOTE — PROGRESS NOTES
rec from Clarissa-pts daughter-she states pt is needing a refill of her Bosulif. She has enough for today and tomorrow.     I contacted [employer] [phone] and spoke to Devan. I placed the reorder and pt will rec her delivery on Wednesday, 7/15/2020

## 2020-07-15 ENCOUNTER — OFFICE VISIT (OUTPATIENT)
Dept: INTERNAL MEDICINE | Facility: CLINIC | Age: 75
End: 2020-07-15

## 2020-07-15 VITALS
WEIGHT: 129.1 LBS | OXYGEN SATURATION: 97 % | HEART RATE: 62 BPM | DIASTOLIC BLOOD PRESSURE: 62 MMHG | SYSTOLIC BLOOD PRESSURE: 170 MMHG | HEIGHT: 65 IN | BODY MASS INDEX: 21.51 KG/M2

## 2020-07-15 DIAGNOSIS — F17.200 NICOTINE USE DISORDER: ICD-10-CM

## 2020-07-15 DIAGNOSIS — I10 ESSENTIAL HYPERTENSION: Primary | ICD-10-CM

## 2020-07-15 DIAGNOSIS — F41.9 ANXIETY: ICD-10-CM

## 2020-07-15 DIAGNOSIS — F51.01 PRIMARY INSOMNIA: ICD-10-CM

## 2020-07-15 DIAGNOSIS — R53.1 WEAKNESS: ICD-10-CM

## 2020-07-15 DIAGNOSIS — Z71.6 ENCOUNTER FOR SMOKING CESSATION COUNSELING: ICD-10-CM

## 2020-07-15 DIAGNOSIS — R42 DIZZINESS: ICD-10-CM

## 2020-07-15 DIAGNOSIS — I95.1 ORTHOSTATIC HYPOTENSION: ICD-10-CM

## 2020-07-15 PROCEDURE — 99407 BEHAV CHNG SMOKING > 10 MIN: CPT | Performed by: FAMILY MEDICINE

## 2020-07-15 PROCEDURE — 99214 OFFICE O/P EST MOD 30 MIN: CPT | Performed by: FAMILY MEDICINE

## 2020-07-15 RX ORDER — LISINOPRIL 20 MG/1
20 TABLET ORAL DAILY
Qty: 90 TABLET | Refills: 3 | Status: SHIPPED | OUTPATIENT
Start: 2020-07-15 | End: 2020-07-29

## 2020-07-15 RX ORDER — LORAZEPAM 0.5 MG/1
0.5 TABLET ORAL 2 TIMES DAILY PRN
Qty: 180 TABLET | Refills: 1 | Status: SHIPPED | OUTPATIENT
Start: 2020-07-15 | End: 2020-11-20 | Stop reason: SDUPTHER

## 2020-07-15 NOTE — PROGRESS NOTES
Subjective   Nicole Lofton is a 74 y.o. female.     Chief Complaint   Patient presents with   • follow up from er   • Dizziness   • Shortness of Breath         History of Present Illness     Patient returns hypertension.  However she also has whitecoat syndrome.  At today's office visit she continues to stay elevated blood pressure 170/62.  She currently is getting medication for her blood pressure.  The medication include amlodipine 10 mg daily as well as lisinopril 10 mg daily.  Patient was recently found to be orthostatic hypotension while she was in the emergency room.  Patient states that she is not drinking as much water as she would like, but she does note that she drink more fluids since come back from the emergency room.  Patient does drink many caffeinated drinks.    Patient has a long history of having anxiety.  Patient currently on Lexapro 10 mg daily.  Patient was also started on Ativan 0.5 mg use as needed for her insomnia as well as anxiety.  Patient denies any side effects of the medication.  Patient does note that she is doing well with this medicine.  Patient notes that due to her weakness that she currently has, she states that she would benefit from having physical therapy to help build up strength.  Patient is concerned about her loss of balance.  Patient states that he is homebound and cannot leave the house.  She is concerned about her gait, as she is concerned that she may fall, and have further complications to help.    The following portions of the patient's history were reviewed and updated as appropriate: allergies, current medications, past family history, past medical history, past social history, past surgical history and problem list.    Review of Systems   Constitutional: Negative for chills and fever.   HENT: Negative for congestion, rhinorrhea, sinus pain and sore throat.    Eyes: Negative for photophobia and visual disturbance.   Respiratory: Negative for cough, chest  "tightness and shortness of breath.    Cardiovascular: Negative for chest pain and palpitations.   Gastrointestinal: Negative for diarrhea, nausea and vomiting.   Genitourinary: Negative for dysuria, frequency and urgency.   Skin: Negative for rash and wound.   Neurological: Negative for dizziness and syncope.   Psychiatric/Behavioral: Negative for behavioral problems and confusion.       Objective   Physical Exam   Constitutional: She is oriented to person, place, and time. She appears well-developed and well-nourished.   HENT:   Head: Normocephalic and atraumatic.   Right Ear: External ear normal.   Left Ear: External ear normal.   Eyes: EOM are normal.   Neck: Normal range of motion. Neck supple.   Cardiovascular: Normal rate, regular rhythm and normal heart sounds.   Pulmonary/Chest: Effort normal and breath sounds normal. No respiratory distress.   Musculoskeletal: Normal range of motion.   Lymphadenopathy:     She has no cervical adenopathy.   Neurological: She is alert and oriented to person, place, and time.   Skin: Skin is warm.   Psychiatric: She has a normal mood and affect. Her behavior is normal.   Nursing note and vitals reviewed.      Vitals:    07/15/20 1319   BP: 170/62   Pulse: 62   SpO2: 97%     Body mass index is 21.51 kg/m².      Assessment/Plan   Nicole was seen today for follow up from er, dizziness and shortness of breath.    Diagnoses and all orders for this visit:    Essential hypertension  -     lisinopril (PRINIVIL,ZESTRIL) 20 MG tablet; Take 1 tablet by mouth Daily.    Orthostatic hypotension  -     Ambulatory Referral to Home Health    Dizziness  -     Ambulatory Referral to Home Health    Weakness  -     Ambulatory Referral to Home Health    Nicotine use disorder    Encounter for smoking cessation counseling        -     \"I advised the patient of the risks in continuing to use tobacco,\" and \"I provided this patient with smoking cessation educational materials and discussed how to quit " "smoking,\" and \"patient has expressed the willingness to quit\".        -      I spent greater than 10 minutes with patient today, visit and counseling patient about oversedation.  Patient understands risk factors of smoking, and wants to eventually quit smoking.  She like to do this on her own terms.    Primary insomnia  -     LORazepam (ATIVAN) 0.5 MG tablet; Take 1 tablet by mouth 2 (Two) Times a Day As Needed for Anxiety.    Anxiety      For patient's anxiety, patient can continue taking Lexapro 10 mg daily.  We will have patient continue her Ativan 0.5 mg twice daily as needed, as she also notes that this seems to help her with her sleep, when she takes the last dose at nighttime.  As for patient's essential hypertension, I would like to increase the lisinopril to 20 mg daily, and will continue amlodipine at 10 mg daily.  We will see how patient responds to this.  Patient does have some orthostatic hypotension, as she was seen in the emergency room with this.  She will need need to drink plenty more fluids, particularly the do not have caffeine.  Patient understood and agree with plan.  As for patient's weakness, it may be secondary to orthostatic hypotension.  I am recommending that patient obtain physical therapy, particularly through the home health, as he would benefit from patient being seen while she is in her home.    No follow-ups on file.    Dictated utilizing Dragon Voice Recognition Software         "

## 2020-07-20 RX ORDER — LEVOTHYROXINE SODIUM 0.1 MG/1
100 TABLET ORAL DAILY
Qty: 90 TABLET | Refills: 0 | Status: SHIPPED | OUTPATIENT
Start: 2020-07-20 | End: 2020-11-02

## 2020-07-28 ENCOUNTER — TELEPHONE (OUTPATIENT)
Dept: INTERNAL MEDICINE | Facility: CLINIC | Age: 75
End: 2020-07-28

## 2020-07-28 ENCOUNTER — TELEPHONE (OUTPATIENT)
Dept: ONCOLOGY | Facility: CLINIC | Age: 75
End: 2020-07-28

## 2020-07-28 NOTE — TELEPHONE ENCOUNTER
Pt left a message requesting a refill of Tramadol that she has taken in the past for back pain. This wasa prescribed in the past by Dr Campbell. Attempted to call pt back, left VM.

## 2020-07-28 NOTE — TELEPHONE ENCOUNTER
PT IS EXPERIENCING BACK PAIN. SHE WOULD LIKE TO ASK IF DR CARLIN WOULD PRESCRIBE TRAMADOL 50 MG.    SHE HAS TAKEN BEFORE AND IT SEEMS TO HELP.    SHE IS CURRENTLY SEEING A PHYSICAL THERAPIST AND DOING HER EXERCISES.    SHE HAS TRIED OVER THE COUNTER MEDICATIONS, ADVIL, TYLENOL AND NOTHING SEEMS TO HELP.     PREFERRED PHARMACY: [facility]  - [phone]    BEST CALL BACK # [phone]

## 2020-07-28 NOTE — TELEPHONE ENCOUNTER
She is under active treatment with Oncology and we cannot prescribe this, she was understanding and states she will contact her oncologist.

## 2020-07-28 NOTE — TELEPHONE ENCOUNTER
PATIENT REQUESTING A PRESCRIPTION FOR TRAMADOL . SHE STATES SHE IS COMPLETELY OUT AND TAKES IT FOR HER ARTHRITIS. SHE REQUESTED A PAPER PRESCRIPTION BECAUSE SHE DOES NOT HAVE A PRESCRIPTION NUMBER.      PLEASE ADVISE  [phone]

## 2020-07-28 NOTE — TELEPHONE ENCOUNTER
"Spoke with pt who said Dr Campbell will not refill her prescription for Tramadol because she \"has cancer\" and it was her understanding she should go through Dr Merida for her refills of this. Pt said she has increased back pain related to physical therapy. Reviewed with Dr Merida who states this is not related to the reason she is seen here and is not appropriate to fill. Pt made aware and v/u.  "

## 2020-07-29 ENCOUNTER — OFFICE VISIT (OUTPATIENT)
Dept: INTERNAL MEDICINE | Facility: CLINIC | Age: 75
End: 2020-07-29

## 2020-07-29 VITALS
OXYGEN SATURATION: 97 % | SYSTOLIC BLOOD PRESSURE: 190 MMHG | DIASTOLIC BLOOD PRESSURE: 66 MMHG | WEIGHT: 128.3 LBS | HEIGHT: 65 IN | BODY MASS INDEX: 21.38 KG/M2 | HEART RATE: 62 BPM

## 2020-07-29 DIAGNOSIS — G89.29 OTHER CHRONIC PAIN: ICD-10-CM

## 2020-07-29 DIAGNOSIS — I10 ESSENTIAL HYPERTENSION: Primary | ICD-10-CM

## 2020-07-29 PROCEDURE — 99214 OFFICE O/P EST MOD 30 MIN: CPT | Performed by: FAMILY MEDICINE

## 2020-07-29 RX ORDER — HYDROCHLOROTHIAZIDE 12.5 MG/1
12.5 TABLET ORAL DAILY
Qty: 30 TABLET | Refills: 6 | Status: SHIPPED | OUTPATIENT
Start: 2020-07-29 | End: 2020-10-05

## 2020-07-29 RX ORDER — TRAMADOL HYDROCHLORIDE 50 MG/1
50 TABLET ORAL DAILY PRN
Qty: 30 TABLET | Refills: 0 | Status: SHIPPED | OUTPATIENT
Start: 2020-07-29 | End: 2020-08-31 | Stop reason: SDUPTHER

## 2020-07-29 NOTE — PROGRESS NOTES
Subjective   Nicole Lofton is a 74 y.o. female.     Chief Complaint   Patient presents with   • Urinary Frequency   • Difficulty Urinating         History of Present Illness     Patient's past medical history for essential hypertension.  Patient blood pressure continues to stay elevated.  Today is 190/66.  On a recheck it was very similar.  Patient states that even when physical therapy came to her house, her blood pressures was in the upper 150s systolic.  Patient states that she does not have any headaches or other symptoms related to the elevated blood pressure, but she does note that she is not stress right now.  She does not know why blood pressure continues to go up.  She is currently taking lisinopril 20 mg daily, and amlodipine 10 mg daily.    Patient comes in today for concern for the chronic pain.  She has not had tramadol in almost a month.  She is concerned that maybe her elevated pain levels are causing her to have an elevated blood pressure.  She does have chronic pain that affects her back and her legs.  In the past she has been controlled on tramadol.    The following portions of the patient's history were reviewed and updated as appropriate: allergies, current medications, past family history, past medical history, past social history, past surgical history and problem list.    Review of Systems   Constitutional: Negative for chills and fever.   HENT: Negative for congestion, rhinorrhea, sinus pain and sore throat.    Eyes: Negative for photophobia and visual disturbance.   Respiratory: Negative for cough, chest tightness and shortness of breath.    Cardiovascular: Negative for chest pain and palpitations.   Gastrointestinal: Negative for diarrhea, nausea and vomiting.   Genitourinary: Positive for dysuria. Negative for frequency and urgency.   Skin: Negative for rash and wound.   Neurological: Negative for dizziness and syncope.   Psychiatric/Behavioral: Negative for behavioral problems and  confusion.       Objective   Physical Exam   Constitutional: She is oriented to person, place, and time. She appears well-developed and well-nourished.   HENT:   Head: Normocephalic and atraumatic.   Right Ear: External ear normal.   Left Ear: External ear normal.   Eyes: EOM are normal.   Neck: Normal range of motion. Neck supple.   Cardiovascular: Normal rate, regular rhythm and normal heart sounds.   Pulmonary/Chest: Effort normal and breath sounds normal. No respiratory distress.   Musculoskeletal: Normal range of motion.   Lymphadenopathy:     She has no cervical adenopathy.   Neurological: She is alert and oriented to person, place, and time.   Skin: Skin is warm.   Psychiatric: She has a normal mood and affect. Her behavior is normal.   Nursing note and vitals reviewed.      Vitals:    07/29/20 1426   BP: (!) 190/66   Pulse: 62   SpO2: 97%     Body mass index is 21.38 kg/m².      Assessment/Plan   Nicole was seen today for urinary frequency and difficulty urinating.    Diagnoses and all orders for this visit:    Essential hypertension  -     Azilsartan Medoxomil (Edarbi) 40 MG tablet; Take 40 mg by mouth Daily.  -     hydroCHLOROthiazide (HYDRODIURIL) 12.5 MG tablet; Take 1 tablet by mouth Daily.  -     Comprehensive Metabolic Panel  -     Today we will stop the lisinopril.  Continue the amlodipine 10 mg daily.  Start her on hydrochlorothiazide 12.5 mg daily as well as Edarbi 40 mg daily.    Chronic pain        -     Discussed with patient at today's office visit that we will start her back on the tramadol 50 mg daily as needed as needed.  Discussed with her that they can make her drowsy, so she needs to be extra careful when taking the medication.  Patient understood and agreed with the plan.    I spent greater than 25 mins with the patient at todays visit. With more than half the time spent in counselling patient about disease pathology and pharmacology used to treat the disease.       No follow-ups on  file.    Dictated utilizing Dragon Voice Recognition Software

## 2020-07-30 ENCOUNTER — PRIOR AUTHORIZATION (OUTPATIENT)
Dept: INTERNAL MEDICINE | Facility: CLINIC | Age: 75
End: 2020-07-30

## 2020-08-03 ENCOUNTER — OFFICE VISIT (OUTPATIENT)
Dept: INTERNAL MEDICINE | Facility: CLINIC | Age: 75
End: 2020-08-03

## 2020-08-03 VITALS
OXYGEN SATURATION: 95 % | HEIGHT: 65 IN | TEMPERATURE: 98.3 F | SYSTOLIC BLOOD PRESSURE: 172 MMHG | WEIGHT: 125 LBS | HEART RATE: 68 BPM | RESPIRATION RATE: 18 BRPM | BODY MASS INDEX: 20.83 KG/M2 | DIASTOLIC BLOOD PRESSURE: 58 MMHG

## 2020-08-03 DIAGNOSIS — I10 ESSENTIAL HYPERTENSION: Primary | ICD-10-CM

## 2020-08-03 PROCEDURE — 99213 OFFICE O/P EST LOW 20 MIN: CPT | Performed by: FAMILY MEDICINE

## 2020-08-03 RX ORDER — ATENOLOL 25 MG/1
25 TABLET ORAL DAILY
Qty: 90 TABLET | Refills: 3 | Status: SHIPPED | OUTPATIENT
Start: 2020-08-03 | End: 2020-10-05

## 2020-08-03 NOTE — PROGRESS NOTES
Subjective   Nicole Lofton is a 74 y.o. female.     Chief Complaint   Patient presents with   • Hypertension         History of Present Illness     Patient with past medical history for essential hypertension.  Patient blood pressure at today's office of 172/58.  She is currently on hydrochlorothiazide 12.5 mg daily, Edarbi 40 mg daily, and amlodipine 10 mg daily.  Her blood pressure continues to elevated.  She does believe that her stress levels are good.  She has checked her blood pressure at home, and notes is been around 158/60.    The following portions of the patient's history were reviewed and updated as appropriate: allergies, current medications, past family history, past medical history, past social history, past surgical history and problem list.    Review of Systems   Constitutional: Negative.    HENT: Negative.    Respiratory: Negative.    Cardiovascular: Negative.    Musculoskeletal: Negative.    Psychiatric/Behavioral: Negative.        Objective   Physical Exam   Constitutional: She is oriented to person, place, and time. She appears well-developed and well-nourished.   HENT:   Head: Normocephalic and atraumatic.   Eyes: EOM are normal.   Neck: Normal range of motion. Neck supple.   Cardiovascular: Normal rate, regular rhythm and normal heart sounds.   Pulmonary/Chest: Effort normal and breath sounds normal. No respiratory distress.   Neurological: She is alert and oriented to person, place, and time.   Psychiatric: She has a normal mood and affect. Her behavior is normal.   Nursing note and vitals reviewed.      Vitals:    08/03/20 1131   BP: 172/58   Pulse: 68   Resp: 18   Temp: 98.3 °F (36.8 °C)   SpO2: 95%     Body mass index is 20.83 kg/m².      Assessment/Plan   Nicole was seen today for hypertension.    Diagnoses and all orders for this visit:    Essential hypertension  -     atenolol (Tenormin) 25 MG tablet; Take 1 tablet by mouth Daily.  -     We will start patient on atenolol 25 mg  daily.  Continue Edarbi 40, hydrochlorothiazide 12.5 mg daily, and amlodipine 10 mg daily.  -     We will reevaluate in 1 month.  If it continues to elevated, can always increase the Edarbi dose.          No follow-ups on file.    Dictated utilizing Dragon Voice Recognition Software

## 2020-08-05 ENCOUNTER — TELEPHONE (OUTPATIENT)
Dept: INTERNAL MEDICINE | Facility: CLINIC | Age: 75
End: 2020-08-05

## 2020-08-05 DIAGNOSIS — R26.89 UNSTABLE BALANCE: Primary | ICD-10-CM

## 2020-08-05 NOTE — TELEPHONE ENCOUNTER
Pt returned call and said she only started having problems being unstable, light headedness when she started med. Informed her now that she is stopping the med. It should resolve. I informed her if she continued having problems to call back and let us know.       Per Dr. Mercer pt needs a face to face visit and will need to wait until Dr. Campbell to come back. The light headedness and being unstable will probably stop due to stopping medication. Called and left message to inform pt she needs a face to face if she is still having problems when Dr. Campbell gets back.       Spoke with pt, she will stop atenolol and her sister will  order for rollator.         Fernando with home health called and wanted us to know that her hr was 45. Her bp was 144/62. Pt c/o being light headed. Her meds was just changed.     He added atenolol 25 mg to edarbi-40 mg hctz-12.5 mg amlodipine- 10 mg     He also wants her to have a rolator rx because she is unsteady.

## 2020-08-21 ENCOUNTER — CLINICAL SUPPORT (OUTPATIENT)
Dept: ONCOLOGY | Facility: HOSPITAL | Age: 75
End: 2020-08-21

## 2020-08-21 ENCOUNTER — TELEPHONE (OUTPATIENT)
Dept: CARDIOLOGY | Facility: CLINIC | Age: 75
End: 2020-08-21

## 2020-08-21 ENCOUNTER — LAB (OUTPATIENT)
Dept: OTHER | Facility: HOSPITAL | Age: 75
End: 2020-08-21

## 2020-08-21 ENCOUNTER — OFFICE VISIT (OUTPATIENT)
Dept: ONCOLOGY | Facility: CLINIC | Age: 75
End: 2020-08-21

## 2020-08-21 VITALS — HEART RATE: 39 BPM

## 2020-08-21 VITALS
OXYGEN SATURATION: 96 % | TEMPERATURE: 97.7 F | SYSTOLIC BLOOD PRESSURE: 183 MMHG | WEIGHT: 132 LBS | HEIGHT: 65 IN | DIASTOLIC BLOOD PRESSURE: 65 MMHG | BODY MASS INDEX: 21.99 KG/M2 | HEART RATE: 41 BPM | RESPIRATION RATE: 18 BRPM

## 2020-08-21 DIAGNOSIS — C92.10 CML (CHRONIC MYELOID LEUKEMIA) (HCC): ICD-10-CM

## 2020-08-21 DIAGNOSIS — R00.1 BRADYCARDIA: Primary | ICD-10-CM

## 2020-08-21 DIAGNOSIS — E03.9 ACQUIRED HYPOTHYROIDISM: ICD-10-CM

## 2020-08-21 DIAGNOSIS — R00.1 BRADYCARDIA: ICD-10-CM

## 2020-08-21 LAB
ALBUMIN SERPL-MCNC: 4.7 G/DL (ref 3.5–5.2)
ALBUMIN/GLOB SERPL: 1.8 G/DL
ALP SERPL-CCNC: 69 U/L (ref 39–117)
ALT SERPL W P-5'-P-CCNC: 8 U/L (ref 1–33)
ANION GAP SERPL CALCULATED.3IONS-SCNC: 9.4 MMOL/L (ref 5–15)
AST SERPL-CCNC: 16 U/L (ref 1–32)
BASOPHILS # BLD AUTO: 0.03 10*3/MM3 (ref 0–0.2)
BASOPHILS NFR BLD AUTO: 0.3 % (ref 0–1.5)
BILIRUB SERPL-MCNC: 0.4 MG/DL (ref 0–1.2)
BUN SERPL-MCNC: 21 MG/DL (ref 8–23)
BUN/CREAT SERPL: 15.9 (ref 7–25)
CALCIUM SPEC-SCNC: 9.3 MG/DL (ref 8.6–10.5)
CHLORIDE SERPL-SCNC: 92 MMOL/L (ref 98–107)
CO2 SERPL-SCNC: 26.6 MMOL/L (ref 22–29)
CREAT SERPL-MCNC: 1.32 MG/DL (ref 0.57–1)
DEPRECATED RDW RBC AUTO: 48.6 FL (ref 37–54)
EOSINOPHIL # BLD AUTO: 0.13 10*3/MM3 (ref 0–0.4)
EOSINOPHIL NFR BLD AUTO: 1.5 % (ref 0.3–6.2)
ERYTHROCYTE [DISTWIDTH] IN BLOOD BY AUTOMATED COUNT: 15.1 % (ref 12.3–15.4)
GFR SERPL CREATININE-BSD FRML MDRD: 39 ML/MIN/1.73
GLOBULIN UR ELPH-MCNC: 2.6 GM/DL
GLUCOSE SERPL-MCNC: 98 MG/DL (ref 65–99)
HCT VFR BLD AUTO: 34.1 % (ref 34–46.6)
HGB BLD-MCNC: 11.3 G/DL (ref 12–15.9)
IMM GRANULOCYTES # BLD AUTO: 0.03 10*3/MM3 (ref 0–0.05)
IMM GRANULOCYTES NFR BLD AUTO: 0.3 % (ref 0–0.5)
LYMPHOCYTES # BLD AUTO: 2.4 10*3/MM3 (ref 0.7–3.1)
LYMPHOCYTES NFR BLD AUTO: 27.9 % (ref 19.6–45.3)
MCH RBC QN AUTO: 28.8 PG (ref 26.6–33)
MCHC RBC AUTO-ENTMCNC: 33.1 G/DL (ref 31.5–35.7)
MCV RBC AUTO: 86.8 FL (ref 79–97)
MONOCYTES # BLD AUTO: 0.74 10*3/MM3 (ref 0.1–0.9)
MONOCYTES NFR BLD AUTO: 8.6 % (ref 5–12)
NEUTROPHILS NFR BLD AUTO: 5.28 10*3/MM3 (ref 1.7–7)
NEUTROPHILS NFR BLD AUTO: 61.4 % (ref 42.7–76)
NRBC BLD AUTO-RTO: 0 /100 WBC (ref 0–0.2)
PLATELET # BLD AUTO: 204 10*3/MM3 (ref 140–450)
PMV BLD AUTO: 9.9 FL (ref 6–12)
POTASSIUM SERPL-SCNC: 4.6 MMOL/L (ref 3.5–5.2)
PROT SERPL-MCNC: 7.3 G/DL (ref 6–8.5)
RBC # BLD AUTO: 3.93 10*6/MM3 (ref 3.77–5.28)
SODIUM SERPL-SCNC: 128 MMOL/L (ref 136–145)
T4 FREE SERPL-MCNC: 1.53 NG/DL (ref 0.93–1.7)
TSH SERPL DL<=0.05 MIU/L-ACNC: 10.46 UIU/ML (ref 0.27–4.2)
WBC # BLD AUTO: 8.61 10*3/MM3 (ref 3.4–10.8)

## 2020-08-21 PROCEDURE — 84443 ASSAY THYROID STIM HORMONE: CPT | Performed by: INTERNAL MEDICINE

## 2020-08-21 PROCEDURE — 93005 ELECTROCARDIOGRAM TRACING: CPT | Performed by: INTERNAL MEDICINE

## 2020-08-21 PROCEDURE — 80053 COMPREHEN METABOLIC PANEL: CPT | Performed by: INTERNAL MEDICINE

## 2020-08-21 PROCEDURE — 84439 ASSAY OF FREE THYROXINE: CPT | Performed by: INTERNAL MEDICINE

## 2020-08-21 PROCEDURE — 85025 COMPLETE CBC W/AUTO DIFF WBC: CPT | Performed by: INTERNAL MEDICINE

## 2020-08-21 PROCEDURE — 36415 COLL VENOUS BLD VENIPUNCTURE: CPT

## 2020-08-21 PROCEDURE — 99215 OFFICE O/P EST HI 40 MIN: CPT | Performed by: INTERNAL MEDICINE

## 2020-08-21 NOTE — TELEPHONE ENCOUNTER
----- Message from Burt Merida MD sent at 8/21/2020  1:22 PM EDT -----  Regarding: chauncey and htn  Galdino,    You saw her a couple of years ago, wonder if you can see her again.  She's doing well on her CML drug bosutinib but has been weak for a week or so and her HR today is 42 and she's pretty hypertensive.  Having her hold the atenolol.  I don't think it's the bosutinib.  Getting an EKG today but I don't have it yet.  To me listening to her it's sinus chauncey.  Thanks!  [place]

## 2020-08-21 NOTE — PROGRESS NOTES
[facility] GROUP OUTPATIENT FOLLOW UP VISIT    REASON FOR FOLLOW-UP:    1.  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  Gleevec 400 mg daily initiated around 10/12/2017, stopped on 11/15/2017 due to intolerance (noah-orbital edema, nausea/vomiting, fatigue).  Resumed at 200 mg daily but, again, not tolerated.    3.  Nilotinib 150 mg twice daily started in late March, 2018.  Held due to intolerance and QTc prolongation.   4.  Hydroxyurea initiated in July 2018.  Subsequently discontinued.     5.  Bosutinib 400 mg daily started Feb 2019    HISTORY OF PRESENT ILLNESS:  Nicole Lofton is a 74 y.o. female with the above-mentioned history.    She continues Bosutinib 400 mg daily.  She tolerates this very well.      For about one week she has felt weak and fatigued.  No fever/chills.  No respiratory symptoms.  Has difficulty ambulating and is using a cane. No focal weakness.  No new medications.        ONCOLOGIC HISTORY:  For about 6 months she has noticed bilateral arm tingling and weakness in her hands.  This has occurred about 5 or 6 times over the past 6 months.  She is vague in her description of this.  She is globally weak and is quite inactive as she lives by herself.  She was completing physical therapy with some improvement but is now quite unsteady on her feet.  She has not had any falls.  She denies any bleeding.  She has chronic fatigue.  She has osteoarthritis pain and reports pain in the right side and in her neck.  She denies any acute low back pain but does have chronic low back pain.  She does have orthostatic symptoms and she is lightheaded when she stands.  She has had a decreased appetite and some weight loss over the past 9 months.     Labs performed on 8/28/2017 showed a white blood cell count of 39.1 with a differential showing 61% neutrophils and 32% lymphocytes, hemoglobin 13.9 and platelets 565,000.         She had further labs done at [facility] on  8/20/2017 showing a white blood cell count of 24.4, hemoglobin 13.7, and platelets 1795.  A differential showed 64% neutrophils, 7% lymphocytes, and 12% basophils.  Peripheral blood flow cytometry was also done on this date showing 11% basophils and less than 1% myeloblasts.  There was no monoclonal B-cell, T-cell, plasma cell, or NK cell population.  FISH for BCR-ABL and ESSENCE 2 mutation analysis were done as well.     Her CBC was normal as of 7/8/2016.     Of note, she has a history of bilateral breast cancer diagnosed in 1988.  She had bilateral mastectomy with reconstruction.  We do not have any records regarding this at this time.  She apparently completed at least 6 months of adjuvant chemotherapy but again does not know the details..    FISH for BCR-ABL performed at [facility] was actually positive.  As of 9/6/2017 ESSENCE 2 is still pending.    She returned on 9/6/2017 for follow-up.  Platelets at 1.9 million with hydroxyurea 1000 mg daily.  She will increase the dose to 2000 mg daily.  She will have a bone marrow aspiration biopsy performed.  Weekly CBCs.    As of 9/6/2017 peripheral blood PCR was positive at 89.238% with a major break point mutation.    ESSENCE 2 and CALR mutation negative.    Bone marrow aspiration and biopsy performed on 9/15/2017.  Flow cytometry showed no increase in blasts.  Morphology showed no increase in blasts.  Bone marrow FISH showed BCR/ABL rearrangement in 93% of cells.  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    Labs as of 9/20/2017 show white blood cell count of 4.7 with hemoglobin of 12.7 and platelets 1.1 million.    On 9/27/2017 white blood cells and hemoglobin are normal.  Platelets are down to 722,000.  We plan to start Gleevec at 400 mg daily if she tolerates it.  In the meantime she will decrease the hydroxyurea dose to 1000 mg daily.    Blood counts normalized.  Hydroxyurea stopped.    Gleevec initiated around 10/12/2017, stopped on 11/15/2017 due to intolerance  (noah-orbital edema, nausea/vomiting, fatigue).    Symptoms improving as of 11/29/2017.  Blood counts normal.  Hold further therapy at this time until she improves more clinically.      Plan to resume Gleevec at 200 mg daily as of 1/11/2018.  Only took it for less than a week but also did not tolerate this dose.    As of 2/21/2018 her platelet count is again elevated at greater than 700,000.  Resume hydroxyurea 1000 mg daily.  Considering nilotinib at a decreased dose.    Platelets continued to rise in March 2018.  Hydroxyurea increased to 1500 mg daily with stabilization of her platelet count and some improvement as of 3/21/2018.  Plan for nilotinib.    Nilotinib initiated in late March 2018.    Subsequently held due to QTC prolongation.    Hydroxyurea 1000 mg daily resumed at the end of July 2018.  Increased the dose to 1000 mg twice daily as of 8/7/2018 for a rising platelet count of 1.3 million.    Platelets as of 9/7/2018 500,000.  Continue hydroxyurea 1000 mg twice daily.    As of 10/12/2018 her CBC has improved significantly.  Platelets 242,000 with a white blood cell count of 3.52.  Decrease hydroxyurea to 1000 mg daily.    Platelets increased as of 11/20/2018.  Increased hydroxyurea to 1000 mg in the morning and 500 mg in the evening.    Platelets were increased to 1.2 million on 1/15/2019.  Hydroxyurea increased 1000 mg twice daily.    She was referred to the [facility] and in mid February 2019 started bosutinib 400 mg daily which she has been taking at night along with olanzapine and tolerating it well.  Blood counts improved.    PAST MEDICAL, SURGICAL, FAMILY, AND SOCIAL HISTORIES were reviewed with the patient and in the electronic medical record, and were updated if indicated.    ALLERGIES:  No Known Allergies    MEDICATIONS:  The medication list has been reviewed with the patient by the medical assistant, and the list has been updated in the electronic medical record, which I reviewed.   "Medication dosages and frequencies were confirmed to be accurate.    I have reviewed the patient's medical history in detail and updated the computerized patient record.    Review of Systems   Constitutional: Positive for fatigue.   Musculoskeletal: Positive for back pain (chronic).   All other systems reviewed and are negative.      Vitals:    08/21/20 1247   BP: (!) 183/65   Pulse: (!) 41   Resp: 18   Temp: 97.7 °F (36.5 °C)   TempSrc: Skin   SpO2: 96%   Weight: 59.9 kg (132 lb)   Height: 165 cm (64.96\")   PainSc: 0-No pain       Physical Exam   Constitutional: She is oriented to person, place, and time. She appears well-developed and well-nourished.   Chronically ill appearing   Cardiovascular: Regular rhythm. Bradycardia present.   Pulmonary/Chest: Effort normal and breath sounds normal.   Musculoskeletal: She exhibits no edema.   Neurological: She is alert and oriented to person, place, and time.            DIAGNOSTIC DATA:  Results for orders placed or performed in visit on 08/21/20   Comprehensive Metabolic Panel   Result Value Ref Range    Glucose 98 65 - 99 mg/dL    BUN 21 8 - 23 mg/dL    Creatinine 1.32 (H) 0.57 - 1.00 mg/dL    Sodium 128 (L) 136 - 145 mmol/L    Potassium 4.6 3.5 - 5.2 mmol/L    Chloride 92 (L) 98 - 107 mmol/L    CO2 26.6 22.0 - 29.0 mmol/L    Calcium 9.3 8.6 - 10.5 mg/dL    Total Protein 7.3 6.0 - 8.5 g/dL    Albumin 4.70 3.50 - 5.20 g/dL    ALT (SGPT) 8 1 - 33 U/L    AST (SGOT) 16 1 - 32 U/L    Alkaline Phosphatase 69 39 - 117 U/L    Total Bilirubin 0.4 0.0 - 1.2 mg/dL    eGFR Non African Amer 39 (L) >60 mL/min/1.73    Globulin 2.6 gm/dL    A/G Ratio 1.8 g/dL    BUN/Creatinine Ratio 15.9 7.0 - 25.0    Anion Gap 9.4 5.0 - 15.0 mmol/L   TSH   Result Value Ref Range    TSH 10.460 (H) 0.270 - 4.200 uIU/mL   CBC Auto Differential   Result Value Ref Range    WBC 8.61 3.40 - 10.80 10*3/mm3    RBC 3.93 3.77 - 5.28 10*6/mm3    Hemoglobin 11.3 (L) 12.0 - 15.9 g/dL    Hematocrit 34.1 34.0 - 46.6 % "    MCV 86.8 79.0 - 97.0 fL    MCH 28.8 26.6 - 33.0 pg    MCHC 33.1 31.5 - 35.7 g/dL    RDW 15.1 12.3 - 15.4 %    RDW-SD 48.6 37.0 - 54.0 fl    MPV 9.9 6.0 - 12.0 fL    Platelets 204 140 - 450 10*3/mm3    Neutrophil % 61.4 42.7 - 76.0 %    Lymphocyte % 27.9 19.6 - 45.3 %    Monocyte % 8.6 5.0 - 12.0 %    Eosinophil % 1.5 0.3 - 6.2 %    Basophil % 0.3 0.0 - 1.5 %    Immature Grans % 0.3 0.0 - 0.5 %    Neutrophils, Absolute 5.28 1.70 - 7.00 10*3/mm3    Lymphocytes, Absolute 2.40 0.70 - 3.10 10*3/mm3    Monocytes, Absolute 0.74 0.10 - 0.90 10*3/mm3    Eosinophils, Absolute 0.13 0.00 - 0.40 10*3/mm3    Basophils, Absolute 0.03 0.00 - 0.20 10*3/mm3    Immature Grans, Absolute 0.03 0.00 - 0.05 10*3/mm3    nRBC 0.0 0.0 - 0.2 /100 WBC     EKG images reviewed today.  Sinus bradycardia.  Will send to cardiology.      IMAGING:  None reviewed    ASSESSMENT:  This is a 74 y.o. female with:  1.  History of bilateral breast cancer in 1988 status post bilateral mastectomy.  She apparently completed 6 months of adjuvant therapy.  We have no details regarding this.    2.  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  Started on Hydrea 1000 mg daily.  This was discontinued and she started Gleevec on approximately 10/12/2017.  Gleevec discontinued due to intolerance on 11/15/2017.  We started Gleevec at a lower dose of 200 mg but she was also intolerant of this dose.  Her side effects were as severe with a lower dose and she therefore stopped taking the drug.  Symptoms improved significantly.  Platelet count subsequently elevated again.    She started nilotinib at 150mg twice daily at the end of March 2018.  This was held in mid-May due to QTc prolongation.  The QTc interval did subsequently normalized.  However, due to this and other adverse effects we are not able to resume the nilotinib nor does she desire to.    In July we resumed hydroxyurea 1000 mg daily.  Her platelet count  increased.  Her dose was increased to 1000 mg twice daily.  Blood counts improved nicely.  Platelet count normalized but her white blood cell count dropped to below normal.  Decreased hydroxyurea to 1000 mg daily.    11/20/18 platelets were up again.  We increased the hydroxyurea to 1000 mg in the mornings and 500 mg in the evenings.    As of 1/15/2019 her platelet count was again elevated to 1.2 million.  Increased hydroxyurea to 1000 mg twice daily.      Platelets improved but remained far from normal.    Due to her history we referred her down to the [facility] to see Dr. Cifuentes for assistance and recommendations on further therapy.    In mid-February 2019 she started bosutinib 400 mg daily which she takes along with olanzapine at night (she takes this as needed now).       Blood counts look good.  Last PCR negative.  PCR pending today.    3.  History of thrombocytosis:  Due to CML.  See above.  Platelets normal today  4.  Hypothyroidism:  Levothyroxine 100 mcg.  TSH is high again today.  Await free T4.  May need to increase the dose.  Notes compliance with this..  5.  Nausea and vomiting: Resolved off Gleevec.   6. Periorbital edema: Resolved off Gleevec.    7.  Hypokalemia:  Potassium normal today   8.  Osteoarthritis pain: she reports this became worse after starting nilotinib. She is taking acetaminophen for this.  Prescribed meloxicam on 8/7/2018.  No longer on nilotinib.  She will continue meloxicam.  9.  Depression and anxiety: per PCP at this point. Stable   10.  Insomnia.  This had been treated with trazodone but as mentioned above this also caused a potential for QT interval prolongation.  She was therefore changed to temazepam.  Primary care now managing. On olanzapine now.significantly improved with olanzapine  11.  QTc prolongation: Previously due to nilotinib.  This resolved.  No longer on this drug. She saw Dr. Duque with cardiology.    12.  Tobacco use: She is unwilling to quit  smoking.    13.  Decreased appetite with weight loss: Appetite and weight improved  14.  Hypertension: worse today. Refer back to cardiology  15.  Bradycardia:  HR is about 40 today which is new.  Likely the cause of her fatigue.  Sinus chauncey per my read on the EKG.  Advised holding atenolol.  Refer back to Dr. Duque.  Advised if any syncope she needs to go to the ER.      PLAN:  1.  Continue bosutinib 400 mg daily as she is tolerating this very well and it has helped her significantly.   2.  Continue olanzapine at night as needed  3.  Continue one tablet of ferrous sulfate daily   4.  We have recommended that she continue aspirin 81 mg daily.  5. Follow up free T4.  If low, increase levothyroxine  6.  Refer back to Dr. Duque with cardiology  7.  I will see her back in 4 months for follow-up with a CBC, CMP, TSH, free T4, and PCR for BCR/ABL done at day.    I spent 40 minutes face-to-face with her today.  I spent 25 minutes counseling her regarding her new symptomatic bradycardia and regarding her CML.  I personally reviewed her EKG.  I referred her back to Dr. Duque.      Burt Merida MD

## 2020-08-21 NOTE — TELEPHONE ENCOUNTER
Bert, can she see TK next week?    [place], I'm in hospital next week but I don't want her to wait, so I'm going to have her see Heavenly Ayala, my APRN.    Thanks all!  BRITTANY

## 2020-08-24 ENCOUNTER — TELEPHONE (OUTPATIENT)
Dept: ONCOLOGY | Facility: CLINIC | Age: 75
End: 2020-08-24

## 2020-08-24 NOTE — TELEPHONE ENCOUNTER
Patient called back and she is scheduled with Heavenly on Wednesday 8/26 at 11am.     Thanks  Betzaida

## 2020-08-24 NOTE — TELEPHONE ENCOUNTER
----- Message from Burt Merida MD sent at 8/24/2020 12:22 PM EDT -----  Please call the patient regarding her result.  Please let her know that even though her TSH was high, her free T4 is normal.  Therefore, I would not suggest making any changes to her thyroid medication at this time.  Thanks, LETICIA

## 2020-08-24 NOTE — TELEPHONE ENCOUNTER
Called patient with results and she said okay thank you.  She Canceled her visit with her cardiologist due to her living alone and wanted to wait till her daughter gets back in town.

## 2020-08-25 DIAGNOSIS — G89.29 OTHER CHRONIC PAIN: ICD-10-CM

## 2020-08-25 RX ORDER — TRAMADOL HYDROCHLORIDE 50 MG/1
50 TABLET ORAL DAILY PRN
Qty: 30 TABLET | Refills: 0 | Status: CANCELLED | OUTPATIENT
Start: 2020-08-25

## 2020-08-31 DIAGNOSIS — G89.29 OTHER CHRONIC PAIN: ICD-10-CM

## 2020-08-31 RX ORDER — TRAMADOL HYDROCHLORIDE 50 MG/1
50 TABLET ORAL DAILY PRN
Qty: 30 TABLET | Refills: 0 | Status: SHIPPED | OUTPATIENT
Start: 2020-08-31 | End: 2020-09-25

## 2020-08-31 NOTE — TELEPHONE ENCOUNTER
Caller: Nicole Lofton    Relationship: Self    Best call back number: [phone]     Medication needed:   Requested Prescriptions     Pending Prescriptions Disp Refills   • traMADol (ULTRAM) 50 MG tablet 30 tablet 0     Sig: Take 1 tablet by mouth Daily As Needed for Moderate Pain .       When do you need the refill by: 08/31/2020    What details did the patient provide when requesting the medication: PATIENT HAS BEEN TRYING TO GET THE OK TO REFILL THIS FOR OVER A WEEK NO ONE WILL RESPOND. SHE IS UPSET ABOUT NO ONE OK THE MEDICATION. SHE IS NOW COMPLETLEY OUT OF THE MEDICATION FOR A FEW DAYS NOW.        Does the patient have less than a 3 day supply:  [x] Yes  [] No    What is the patient's preferred pharmacy:    [id number] [address] AT [facility] - [phone] [facility] [phone] FX

## 2020-09-01 LAB — REF LAB TEST METHOD: NORMAL

## 2020-09-02 ENCOUNTER — TELEPHONE (OUTPATIENT)
Dept: ONCOLOGY | Facility: CLINIC | Age: 75
End: 2020-09-02

## 2020-09-02 DIAGNOSIS — G89.29 OTHER CHRONIC PAIN: ICD-10-CM

## 2020-09-02 RX ORDER — TRAMADOL HYDROCHLORIDE 50 MG/1
50 TABLET ORAL DAILY PRN
Qty: 30 TABLET | Refills: 0 | OUTPATIENT
Start: 2020-09-02

## 2020-09-02 NOTE — TELEPHONE ENCOUNTER
Patient ran out of pain med (Tramadol).  She gets this normally from her PCP (Dr. Campbell)  He is out of the office for a month.      She has been out of this medication for a week.      She is wondering if Dr. Merida could refill this for her.    Pharmacy in [facility] is correct ([facility])    Please call her at - [phone]

## 2020-09-02 NOTE — TELEPHONE ENCOUNTER
S/W PT. DAUGHTER BRANDY.  RECEIVED A MESSAGE THAT PT. IS OUT OF HER ULTRAM THAT HER PCP PRESCRIBES.  INQUIRING IF DR. CARLIN WOULD BE WILLING TO PRESCRIBE FOR HER.  COULDN'T REACH PT. BY PHONE.  HER DAUGHTER STATES SHE WILL LET HER KNOW I CALLED.  INFORMED DAUGHTER THAT THERE SHOULD BE A MD IN THE PRACTICE COVERING FOR HIM.  SHE FELT LIKE THERE IS AND WILL RELAY THIS TO HER MOM.

## 2020-09-02 NOTE — TELEPHONE ENCOUNTER
Caller: Nicole Lofton    Relationship: Self    Best call back number: [phone]    Medication needed:   Requested Prescriptions     Pending Prescriptions Disp Refills   • traMADol (ULTRAM) 50 MG tablet 30 tablet 0     Sig: Take 1 tablet by mouth Daily As Needed for Moderate Pain .       When do you need the refill by: ASAP    What details did the patient provide when requesting the medication: PATIENT HAS BEEN OUT OF MEDICATION FOR A WEEK, IF ANOTHER PROVIDER CAN SIGN PRESCRIPTION PLEASE    Does the patient have less than a 3 day supply:  [x] Yes  [] No    What is the patient's preferred pharmacy: [facility] #[id number] [facility] [address] AT [facility] OF [facility] & [facility] - [phone] [facility] [phone] FX

## 2020-09-04 ENCOUNTER — TELEPHONE (OUTPATIENT)
Dept: ONCOLOGY | Facility: CLINIC | Age: 75
End: 2020-09-04

## 2020-09-04 NOTE — TELEPHONE ENCOUNTER
I called this in, because I was unaware the pharmacy did not receive it until we got this message today.  I thought it was a duplicate request which is why I denied it initially.  The pt is aware and will contact the pharmacy this afternoon.

## 2020-09-04 NOTE — TELEPHONE ENCOUNTER
PT STATES THAT SHE HAS CALLED THE PHARMACY REGARDING HER TRAMADOL 50MG AND THEY INFORMED HER THAT THE PRESCRIPTIONS HAD NO MORE REFILLS AND THAT SHE NEEDED TO CONTACT DR KELLER ABOUT THIS.  PT SAID THAT SHE HAS BEEN WITHOUT THIS FOR A WEEK NOW.  PT MENTIONED THAT SHE IS ONE OF [facility] AND SHE NEEDS HER MEDICATION.       PT CALL BACK  [phone]  PHARMACY  [place] [address] [address]  [phone]

## 2020-09-04 NOTE — TELEPHONE ENCOUNTER
Pt calling for refill on tramadol. This is managed by pt's PCP and appears it was refilled 8/31. Chart review shows it was not sent electronically but set to print. Explained this to pt who states she doesn't think she can relay that to Dr. Campbell's office. This nurse contacted Dr. Campbell's office and explained that refill was not sent, they v/u and will address the issue.

## 2020-09-04 NOTE — TELEPHONE ENCOUNTER
Pt wants to know if Dr. Merida will refill her Tramadol 50 mg as needed.  She cannot get a hold of Dr. Campbell to get it refilled.

## 2020-09-22 ENCOUNTER — DOCUMENTATION (OUTPATIENT)
Dept: ONCOLOGY | Facility: CLINIC | Age: 75
End: 2020-09-22

## 2020-09-22 NOTE — PROGRESS NOTES
Call rec from Clarissa, pts daughter, she states [facility] states they did not get a call to reorder and pt recently lost her  so a lot has been going on.    I contacted [employer] [phone] and spoke to Risa. I placed the reorder and pt will rec her delivery on Wednesday, 9/23/2020. Risa states it is the pts responsibility to contact them 10 days in advance to place the reorder as they do not make outbound calls to schedule the next delivery.

## 2020-09-24 DIAGNOSIS — G89.29 OTHER CHRONIC PAIN: ICD-10-CM

## 2020-09-24 RX ORDER — OLANZAPINE 5 MG/1
5 TABLET ORAL NIGHTLY
Qty: 90 TABLET | Refills: 1 | Status: SHIPPED | OUTPATIENT
Start: 2020-09-24 | End: 2021-01-11

## 2020-09-25 PROCEDURE — G0180 MD CERTIFICATION HHA PATIENT: HCPCS | Performed by: FAMILY MEDICINE

## 2020-09-25 RX ORDER — TRAMADOL HYDROCHLORIDE 50 MG/1
TABLET ORAL
Qty: 30 TABLET | Refills: 1 | Status: SHIPPED | OUTPATIENT
Start: 2020-09-25 | End: 2020-10-06

## 2020-09-29 RX ORDER — PROMETHAZINE HYDROCHLORIDE 12.5 MG/1
TABLET ORAL
Qty: 60 TABLET | Refills: 0 | Status: SHIPPED | OUTPATIENT
Start: 2020-09-29 | End: 2020-12-08

## 2020-10-02 ENCOUNTER — OFFICE VISIT (OUTPATIENT)
Dept: CARDIOLOGY | Facility: CLINIC | Age: 75
End: 2020-10-02

## 2020-10-02 VITALS
HEIGHT: 65 IN | HEART RATE: 57 BPM | BODY MASS INDEX: 21.66 KG/M2 | DIASTOLIC BLOOD PRESSURE: 62 MMHG | WEIGHT: 130 LBS | SYSTOLIC BLOOD PRESSURE: 120 MMHG

## 2020-10-02 DIAGNOSIS — R00.1 BRADYCARDIA: Primary | ICD-10-CM

## 2020-10-02 DIAGNOSIS — R01.1 HEART MURMUR: ICD-10-CM

## 2020-10-02 DIAGNOSIS — I10 ESSENTIAL HYPERTENSION: ICD-10-CM

## 2020-10-02 PROBLEM — E87.6 LOW BLOOD POTASSIUM: Status: RESOLVED | Noted: 2019-03-08 | Resolved: 2020-10-02

## 2020-10-02 PROBLEM — R30.0 DYSURIA: Status: RESOLVED | Noted: 2018-08-07 | Resolved: 2020-10-02

## 2020-10-02 PROBLEM — R53.1 WEAKNESS: Status: RESOLVED | Noted: 2018-11-28 | Resolved: 2020-10-02

## 2020-10-02 PROBLEM — R94.31 PROLONGED Q-T INTERVAL ON ECG: Status: RESOLVED | Noted: 2018-06-06 | Resolved: 2020-10-02

## 2020-10-02 PROCEDURE — 93000 ELECTROCARDIOGRAM COMPLETE: CPT | Performed by: INTERNAL MEDICINE

## 2020-10-02 PROCEDURE — 99214 OFFICE O/P EST MOD 30 MIN: CPT | Performed by: INTERNAL MEDICINE

## 2020-10-02 NOTE — PROGRESS NOTES
Date of Office Visit: 10/02/2020  Encounter Provider: Galdino Duque MD  Place of Service: [facility] CARDIOLOGY  Patient Name: Nicole Lofton  :1945    Chief Complaint   Patient presents with   • Slow Heart Rate   • Fatigue   :     HPI: Nicole Lofton is a 75 y.o. female who presents today at the request of Dr. Burt Merida regarding low heart rate.    I previously saw her in 2018, for the evaluation of QT prolongation on EKG.  It noted that it was intermittent, and her QT had a normal appearance to it.  She had no cardiac symptoms, and I did not recommend further evaluation.    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  She has a history of severe hypertension, and at one time was on atenolol, azilsartan, amlodipine, and HCTZ.  Recently, she was noted to have a sodium of 128 and her creatinine went from 0.9 to 1.3.  Her azilsartan was stopped.      Then, she was noted to have a low heart rate at Dr Merida's office.  He sent her for an EKG, which showed ectopic atrial bradycardia, HR 40 bpm.  Her atenolol was stopped, as well.    She is fatigued, but denies dyspnea, chest pain, palpitations, lightheadedness, syncope, orthopnea, or edema.     Past Medical History:   Diagnosis Date   • Anxiety    • Breast cancer (CMS/[facility]ly [facility])    • Cervical spondylosis    • CML (chronic myelocytic leukemia) (CMS/HCC)    • Depression    • H/O Closed fracture of right proximal humerus    • H/O Distal radius fracture, left    • Hyperlipidemia    • Hypertension    • Hypothyroidism    • Lumbar spondylosis    • Osteoarthritis    • Thrombocytosis (CMS/[facility]ly [facility])        Past Surgical History:   Procedure Laterality Date   • BACK SURGERY  ;     ruptured herniated disc   • MASTECTOMY Bilateral        Social History     Socioeconomic History   • Marital status:      Spouse name: Not on file   • Number of children: 3   • Years of education: High school   • Highest education level: Not on  file   Occupational History     Employer: RETIRED   Social Needs   • Financial resource strain: Not hard at all   • Food insecurity     Worry: Never true     Inability: Never true   • Transportation needs     Medical: Not on file     Non-medical: No   Tobacco Use   • Smoking status: Current Every Day Smoker     Packs/day: 1.00     Types: Cigarettes   • Smokeless tobacco: Never Used   • Tobacco comment: since age 18   1ppd   Substance and Sexual Activity   • Alcohol use: Yes     Comment: SOCIALLY   • Drug use: No   • Sexual activity: Never   Lifestyle   • Physical activity     Days per week: 0 days     Minutes per session: 0 min   • Stress: Rather much   Relationships   • Social connections     Talks on phone: Patient refused     Gets together: Patient refused     Attends Evangelical service: Patient refused     Active member of club or organization: Patient refused     Attends meetings of clubs or organizations: Patient refused     Relationship status: Patient refused       Family History   Problem Relation Age of Onset   • Breast cancer Sister         Diagnosed in her 40s   • Breast cancer Paternal Grandmother    • Lung cancer Brother         Diagnosed in his 50s   • Other Brother         Substance abuse   • Lung cancer Brother         Diagnosed in his 50s   • Liver disease Brother    • Lung cancer Brother         Diagnosed in his 50s   • Breast cancer Sister         Diagnosed in her 40s   • Breast cancer Sister    • Breast cancer Sister    • Dementia Mother    • Parkinsonism Mother    • Heart disease Father    • Heart attack Father        Review of Systems   Constitution: Positive for malaise/fatigue.   Cardiovascular: Negative for chest pain and palpitations.   Respiratory: Negative for shortness of breath.    All other systems reviewed and are negative.      No Known Allergies      Current Outpatient Medications:   •  amLODIPine (NORVASC) 10 MG tablet, Take 1 tablet by mouth Daily., Disp: 90 tablet, Rfl: 1  •   "atorvastatin (LIPITOR) 40 MG tablet, TAKE 1 TABLET BY MOUTH DAILY, Disp: 90 tablet, Rfl: 1  •  bosutinib (BOSULIF) 100 MG chemo tablet, Take 4 tablets by mouth Daily., Disp: 120 tablet, Rfl: 0  •  escitalopram (Lexapro) 10 MG tablet, Take 1 tablet by mouth Daily., Disp: 30 tablet, Rfl: 11  •  hydroCHLOROthiazide (HYDRODIURIL) 12.5 MG tablet, Take 1 tablet by mouth Daily., Disp: 30 tablet, Rfl: 6  •  levothyroxine (SYNTHROID, LEVOTHROID) 100 MCG tablet, TAKE 1 TABLET BY MOUTH DAILY, Disp: 90 tablet, Rfl: 0  •  meloxicam (MOBIC) 7.5 MG tablet, TAKE 1 TABLET BY MOUTH EVERY DAY, Disp: 30 tablet, Rfl: 5  •  OLANZapine (zyPREXA) 5 MG tablet, TAKE 1 TABLET BY MOUTH EVERY NIGHT, Disp: 90 tablet, Rfl: 1  •  ondansetron (ZOFRAN) 8 MG tablet, Take 1 tablet by mouth Every 8 (Eight) Hours As Needed for Nausea or Vomiting., Disp: 30 tablet, Rfl: 2  •  promethazine (PHENERGAN) 12.5 MG tablet, TAKE 1 TABLET BY MOUTH THREE TIMES DAILY AS NEEDED, Disp: 60 tablet, Rfl: 0  •  traMADol (ULTRAM) 50 MG tablet, TAKE 1 TABLET BY MOUTH EVERY DAY AS NEEDED FOR PAIN, Disp: 30 tablet, Rfl: 1  •  atenolol (Tenormin) 25 MG tablet, Take 1 tablet by mouth Daily., Disp: 90 tablet, Rfl: 3  •  Azilsartan Medoxomil (Edarbi) 40 MG tablet, Take 40 mg by mouth Daily., Disp: 30 tablet, Rfl: 6  •  LORazepam (ATIVAN) 0.5 MG tablet, Take 1 tablet by mouth 2 (Two) Times a Day As Needed for Anxiety., Disp: 180 tablet, Rfl: 1      Objective:     Vitals:    10/02/20 1155   BP: 120/62   BP Location: Left arm   Pulse: 57   Weight: 59 kg (130 lb)   Height: 165 cm (64.96\")     Body mass index is 21.66 kg/m².    Physical Exam   Constitutional: She is oriented to person, place, and time. She appears well-developed and well-nourished.   HENT:   Head: Normocephalic.   Nose: Nose normal.   Masked   Eyes: Pupils are equal, round, and reactive to light. Conjunctivae and EOM are normal.   Neck: Normal range of motion. No JVD present.   Cardiovascular: Normal rate, regular " rhythm and intact distal pulses.   Murmur heard.   Systolic murmur is present with a grade of 2/6 at the apex.  Pulmonary/Chest: Effort normal and breath sounds normal.   Abdominal: Soft. There is no abdominal tenderness.   Musculoskeletal: Normal range of motion.         General: No edema.   Neurological: She is alert and oriented to person, place, and time. No cranial nerve deficit.   Skin: Skin is warm and dry. No rash noted.   Psychiatric: She has a normal mood and affect. Her behavior is normal. Judgment and thought content normal.   Vitals reviewed.        ECG 12 Lead    Date/Time: 10/2/2020 1:02 PM  Performed by: Galdino Duque MD  Authorized by: Galdino Duque MD   Comparison: compared with previous ECG   Similar to previous ECG  Rhythm comments: Ectopic atrial rhythm  Conduction: conduction normal  ST Segments: ST segments normal  T Waves: T waves normal  QRS axis: normal  Other: no other findings    Clinical impression: non-specific ECG              Assessment:       Diagnosis Plan   1. Bradycardia     2. Essential hypertension     3. Heart murmur            Plan:       She has an ectopic atrial rhythm, and was very slow while on atenolol.  She has normal intervals and no evidence of block on her EKG.  I will get a 24 hour Holter, and continue to hold her atenolol.     Her BP is well controlled today, but her sodium is low.  I stopped HCTZ, and started spironolactone, 25mg daily.    She has a 2/6 systolic murmur at the apex which is concerning for mitral regurgitation.  I will get an echo.    Sincerely,       Galdino Duque MD

## 2020-10-05 DIAGNOSIS — G89.29 OTHER CHRONIC PAIN: ICD-10-CM

## 2020-10-05 RX ORDER — SPIRONOLACTONE 25 MG/1
25 TABLET ORAL DAILY
Qty: 30 TABLET | Refills: 3 | Status: SHIPPED | OUTPATIENT
Start: 2020-10-05 | End: 2020-12-08

## 2020-10-05 NOTE — TELEPHONE ENCOUNTER
PATIENT IS CALLING IN SHE WAS TOLD BY PHARMACY THAT SHE NEEDS TO GET DOCTOR TO AUTHORIZE REFILL ON THIS MEDICATION.       SHE IS TOTALLY OUT OF THE MEDICATION AND WOULD LIKE THIS TO BE DONE ASAP.          CALLBACK NUMBER IS:  [phone]     CONFIRMED PHARMACY    [facility] #[id number] [place], [place] - [address] AT [facility] OF [facility] - [phone]  - [phone] FX

## 2020-10-06 RX ORDER — TRAMADOL HYDROCHLORIDE 50 MG/1
TABLET ORAL
Qty: 30 TABLET | Refills: 2 | Status: SHIPPED | OUTPATIENT
Start: 2020-10-06 | End: 2020-12-08

## 2020-10-08 RX ORDER — MELOXICAM 7.5 MG/1
TABLET ORAL
Qty: 90 TABLET | Refills: 1 | Status: SHIPPED | OUTPATIENT
Start: 2020-10-08 | End: 2021-06-04

## 2020-10-21 ENCOUNTER — TELEPHONE (OUTPATIENT)
Dept: ONCOLOGY | Facility: CLINIC | Age: 75
End: 2020-10-21

## 2020-10-21 NOTE — TELEPHONE ENCOUNTER
Caller: ANNA KUMAR    Relationship to patient: SELF    Best call back number: [phone]    Type of visit: LAB & F/U    Requested date: 12/14-12/16    If rescheduling, when is the original appointment: 12/18

## 2020-10-21 NOTE — TELEPHONE ENCOUNTER
Caller: Nicole    Relationship to patient: Self    Best call back number: [phone]    Type of visit: Lab and     Requested date: 12/14, 12/15, or 12/16 @ 10:30a     If rescheduling, when is the original appointment: 12/18

## 2020-10-26 ENCOUNTER — DOCUMENTATION (OUTPATIENT)
Dept: ONCOLOGY | Facility: CLINIC | Age: 75
End: 2020-10-26

## 2020-10-26 NOTE — PROGRESS NOTES
Call rec from Clarissa, pts daughter, she states pt has informed her that she has one more day of supply on her Bosulif.     I contacted [employer] [phone] and spoke to Greg. I placed the reorder and pt will rec her delivery on Wednesday, 10/28/2020.

## 2020-10-28 ENCOUNTER — OFFICE VISIT (OUTPATIENT)
Dept: INTERNAL MEDICINE | Facility: CLINIC | Age: 75
End: 2020-10-28

## 2020-10-28 VITALS
RESPIRATION RATE: 16 BRPM | SYSTOLIC BLOOD PRESSURE: 144 MMHG | OXYGEN SATURATION: 98 % | TEMPERATURE: 97.1 F | HEART RATE: 58 BPM | DIASTOLIC BLOOD PRESSURE: 72 MMHG

## 2020-10-28 DIAGNOSIS — M54.6 ACUTE RIGHT-SIDED THORACIC BACK PAIN: Primary | ICD-10-CM

## 2020-10-28 PROCEDURE — 99213 OFFICE O/P EST LOW 20 MIN: CPT | Performed by: FAMILY MEDICINE

## 2020-10-28 RX ORDER — CYCLOBENZAPRINE HYDROCHLORIDE 7.5 MG/1
7.5 TABLET, FILM COATED ORAL 2 TIMES DAILY PRN
Qty: 30 TABLET | Refills: 0 | Status: SHIPPED | OUTPATIENT
Start: 2020-10-28 | End: 2020-11-13 | Stop reason: SDUPTHER

## 2020-10-30 ENCOUNTER — TELEPHONE (OUTPATIENT)
Dept: INTERNAL MEDICINE | Facility: CLINIC | Age: 75
End: 2020-10-30

## 2020-10-30 DIAGNOSIS — M54.6 ACUTE RIGHT-SIDED THORACIC BACK PAIN: Primary | ICD-10-CM

## 2020-10-30 RX ORDER — HYDROCODONE BITARTRATE AND ACETAMINOPHEN 5; 325 MG/1; MG/1
1 TABLET ORAL EVERY 6 HOURS PRN
Qty: 18 TABLET | Refills: 0 | Status: SHIPPED | OUTPATIENT
Start: 2020-10-30 | End: 2020-12-08

## 2020-10-30 NOTE — TELEPHONE ENCOUNTER
PATIENT IS REQUESTING SOMETHING TO BE CALLED IN FOR HER MUSCLE PAIN IN THE MIDDLE OF HER SHOULDERS, INDICATED SHE IS HAVING TROUBLE WALKING, PATIENT INDICATED SHE WAS SEEN IN OFFICE 10/28/2020 FOR THIS BUT WASN'T AS BAD AS IS CURRENTLY.    PT CALL BACK # [phone]       [id number] [place], [place] - [address] AT [facility] OF [facility] - [phone]  - [phone] FX

## 2020-10-30 NOTE — TELEPHONE ENCOUNTER
I have given her few doses of Norco.  If she is to take this she needs to not take her tramadol.  I have sent in Flexeril at office visit.  Did patient try to take this medicine?

## 2020-11-01 NOTE — PROGRESS NOTES
Subjective   Nicole Lofton is a 75 y.o. female.     Chief Complaint   Patient presents with   • Back Pain         History of Present Illness     Patient notes that for the last couple weeks, she started to have back pain. She is currently not doing anything for it, besides the chronic pain meds she has. She does note that the pain is adjacent to her spine on the right side, mid to low back. She hasnt done any particular activities that caused her to have this.     The following portions of the patient's history were reviewed and updated as appropriate: allergies, current medications, past family history, past medical history, past social history, past surgical history and problem list.    Review of Systems   Constitutional: Negative for chills and fever.   HENT: Negative for congestion, rhinorrhea, sinus pain and sore throat.    Eyes: Negative for photophobia and visual disturbance.   Respiratory: Negative for cough, chest tightness and shortness of breath.    Cardiovascular: Negative for chest pain and palpitations.   Gastrointestinal: Negative for diarrhea, nausea and vomiting.   Genitourinary: Negative for dysuria, frequency and urgency.   Musculoskeletal: Positive for back pain.   Skin: Negative for rash and wound.   Neurological: Negative for dizziness and syncope.   Psychiatric/Behavioral: Negative for behavioral problems and confusion.       Objective   Physical Exam  Vitals signs and nursing note reviewed.   Constitutional:       Appearance: She is well-developed.   HENT:      Head: Normocephalic and atraumatic.   Neck:      Musculoskeletal: Normal range of motion and neck supple.   Musculoskeletal:      Thoracic back: She exhibits tenderness, pain and spasm.        Back:    Neurological:      Mental Status: She is alert and oriented to person, place, and time.   Psychiatric:         Behavior: Behavior normal.         Vitals:    10/28/20 1533   BP: 144/72   Pulse: 58   Resp: 16   Temp: 97.1 °F (36.2  °C)   SpO2: 98%     There is no height or weight on file to calculate BMI.      Assessment/Plan   Diagnoses and all orders for this visit:    1. Acute right-sided thoracic back pain (Primary)  -     cyclobenzaprine (FEXMID) 7.5 MG tablet; Take 1 tablet by mouth 2 (Two) Times a Day As Needed for Muscle Spasms.  Dispense: 30 tablet; Refill: 0  -     Continue tramadol and start on flexeril. If tramadol is not strong enough, may consider norco.           No follow-ups on file.    Dictated utilizing Dragon Voice Recognition Software

## 2020-11-02 RX ORDER — LEVOTHYROXINE SODIUM 0.1 MG/1
100 TABLET ORAL DAILY
Qty: 90 TABLET | Refills: 2 | Status: SHIPPED | OUTPATIENT
Start: 2020-11-02 | End: 2021-04-09

## 2020-11-13 DIAGNOSIS — M54.6 ACUTE RIGHT-SIDED THORACIC BACK PAIN: ICD-10-CM

## 2020-11-15 RX ORDER — CYCLOBENZAPRINE HYDROCHLORIDE 7.5 MG/1
7.5 TABLET, FILM COATED ORAL 2 TIMES DAILY PRN
Qty: 30 TABLET | Refills: 3 | Status: SHIPPED | OUTPATIENT
Start: 2020-11-15 | End: 2022-07-05

## 2020-11-20 ENCOUNTER — OFFICE VISIT (OUTPATIENT)
Dept: INTERNAL MEDICINE | Facility: CLINIC | Age: 75
End: 2020-11-20

## 2020-11-20 VITALS
BODY MASS INDEX: 21.66 KG/M2 | SYSTOLIC BLOOD PRESSURE: 136 MMHG | OXYGEN SATURATION: 99 % | TEMPERATURE: 97.8 F | RESPIRATION RATE: 16 BRPM | WEIGHT: 130 LBS | DIASTOLIC BLOOD PRESSURE: 74 MMHG | HEIGHT: 65 IN | HEART RATE: 69 BPM

## 2020-11-20 DIAGNOSIS — F41.0 PANIC ATTACK: ICD-10-CM

## 2020-11-20 DIAGNOSIS — F51.01 PRIMARY INSOMNIA: ICD-10-CM

## 2020-11-20 DIAGNOSIS — F41.9 ANXIETY: Primary | ICD-10-CM

## 2020-11-20 DIAGNOSIS — F32.A DEPRESSION, UNSPECIFIED DEPRESSION TYPE: ICD-10-CM

## 2020-11-20 PROCEDURE — 99214 OFFICE O/P EST MOD 30 MIN: CPT | Performed by: FAMILY MEDICINE

## 2020-11-20 RX ORDER — VORTIOXETINE 10 MG/1
1 TABLET, FILM COATED ORAL DAILY
Qty: 30 TABLET | Refills: 9 | Status: SHIPPED | OUTPATIENT
Start: 2020-11-20 | End: 2021-04-09 | Stop reason: SDUPTHER

## 2020-11-20 RX ORDER — LORAZEPAM 0.5 MG/1
0.5 TABLET ORAL 2 TIMES DAILY PRN
Qty: 180 TABLET | Refills: 1 | Status: SHIPPED | OUTPATIENT
Start: 2020-11-20 | End: 2021-02-12

## 2020-11-20 NOTE — PROGRESS NOTES
Subjective   Nicole Lofton is a 75 y.o. female.     Chief Complaint   Patient presents with   • Anxiety         History of Present Illness       Past medical history for anxiety and depression.  She states that she is having more panic attacks, ever since her ex- has passed away.  She is having trouble sleeping as well.  Patient states that the Lexapro that she is currently on does not seem to be helping her.  She noted that she did a lot better when she was on the Trintellix 10 mg daily.  However she ran into cost issues with the medication.  I did discuss with her that we may have some samples that we may able to do for her.  Patient states that she needed to use more Ativan here lately because of amount of panic attack she was having.    The following portions of the patient's history were reviewed and updated as appropriate: allergies, current medications, past family history, past medical history, past social history, past surgical history and problem list.    Review of Systems   Constitutional: Negative for chills and fever.   HENT: Negative for congestion, rhinorrhea, sinus pain and sore throat.    Eyes: Negative for photophobia and visual disturbance.   Respiratory: Negative for cough, chest tightness and shortness of breath.    Cardiovascular: Negative for chest pain and palpitations.   Gastrointestinal: Negative for diarrhea, nausea and vomiting.   Genitourinary: Negative for dysuria, frequency and urgency.   Skin: Negative for rash and wound.   Neurological: Negative for dizziness and syncope.   Psychiatric/Behavioral: Negative for behavioral problems and confusion.       Objective   Physical Exam  Vitals signs and nursing note reviewed.   Constitutional:       Appearance: She is well-developed.   HENT:      Head: Normocephalic and atraumatic.      Right Ear: External ear normal.      Left Ear: External ear normal.   Neck:      Musculoskeletal: Normal range of motion and neck supple.    Cardiovascular:      Rate and Rhythm: Normal rate and regular rhythm.      Heart sounds: Normal heart sounds.   Pulmonary:      Effort: Pulmonary effort is normal. No respiratory distress.      Breath sounds: Normal breath sounds.   Musculoskeletal: Normal range of motion.   Lymphadenopathy:      Cervical: No cervical adenopathy.   Skin:     General: Skin is warm.   Neurological:      Mental Status: She is alert and oriented to person, place, and time.   Psychiatric:         Behavior: Behavior normal.         Vitals:    11/20/20 0935   BP: 136/74   Pulse: 69   Resp: 16   Temp: 97.8 °F (36.6 °C)   SpO2: 99%     Body mass index is 21.66 kg/m².      Assessment/Plan   Diagnoses and all orders for this visit:    1. Anxiety (Primary)  -     LORazepam (ATIVAN) 0.5 MG tablet; Take 1 tablet by mouth 2 (Two) Times a Day As Needed for Anxiety.  Dispense: 180 tablet; Refill: 1  -     Vortioxetine HBr (Trintellix) 10 MG tablet; Take 10 mg by mouth Daily.  Dispense: 30 tablet; Refill: 9    2. Panic attack    3. Primary insomnia  -     LORazepam (ATIVAN) 0.5 MG tablet; Take 1 tablet by mouth 2 (Two) Times a Day As Needed for Anxiety.  Dispense: 180 tablet; Refill: 1    4. Depression, unspecified depression type      Today's office visit discussed with patient that for her anxiety as well as her panic attacks, she can continue taking the Ativan.  Ativan also helps her sleep better.  I will switch her Lexapro to Trintellix 10 mg daily.  We have given her plenty of samples.  She has done well with this medication in the past.  We will continue to monitor the patient.  We will see her back in 6 weeks.    No follow-ups on file.    Dictated utilizing Dragon Voice Recognition Software

## 2020-12-01 ENCOUNTER — DOCUMENTATION (OUTPATIENT)
Dept: ONCOLOGY | Facility: CLINIC | Age: 75
End: 2020-12-01

## 2020-12-01 NOTE — PROGRESS NOTES
VM rec from Clarissa, pts daughter-She is asking for me to place the refill order for pts Bosulif.     I contacted [employer] and pt will be getting a 2 month supply on Thursday, 12/3/2020    Message left for Clarissa to notify her of the above.

## 2020-12-02 ENCOUNTER — TELEPHONE (OUTPATIENT)
Dept: INTERNAL MEDICINE | Facility: CLINIC | Age: 75
End: 2020-12-02

## 2020-12-02 NOTE — TELEPHONE ENCOUNTER
IRENE'S CALLED BACK WITH PA [employer] FOR INSURANCE COMPANY.  THIS SHOULD BE ALL THAT IS NEEDED TO GET IS    [phone]  JUST CALL AND LET THEM KNOW WHAT YOU ARE LOOKING FOR (PRIOR AUTHORIZATION)

## 2020-12-02 NOTE — TELEPHONE ENCOUNTER
PT IS CALLING IN STATING THAT HER PRESCRIPTION FOR ALPRAZOLAM 0.5MG   NEEDS A PRIOR AUTHORIZATION  FROM DR KELLER SINCE THERE ARE NO MORE REFILLS.  PT HAS BEEN OUT OF THIS MEDICINE FOR ABOUT A WEEK NOW AND IS VERY DESTOT TODAY.    PT CALL BACK  [phone]  PHARMACY  [address]

## 2020-12-02 NOTE — TELEPHONE ENCOUNTER
Tried to obtain this but pharmacy doesn't know what I am talking about. I have no fax with prescription info, member ID or anything, this is also not in cover my meds.

## 2020-12-07 ENCOUNTER — TELEPHONE (OUTPATIENT)
Dept: INTERNAL MEDICINE | Facility: CLINIC | Age: 75
End: 2020-12-07

## 2020-12-07 ENCOUNTER — READMISSION MANAGEMENT (OUTPATIENT)
Dept: CALL CENTER | Facility: HOSPITAL | Age: 75
End: 2020-12-07

## 2020-12-07 NOTE — TELEPHONE ENCOUNTER
Caller: [facility]    Relationship to patient: HOSPITAL     Best call back number: [phone]  [place]    New or established patient?  [] New  [x] Established    Date of discharge: 12/07    Facility discharged from: [facility]     Diagnosis/Symptoms: ANXIETY     Length of stay (If applicable): 12/02-12/07

## 2020-12-07 NOTE — OUTREACH NOTE
Prep Survey      Responses   [facility] facility patient discharged from?  Non-BH   Is LACE score < 7 ?  Non-BH Discharge   Eligibility  [facility]    Date of Discharge  12/07/20   Discharge diagnosis  anxiety   Does the patient have one of the following disease processes/diagnoses(primary or secondary)?  Other   Prep survey completed?  Yes          Yue Cooper RN

## 2020-12-08 ENCOUNTER — TRANSITIONAL CARE MANAGEMENT TELEPHONE ENCOUNTER (OUTPATIENT)
Dept: CALL CENTER | Facility: HOSPITAL | Age: 75
End: 2020-12-08

## 2020-12-08 ENCOUNTER — OFFICE VISIT (OUTPATIENT)
Dept: INTERNAL MEDICINE | Facility: CLINIC | Age: 75
End: 2020-12-08

## 2020-12-08 VITALS
HEIGHT: 65 IN | WEIGHT: 127.8 LBS | SYSTOLIC BLOOD PRESSURE: 170 MMHG | DIASTOLIC BLOOD PRESSURE: 72 MMHG | OXYGEN SATURATION: 99 % | BODY MASS INDEX: 21.29 KG/M2 | HEART RATE: 66 BPM

## 2020-12-08 DIAGNOSIS — I10 ESSENTIAL HYPERTENSION: Primary | ICD-10-CM

## 2020-12-08 DIAGNOSIS — F32.A DEPRESSION, UNSPECIFIED DEPRESSION TYPE: ICD-10-CM

## 2020-12-08 DIAGNOSIS — F41.9 ANXIETY: ICD-10-CM

## 2020-12-08 PROCEDURE — 99496 TRANSJ CARE MGMT HIGH F2F 7D: CPT | Performed by: FAMILY MEDICINE

## 2020-12-08 RX ORDER — LOSARTAN POTASSIUM 50 MG/1
50 TABLET ORAL DAILY
Qty: 90 TABLET | Refills: 0 | Status: SHIPPED | OUTPATIENT
Start: 2020-12-08 | End: 2021-07-15 | Stop reason: SDUPTHER

## 2020-12-08 NOTE — OUTREACH NOTE
Call Center TCM Note      Responses   [facility] patient discharged from?  Non-   Does the patient have one of the following disease processes/diagnoses(primary or secondary)?  Other   TCM attempt successful?  Yes   Call start time  0833   Call end time  0836   Discharge diagnosis  anxiety   Meds reviewed with patient/caregiver?  Yes   Is the patient having any side effects they believe may be caused by any medication additions or changes?  No   Does the patient have all medications ordered at discharge?  Yes   Is the patient taking all medications as directed (includes completed medication regime)?  Yes   Does the patient have a primary care provider?   Yes   Does the patient have an appointment with their PCP within 7 days of discharge?  Yes   Comments regarding PCP  Hospital d/c f/u appt is on 12/8/20 at 2:30 pm    Has the patient kept scheduled appointments due by today?  N/A   Psychosocial issues?  No   Did the patient receive a copy of their discharge instructions?  Yes [D/C'd from [facility] on 12/7/20]   Nursing interventions  Reviewed instructions with patient   What is the patient's perception of their health status since discharge?  Improving   Is the patient/caregiver able to teach back signs and symptoms related to disease process for when to call PCP?  Yes   Is the patient/caregiver able to teach back signs and symptoms related to disease process for when to call 911?  Yes   Is the patient/caregiver able to teach back the hierarchy of who to call/visit for symptoms/problems? PCP, Specialist, Home health nurse, Urgent Care, ED, 911  Yes   If the patient is a current smoker, are they able to teach back resources for cessation?  Smoking cessation medications   TCM call completed?  Yes          Margarita Samayoa RN    12/8/2020, 08:36 EST

## 2020-12-09 NOTE — PROGRESS NOTES
Transitional Care Follow Up Visit  Subjective     Nicole Lofton is a 75 y.o. female who presents for a transitional care management visit.    Within 48 business hours after discharge our office contacted her via telephone to coordinate her care and needs.      I reviewed and discussed the details of that call along with the discharge summary, hospital problems, inpatient lab results, inpatient diagnostic studies, and consultation reports with Nicole.     Current outpatient and discharge medications have been reconciled for the patient.  Reviewed by: Keith Campbell MD      Date of TCM Phone Call 12/7/2020   [facility]    Date of Discharge 12/7/2020     Risk for Readmission (LACE) No data recorded    History of Present Illness   Course During Hospital Stay:      Patient was admitted for 4 days at [facility] which is a psychiatric inpatient hospital.  She was admitted for increased depression, anxiety, and suicidal ideation with no plan.  Patient has a long history of depression anxiety and has some unresolved grief.  During the hospital stay she was evaluated by psychiatry and it was found that patient does have a family that is very supportive.  Her daughter Clarissa Rodriguez is also her power of  and involved in her treatment and discharge planning.  While in the hospital she had full psychiatric evaluation, for medication management, and acute programming to include individual and group therapy.  It was determined the patient was compliant with the medications and has actively participated in the groups.  Patient was also reported that she has decrease in both anxiety and depression.  Patient also has a good support system.  Patient was then discharged home with her daughter.  Both of her daughters visit her daily, patient lives alone in a condo.  Far as her medications were concerned, patient was continued to be on Trintellix 10 mg daily for her depression, as it seems to be the only  medication which seems to help of her underlying depression.  She will still continue to take Ativan 0.5 mg as needed for anxiety.  She was also started on Zyprexa 7.5 mg nightly for any psychosis that she may have.    At today's office visit she does state that she is doing better.  She states that her overall anxiety and depression is significantly improved.  She states that she is feeling better.  She is concerned about her blood pressure, because of her blood pressure today's office visit is elevated 170/72.  She is only taking amlodipine 10 mg daily.  I have discussed with her about starting another medication.     The following portions of the patient's history were reviewed and updated as appropriate: allergies, current medications, past family history, past medical history, past social history, past surgical history and problem list.    Review of Systems   Constitutional: Negative for chills and fever.   HENT: Negative for congestion, rhinorrhea, sinus pain and sore throat.    Eyes: Negative for photophobia and visual disturbance.   Respiratory: Negative for cough, chest tightness and shortness of breath.    Cardiovascular: Negative for chest pain and palpitations.   Gastrointestinal: Negative for diarrhea, nausea and vomiting.   Genitourinary: Negative for dysuria, frequency and urgency.   Skin: Negative for rash and wound.   Neurological: Negative for dizziness and syncope.   Psychiatric/Behavioral: Negative for behavioral problems and confusion.       Objective   Physical Exam  Vitals signs and nursing note reviewed.   Constitutional:       Appearance: She is well-developed.   HENT:      Head: Normocephalic and atraumatic.   Neck:      Musculoskeletal: Normal range of motion and neck supple.   Neurological:      Mental Status: She is alert and oriented to person, place, and time.   Psychiatric:         Behavior: Behavior normal.         Assessment/Plan   Diagnoses and all orders for this visit:    1.  Essential hypertension (Primary)  -     losartan (Cozaar) 50 MG tablet; Take 1 tablet by mouth Daily.  Dispense: 90 tablet; Refill: 0  -     Continue amlodipine 10 mg daily.  Will start patient on losartan 50 mg daily.    2. Anxiety        -     Anxiety seems to be under better control with the Ativan and Trintellix.    3. Depression, unspecified depression type        -     Patient depression does seem to be under better control with Ativan and Trintellix.  She is also due to Zyprexa at night due to any psychosis.  If she starts getting suicidal ideation or worsening symptoms, she is advised to go back to the [facility].  Patient understood and agreed with plan.

## 2020-12-15 ENCOUNTER — APPOINTMENT (OUTPATIENT)
Dept: LAB | Facility: HOSPITAL | Age: 75
End: 2020-12-15

## 2020-12-21 ENCOUNTER — TELEPHONE (OUTPATIENT)
Dept: INTERNAL MEDICINE | Facility: CLINIC | Age: 75
End: 2020-12-21

## 2020-12-21 NOTE — TELEPHONE ENCOUNTER
PATIENT CALLED REQUESTING A CALLBACK REGARDING THE COVID VACCINE. PATIENT STATED THAT SHE HAS QUESTIONS ABOUT AVAILABILITY.    PATIENT CALLBACK: [phone]

## 2020-12-21 NOTE — TELEPHONE ENCOUNTER
Called and spoke with pt, informed her we do not know when we will receive the covid 19 injections, and we do not have a list.

## 2020-12-28 DIAGNOSIS — E78.5 HYPERLIPIDEMIA, UNSPECIFIED HYPERLIPIDEMIA TYPE: ICD-10-CM

## 2020-12-28 RX ORDER — PROMETHAZINE HYDROCHLORIDE 12.5 MG/1
TABLET ORAL
Qty: 60 TABLET | Refills: 0 | Status: SHIPPED | OUTPATIENT
Start: 2020-12-28 | End: 2021-05-18

## 2020-12-28 NOTE — TELEPHONE ENCOUNTER
Patient called in requesting a re-fill for     atorvastatin (LIPITOR) 40 MG tablet    [facility] [id number] [address]    Best call back # [phone]

## 2020-12-29 RX ORDER — ATORVASTATIN CALCIUM 40 MG/1
40 TABLET, FILM COATED ORAL DAILY
Qty: 90 TABLET | Refills: 1 | Status: SHIPPED | OUTPATIENT
Start: 2020-12-29 | End: 2021-07-13

## 2020-12-30 ENCOUNTER — TELEPHONE (OUTPATIENT)
Dept: ONCOLOGY | Facility: CLINIC | Age: 75
End: 2020-12-30

## 2020-12-30 DIAGNOSIS — I10 ESSENTIAL HYPERTENSION: ICD-10-CM

## 2020-12-30 NOTE — TELEPHONE ENCOUNTER
PT DAUGHTER BRANDY CALLING  CALL BACK NUMBER [phone]    BRANDY WANTS TO KNOW IF HER MOTHER CAN GET THE COVID VACCINE AND HAS QUESTIONS.   PLEASE ADVISE

## 2020-12-30 NOTE — TELEPHONE ENCOUNTER
Pt's daughter calling to inquire about the availability of the COVID vaccine and it her mother could get the vaccine at her apt next week.  Explained that the vaccine is not yet available to the public.  Daughter v/u.

## 2020-12-31 RX ORDER — AMLODIPINE BESYLATE 10 MG/1
10 TABLET ORAL DAILY
Qty: 90 TABLET | Refills: 1 | Status: SHIPPED | OUTPATIENT
Start: 2020-12-31 | End: 2021-07-27

## 2021-01-06 ENCOUNTER — LAB (OUTPATIENT)
Dept: OTHER | Facility: HOSPITAL | Age: 76
End: 2021-01-06

## 2021-01-06 ENCOUNTER — APPOINTMENT (OUTPATIENT)
Dept: LAB | Facility: HOSPITAL | Age: 76
End: 2021-01-06

## 2021-01-06 DIAGNOSIS — C92.10 CML (CHRONIC MYELOID LEUKEMIA) (HCC): ICD-10-CM

## 2021-01-06 DIAGNOSIS — R00.1 BRADYCARDIA: ICD-10-CM

## 2021-01-06 LAB
BASOPHILS # BLD AUTO: 0.02 10*3/MM3 (ref 0–0.2)
BASOPHILS NFR BLD AUTO: 0.2 % (ref 0–1.5)
DEPRECATED RDW RBC AUTO: 46.8 FL (ref 37–54)
EOSINOPHIL # BLD AUTO: 0.11 10*3/MM3 (ref 0–0.4)
EOSINOPHIL NFR BLD AUTO: 1.3 % (ref 0.3–6.2)
ERYTHROCYTE [DISTWIDTH] IN BLOOD BY AUTOMATED COUNT: 15.3 % (ref 12.3–15.4)
HCT VFR BLD AUTO: 33.6 % (ref 34–46.6)
HGB BLD-MCNC: 10.9 G/DL (ref 12–15.9)
IMM GRANULOCYTES # BLD AUTO: 0.02 10*3/MM3 (ref 0–0.05)
IMM GRANULOCYTES NFR BLD AUTO: 0.2 % (ref 0–0.5)
LYMPHOCYTES # BLD AUTO: 2.52 10*3/MM3 (ref 0.7–3.1)
LYMPHOCYTES NFR BLD AUTO: 30.7 % (ref 19.6–45.3)
MCH RBC QN AUTO: 27.1 PG (ref 26.6–33)
MCHC RBC AUTO-ENTMCNC: 32.4 G/DL (ref 31.5–35.7)
MCV RBC AUTO: 83.6 FL (ref 79–97)
MONOCYTES # BLD AUTO: 0.57 10*3/MM3 (ref 0.1–0.9)
MONOCYTES NFR BLD AUTO: 7 % (ref 5–12)
NEUTROPHILS NFR BLD AUTO: 4.96 10*3/MM3 (ref 1.7–7)
NEUTROPHILS NFR BLD AUTO: 60.6 % (ref 42.7–76)
NRBC BLD AUTO-RTO: 0 /100 WBC (ref 0–0.2)
PLATELET # BLD AUTO: 284 10*3/MM3 (ref 140–450)
PMV BLD AUTO: 8.9 FL (ref 6–12)
RBC # BLD AUTO: 4.02 10*6/MM3 (ref 3.77–5.28)
WBC # BLD AUTO: 8.2 10*3/MM3 (ref 3.4–10.8)

## 2021-01-06 PROCEDURE — 85025 COMPLETE CBC W/AUTO DIFF WBC: CPT | Performed by: INTERNAL MEDICINE

## 2021-01-06 PROCEDURE — 80053 COMPREHEN METABOLIC PANEL: CPT | Performed by: INTERNAL MEDICINE

## 2021-01-06 PROCEDURE — 84439 ASSAY OF FREE THYROXINE: CPT | Performed by: INTERNAL MEDICINE

## 2021-01-06 PROCEDURE — 84443 ASSAY THYROID STIM HORMONE: CPT | Performed by: INTERNAL MEDICINE

## 2021-01-06 PROCEDURE — 36415 COLL VENOUS BLD VENIPUNCTURE: CPT

## 2021-01-07 LAB
ALBUMIN SERPL-MCNC: 4.8 G/DL (ref 3.5–5.2)
ALBUMIN/GLOB SERPL: 1.7 G/DL
ALP SERPL-CCNC: 77 U/L (ref 39–117)
ALT SERPL W P-5'-P-CCNC: 17 U/L (ref 1–33)
ANION GAP SERPL CALCULATED.3IONS-SCNC: 10.7 MMOL/L (ref 5–15)
AST SERPL-CCNC: 22 U/L (ref 1–32)
BILIRUB SERPL-MCNC: 0.2 MG/DL (ref 0–1.2)
BUN SERPL-MCNC: 28 MG/DL (ref 8–23)
BUN/CREAT SERPL: 20.9 (ref 7–25)
CALCIUM SPEC-SCNC: 9.3 MG/DL (ref 8.6–10.5)
CHLORIDE SERPL-SCNC: 94 MMOL/L (ref 98–107)
CO2 SERPL-SCNC: 26.3 MMOL/L (ref 22–29)
CREAT SERPL-MCNC: 1.34 MG/DL (ref 0.57–1)
GFR SERPL CREATININE-BSD FRML MDRD: 38 ML/MIN/1.73
GLOBULIN UR ELPH-MCNC: 2.8 GM/DL
GLUCOSE SERPL-MCNC: 87 MG/DL (ref 65–99)
POTASSIUM SERPL-SCNC: 4.6 MMOL/L (ref 3.5–5.2)
PROT SERPL-MCNC: 7.6 G/DL (ref 6–8.5)
SODIUM SERPL-SCNC: 131 MMOL/L (ref 136–145)
T4 FREE SERPL-MCNC: 1.51 NG/DL (ref 0.93–1.7)
TSH SERPL DL<=0.05 MIU/L-ACNC: 9.87 UIU/ML (ref 0.27–4.2)

## 2021-01-08 ENCOUNTER — APPOINTMENT (OUTPATIENT)
Dept: OTHER | Facility: HOSPITAL | Age: 76
End: 2021-01-08

## 2021-01-11 RX ORDER — OLANZAPINE 5 MG/1
5 TABLET ORAL NIGHTLY
Qty: 90 TABLET | Refills: 1 | Status: SHIPPED | OUTPATIENT
Start: 2021-01-11 | End: 2021-05-25

## 2021-01-13 ENCOUNTER — DOCUMENTATION (OUTPATIENT)
Dept: ONCOLOGY | Facility: CLINIC | Age: 76
End: 2021-01-13

## 2021-01-13 NOTE — PROGRESS NOTES
[employer] is still processing pts Bosulif application. They will hopefully made a decision this week.    I spoke with Clarissa to provide her an update and she states pt has been out of medication for a couple days. Pt has an appt to see Dr Merida on Friday.    When I spoke to [employer] in December-I was told that pt will be getting a 2 month supply to get her through re-enrollment.    I contacted [employer] again and let them know that pt did not get the 60 day supply in December and she is out of medication now. I asked if a supply can be sent to her. I was told that is not an option as they do not send medications for pts that are not fully approved.    I have sent Dr Merida a staff message to let him know pt has not had her Bosulif for a couple days.

## 2021-01-14 ENCOUNTER — TELEPHONE (OUTPATIENT)
Dept: ONCOLOGY | Facility: CLINIC | Age: 76
End: 2021-01-14

## 2021-01-14 NOTE — TELEPHONE ENCOUNTER
Caller: PATIENT     Relationship to patient:     Best call back number: [phone]    Chief complaint: PT HAS AN APPT FOR DR. CARLIN TOMORROW, SHE IS NOT FEELING WELL AND WOULD LIKE TO RESCHEDULE, PLEASE ADVISE?    Type of visit: VITALS AND FOLLOW UP    Requested date: WANTS TO RESCHEDULE IN A COUPLE WEEKS    If rescheduling, when is the original appointment:1-15-21    Additional notes:

## 2021-01-15 ENCOUNTER — APPOINTMENT (OUTPATIENT)
Dept: OTHER | Facility: HOSPITAL | Age: 76
End: 2021-01-15

## 2021-01-15 ENCOUNTER — OFFICE VISIT (OUTPATIENT)
Dept: ONCOLOGY | Facility: CLINIC | Age: 76
End: 2021-01-15

## 2021-01-15 DIAGNOSIS — G89.29 OTHER CHRONIC PAIN: ICD-10-CM

## 2021-01-15 DIAGNOSIS — M19.90 OSTEOARTHRITIS, UNSPECIFIED OSTEOARTHRITIS TYPE, UNSPECIFIED SITE: ICD-10-CM

## 2021-01-15 DIAGNOSIS — C92.10 CML (CHRONIC MYELOID LEUKEMIA) (HCC): Primary | ICD-10-CM

## 2021-01-15 DIAGNOSIS — E03.9 ACQUIRED HYPOTHYROIDISM: ICD-10-CM

## 2021-01-15 LAB — REF LAB TEST METHOD: NORMAL

## 2021-01-15 PROCEDURE — 99443 PR PHYS/QHP TELEPHONE EVALUATION 21-30 MIN: CPT | Performed by: INTERNAL MEDICINE

## 2021-01-15 NOTE — PROGRESS NOTES
[facility] GROUP OUTPATIENT FOLLOW UP VISIT    REASON FOR FOLLOW-UP:    1.  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  Gleevec 400 mg daily initiated around 10/12/2017, stopped on 11/15/2017 due to intolerance (noah-orbital edema, nausea/vomiting, fatigue).  Resumed at 200 mg daily but, again, not tolerated.    3.  Nilotinib 150 mg twice daily started in late March, 2018.  Held due to intolerance and QTc prolongation.   4.  Hydroxyurea initiated in July 2018.  Subsequently discontinued.     5.  Bosutinib 400 mg daily started Feb 2019    HISTORY OF PRESENT ILLNESS:  Nicole Lofton is a 75 y.o. female with the above-mentioned history.    Telephone visit today.  She has been feeling more fatigued over the past few days.  No fevers or chills.  No other infectious symptoms.  During this time she has also been off of her bosutinib as she ran out and has not been able to get it refilled.    She is generally weak and fatigued.  She does not move around very much and therefore is generally deconditioned and has some back pain.  She is requesting referral to physical therapy.    She did have labs done on 1/6/2021 that looked okay but we are still waiting for her BCR/ABL PCR to come back.      ROS:  Fatigue.  Lower extremity weakness.  Back pain.    There were no vitals filed for this visit.     Telephone visit         DIAGNOSTIC DATA:  T4, Free (01/06/2021 16:03)  TSH (01/06/2021 16:03)  Comprehensive Metabolic Panel (01/06/2021 16:03)  CBC & Differential (01/06/2021 16:03)    BCR/ABL PCR per verbal report shows no detectable transcript.    IMAGING:  None reviewed    ASSESSMENT:  This is a 75 y.o. female with:    *History of bilateral breast cancer in 1988 status post bilateral mastectomy.  She apparently completed 6 months of adjuvant therapy.  We have no details regarding this.    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    · Started on Hydrea 1000 mg daily.    · This was discontinued and she started Gleevec on approximately 10/12/2017.    · Gleevec discontinued due to intolerance on 11/15/2017.    · We started Gleevec at a lower dose of 200 mg bu  · t she was also intolerant of this dose.  Her side effects were as severe with a lower dose and she therefore stopped taking the drug.  Symptoms improved significantly.  Platelet count subsequently elevated again.  · She started nilotinib at 150mg twice daily at the end of March 2018.  This was held in mid-May due to QTc prolongation.  The QTc interval did subsequently normalized.  However, due to this and other adverse effects we are not able to resume the nilotinib nor does she desire to.  · In July we resumed hydroxyurea 1000 mg daily.  Her platelet count increased.  Her dose was increased to 1000 mg twice daily.  Blood counts improved nicely.  Platelet count normalized but her white blood cell count dropped to below normal.  Decreased hydroxyurea to 1000 mg daily.  · 11/20/18 platelets were up again.  We increased the hydroxyurea to 1000 mg in the mornings and 500 mg in the evenings.  · As of 1/15/2019 her platelet count was again elevated to 1.2 million.  Increased hydroxyurea to 1000 mg twice daily.    · Platelets improved but remained far from normal.  · Due to her history we referred her down to the [facility] to see Dr. Cifuentes for assistance and recommendations on further therapy.  · In mid-February 2019 she started bosutinib 400 mg daily which she takes along with olanzapine at night (she takes this as needed now).     · Blood counts look good.  Last PCR 1/6/2020 by verbal report negative.    *Hypothyroidism:  Levothyroxine 100 mcg.  TSH high, free T4 normal.  No change in her medicine at this point.    *Tobacco use: She is unwilling to quit smoking.      *Deconditioning, low back pain, lower extremity weakness: She requests referral to physical  therapy and we will see if we can refer her for this.    *Hypertension: I discussed with her daughter on the phone today that she is supposed to be on losartan and amlodipine at this point and it looks like spironolactone as well.  She has no edema.  She has not been on the spironolactone.  She will continue amlodipine and losartan.    *Bradycardia: She was referred back to Dr. Duque for this previously.    PLAN:  1. Continue bosutinib when she is able to get this refilled  2. Refer to physical therapy  3. Follow-up in 4 months with labs the same day.    You have chosen to receive care through a telephone visit. Do you consent to use a telephone visit for your medical care today? Yes    22 minutes        Burt Merida MD

## 2021-02-01 ENCOUNTER — TELEPHONE (OUTPATIENT)
Dept: INTERNAL MEDICINE | Facility: CLINIC | Age: 76
End: 2021-02-01

## 2021-02-01 NOTE — TELEPHONE ENCOUNTER
Caller: Nicole Lofton    Relationship: Self    Best call back number: [phone]*    Medication needed: TRAMADOL 50 MG    When do you need the refill by: 2/1/21    What details did the patient provide when requesting the medication: PATIENT REQUESTING A NEW REFILL OF TRAMADOL (NOT ON MED LIST). PATIENT STATES THAT HER PHARMACIST TOLD HER THAT SOMEONE PICKED UP A REFILL OF TRAMADOL ON 1/21/21. THE PATIENT STATES SHE DOESN'T REMEMBER PICKING IT UP, AND IT COULD HAVE BEEN HER DAUGHTER, THE PATIENT ALSO STATED THAT SHE MAY HAVE ACCIDENTALLY THROWN IT AWAY. THE PATIENT STATES SHE IS COMPLETELY OUT OF MEDICATION.    Does the patient have less than a 3 day supply:  [x] Yes  [] No    What is the patient's preferred pharmacy: [facility] #[id number] [id number] AT [facility] - [phone] [facility] [phone] FX

## 2021-02-02 NOTE — TELEPHONE ENCOUNTER
Can we verify where tramadol is?  She states that someone picked it up about almost 2 weeks ago, but she does not know what this is.  If this truly the case, we will give her 2-week supply.

## 2021-02-03 RX ORDER — TRAMADOL HYDROCHLORIDE 50 MG/1
50 TABLET ORAL EVERY 6 HOURS PRN
Qty: 56 TABLET | Refills: 0 | Status: SHIPPED | OUTPATIENT
Start: 2021-02-03 | End: 2021-03-04 | Stop reason: SDUPTHER

## 2021-02-09 ENCOUNTER — TELEPHONE (OUTPATIENT)
Dept: ONCOLOGY | Facility: CLINIC | Age: 76
End: 2021-02-09

## 2021-02-09 NOTE — TELEPHONE ENCOUNTER
Dana calling from [facility].    She has faxed over a plan of care for this patient every week for a month, but hasn't heard back.  She faxed it yesterday.    Please call Dana with status update-  [phone]

## 2021-02-09 NOTE — TELEPHONE ENCOUNTER
Called Marlen back and let her know that we received the forms, we will have Dr. Merida sign and it will be faxed back to their office.

## 2021-02-12 DIAGNOSIS — F41.9 ANXIETY: ICD-10-CM

## 2021-02-12 DIAGNOSIS — F51.01 PRIMARY INSOMNIA: ICD-10-CM

## 2021-02-17 RX ORDER — LORAZEPAM 0.5 MG/1
TABLET ORAL
Qty: 180 TABLET | Refills: 0 | Status: SHIPPED | OUTPATIENT
Start: 2021-02-17 | End: 2021-05-14

## 2021-03-04 RX ORDER — TRAMADOL HYDROCHLORIDE 50 MG/1
50 TABLET ORAL EVERY 6 HOURS PRN
Qty: 56 TABLET | Refills: 0 | Status: SHIPPED | OUTPATIENT
Start: 2021-03-04 | End: 2021-04-05 | Stop reason: SDUPTHER

## 2021-03-10 ENCOUNTER — OFFICE VISIT (OUTPATIENT)
Dept: INTERNAL MEDICINE | Facility: CLINIC | Age: 76
End: 2021-03-10

## 2021-03-10 ENCOUNTER — LAB (OUTPATIENT)
Dept: LAB | Facility: HOSPITAL | Age: 76
End: 2021-03-10

## 2021-03-10 VITALS
DIASTOLIC BLOOD PRESSURE: 64 MMHG | BODY MASS INDEX: 21.54 KG/M2 | TEMPERATURE: 98.2 F | WEIGHT: 129.3 LBS | HEIGHT: 65 IN | RESPIRATION RATE: 16 BRPM | HEART RATE: 75 BPM | SYSTOLIC BLOOD PRESSURE: 102 MMHG | OXYGEN SATURATION: 98 %

## 2021-03-10 DIAGNOSIS — B07.0 PLANTAR WART OF LEFT FOOT: ICD-10-CM

## 2021-03-10 DIAGNOSIS — Z00.00 HEALTHCARE MAINTENANCE: ICD-10-CM

## 2021-03-10 DIAGNOSIS — Z09 ENCOUNTER FOR FOLLOW-UP EXAMINATION AFTER COMPLETED TREATMENT FOR CONDITIONS OTHER THAN MALIGNANT NEOPLASM: ICD-10-CM

## 2021-03-10 DIAGNOSIS — Z12.11 SCREEN FOR COLON CANCER: ICD-10-CM

## 2021-03-10 DIAGNOSIS — R26.81 UNSTEADY GAIT: ICD-10-CM

## 2021-03-10 DIAGNOSIS — M89.9 DISORDER OF BONE, UNSPECIFIED: ICD-10-CM

## 2021-03-10 DIAGNOSIS — Z00.00 MEDICARE ANNUAL WELLNESS VISIT, SUBSEQUENT: Primary | ICD-10-CM

## 2021-03-10 LAB
25(OH)D3 SERPL-MCNC: 24 NG/ML (ref 30–100)
ALBUMIN SERPL-MCNC: 4.1 G/DL (ref 3.5–5.2)
ALBUMIN/GLOB SERPL: 1.3 G/DL
ALP SERPL-CCNC: 153 U/L (ref 39–117)
ALT SERPL W P-5'-P-CCNC: 43 U/L (ref 1–33)
ANION GAP SERPL CALCULATED.3IONS-SCNC: 9.2 MMOL/L (ref 5–15)
AST SERPL-CCNC: 37 U/L (ref 1–32)
BASOPHILS # BLD AUTO: 0.03 10*3/MM3 (ref 0–0.2)
BASOPHILS NFR BLD AUTO: 0.5 % (ref 0–1.5)
BILIRUB SERPL-MCNC: 0.3 MG/DL (ref 0–1.2)
BUN SERPL-MCNC: 17 MG/DL (ref 8–23)
BUN/CREAT SERPL: 13.8 (ref 7–25)
CALCIUM SPEC-SCNC: 9 MG/DL (ref 8.6–10.5)
CHLORIDE SERPL-SCNC: 99 MMOL/L (ref 98–107)
CHOLEST SERPL-MCNC: 146 MG/DL (ref 0–200)
CO2 SERPL-SCNC: 27.8 MMOL/L (ref 22–29)
CREAT SERPL-MCNC: 1.23 MG/DL (ref 0.57–1)
DEPRECATED RDW RBC AUTO: 40.3 FL (ref 37–54)
EOSINOPHIL # BLD AUTO: 0.14 10*3/MM3 (ref 0–0.4)
EOSINOPHIL NFR BLD AUTO: 2.1 % (ref 0.3–6.2)
ERYTHROCYTE [DISTWIDTH] IN BLOOD BY AUTOMATED COUNT: 13.8 % (ref 12.3–15.4)
GFR SERPL CREATININE-BSD FRML MDRD: 42 ML/MIN/1.73
GLOBULIN UR ELPH-MCNC: 3.1 GM/DL
GLUCOSE SERPL-MCNC: 87 MG/DL (ref 65–99)
HBA1C MFR BLD: 5.3 % (ref 4.8–5.6)
HCT VFR BLD AUTO: 36.4 % (ref 34–46.6)
HCV AB SER DONR QL: NORMAL
HDLC SERPL-MCNC: 47 MG/DL (ref 40–60)
HGB BLD-MCNC: 11.3 G/DL (ref 12–15.9)
IMM GRANULOCYTES # BLD AUTO: 0.02 10*3/MM3 (ref 0–0.05)
IMM GRANULOCYTES NFR BLD AUTO: 0.3 % (ref 0–0.5)
LDLC SERPL CALC-MCNC: 76 MG/DL (ref 0–100)
LDLC/HDLC SERPL: 1.55 {RATIO}
LYMPHOCYTES # BLD AUTO: 2.05 10*3/MM3 (ref 0.7–3.1)
LYMPHOCYTES NFR BLD AUTO: 30.8 % (ref 19.6–45.3)
MCH RBC QN AUTO: 25.5 PG (ref 26.6–33)
MCHC RBC AUTO-ENTMCNC: 31 G/DL (ref 31.5–35.7)
MCV RBC AUTO: 82 FL (ref 79–97)
MONOCYTES # BLD AUTO: 0.44 10*3/MM3 (ref 0.1–0.9)
MONOCYTES NFR BLD AUTO: 6.6 % (ref 5–12)
NEUTROPHILS NFR BLD AUTO: 3.98 10*3/MM3 (ref 1.7–7)
NEUTROPHILS NFR BLD AUTO: 59.7 % (ref 42.7–76)
NRBC BLD AUTO-RTO: 0 /100 WBC (ref 0–0.2)
PLATELET # BLD AUTO: 265 10*3/MM3 (ref 140–450)
PMV BLD AUTO: 9.9 FL (ref 6–12)
POTASSIUM SERPL-SCNC: 4.2 MMOL/L (ref 3.5–5.2)
PROT SERPL-MCNC: 7.2 G/DL (ref 6–8.5)
RBC # BLD AUTO: 4.44 10*6/MM3 (ref 3.77–5.28)
SODIUM SERPL-SCNC: 136 MMOL/L (ref 136–145)
T-UPTAKE NFR SERPL: 1.11 TBI (ref 0.8–1.3)
T4 SERPL-MCNC: 7.97 MCG/DL (ref 4.5–11.7)
TRIGL SERPL-MCNC: 131 MG/DL (ref 0–150)
TSH SERPL DL<=0.05 MIU/L-ACNC: 5.7 UIU/ML (ref 0.27–4.2)
VLDLC SERPL-MCNC: 23 MG/DL (ref 5–40)
WBC # BLD AUTO: 6.66 10*3/MM3 (ref 3.4–10.8)

## 2021-03-10 PROCEDURE — 84443 ASSAY THYROID STIM HORMONE: CPT | Performed by: FAMILY MEDICINE

## 2021-03-10 PROCEDURE — 82306 VITAMIN D 25 HYDROXY: CPT | Performed by: FAMILY MEDICINE

## 2021-03-10 PROCEDURE — G0439 PPPS, SUBSEQ VISIT: HCPCS | Performed by: FAMILY MEDICINE

## 2021-03-10 PROCEDURE — 80061 LIPID PANEL: CPT | Performed by: FAMILY MEDICINE

## 2021-03-10 PROCEDURE — 85025 COMPLETE CBC W/AUTO DIFF WBC: CPT | Performed by: FAMILY MEDICINE

## 2021-03-10 PROCEDURE — 86803 HEPATITIS C AB TEST: CPT | Performed by: FAMILY MEDICINE

## 2021-03-10 PROCEDURE — 83036 HEMOGLOBIN GLYCOSYLATED A1C: CPT | Performed by: FAMILY MEDICINE

## 2021-03-10 PROCEDURE — 80053 COMPREHEN METABOLIC PANEL: CPT | Performed by: FAMILY MEDICINE

## 2021-03-10 PROCEDURE — 84479 ASSAY OF THYROID (T3 OR T4): CPT | Performed by: FAMILY MEDICINE

## 2021-03-10 PROCEDURE — 99214 OFFICE O/P EST MOD 30 MIN: CPT | Performed by: FAMILY MEDICINE

## 2021-03-10 PROCEDURE — 36415 COLL VENOUS BLD VENIPUNCTURE: CPT | Performed by: FAMILY MEDICINE

## 2021-03-10 PROCEDURE — 84436 ASSAY OF TOTAL THYROXINE: CPT | Performed by: FAMILY MEDICINE

## 2021-03-10 NOTE — PROGRESS NOTES
The ABCs of the Annual Wellness Visit  Subsequent Medicare Wellness Visit    Chief Complaint   Patient presents with   • Medicare Wellness-subsequent       Subjective   History of Present Illness:  Nicole Lofton is a 75 y.o. female who presents for a Subsequent Medicare Wellness Visit.    Patient states that today's office visit that over the last 3 months she feels as if she has difficulty walking.  She does not feel dizzy or lightheaded.  She does feel that her legs may go out from underneath her.  She believes that her legs have gotten weaker over the last several months and is unable to really support her weight and help ambulating her.  Patient has not fallen, but she is concerned that she may have a fall soon.  She does use a cane to help ambulate.  Patient has done some recent physical therapy but states that it was not really helping.     Patient notes that she does not have any pain that is preventing her from ambulating correctly.  She does note that on her left foot there is a plantar wart which is fairly large located on the ball of the foot.  She would like to have this removed.  She states that that can give her some discomfort making it difficult for them for her to wear some shoes.    HEALTH RISK ASSESSMENT    Recent Hospitalizations:  No hospitalization(s) within the last year.    Current Medical Providers:  Patient Care Team:  Keith Campbell MD as PCP - General (Family Medicine)  Burt Merida MD as Consulting Physician (Hematology and Oncology)  Galdino Duque MD as Consulting Physician (Cardiology)    Smoking Status:  Social History     Tobacco Use   Smoking Status Current Every Day Smoker   • Packs/day: 1.00   • Types: Cigarettes   Smokeless Tobacco Never Used   Tobacco Comment    since age 18   1ppd       Alcohol Consumption:  Social History     Substance and Sexual Activity   Alcohol Use Yes    Comment: SOCIALLY       Depression Screen:   PHQ-2/PHQ-9 Depression Screening 3/10/2021    Little interest or pleasure in doing things 0   Feeling down, depressed, or hopeless 0   Trouble falling or staying asleep, or sleeping too much -   Feeling tired or having little energy -   Poor appetite or overeating -   Feeling bad about yourself - or that you are a failure or have let yourself or your family down -   Trouble concentrating on things, such as reading the newspaper or watching television -   Moving or speaking so slowly that other people could have noticed. Or the opposite - being so fidgety or restless that you have been moving around a lot more than usual -   Thoughts that you would be better off dead, or of hurting yourself in some way -   Total Score 0   If you checked off any problems, how difficult have these problems made it for you to do your work, take care of things at home, or get along with other people? -       Fall Risk Screen:  BENEDICT Fall Risk Assessment was completed, and patient is at LOW risk for falls.Assessment completed on:3/10/2021    Health Habits and Functional and Cognitive Screening:  Functional & Cognitive Status 3/10/2021   Do you have difficulty preparing food and eating? No   Do you have difficulty bathing yourself, getting dressed or grooming yourself? No   Do you have difficulty using the toilet? No   Do you have difficulty moving around from place to place? Yes   Do you have trouble with steps or getting out of a bed or a chair? Yes   Current Diet Well Balanced Diet   Dental Exam Up to date   Eye Exam Up to date   Exercise (times per week) 0 times per week   Current Exercise Activities Include None   Do you need help using the phone?  No   Are you deaf or do you have serious difficulty hearing?  No   Do you need help with transportation? No   Do you need help shopping? No   Do you need help preparing meals?  No   Do you need help with housework?  No   Do you need help with laundry? No   Do you need help taking your medications? No   Do you need help managing  money? No   Do you ever drive or ride in a car without wearing a seat belt? No   Have you felt unusual stress, anger or loneliness in the last month? No   Who do you live with? Alone   If you need help, do you have trouble finding someone available to you? No   Have you been bothered in the last four weeks by sexual problems? No   Do you have difficulty concentrating, remembering or making decisions? No         Does the patient have evidence of cognitive impairment? No    Asprin use counseling:Does not need ASA (and currently is not on it)    Age-appropriate Screening Schedule:  Refer to the list below for future screening recommendations based on patient's age, sex and/or medical conditions. Orders for these recommended tests are listed in the plan section. The patient has been provided with a written plan.    Health Maintenance   Topic Date Due   • DXA SCAN  Never done   • COLONOSCOPY  Never done   • ZOSTER VACCINE (1 of 2) Never done   • INFLUENZA VACCINE  08/01/2020   • LIPID PANEL  09/09/2020   • TDAP/TD VACCINES (4 - Td) 08/16/2025   • MAMMOGRAM  Discontinued          The following portions of the patient's history were reviewed and updated as appropriate: allergies, current medications, past family history, past medical history, past social history, past surgical history and problem list.    Outpatient Medications Prior to Visit   Medication Sig Dispense Refill   • amLODIPine (NORVASC) 10 MG tablet Take 1 tablet by mouth Daily. 90 tablet 1   • atorvastatin (LIPITOR) 40 MG tablet Take 1 tablet by mouth Daily. 90 tablet 1   • bosutinib (BOSULIF) 100 MG chemo tablet Take 4 tablets by mouth Daily. 120 tablet 0   • cyclobenzaprine (FEXMID) 7.5 MG tablet Take 1 tablet by mouth 2 (Two) Times a Day As Needed for Muscle Spasms. 30 tablet 3   • levothyroxine (SYNTHROID, LEVOTHROID) 100 MCG tablet TAKE 1 TABLET BY MOUTH DAILY 90 tablet 2   • LORazepam (ATIVAN) 0.5 MG tablet TAKE 1 TABLET BY MOUTH TWICE DAILY AS NEEDED  "FOR ANXIETY 180 tablet 0   • losartan (Cozaar) 50 MG tablet Take 1 tablet by mouth Daily. 90 tablet 0   • meloxicam (MOBIC) 7.5 MG tablet TAKE 1 TABLET BY MOUTH EVERY DAY 90 tablet 1   • OLANZapine (zyPREXA) 5 MG tablet TAKE 1 TABLET BY MOUTH EVERY NIGHT 90 tablet 1   • promethazine (PHENERGAN) 12.5 MG tablet TAKE 1 TABLET BY MOUTH THREE TIMES DAILY AS NEEDED 60 tablet 0   • traMADol (ULTRAM) 50 MG tablet Take 1 tablet by mouth Every 6 (Six) Hours As Needed for Moderate Pain . 56 tablet 0   • Vortioxetine HBr (Trintellix) 10 MG tablet Take 10 mg by mouth Daily. 30 tablet 9     No facility-administered medications prior to visit.       Patient Active Problem List   Diagnosis   • Thrombocytosis (CMS/HCC)   • CML (chronic myeloid leukemia) (CMS/HCC)   • Hypothyroidism   • Medication monitoring encounter   • Severe single current episode of major depressive disorder, without psychotic features (CMS/HCC)   • Primary insomnia   • Major depressive disorder with current active episode   • Osteoarthritis   • Incidental lung nodule, greater than or equal to 8mm   • Hypertension   • Chronic pain syndrome   • Hyperlipidemia   • Other chronic pain   • Anxiety   • Nicotine use disorder   • Bradycardia   • Depression   • Panic attack       Advanced Care Planning:  ACP discussion was held with the patient during this visit. Patient has an advance directive in EMR which is still valid.     Review of Systems    Compared to one year ago, the patient feels her physical health is worse.  Compared to one year ago, the patient feels her mental health is the same.    Reviewed chart for potential of high risk medication in the elderly: yes  Reviewed chart for potential of harmful drug interactions in the elderly:yes    Objective         Vitals:    03/10/21 1355   BP: 102/64   Pulse: 75   Resp: 16   Temp: 98.2 °F (36.8 °C)   TempSrc: Infrared   SpO2: 98%   Weight: 58.7 kg (129 lb 4.8 oz)   Height: 165 cm (64.96\")   PainSc:   7       Body " mass index is 21.54 kg/m².  Discussed the patient's BMI with her. The BMI is in the acceptable range.    Physical Exam  Vitals and nursing note reviewed.   Constitutional:       Appearance: She is well-developed.   HENT:      Head: Normocephalic and atraumatic.   Musculoskeletal:      Cervical back: Normal range of motion and neck supple.   Skin:     Comments: Plantar wart located on the left ball of foot.   Neurological:      Mental Status: She is alert and oriented to person, place, and time.   Psychiatric:         Behavior: Behavior normal.               Assessment/Plan   Medicare Risks and Personalized Health Plan  CMS Preventative Services Quick Reference  Cardiovascular risk  Colon Cancer Screening  Fall Risk    The above risks/problems have been discussed with the patient.  Pertinent information has been shared with the patient in the After Visit Summary.  Follow up plans and orders are seen below in the Assessment/Plan Section.    Diagnoses and all orders for this visit:    1. Medicare annual wellness visit, subsequent (Primary)    2. Healthcare maintenance  -     CBC & Differential  -     Comprehensive Metabolic Panel  -     Hemoglobin A1c  -     Thyroid Panel With TSH  -     Lipid Panel With LDL / HDL Ratio  -     Vitamin D 25 Hydroxy  -     Hepatitis C Antibody  -     Ambulatory Referral For Screening Colonoscopy    3. Screen for colon cancer  -     Ambulatory Referral For Screening Colonoscopy    4. Disorder of bone, unspecified   -     Vitamin D 25 Hydroxy  -     DEXA Bone Density Axial    5. Encounter for follow-up examination after completed treatment for conditions other than malignant neoplasm   -     DEXA Bone Density Axial    6. Unsteady gait  -     Walker    7. Plantar wart of left foot  -     Ambulatory Referral to Podiatry        I discussed with patient at today's office visit that for the wart on her left foot, will refer her to podiatry where they can get it removed.  As far as her unsteady  gait I have discussed with her that I would like her to retry physical therapy, we can try to go somewhere else.  I have also urged her to get a walker.  And for her to walk slowly.  I do strongly believe that if she has good physical therapy and she is able to build some good strength of her lower extremities she may be on the walk better.  I have also discussed with her at today's office visit that I would like to get a DEXA scan to check for osteoporosis, and check some vitamin D levels.        Follow Up:  No follow-ups on file.     An After Visit Summary and PPPS were given to the patient.

## 2021-03-11 DIAGNOSIS — E03.9 ACQUIRED HYPOTHYROIDISM: Primary | ICD-10-CM

## 2021-03-11 DIAGNOSIS — E55.9 VITAMIN D DEFICIENCY: ICD-10-CM

## 2021-03-11 NOTE — PROGRESS NOTES
Please inform the patient of the following abnormal results.   Vitamin d levels are low. Needs to do 5000 units daily for one month and then 2000 units daily after that.   TSH still elevated. Need to increase to 112mcg daily. Need to have her see me in one month, with labs prior.

## 2021-03-18 DIAGNOSIS — R74.8 ELEVATED LIVER ENZYMES: ICD-10-CM

## 2021-03-18 DIAGNOSIS — R79.89 ELEVATED SERUM CREATININE: Primary | ICD-10-CM

## 2021-03-29 ENCOUNTER — TELEPHONE (OUTPATIENT)
Dept: GASTROENTEROLOGY | Facility: CLINIC | Age: 76
End: 2021-03-29

## 2021-03-30 ENCOUNTER — TELEPHONE (OUTPATIENT)
Dept: INTERNAL MEDICINE | Facility: CLINIC | Age: 76
End: 2021-03-30

## 2021-03-30 DIAGNOSIS — I10 ESSENTIAL HYPERTENSION: ICD-10-CM

## 2021-03-30 RX ORDER — LOSARTAN POTASSIUM 50 MG/1
50 TABLET ORAL DAILY
Qty: 90 TABLET | Refills: 1 | Status: CANCELLED | OUTPATIENT
Start: 2021-03-30

## 2021-03-31 ENCOUNTER — TELEPHONE (OUTPATIENT)
Dept: INTERNAL MEDICINE | Facility: CLINIC | Age: 76
End: 2021-03-31

## 2021-03-31 DIAGNOSIS — R26.81 UNSTEADY GAIT: Primary | ICD-10-CM

## 2021-03-31 NOTE — TELEPHONE ENCOUNTER
Caller: JenniferClarissa rothman    Relationship: Emergency Contact    Best call back number: [phone]    What orders are you requesting (i.e. lab or imaging): WALKER WITH 2 FRONT WHEELS    Additional notes: PATIENT'S DAUGHTER CALLED STATED PATIENT NEEDS THE WALKER WITH THE TWO WHEELS ON THE FRONT INSTEAD OF THE WALKER WITHOUT ANY WHEELS. PLEASE REWRITE AND CONTACT DAUGHTER. DAUGHTER WILL  ORDER.

## 2021-04-05 RX ORDER — TRAMADOL HYDROCHLORIDE 50 MG/1
50 TABLET ORAL EVERY 6 HOURS PRN
Qty: 56 TABLET | Refills: 0 | Status: SHIPPED | OUTPATIENT
Start: 2021-04-05 | End: 2021-05-19 | Stop reason: SDUPTHER

## 2021-04-05 NOTE — TELEPHONE ENCOUNTER
Caller: Nicole Lofton    Relationship: Self    Best call back number:[phone]     Medication needed:   Requested Prescriptions     Pending Prescriptions Disp Refills   • traMADol (ULTRAM) 50 MG tablet 56 tablet 0     Sig: Take 1 tablet by mouth Every 6 (Six) Hours As Needed for Moderate Pain .       When do you need the refill by: ASAP    What additional details did the patient provide when requesting the medication: PATIENT NEEDS A NEW SCRIPT AND IS OUT OF MEDICATION    Does the patient have less than a 3 day supply:  [x] Yes  [] No    What is the patient's preferred pharmacy: [facility] #[id number] [facility] [id number] AT [facility] - [phone] [facility] [phone] FX

## 2021-04-09 ENCOUNTER — OFFICE VISIT (OUTPATIENT)
Dept: INTERNAL MEDICINE | Facility: CLINIC | Age: 76
End: 2021-04-09

## 2021-04-09 VITALS
BODY MASS INDEX: 21.18 KG/M2 | OXYGEN SATURATION: 97 % | DIASTOLIC BLOOD PRESSURE: 80 MMHG | HEIGHT: 65 IN | SYSTOLIC BLOOD PRESSURE: 144 MMHG | WEIGHT: 127.1 LBS | RESPIRATION RATE: 16 BRPM | TEMPERATURE: 97.1 F | HEART RATE: 64 BPM

## 2021-04-09 DIAGNOSIS — E55.9 VITAMIN D DEFICIENCY: Primary | ICD-10-CM

## 2021-04-09 DIAGNOSIS — E03.9 ACQUIRED HYPOTHYROIDISM: ICD-10-CM

## 2021-04-09 DIAGNOSIS — F41.9 ANXIETY: ICD-10-CM

## 2021-04-09 PROCEDURE — 99214 OFFICE O/P EST MOD 30 MIN: CPT | Performed by: FAMILY MEDICINE

## 2021-04-09 RX ORDER — LEVOTHYROXINE SODIUM 112 UG/1
112 TABLET ORAL DAILY
Qty: 90 TABLET | Refills: 3 | Status: SHIPPED | OUTPATIENT
Start: 2021-04-09 | End: 2021-11-10 | Stop reason: DRUGHIGH

## 2021-04-09 RX ORDER — VORTIOXETINE 10 MG/1
1 TABLET, FILM COATED ORAL DAILY
Qty: 30 TABLET | Refills: 9 | Status: SHIPPED | OUTPATIENT
Start: 2021-04-09 | End: 2021-08-26

## 2021-04-09 NOTE — PROGRESS NOTES
"Chief Complaint  Hypertension    Subjective          Nicole Lofton presents to [facility] PRIMARY CARE  History of Present Illness    Patient has a history of vitamin D deficiency. She is currently taking 5000 IU daily. Patient denies any side effects of the medication.    Patient has a hx of hypothyroidism. Patient is taking levothyroxine 100mcg. Her most recent tsh was elevated. I have discussed with her today that we need increase the dose.     Objective   Vital Signs:   /80   Pulse 64   Temp 97.1 °F (36.2 °C) (Infrared)   Resp 16   Ht 165 cm (64.96\")   Wt 57.7 kg (127 lb 1.6 oz)   SpO2 97%   BMI 21.18 kg/m²     Physical Exam  Vitals and nursing note reviewed.   Constitutional:       Appearance: She is well-developed.   HENT:      Head: Normocephalic and atraumatic.   Musculoskeletal:      Cervical back: Normal range of motion and neck supple.   Neurological:      Mental Status: She is alert and oriented to person, place, and time.   Psychiatric:         Behavior: Behavior normal.        Result Review :     Common labs    Common Labsle 8/21/20 8/21/20 1/6/21 1/6/21 3/10/21 3/10/21 3/10/21 3/10/21    1234 1234 1603 1603 1435 1435 1435 1435   Glucose  98  87  87     BUN  21  28 (A)  17     Creatinine  1.32 (A)  1.34 (A)  1.23 (A)     eGFR Non  Am  39 (A)  38 (A)  42 (A)     Sodium  128 (A)  131 (A)  136     Potassium  4.6  4.6  4.2     Chloride  92 (A)  94 (A)  99     Calcium  9.3  9.3  9.0     Albumin  4.70  4.80  4.10     Total Bilirubin  0.4  0.2  0.3     Alkaline Phosphatase  69  77  153 (A)     AST (SGOT)  16  22  37 (A)     ALT (SGPT)  8  17  43 (A)     WBC 8.61  8.20  6.66      Hemoglobin 11.3 (A)  10.9 (A)  11.3 (A)      Hematocrit 34.1  33.6 (A)  36.4      Platelets 204  284  265      Total Cholesterol        146   Triglycerides        131   HDL Cholesterol        47   LDL Cholesterol         76   Hemoglobin A1C       5.30    (A) Abnormal value                    "   Assessment and Plan    Diagnoses and all orders for this visit:    1. Vitamin D deficiency (Primary)        -     Continue patient on 5000 IU daily.     2. Acquired hypothyroidism  -     Thyroid Panel With TSH; Future  -     levothyroxine (Synthroid) 112 MCG tablet; Take 1 tablet by mouth Daily.  Dispense: 90 tablet; Refill: 3  -     Will increase patient to levothyroxine 112mcg daily.     3. Anxiety  -     Vortioxetine HBr (Trintellix) 10 MG tablet; Take 10 mg by mouth Daily.  Dispense: 30 tablet; Refill: 9        Follow Up   No follow-ups on file.  Patient was given instructions and counseling regarding her condition or for health maintenance advice. Please see specific information pulled into the AVS if appropriate.

## 2021-05-12 DIAGNOSIS — F41.9 ANXIETY: ICD-10-CM

## 2021-05-12 DIAGNOSIS — F51.01 PRIMARY INSOMNIA: ICD-10-CM

## 2021-05-12 NOTE — PROGRESS NOTES
"[facility] CBC GROUP OUTPATIENT FOLLOW UP VISIT    REASON FOR FOLLOW-UP:    1.  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  Gleevec 400 mg daily initiated around 10/12/2017, stopped on 11/15/2017 due to intolerance (noah-orbital edema, nausea/vomiting, fatigue).  Resumed at 200 mg daily but, again, not tolerated.    3.  Nilotinib 150 mg twice daily started in late March, 2018.  Held due to intolerance and QTc prolongation.   4.  Hydroxyurea initiated in July 2018.  Subsequently discontinued.     5.  Bosutinib 400 mg daily started Feb 2019    HISTORY OF PRESENT ILLNESS:  Nicole Lofton is a 75 y.o. female with the above-mentioned history.    She is doing reasonably well.  Adequate below appetite.  She has been anxious recently.  She complains of some knee pain.  She continues bosutinib which she tolerates very well without significant adverse effects.      ROS:  Fatigue.  Knee pain.  Anxiety.    Vitals:    05/14/21 1239   BP: 168/77   Pulse: 61   Resp: 16   Temp: 98 °F (36.7 °C)   TempSrc: Temporal   SpO2: 94%   Weight: 58 kg (127 lb 14.4 oz)   Height: 165 cm (64.96\")   PainSc: 0-No pain  Comment: CML       General:  No acute distress, awake, alert and oriented.  Walks with a cane.  Chronically ill-appearing.  Skin:  Warm and dry, no visible rash  HEENT:  Normocephalic/atraumatic.  Wearing a facemask.  Chest:  Normal respiratory effort.  Lungs clear to auscultation bilaterally.  Heart: Regular with some ectopy  Extremities:  No visible clubbing, cyanosis, or edema  Neuro/psych:  Grossly non-focal.  Normal mood and affect.    DIAGNOSTIC DATA:  CBC & Differential (05/14/2021 12:35)    IMAGING:  None reviewed    ASSESSMENT:  This is a 75 y.o. female with:    *History of bilateral breast cancer in 1988 status post bilateral mastectomy.  She apparently completed 6 months of adjuvant therapy.  We have no details regarding this.    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    · Started on Hydrea 1000 mg daily.    · This was discontinued and she started Gleevec on approximately 10/12/2017.    · Gleevec discontinued due to intolerance on 11/15/2017.    · We started Gleevec at a lower dose of 200 mg bu  · t she was also intolerant of this dose.  Her side effects were as severe with a lower dose and she therefore stopped taking the drug.  Symptoms improved significantly.  Platelet count subsequently elevated again.  · She started nilotinib at 150mg twice daily at the end of March 2018.  This was held in mid-May due to QTc prolongation.  The QTc interval did subsequently normalized.  However, due to this and other adverse effects we are not able to resume the nilotinib nor does she desire to.  · In July we resumed hydroxyurea 1000 mg daily.  Her platelet count increased.  Her dose was increased to 1000 mg twice daily.  Blood counts improved nicely.  Platelet count normalized but her white blood cell count dropped to below normal.  Decreased hydroxyurea to 1000 mg daily.  · 11/20/18 platelets were up again.  We increased the hydroxyurea to 1000 mg in the mornings and 500 mg in the evenings.  · As of 1/15/2019 her platelet count was again elevated to 1.2 million.  Increased hydroxyurea to 1000 mg twice daily.    · Platelets improved but remained far from normal.  · Due to her history we referred her down to the [facility] to see Dr. Cifuentes for assistance and recommendations on further therapy.  · In mid-February 2019 she started bosutinib 400 mg daily which she takes along with olanzapine at night (she takes this as needed now).     · Blood counts look good.  Last PCR 1/6/2021 negative.    *Hypothyroidism:  Levothyroxine 100 mcg.  Follow-up thyroid studies from today.    *Tobacco use: She is unwilling to quit smoking.      *Deconditioning    *Hypertension: Antihypertensives per primary care.  Systolic blood pressure is a  little high today.    *Bradycardia: She was referred back to Dr. Duque for this previously.  Heart rate 61 today.    *Anxiety: She is already on a couple of medications for this.  Follow-up with primary care.    PLAN:  1. Continue bosutinib   2. Follow-up in 6 months with labs    High risk medication requiring intensive monitoring      Burt Merida MD

## 2021-05-14 ENCOUNTER — OFFICE VISIT (OUTPATIENT)
Dept: ONCOLOGY | Facility: CLINIC | Age: 76
End: 2021-05-14

## 2021-05-14 ENCOUNTER — LAB (OUTPATIENT)
Dept: OTHER | Facility: HOSPITAL | Age: 76
End: 2021-05-14

## 2021-05-14 VITALS
HEIGHT: 65 IN | TEMPERATURE: 98 F | RESPIRATION RATE: 16 BRPM | DIASTOLIC BLOOD PRESSURE: 77 MMHG | HEART RATE: 61 BPM | WEIGHT: 127.9 LBS | OXYGEN SATURATION: 94 % | BODY MASS INDEX: 21.31 KG/M2 | SYSTOLIC BLOOD PRESSURE: 168 MMHG

## 2021-05-14 DIAGNOSIS — E03.9 ACQUIRED HYPOTHYROIDISM: ICD-10-CM

## 2021-05-14 DIAGNOSIS — C92.10 CML (CHRONIC MYELOID LEUKEMIA) (HCC): Primary | ICD-10-CM

## 2021-05-14 DIAGNOSIS — C92.10 CML (CHRONIC MYELOID LEUKEMIA) (HCC): ICD-10-CM

## 2021-05-14 LAB
ALBUMIN SERPL-MCNC: 4.4 G/DL (ref 3.5–5.2)
ALBUMIN/GLOB SERPL: 1.4 G/DL
ALP SERPL-CCNC: 93 U/L (ref 39–117)
ALT SERPL W P-5'-P-CCNC: 13 U/L (ref 1–33)
ANION GAP SERPL CALCULATED.3IONS-SCNC: 10.8 MMOL/L (ref 5–15)
AST SERPL-CCNC: 18 U/L (ref 1–32)
BASOPHILS # BLD AUTO: 0.04 10*3/MM3 (ref 0–0.2)
BASOPHILS NFR BLD AUTO: 0.5 % (ref 0–1.5)
BILIRUB SERPL-MCNC: 0.3 MG/DL (ref 0–1.2)
BUN SERPL-MCNC: 15 MG/DL (ref 8–23)
BUN/CREAT SERPL: 14.6 (ref 7–25)
CALCIUM SPEC-SCNC: 9.6 MG/DL (ref 8.6–10.5)
CHLORIDE SERPL-SCNC: 102 MMOL/L (ref 98–107)
CO2 SERPL-SCNC: 26.2 MMOL/L (ref 22–29)
CREAT SERPL-MCNC: 1.03 MG/DL (ref 0.57–1)
DEPRECATED RDW RBC AUTO: 55.7 FL (ref 37–54)
EOSINOPHIL # BLD AUTO: 0.14 10*3/MM3 (ref 0–0.4)
EOSINOPHIL NFR BLD AUTO: 1.7 % (ref 0.3–6.2)
ERYTHROCYTE [DISTWIDTH] IN BLOOD BY AUTOMATED COUNT: 18.9 % (ref 12.3–15.4)
GFR SERPL CREATININE-BSD FRML MDRD: 52 ML/MIN/1.73
GLOBULIN UR ELPH-MCNC: 3.2 GM/DL
GLUCOSE SERPL-MCNC: 99 MG/DL (ref 65–99)
HCT VFR BLD AUTO: 35.4 % (ref 34–46.6)
HGB BLD-MCNC: 11.2 G/DL (ref 12–15.9)
IMM GRANULOCYTES # BLD AUTO: 0.03 10*3/MM3 (ref 0–0.05)
IMM GRANULOCYTES NFR BLD AUTO: 0.4 % (ref 0–0.5)
LYMPHOCYTES # BLD AUTO: 1.83 10*3/MM3 (ref 0.7–3.1)
LYMPHOCYTES NFR BLD AUTO: 21.6 % (ref 19.6–45.3)
MCH RBC QN AUTO: 25.6 PG (ref 26.6–33)
MCHC RBC AUTO-ENTMCNC: 31.6 G/DL (ref 31.5–35.7)
MCV RBC AUTO: 80.8 FL (ref 79–97)
MONOCYTES # BLD AUTO: 0.57 10*3/MM3 (ref 0.1–0.9)
MONOCYTES NFR BLD AUTO: 6.7 % (ref 5–12)
NEUTROPHILS NFR BLD AUTO: 5.85 10*3/MM3 (ref 1.7–7)
NEUTROPHILS NFR BLD AUTO: 69.1 % (ref 42.7–76)
NRBC BLD AUTO-RTO: 0 /100 WBC (ref 0–0.2)
PLATELET # BLD AUTO: 250 10*3/MM3 (ref 140–450)
PMV BLD AUTO: 10.1 FL (ref 6–12)
POTASSIUM SERPL-SCNC: 4 MMOL/L (ref 3.5–5.2)
PROT SERPL-MCNC: 7.6 G/DL (ref 6–8.5)
RBC # BLD AUTO: 4.38 10*6/MM3 (ref 3.77–5.28)
SODIUM SERPL-SCNC: 139 MMOL/L (ref 136–145)
T4 FREE SERPL-MCNC: 1.31 NG/DL (ref 0.93–1.7)
TSH SERPL DL<=0.05 MIU/L-ACNC: 8.56 UIU/ML (ref 0.27–4.2)
WBC # BLD AUTO: 8.46 10*3/MM3 (ref 3.4–10.8)

## 2021-05-14 PROCEDURE — 84439 ASSAY OF FREE THYROXINE: CPT | Performed by: INTERNAL MEDICINE

## 2021-05-14 PROCEDURE — 85025 COMPLETE CBC W/AUTO DIFF WBC: CPT | Performed by: INTERNAL MEDICINE

## 2021-05-14 PROCEDURE — 99214 OFFICE O/P EST MOD 30 MIN: CPT | Performed by: INTERNAL MEDICINE

## 2021-05-14 PROCEDURE — 80053 COMPREHEN METABOLIC PANEL: CPT | Performed by: INTERNAL MEDICINE

## 2021-05-14 PROCEDURE — 36415 COLL VENOUS BLD VENIPUNCTURE: CPT

## 2021-05-14 PROCEDURE — 84443 ASSAY THYROID STIM HORMONE: CPT | Performed by: INTERNAL MEDICINE

## 2021-05-14 RX ORDER — LORAZEPAM 0.5 MG/1
TABLET ORAL
Qty: 180 TABLET | Refills: 0 | Status: SHIPPED | OUTPATIENT
Start: 2021-05-14 | End: 2021-07-20 | Stop reason: SDUPTHER

## 2021-05-18 RX ORDER — PROMETHAZINE HYDROCHLORIDE 12.5 MG/1
TABLET ORAL
Qty: 60 TABLET | Refills: 0 | Status: SHIPPED | OUTPATIENT
Start: 2021-05-18 | End: 2021-11-10 | Stop reason: SDUPTHER

## 2021-05-19 ENCOUNTER — LAB (OUTPATIENT)
Dept: LAB | Facility: HOSPITAL | Age: 76
End: 2021-05-19

## 2021-05-19 ENCOUNTER — OFFICE VISIT (OUTPATIENT)
Dept: INTERNAL MEDICINE | Facility: CLINIC | Age: 76
End: 2021-05-19

## 2021-05-19 VITALS
BODY MASS INDEX: 21.16 KG/M2 | WEIGHT: 127 LBS | HEIGHT: 65 IN | SYSTOLIC BLOOD PRESSURE: 138 MMHG | HEART RATE: 70 BPM | DIASTOLIC BLOOD PRESSURE: 80 MMHG | RESPIRATION RATE: 16 BRPM | OXYGEN SATURATION: 98 %

## 2021-05-19 DIAGNOSIS — N39.0 ACUTE UTI: Primary | ICD-10-CM

## 2021-05-19 DIAGNOSIS — R30.0 DYSURIA: ICD-10-CM

## 2021-05-19 DIAGNOSIS — R35.0 URINARY FREQUENCY: ICD-10-CM

## 2021-05-19 LAB
BACTERIA UR QL AUTO: NORMAL /HPF
BILIRUB UR QL STRIP: NEGATIVE
CLARITY UR: CLEAR
COLOR UR: YELLOW
GLUCOSE UR STRIP-MCNC: NEGATIVE MG/DL
HGB UR QL STRIP.AUTO: NEGATIVE
HYALINE CASTS UR QL AUTO: NORMAL /LPF
KETONES UR QL STRIP: NEGATIVE
LEUKOCYTE ESTERASE UR QL STRIP.AUTO: NEGATIVE
NITRITE UR QL STRIP: NEGATIVE
PH UR STRIP.AUTO: 6.5 [PH] (ref 5–8)
PROT UR QL STRIP: ABNORMAL
RBC # UR: NORMAL /HPF
REF LAB TEST METHOD: NORMAL
SP GR UR STRIP: 1.02 (ref 1–1.03)
SQUAMOUS #/AREA URNS HPF: NORMAL /HPF
TRANS CELLS #/AREA URNS HPF: NORMAL /HPF
UROBILINOGEN UR QL STRIP: ABNORMAL
WBC UR QL AUTO: NORMAL /HPF

## 2021-05-19 PROCEDURE — 81001 URINALYSIS AUTO W/SCOPE: CPT | Performed by: FAMILY MEDICINE

## 2021-05-19 PROCEDURE — 99213 OFFICE O/P EST LOW 20 MIN: CPT | Performed by: FAMILY MEDICINE

## 2021-05-19 RX ORDER — CEFDINIR 300 MG/1
300 CAPSULE ORAL 2 TIMES DAILY
Qty: 14 CAPSULE | Refills: 0 | Status: SHIPPED | OUTPATIENT
Start: 2021-05-19 | End: 2021-05-26

## 2021-05-19 RX ORDER — TRAMADOL HYDROCHLORIDE 50 MG/1
50 TABLET ORAL EVERY 6 HOURS PRN
Qty: 56 TABLET | Refills: 0 | Status: SHIPPED | OUTPATIENT
Start: 2021-05-19 | End: 2021-06-18 | Stop reason: SDUPTHER

## 2021-05-19 NOTE — TELEPHONE ENCOUNTER
Caller: Nicole Lofton    Relationship: Self    Best call back number: [phone]    Medication needed:   Requested Prescriptions     Pending Prescriptions Disp Refills   • traMADol (ULTRAM) 50 MG tablet 56 tablet 0     Sig: Take 1 tablet by mouth Every 6 (Six) Hours As Needed for Moderate Pain .       When do you need the refill by: 05/19/2021    What additional details did the patient provide when requesting the medication: THE PATIENT STATES THAT SHE HEARD FROM THE OFFICE THAT THE ABOVE MEDICATION WAS APPROVED, BUT THE PHARMACY DID NOT HAVE THIS WHEN SHE WENT TO  HER PRESCRIPTION. THE PATIENT IS CURRENTLY OUT OF THIS MEDICATION. PLEASE ADVISE.    Does the patient have less than a 3 day supply:  [x] Yes  [] No    What is the patient's preferred pharmacy: [facility]ORE #[id number] [facility] [address] AT [facility] - [phone] [facility] [phone] FX

## 2021-05-19 NOTE — PROGRESS NOTES
"Chief Complaint  Urinary Tract Infection    Subjective          Nicole Lofton presents to [facility] PRIMARY CARE  Urinary Tract Infection   This is a new problem. The current episode started today. The problem occurs every urination. The problem has been gradually worsening. The pain is at a severity of 2/10. The pain is mild. There has been no fever. Associated symptoms include frequency and urgency. She has tried nothing for the symptoms. The treatment provided no relief.     Patient also notes that she is having some urinary incontinence due to her urinary frequency.  She does have some dysuria.  She has some mild back pain as well.  Patient states that she just does not feel well.  She states that she is constantly feels the urge to urinate, and she cannot even make it to the bathroom without having to urinate in her pants.      Objective   Vital Signs:   /80   Pulse 70   Resp 16   Ht 165 cm (64.96\")   Wt 57.6 kg (127 lb)   SpO2 98%   BMI 21.16 kg/m²     Physical Exam  Vitals and nursing note reviewed.   Constitutional:       Appearance: She is well-developed.   HENT:      Head: Normocephalic and atraumatic.   Musculoskeletal:      Cervical back: Normal range of motion and neck supple.   Neurological:      Mental Status: She is alert and oriented to person, place, and time.   Psychiatric:         Behavior: Behavior normal.        Result Review :                 Assessment and Plan    Diagnoses and all orders for this visit:    1. Acute UTI (Primary)  -     Urinalysis With Microscopic - Urine, Clean Catch    2. Dysuria    3. Urinary frequency    Other orders  -     cefdinir (OMNICEF) 300 MG capsule; Take 1 capsule by mouth 2 (Two) Times a Day for 7 days.  Dispense: 14 capsule; Refill: 0    Most likely UTI.  Will get a urine analysis.  Will treat with Omnicef.      Follow Up   No follow-ups on file.  Patient was given instructions and counseling regarding her condition or for " health maintenance advice. Please see specific information pulled into the AVS if appropriate.

## 2021-05-25 RX ORDER — OLANZAPINE 5 MG/1
5 TABLET ORAL NIGHTLY
Qty: 90 TABLET | Refills: 1 | Status: SHIPPED | OUTPATIENT
Start: 2021-05-25 | End: 2022-03-18 | Stop reason: SDUPTHER

## 2021-05-27 LAB — REF LAB TEST METHOD: NORMAL

## 2021-05-28 ENCOUNTER — TELEPHONE (OUTPATIENT)
Dept: ONCOLOGY | Facility: CLINIC | Age: 76
End: 2021-05-28

## 2021-05-28 NOTE — TELEPHONE ENCOUNTER
Called pt and informed her of results. She was very pleased and v/u.     ----- Message from Burt Merida MD sent at 5/27/2021 10:16 PM EDT -----  Please call the patient regarding her result. Please let her know that her PCR is negative, so the CML is not detectable! Thanks, LETICIA

## 2021-06-01 ENCOUNTER — OFFICE VISIT (OUTPATIENT)
Dept: INTERNAL MEDICINE | Facility: CLINIC | Age: 76
End: 2021-06-01

## 2021-06-01 ENCOUNTER — HOSPITAL ENCOUNTER (OUTPATIENT)
Dept: GENERAL RADIOLOGY | Facility: HOSPITAL | Age: 76
Discharge: HOME | End: 2021-06-01
Admitting: FAMILY MEDICINE

## 2021-06-01 VITALS
BODY MASS INDEX: 20.66 KG/M2 | WEIGHT: 124 LBS | OXYGEN SATURATION: 98 % | HEART RATE: 67 BPM | DIASTOLIC BLOOD PRESSURE: 72 MMHG | RESPIRATION RATE: 16 BRPM | SYSTOLIC BLOOD PRESSURE: 144 MMHG

## 2021-06-01 DIAGNOSIS — G89.29 CHRONIC PAIN OF RIGHT KNEE: Primary | ICD-10-CM

## 2021-06-01 DIAGNOSIS — M25.561 ACUTE PAIN OF RIGHT KNEE: ICD-10-CM

## 2021-06-01 DIAGNOSIS — M25.561 CHRONIC PAIN OF RIGHT KNEE: Primary | ICD-10-CM

## 2021-06-01 PROCEDURE — 99214 OFFICE O/P EST MOD 30 MIN: CPT | Performed by: FAMILY MEDICINE

## 2021-06-01 PROCEDURE — 73560 X-RAY EXAM OF KNEE 1 OR 2: CPT

## 2021-06-01 NOTE — PROGRESS NOTES
Chief Complaint  Knee Pain    Subjective          Nicole Lofton presents to [facility] PRIMARY CARE  History of Present Illness      Patient presents at today's office visit with a history of having chronic knee pain.  Patient states that over the last couple months it has gotten progressively worse, particularly over the last month.  She is taking meloxicam daily which has been helping.  She does take tramadol as well which seems to help, however her right knee continues to be painful.  She denies any particular trauma.  I do suspect is most likely underlying osteoarthritis.    Objective   Vital Signs:   /72   Pulse 67   Resp 16   Wt 56.2 kg (124 lb)   SpO2 98%   BMI 20.66 kg/m²     Physical Exam  Vitals and nursing note reviewed.   Constitutional:       Appearance: She is well-developed.   HENT:      Head: Normocephalic and atraumatic.   Musculoskeletal:      Cervical back: Normal range of motion and neck supple.      Right knee: Swelling present. No deformity, effusion or erythema.      Left knee: Normal.   Neurological:      Mental Status: She is alert and oriented to person, place, and time.   Psychiatric:         Behavior: Behavior normal.        Result Review :                 Assessment and Plan    Diagnoses and all orders for this visit:    1. Chronic pain of right knee (Primary)  -     XR Knee 3 View Right    2. Acute pain of right knee  -     Ambulatory Referral to Orthopedic Surgery    At today's office visit I did discuss with her that I would like to get a knee x-ray.  Which is most likely can confirm some osteoarthritis.  I have discussed with her at today's office visit that I would like her to continue to meloxicam as well as tramadol that she is currently taking.  I have written for her to take a compound pharmacy which includes a solution/compound of gabapentin 10%, diclofenac 4%, lidocaine 5%, and ibuprofen 3%.  I will also refer her to orthopedics.      I spent  30 minutes caring for Nicole on this date of service. This time includes time spent by me in the following activities:reviewing tests, performing a medically appropriate examination and/or evaluation , counseling and educating the patient/family/caregiver and ordering medications, tests, or procedures  Follow Up   No follow-ups on file.  Patient was given instructions and counseling regarding her condition or for health maintenance advice. Please see specific information pulled into the AVS if appropriate.

## 2021-06-04 RX ORDER — MELOXICAM 7.5 MG/1
TABLET ORAL
Qty: 90 TABLET | Refills: 1 | Status: SHIPPED | OUTPATIENT
Start: 2021-06-04 | End: 2022-07-05

## 2021-06-12 ENCOUNTER — HOSPITAL ENCOUNTER (EMERGENCY)
Facility: HOSPITAL | Age: 76
End: 2021-06-12

## 2021-06-17 ENCOUNTER — TELEPHONE (OUTPATIENT)
Dept: INTERNAL MEDICINE | Facility: CLINIC | Age: 76
End: 2021-06-17

## 2021-06-17 NOTE — TELEPHONE ENCOUNTER
SEE BELOW  Caller: Clarissa Rodriguez    Relationship: Emergency Contact    Best call back number: [phone]   What medications are you currently taking:   Current Outpatient Medications on File Prior to Visit   Medication Sig Dispense Refill   • amLODIPine (NORVASC) 10 MG tablet Take 1 tablet by mouth Daily. 90 tablet 1   • atorvastatin (LIPITOR) 40 MG tablet Take 1 tablet by mouth Daily. 90 tablet 1   • bosutinib (BOSULIF) 100 MG chemo tablet Take 4 tablets by mouth Daily. 120 tablet 0   • cyclobenzaprine (FEXMID) 7.5 MG tablet Take 1 tablet by mouth 2 (Two) Times a Day As Needed for Muscle Spasms. 30 tablet 3   • levothyroxine (Synthroid) 112 MCG tablet Take 1 tablet by mouth Daily. 90 tablet 3   • LORazepam (ATIVAN) 0.5 MG tablet TAKE 1 TABLET BY MOUTH TWICE DAILY AS NEEDED FOR ANXIETY 180 tablet 0   • losartan (Cozaar) 50 MG tablet Take 1 tablet by mouth Daily. 90 tablet 0   • meloxicam (MOBIC) 7.5 MG tablet TAKE 1 TABLET BY MOUTH EVERY DAY 90 tablet 1   • OLANZapine (zyPREXA) 5 MG tablet TAKE 1 TABLET BY MOUTH EVERY NIGHT 90 tablet 1   • promethazine (PHENERGAN) 12.5 MG tablet TAKE 1 TABLET BY MOUTH THREE TIMES DAILY AS NEEDED 60 tablet 0   • traMADol (ULTRAM) 50 MG tablet Take 1 tablet by mouth Every 6 (Six) Hours As Needed for Moderate Pain . 56 tablet 0   • Vortioxetine HBr (Trintellix) 10 MG tablet Take 10 mg by mouth Daily. 30 tablet 9     No current facility-administered medications on file prior to visit.            Which medication are you concerned about: UNKNOWN    what are your concerns: REQUEST APPOINTMENT TO SEE DOCTOR.  PREVIOUS APPOINTMENT CANCELLED.  CONCERNED THAT A MEDICATION MIGHT BE AFFECTING HER

## 2021-06-17 NOTE — TELEPHONE ENCOUNTER
I TRC for Clarissa I need some more information about what she is experiencing and what medication.  I can get her in to see him next week.

## 2021-06-18 DIAGNOSIS — M25.561 ACUTE PAIN OF RIGHT KNEE: Primary | ICD-10-CM

## 2021-06-18 RX ORDER — TRAMADOL HYDROCHLORIDE 50 MG/1
50 TABLET ORAL EVERY 6 HOURS PRN
Qty: 90 TABLET | Refills: 0 | Status: SHIPPED | OUTPATIENT
Start: 2021-06-18 | End: 2021-07-20 | Stop reason: SDUPTHER

## 2021-06-18 NOTE — TELEPHONE ENCOUNTER
Have her see me this week so we can discuss whats bothering her. If she is not well, she can utilized the ER.

## 2021-07-13 DIAGNOSIS — E78.5 HYPERLIPIDEMIA, UNSPECIFIED HYPERLIPIDEMIA TYPE: ICD-10-CM

## 2021-07-13 RX ORDER — ATORVASTATIN CALCIUM 40 MG/1
40 TABLET, FILM COATED ORAL DAILY
Qty: 90 TABLET | Refills: 1 | Status: SHIPPED | OUTPATIENT
Start: 2021-07-13 | End: 2022-03-01 | Stop reason: SDUPTHER

## 2021-07-15 ENCOUNTER — TELEPHONE (OUTPATIENT)
Dept: INTERNAL MEDICINE | Facility: CLINIC | Age: 76
End: 2021-07-15

## 2021-07-15 DIAGNOSIS — I10 ESSENTIAL HYPERTENSION: ICD-10-CM

## 2021-07-15 RX ORDER — LOSARTAN POTASSIUM 50 MG/1
50 TABLET ORAL DAILY
Qty: 90 TABLET | Refills: 0 | Status: SHIPPED | OUTPATIENT
Start: 2021-07-15 | End: 2021-11-11

## 2021-07-15 NOTE — TELEPHONE ENCOUNTER
Caller: Rolly Nicole C    Relationship: Self    Best call back number: [phone]    Medication needed:   Requested Prescriptions     Pending Prescriptions Disp Refills   • losartan (Cozaar) 50 MG tablet 90 tablet 0     Sig: Take 1 tablet by mouth Daily.       When do you need the refill by: ASAP    What additional details did the patient provide when requesting the medication: PATIENT STATES THAT THE PHARMACY HAS BEEN TRYING TO GET THIS MEDICATION REFILLED FOR 4 OR 5 DAYS.  PATIENT HAS A FOLLOW UP ON 7/30 WITH DR. KELLER.    Does the patient have less than a 3 day supply:  [x] Yes  [] No    What is the patient's preferred pharmacy: [facility] #[id number] [facility] [address] AT [facility] OF [facility] - [phone] [facility] [phone] FX

## 2021-07-20 DIAGNOSIS — F41.9 ANXIETY: ICD-10-CM

## 2021-07-20 DIAGNOSIS — M25.561 ACUTE PAIN OF RIGHT KNEE: ICD-10-CM

## 2021-07-20 DIAGNOSIS — F51.01 PRIMARY INSOMNIA: ICD-10-CM

## 2021-07-20 NOTE — TELEPHONE ENCOUNTER
Caller: Nicole Lofton    Relationship: Self    Best call back number: [phone]     Medication needed:   Requested Prescriptions     Pending Prescriptions Disp Refills   • LORazepam (ATIVAN) 0.5 MG tablet 180 tablet 0     Sig: Take 1 tablet by mouth 2 (Two) Times a Day As Needed. for anxiety   • traMADol (ULTRAM) 50 MG tablet 90 tablet 0     Sig: Take 1 tablet by mouth Every 6 (Six) Hours As Needed for Moderate Pain .       When do you need the refill by: 07/20/21    What additional details did the patient provide when requesting the medication: PATIENT STATES SHE IS OUT OF THE MEDICATIONS    Does the patient have less than a 3 day supply:  [x] Yes  [] No    What is the patient's preferred pharmacy: [facility] #[id number] [address] AT [facility] [phone] [facility] [phone] FX

## 2021-07-21 RX ORDER — LORAZEPAM 0.5 MG/1
0.5 TABLET ORAL 2 TIMES DAILY PRN
Qty: 180 TABLET | Refills: 0 | Status: SHIPPED | OUTPATIENT
Start: 2021-07-21 | End: 2021-08-23

## 2021-07-21 RX ORDER — TRAMADOL HYDROCHLORIDE 50 MG/1
50 TABLET ORAL EVERY 6 HOURS PRN
Qty: 90 TABLET | Refills: 0 | Status: SHIPPED | OUTPATIENT
Start: 2021-07-21 | End: 2021-08-13

## 2021-07-26 DIAGNOSIS — I10 ESSENTIAL HYPERTENSION: ICD-10-CM

## 2021-07-27 ENCOUNTER — TELEPHONE (OUTPATIENT)
Dept: INTERNAL MEDICINE | Facility: CLINIC | Age: 76
End: 2021-07-27

## 2021-07-27 DIAGNOSIS — R26.81 UNSTEADY GAIT: Primary | ICD-10-CM

## 2021-07-27 RX ORDER — AMLODIPINE BESYLATE 10 MG/1
10 TABLET ORAL DAILY
Qty: 90 TABLET | Refills: 1 | Status: SHIPPED | OUTPATIENT
Start: 2021-07-27 | End: 2021-11-10 | Stop reason: SDUPTHER

## 2021-07-27 NOTE — TELEPHONE ENCOUNTER
Caller: ADRIANA PAGE    Relationship: DAUGHTER    Best call back number: [phone]    What orders are you requesting (i.e. lab or imaging): CAROL MOON    In what timeframe would the patient need to come in: ASAP    Where will you receive your lab/imaging services: [facility]    Additional notes: PATIENT'S DAUGHTER WOULD LIKE TO  THE ORDER FOR THIS WALKER, PLEASE ADVISE WHEN READY

## 2021-08-11 DIAGNOSIS — M25.561 ACUTE PAIN OF RIGHT KNEE: ICD-10-CM

## 2021-08-11 DIAGNOSIS — I10 ESSENTIAL HYPERTENSION: ICD-10-CM

## 2021-08-13 ENCOUNTER — TELEPHONE (OUTPATIENT)
Dept: INTERNAL MEDICINE | Facility: CLINIC | Age: 76
End: 2021-08-13

## 2021-08-13 DIAGNOSIS — M25.561 ACUTE PAIN OF RIGHT KNEE: ICD-10-CM

## 2021-08-13 RX ORDER — TRAMADOL HYDROCHLORIDE 50 MG/1
TABLET ORAL
Qty: 30 TABLET | Refills: 2 | Status: SHIPPED | OUTPATIENT
Start: 2021-08-13 | End: 2021-08-13 | Stop reason: SDUPTHER

## 2021-08-13 RX ORDER — TRAMADOL HYDROCHLORIDE 50 MG/1
TABLET ORAL
Qty: 90 TABLET | Refills: 0 | Status: SHIPPED | OUTPATIENT
Start: 2021-08-13 | End: 2022-02-24

## 2021-08-13 RX ORDER — HYDROCHLOROTHIAZIDE 12.5 MG/1
12.5 TABLET ORAL DAILY
Qty: 30 TABLET | Refills: 6 | Status: SHIPPED | OUTPATIENT
Start: 2021-08-13 | End: 2021-12-03 | Stop reason: SDUPTHER

## 2021-08-13 NOTE — TELEPHONE ENCOUNTER
Caller: Nicole Lofton    Relationship: Self    Best call back number: [phone]    Medication needed:   Requested Prescriptions     Pending Prescriptions Disp Refills   • traMADol (ULTRAM) 50 MG tablet 30 tablet 2       When do you need the refill by: ASAP     What additional details did the patient provide when requesting the medication: OUT OF MEDICATION     Does the patient have less than a 3 day supply:  [x] Yes  [] No    What is the patient's preferred pharmacy: [facility] #[id number] [address] AT [place] [phone] [place] [phone]

## 2021-08-17 DIAGNOSIS — F51.01 PRIMARY INSOMNIA: ICD-10-CM

## 2021-08-17 DIAGNOSIS — M25.561 ACUTE PAIN OF RIGHT KNEE: ICD-10-CM

## 2021-08-17 DIAGNOSIS — F41.9 ANXIETY: ICD-10-CM

## 2021-08-17 RX ORDER — TRAMADOL HYDROCHLORIDE 50 MG/1
50 TABLET ORAL EVERY 6 HOURS PRN
Qty: 30 TABLET | Refills: 2 | Status: SHIPPED | OUTPATIENT
Start: 2021-08-17 | End: 2021-08-26 | Stop reason: SDUPTHER

## 2021-08-23 RX ORDER — LORAZEPAM 0.5 MG/1
TABLET ORAL
Qty: 180 TABLET | Refills: 2 | Status: SHIPPED | OUTPATIENT
Start: 2021-08-23 | End: 2021-08-26 | Stop reason: SDUPTHER

## 2021-08-24 ENCOUNTER — TELEPHONE (OUTPATIENT)
Dept: ONCOLOGY | Facility: CLINIC | Age: 76
End: 2021-08-24

## 2021-08-24 DIAGNOSIS — C92.10 CML (CHRONIC MYELOID LEUKEMIA) (HCC): Primary | ICD-10-CM

## 2021-08-24 DIAGNOSIS — D75.839 THROMBOCYTOSIS: ICD-10-CM

## 2021-08-24 NOTE — PROGRESS NOTES
"[facility] CBC GROUP OUTPATIENT FOLLOW UP VISIT    REASON FOR FOLLOW-UP:    1.  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  Gleevec 400 mg daily initiated around 10/12/2017, stopped on 11/15/2017 due to intolerance (noah-orbital edema, nausea/vomiting, fatigue).  Resumed at 200 mg daily but, again, not tolerated.    3.  Nilotinib 150 mg twice daily started in late March, 2018.  Held due to intolerance and QTc prolongation.   4.  Hydroxyurea initiated in July 2018.  Subsequently discontinued.     5.  Bosutinib 400 mg daily started Feb 2019    HISTORY OF PRESENT ILLNESS:  Nicole Lofton is a 75 y.o. female with the above-mentioned history.  She is seen today for triage visit her daughter present.  She continues on bosutinib daily.  She reports having recently moved from her home into a assisted living facility.  After moving to the assisted living facility she became very depressed, experiencing fatigue, confusion, increased back pain, and weight loss.  She was then moved back from the assisted living facility into her home where her granddaughter lives with her now.  She has experienced approximately 10 pound weight loss since June.  Since moving back into her home, she feels she is eating and drinking adequately.  She does experience occasional nausea.  Her bowels are moving regularly.    She was evaluated at urgent care on 8/21/2021 and was negative for COVID-19 and UTI.     ROS:  As mentioned in HPI    Vitals:    08/25/21 1253   BP: 179/74   Pulse: 69   Resp: 16   Temp: 97.7 °F (36.5 °C)   TempSrc: Temporal   SpO2: 95%   Weight: 52.6 kg (116 lb)   Height: 165 cm (64.96\")   PainSc: 0-No pain     General:  No acute distress, awake, alert and oriented.  Walks with a cane.  Chronically ill-appearing.  Skin:  Warm and dry, no visible rash  HEENT:  Normocephalic/atraumatic.  Wearing a facemask.  Chest:  Normal respiratory effort.  Lungs clear to auscultation " bilaterally.  Heart: Regular with some ectopy  Extremities:  No visible clubbing, cyanosis, or edema  Neuro/psych:  Grossly non-focal.  Normal mood and affect.    DIAGNOSTIC DATA:  Comprehensive Metabolic Panel (08/25/2021 12:27)  CBC & Differential (08/25/2021 12:27)    IMAGING:  None reviewed    ASSESSMENT:  This is a 75 y.o. female with:    *History of bilateral breast cancer in 1988 status post bilateral mastectomy.  She apparently completed 6 months of adjuvant therapy.  We have no details regarding this.    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    · Started on Hydrea 1000 mg daily.    · This was discontinued and she started Gleevec on approximately 10/12/2017.    · Gleevec discontinued due to intolerance on 11/15/2017.    · We started Gleevec at a lower dose of 200 mg bu  · t she was also intolerant of this dose.  Her side effects were as severe with a lower dose and she therefore stopped taking the drug.  Symptoms improved significantly.  Platelet count subsequently elevated again.  · She started nilotinib at 150mg twice daily at the end of March 2018.  This was held in mid-May due to QTc prolongation.  The QTc interval did subsequently normalized.  However, due to this and other adverse effects we are not able to resume the nilotinib nor does she desire to.  · In July we resumed hydroxyurea 1000 mg daily.  Her platelet count increased.  Her dose was increased to 1000 mg twice daily.  Blood counts improved nicely.  Platelet count normalized but her white blood cell count dropped to below normal.  Decreased hydroxyurea to 1000 mg daily.  · 11/20/18 platelets were up again.  We increased the hydroxyurea to 1000 mg in the mornings and 500 mg in the evenings.  · As of 1/15/2019 her platelet count was again elevated to 1.2 million.  Increased hydroxyurea to 1000 mg twice daily.    · Platelets improved but remained far from normal.  · Due to her history we  referred her down to the [facility] to see Dr. Cifuentes for assistance and recommendations on further therapy.  · In mid-February 2019 she started bosutinib 400 mg daily which she takes along with olanzapine at night (she takes this as needed now).     · Blood counts look good.  Last PCR 1/6/2021 negative.  · 8/25/2021 the patient is seen today for triage.  She continues on bosutinib daily.  She was recently moved from her home into an assisted living facility.  After moving, the patient experienced several issues including worsening depression, fatigue, and confusion which will be discussed further below.  The patient was taken to urgent care for evaluation where COVID-19 and UA were performed, negative.  The patient's daughter would like for her to have a CBC to make sure these counts are within normal limits, so she called our office to schedule an appointment.    *Hypothyroidism:  Levothyroxine 100 mcg.      *Tobacco use: She is unwilling to quit smoking.      *Deconditioning    *Hypertension: Antihypertensives per primary care.  Blood pressure today 179/74.  Patient has follow-up with her PCP tomorrow.    *Bradycardia: She was referred back to Dr. Duque for this previously.  Heart rate 69 today.    *Anxiety: She is already on a couple of medications for this.  Follow-up with primary care.    *Unintentional weight loss  · 8/25/2021, the patient is seen today for triage visit.  She has lost approximately 10 pounds since June.  She was previously living in her own home and was moved to an assisted living facility.  When she lived at home by herself she would often drive to get fast food for each meal.  Since living in the assisted living facility she has been eating a much healthier diet.  She reports during these times she was eating and drinking adequately.  Since then she has been moved back to her own home where she lives with her granddaughter.    *Back pain  · 8/25/2021, the patient is seen today  complaining of worsening back pain.  She receives Ultram every 6 hours per her primary care provider.  She is scheduled to follow-up with him tomorrow and will discuss her pain control at this time.    *Nausea  · Patient reports experiencing occasional nausea with bosutinib.  She denies vomiting.  She will be sent a prescription for Zofran 4 mg to take every 8 hours as needed for nausea.    *Depression  · 8/25/2021, the patient is seen today for triage visit.  She complains of worsening depression since being moved from her home into assisted living facility.  After moving to the assisted living facility her depression greatly worsened.  She was struggling with increased fatigue, increased back pain, and confusion.  She was then moved back into her home where she lives with her granddaughter.  She follows with a mental health provider and is on medication for this.    *Hypokalemia  · 8/25/2021, potassium today 3.0.  Discussed increasing dietary potassium, however the patient would like to go ahead and initiate potassium replacement.  Prescription sent for potassium 10 M EQ's daily.    PLAN:   1. Start Zofran 4 mg every 8 hours as needed for nausea.  Prescription sent to pharmacy today.  2. Start potassium 10 M EQ daily.  Prescription sent to pharmacy today.  3. Check BCR abl today.  4. Continue bosutinib   5. Return November 2021 for follow up with Dr. Merida.  6. Follow-up with PCP tomorrow regarding hypertension, back pain, and depression.    The patient is on high risk medication requiring close monitoring for toxicity.  The daughter helps with history taking.    I spent 50 minutes caring for Nicole on this date of service. This time includes time spent by me in the following activities: preparing for the visit, reviewing tests, obtaining and/or reviewing a separately obtained history, performing a medically appropriate examination and/or evaluation, counseling and educating the patient/family/caregiver, ordering  medications, tests, or procedures, documenting information in the medical record and care coordination.       Yue Rosen, FERNANDO   08/25/2021

## 2021-08-24 NOTE — TELEPHONE ENCOUNTER
PT'S DGT CALLING W/ PT UPDATE. PT IS VERY WEAK, FATIGUED, DEPRESSED AND HAS SOME CONFUSION. PT HAS BEEN SEEN IN UC OVER THE [facility]KEND AND HAD NEG URINE AND COVID TEST. NO FEVER. DGT ASKING IF WE COULD CHECK LABS. PT DOESN'T HAVE F/U UNTIL NOV. D/W JL NP AND SHE SAID TO ADD PT ON TO SEE NP W/ CBC AND CMP. DGT REQUESTING EP LOCATION AND WANTS TO BRING PT TOMORROW. LAB ORDERS PLACED. APPT DESK MESSAGED TO SET UP. DGT V/U.

## 2021-08-24 NOTE — TELEPHONE ENCOUNTER
----- Message from Celia Jeong RN sent at 8/24/2021  8:37 AM EDT -----  Regarding: APPT TOMORROW AT EP LOCATION!  PT NEEDS APPT TOMORROW AT EP LOCATION FOR LABS (CBC, STAT CMP) AND 2 U NP VISIT AMIRAH/ SKIP. THANKS! CALL PT'S DGT BRANDY TO SET UP. THANKS!

## 2021-08-25 ENCOUNTER — LAB (OUTPATIENT)
Dept: OTHER | Facility: HOSPITAL | Age: 76
End: 2021-08-25

## 2021-08-25 ENCOUNTER — OFFICE VISIT (OUTPATIENT)
Dept: ONCOLOGY | Facility: CLINIC | Age: 76
End: 2021-08-25

## 2021-08-25 VITALS
HEIGHT: 65 IN | RESPIRATION RATE: 16 BRPM | BODY MASS INDEX: 19.33 KG/M2 | WEIGHT: 116 LBS | TEMPERATURE: 97.7 F | HEART RATE: 69 BPM | SYSTOLIC BLOOD PRESSURE: 179 MMHG | OXYGEN SATURATION: 95 % | DIASTOLIC BLOOD PRESSURE: 74 MMHG

## 2021-08-25 DIAGNOSIS — F32.A DEPRESSION, UNSPECIFIED DEPRESSION TYPE: ICD-10-CM

## 2021-08-25 DIAGNOSIS — D75.839 THROMBOCYTOSIS: ICD-10-CM

## 2021-08-25 DIAGNOSIS — R11.0 NAUSEA: ICD-10-CM

## 2021-08-25 DIAGNOSIS — Z79.899 HIGH RISK MEDICATION USE: ICD-10-CM

## 2021-08-25 DIAGNOSIS — E87.6 HYPOKALEMIA: ICD-10-CM

## 2021-08-25 DIAGNOSIS — C92.10 CML (CHRONIC MYELOID LEUKEMIA) (HCC): Primary | ICD-10-CM

## 2021-08-25 DIAGNOSIS — C92.10 CML (CHRONIC MYELOID LEUKEMIA) (HCC): ICD-10-CM

## 2021-08-25 DIAGNOSIS — G89.29 OTHER CHRONIC PAIN: ICD-10-CM

## 2021-08-25 DIAGNOSIS — I10 HYPERTENSION, UNSPECIFIED TYPE: ICD-10-CM

## 2021-08-25 LAB
ALBUMIN SERPL-MCNC: 5 G/DL (ref 3.5–5.2)
ALBUMIN/GLOB SERPL: 1.6 G/DL
ALP SERPL-CCNC: 84 U/L (ref 39–117)
ALT SERPL W P-5'-P-CCNC: 11 U/L (ref 1–33)
ANION GAP SERPL CALCULATED.3IONS-SCNC: 13.2 MMOL/L (ref 5–15)
AST SERPL-CCNC: 26 U/L (ref 1–32)
BASOPHILS # BLD AUTO: 0.02 10*3/MM3 (ref 0–0.2)
BASOPHILS NFR BLD AUTO: 0.2 % (ref 0–1.5)
BILIRUB SERPL-MCNC: 0.4 MG/DL (ref 0–1.2)
BUN SERPL-MCNC: 13 MG/DL (ref 8–23)
BUN/CREAT SERPL: 10.8 (ref 7–25)
CALCIUM SPEC-SCNC: 10.1 MG/DL (ref 8.6–10.5)
CHLORIDE SERPL-SCNC: 89 MMOL/L (ref 98–107)
CO2 SERPL-SCNC: 28.8 MMOL/L (ref 22–29)
CREAT SERPL-MCNC: 1.2 MG/DL (ref 0.57–1)
DEPRECATED RDW RBC AUTO: 47.5 FL (ref 37–54)
EOSINOPHIL # BLD AUTO: 0.03 10*3/MM3 (ref 0–0.4)
EOSINOPHIL NFR BLD AUTO: 0.3 % (ref 0.3–6.2)
ERYTHROCYTE [DISTWIDTH] IN BLOOD BY AUTOMATED COUNT: 15.9 % (ref 12.3–15.4)
GFR SERPL CREATININE-BSD FRML MDRD: 44 ML/MIN/1.73
GLOBULIN UR ELPH-MCNC: 3.2 GM/DL
GLUCOSE SERPL-MCNC: 114 MG/DL (ref 65–99)
HCT VFR BLD AUTO: 40.7 % (ref 34–46.6)
HGB BLD-MCNC: 13.3 G/DL (ref 12–15.9)
IMM GRANULOCYTES # BLD AUTO: 0.04 10*3/MM3 (ref 0–0.05)
IMM GRANULOCYTES NFR BLD AUTO: 0.4 % (ref 0–0.5)
LYMPHOCYTES # BLD AUTO: 1.28 10*3/MM3 (ref 0.7–3.1)
LYMPHOCYTES NFR BLD AUTO: 12.2 % (ref 19.6–45.3)
MCH RBC QN AUTO: 26.7 PG (ref 26.6–33)
MCHC RBC AUTO-ENTMCNC: 32.7 G/DL (ref 31.5–35.7)
MCV RBC AUTO: 81.7 FL (ref 79–97)
MONOCYTES # BLD AUTO: 0.97 10*3/MM3 (ref 0.1–0.9)
MONOCYTES NFR BLD AUTO: 9.2 % (ref 5–12)
NEUTROPHILS NFR BLD AUTO: 77.7 % (ref 42.7–76)
NEUTROPHILS NFR BLD AUTO: 8.17 10*3/MM3 (ref 1.7–7)
NRBC BLD AUTO-RTO: 0 /100 WBC (ref 0–0.2)
PLATELET # BLD AUTO: 261 10*3/MM3 (ref 140–450)
PMV BLD AUTO: 10 FL (ref 6–12)
POTASSIUM SERPL-SCNC: 3 MMOL/L (ref 3.5–5.2)
PROT SERPL-MCNC: 8.2 G/DL (ref 6–8.5)
RBC # BLD AUTO: 4.98 10*6/MM3 (ref 3.77–5.28)
SODIUM SERPL-SCNC: 131 MMOL/L (ref 136–145)
WBC # BLD AUTO: 10.51 10*3/MM3 (ref 3.4–10.8)

## 2021-08-25 PROCEDURE — 80053 COMPREHEN METABOLIC PANEL: CPT | Performed by: NURSE PRACTITIONER

## 2021-08-25 PROCEDURE — 85025 COMPLETE CBC W/AUTO DIFF WBC: CPT | Performed by: NURSE PRACTITIONER

## 2021-08-25 PROCEDURE — 36415 COLL VENOUS BLD VENIPUNCTURE: CPT

## 2021-08-25 PROCEDURE — 99215 OFFICE O/P EST HI 40 MIN: CPT | Performed by: NURSE PRACTITIONER

## 2021-08-25 RX ORDER — ONDANSETRON 4 MG/1
4 TABLET, FILM COATED ORAL EVERY 8 HOURS PRN
Qty: 30 TABLET | Refills: 2 | Status: SHIPPED | OUTPATIENT
Start: 2021-08-25 | End: 2021-11-10

## 2021-08-25 RX ORDER — POTASSIUM CHLORIDE 750 MG/1
10 TABLET, EXTENDED RELEASE ORAL DAILY
Qty: 30 TABLET | Refills: 3 | Status: SHIPPED | OUTPATIENT
Start: 2021-08-25 | End: 2022-03-22 | Stop reason: SDUPTHER

## 2021-08-26 ENCOUNTER — TELEPHONE (OUTPATIENT)
Dept: ONCOLOGY | Facility: CLINIC | Age: 76
End: 2021-08-26

## 2021-08-26 ENCOUNTER — OFFICE VISIT (OUTPATIENT)
Dept: INTERNAL MEDICINE | Facility: CLINIC | Age: 76
End: 2021-08-26

## 2021-08-26 DIAGNOSIS — F41.9 ANXIETY: Primary | ICD-10-CM

## 2021-08-26 DIAGNOSIS — F51.01 PRIMARY INSOMNIA: ICD-10-CM

## 2021-08-26 DIAGNOSIS — F32.A DEPRESSION, UNSPECIFIED DEPRESSION TYPE: ICD-10-CM

## 2021-08-26 DIAGNOSIS — G89.29 OTHER CHRONIC PAIN: ICD-10-CM

## 2021-08-26 DIAGNOSIS — M25.561 ACUTE PAIN OF RIGHT KNEE: ICD-10-CM

## 2021-08-26 PROCEDURE — 99214 OFFICE O/P EST MOD 30 MIN: CPT | Performed by: FAMILY MEDICINE

## 2021-08-26 RX ORDER — BUPRENORPHINE HYDROCHLORIDE 150 UG/1
1 FILM, SOLUBLE BUCCAL 2 TIMES DAILY
Qty: 60 EACH | Refills: 1 | Status: SHIPPED | OUTPATIENT
Start: 2021-08-26 | End: 2022-02-24

## 2021-08-26 RX ORDER — LORAZEPAM 0.5 MG/1
0.5 TABLET ORAL 2 TIMES DAILY PRN
Qty: 180 TABLET | Refills: 2 | Status: SHIPPED | OUTPATIENT
Start: 2021-08-26 | End: 2021-09-09

## 2021-08-26 RX ORDER — VORTIOXETINE 20 MG/1
1 TABLET, FILM COATED ORAL DAILY
Qty: 30 TABLET | Refills: 11 | Status: SHIPPED | OUTPATIENT
Start: 2021-08-26 | End: 2022-03-09 | Stop reason: SDUPTHER

## 2021-08-26 RX ORDER — TRAMADOL HYDROCHLORIDE 50 MG/1
50 TABLET ORAL EVERY 6 HOURS PRN
Qty: 90 TABLET | Refills: 2 | Status: SHIPPED | OUTPATIENT
Start: 2021-08-26 | End: 2021-11-10 | Stop reason: SDUPTHER

## 2021-08-26 NOTE — TELEPHONE ENCOUNTER
Pt said she picked her medicine up yesterday and lost the Zofran.  She thinks she might have thrown it away.    Will it hurt if she don't take it, she doesn't know what it's for.

## 2021-08-26 NOTE — TELEPHONE ENCOUNTER
S/W PT. DAUGHTER BRANDY.  SHE DID NOT KNOW HER MOTHER CALLED ABOUT LOSING HER ZOFRAN.  STATES HER MOTHER IS SLOWLY GETTING DEMENTIA AND SHE DID NOT LOSE HER ZOFRAN.  STATES SHE IS ON HER WAY OVER THERE NOW AND SHE WILL TALK TO HER AND GO OVER HER MEDS WITH HER.  INSTRUCTED TO CALL FOR ANY QUESTIONS OR CONCERNS.  V/U.

## 2021-08-26 NOTE — PROGRESS NOTES
Chief Complaint  No chief complaint on file.    Cc anxiety/depression    This visit has been rescheduled as a phone visit to comply with patient safety concerns in accordance with CDC recommendations. Total time of discussion was 21 minutes.    You have chosen to receive care through a telephone visit. Do you consent to use a telephone visit for your medical care today? Yes      Subjective          Nicole Lofton presents to [facility]  History of Present Illness    Spent most of this office visit discussing patient's health care with her daughter Clarissa.    At today's office visit it appears the patient may not be doing as well as she once was.  She recently moved back out of the nursing home facility to go back into her own condo.  It appears that patient has trouble ambulating and is feeling weak.  It appears that it could all be related to anxiety and depression.  Patient is constantly feeling depressed.  She is also feeling anxious.  She has been on Ativan 0.5 mg twice daily.  She also takes Trintellix 10 mg daily.  Patient been on numerous different medications for her depression, capital extension works well for her.    Patient also notes to have a history of having chronic pain.  Patient states that her tramadol does not seem to last as long.  She is currently taking tramadol 50 mg every 6 hours.  We did discuss options such as long-acting pain medication.          Objective   Vital Signs:   There were no vitals taken for this visit.    Physical Exam  Vitals and nursing note reviewed.   Constitutional:       Appearance: She is well-developed.   HENT:      Head: Normocephalic and atraumatic.   Neurological:      Mental Status: She is alert and oriented to person, place, and time.   Psychiatric:         Behavior: Behavior normal.        Result Review :                 Assessment and Plan    Diagnoses and all orders for this visit:    1. Anxiety (Primary)  -     Vortioxetine HBr  (Trintellix) 20 MG tablet; Take 20 mg by mouth Daily.  Dispense: 30 tablet; Refill: 11  -     LORazepam (ATIVAN) 0.5 MG tablet; Take 1 tablet by mouth 2 (Two) Times a Day As Needed for Anxiety. for anxiety  Dispense: 180 tablet; Refill: 2    2. Depression, unspecified depression type  -     Vortioxetine HBr (Trintellix) 20 MG tablet; Take 20 mg by mouth Daily.  Dispense: 30 tablet; Refill: 11    3. Other chronic pain  -     Buprenorphine HCl (Belbuca) 150 MCG film; Apply 1 each to cheek 2 (two) times a day.  Dispense: 60 each; Refill: 1  -     Ambulatory Referral to Pain Management    4. Acute pain of right knee  -     traMADol (ULTRAM) 50 MG tablet; Take 1 tablet by mouth Every 6 (Six) Hours As Needed for Moderate Pain .  Dispense: 90 tablet; Refill: 2    5. Primary insomnia  -     LORazepam (ATIVAN) 0.5 MG tablet; Take 1 tablet by mouth 2 (Two) Times a Day As Needed for Anxiety. for anxiety  Dispense: 180 tablet; Refill: 2      At today's office visit that I discussed for her depression I would like to increase the Trintellix to 20 mg daily.  She is still continue taking the Ativan as prescribed.  And the higher dose of the Trintellix will also help with anxiety.  Chronic pain, we will increase the quantity of the tramadol.  I would like to start her on a longer acting such as Belbuca 150 mcg twice daily.  I will also like to refer her to pain management.      Follow Up   No follow-ups on file.  Patient was given instructions and counseling regarding her condition or for health maintenance advice. Please see specific information pulled into the AVS if appropriate.

## 2021-09-07 DIAGNOSIS — F51.01 PRIMARY INSOMNIA: ICD-10-CM

## 2021-09-07 DIAGNOSIS — F41.9 ANXIETY: ICD-10-CM

## 2021-09-09 RX ORDER — LORAZEPAM 0.5 MG/1
TABLET ORAL
Qty: 180 TABLET | Refills: 1 | Status: SHIPPED | OUTPATIENT
Start: 2021-09-09 | End: 2021-09-13

## 2021-09-13 ENCOUNTER — OFFICE VISIT (OUTPATIENT)
Dept: INTERNAL MEDICINE | Facility: CLINIC | Age: 76
End: 2021-09-13

## 2021-09-13 DIAGNOSIS — F32.A DEPRESSION, UNSPECIFIED DEPRESSION TYPE: ICD-10-CM

## 2021-09-13 DIAGNOSIS — F41.9 ANXIETY: Primary | ICD-10-CM

## 2021-09-13 PROCEDURE — 99214 OFFICE O/P EST MOD 30 MIN: CPT | Performed by: FAMILY MEDICINE

## 2021-09-13 RX ORDER — LORAZEPAM 1 MG/1
1 TABLET ORAL 2 TIMES DAILY PRN
Qty: 60 TABLET | Refills: 3 | Status: SHIPPED | OUTPATIENT
Start: 2021-09-13 | End: 2022-03-09 | Stop reason: SDUPTHER

## 2021-09-13 NOTE — PROGRESS NOTES
Chief Complaint  No chief complaint on file.    Cc depression and anxiety    Subjective          Nicole Lofton presents to [facility]  History of Present Illness    She does have past medical history having anxiety and depression.  For her anxiety, we had increased her Trintellix to 20 mg daily.  She is taking that, but she still feels anxious.  She is currently taking lorazepam 0.5 mg twice daily.  He states that she has needed to use more of this, is currently out of the medication.  Lately she states that she is feeling far more anxious.  Many of the symptoms have increased since coming back from the nursing home.    Objective   Vital Signs:   There were no vitals taken for this visit.    Physical Exam  Vitals and nursing note reviewed.   Constitutional:       Appearance: She is well-developed.   HENT:      Head: Normocephalic and atraumatic.   Musculoskeletal:      Cervical back: Normal range of motion and neck supple.   Neurological:      Mental Status: She is alert and oriented to person, place, and time.   Psychiatric:         Behavior: Behavior normal.        Result Review :                 Assessment and Plan {CC Problem List  Visit Diagnosis   ROS  Review (Popup)  Health Maintenance  Quality  BestPractice  Medications  SmartSets  SnapShot Encounters  Media :23}   Diagnoses and all orders for this visit:    1. Anxiety (Primary)  -     LORazepam (ATIVAN) 1 MG tablet; Take 1 tablet by mouth 2 (Two) Times a Day As Needed for Anxiety.  Dispense: 60 tablet; Refill: 3    2. Depression, unspecified depression type    Would like her to continue taking Trintellix 20 mg daily.  Will increase Ativan to 1 mg twice daily.  I did recommend her seeing a psychiatrist as well as a psychologist.      Follow Up   No follow-ups on file.  Patient was given instructions and counseling regarding her condition or for health maintenance advice. Please see specific information pulled  into the AVS if appropriate.

## 2021-10-04 ENCOUNTER — OFFICE VISIT (OUTPATIENT)
Dept: INTERNAL MEDICINE | Facility: CLINIC | Age: 76
End: 2021-10-04

## 2021-10-04 ENCOUNTER — LAB (OUTPATIENT)
Dept: LAB | Facility: HOSPITAL | Age: 76
End: 2021-10-04

## 2021-10-04 VITALS
RESPIRATION RATE: 15 BRPM | WEIGHT: 117.3 LBS | HEIGHT: 65 IN | BODY MASS INDEX: 19.54 KG/M2 | SYSTOLIC BLOOD PRESSURE: 144 MMHG | HEART RATE: 63 BPM | OXYGEN SATURATION: 97 % | DIASTOLIC BLOOD PRESSURE: 82 MMHG

## 2021-10-04 DIAGNOSIS — R35.0 URINARY FREQUENCY: ICD-10-CM

## 2021-10-04 DIAGNOSIS — Z74.09 DECREASED AMBULATION STATUS: Primary | ICD-10-CM

## 2021-10-04 LAB
BACTERIA UR QL AUTO: ABNORMAL /HPF
BILIRUB UR QL STRIP: NEGATIVE
CLARITY UR: CLEAR
COLOR UR: YELLOW
GLUCOSE UR STRIP-MCNC: NEGATIVE MG/DL
HGB UR QL STRIP.AUTO: NEGATIVE
HYALINE CASTS UR QL AUTO: ABNORMAL /LPF
KETONES UR QL STRIP: NEGATIVE
LEUKOCYTE ESTERASE UR QL STRIP.AUTO: ABNORMAL
NITRITE UR QL STRIP: NEGATIVE
PH UR STRIP.AUTO: 6.5 [PH] (ref 5–8)
PROT UR QL STRIP: ABNORMAL
RBC # UR: ABNORMAL /HPF
REF LAB TEST METHOD: ABNORMAL
SP GR UR STRIP: 1.02 (ref 1–1.03)
SQUAMOUS #/AREA URNS HPF: ABNORMAL /HPF
UROBILINOGEN UR QL STRIP: ABNORMAL
WBC UR QL AUTO: ABNORMAL /HPF

## 2021-10-04 PROCEDURE — 81001 URINALYSIS AUTO W/SCOPE: CPT | Performed by: FAMILY MEDICINE

## 2021-10-04 PROCEDURE — 99214 OFFICE O/P EST MOD 30 MIN: CPT | Performed by: FAMILY MEDICINE

## 2021-10-04 RX ORDER — SULFAMETHOXAZOLE AND TRIMETHOPRIM 800; 160 MG/1; MG/1
1 TABLET ORAL 2 TIMES DAILY
Qty: 14 TABLET | Refills: 0 | Status: SHIPPED | OUTPATIENT
Start: 2021-10-04 | End: 2021-10-11

## 2021-10-04 NOTE — PROGRESS NOTES
"Chief Complaint  No chief complaint on file.    Chief complaint urinary frequency.    Subjective          Nicole Lofton presents to [facility]  History of Present Illness    Patient presents at today's office visit for concern about having decreased ambulation status.  She states that she feels weak and very wobbly.  She has been feel like this for quite some time.  She was possibly an assisted living facility which she has left.  Patient is currently using only her cane.  She states that she has a walker at home but did not not want to bring it due to the inconvenience of it.  We have discussed multiple times about using proper assistive device, and appears that she is not, but she still continues to feel wobbly, and is concerned about a fall.    Patient also notes that she has some urinary frequency.  She states that she is not urinating much, but this sensation is been going on for almost a month.  She denies any dysuria.    Objective   Vital Signs:   /82   Pulse 63   Resp 15   Ht 164.3 cm (64.69\")   Wt 53.2 kg (117 lb 4.8 oz)   SpO2 97%   BMI 19.71 kg/m²     Physical Exam  Vitals and nursing note reviewed.   Constitutional:       Appearance: She is well-developed.   HENT:      Head: Normocephalic and atraumatic.   Musculoskeletal:      Cervical back: Normal range of motion and neck supple.   Neurological:      Mental Status: She is alert and oriented to person, place, and time.   Psychiatric:         Behavior: Behavior normal.        Result Review :                 Assessment and Plan    Diagnoses and all orders for this visit:    1. Decreased ambulation status (Primary)  -     Ambulatory Referral to Home Health  -     She needs to do physical therapy and Occupational Therapy, however her leaving the home is very difficult for her as she is dependent and reliant on her family members.  But physical therapy and Occupational Therapy could help her build up strength " and help her with the balance.    2. Urinary frequency  -     Urinalysis With Microscopic - Urine, Clean Catch  -     sulfamethoxazole-trimethoprim (BACTRIM DS,SEPTRA DS) 800-160 MG per tablet; Take 1 tablet by mouth 2 (Two) Times a Day for 7 days.  Dispense: 14 tablet; Refill: 0  -     Check a urine at today's office visit.  I did send some Bactrim in case the urine is positive.        Follow Up   No follow-ups on file.  Patient was given instructions and counseling regarding her condition or for health maintenance advice. Please see specific information pulled into the AVS if appropriate.

## 2021-10-05 ENCOUNTER — HOME HEALTH ADMISSION (OUTPATIENT)
Dept: HOME HEALTH SERVICES | Facility: HOME HEALTHCARE | Age: 76
End: 2021-10-05

## 2021-10-14 ENCOUNTER — TELEPHONE (OUTPATIENT)
Dept: INTERNAL MEDICINE | Facility: CLINIC | Age: 76
End: 2021-10-14

## 2021-10-27 ENCOUNTER — TELEPHONE (OUTPATIENT)
Dept: INTERNAL MEDICINE | Facility: CLINIC | Age: 76
End: 2021-10-27

## 2021-10-27 NOTE — TELEPHONE ENCOUNTER
Caller: Clarissa Rodriguez    Relationship: Emergency Contact    Best call back number: [phone]    What is the best time to reach you: ANY    Who are you requesting to speak with (clinical staff, provider,  specific staff member): DR KELLER    What was the call regarding: PATIENT'S DAUGHTER CALLED ABOUT WINGING HER MOTHER OFF OF LORazepam (ATIVAN) 1 MG tablet. PATIENT IS GOING THROUGH WITHDRAWALS. SHE IS ABOUT 5 DAYS IN WITHOUT. PLEASE CALL BACK ASAP    Do you require a callback: YES

## 2021-10-31 DIAGNOSIS — I10 ESSENTIAL HYPERTENSION: ICD-10-CM

## 2021-11-01 RX ORDER — LISINOPRIL 10 MG/1
10 TABLET ORAL DAILY
Qty: 30 TABLET | Refills: 11 | OUTPATIENT
Start: 2021-11-01

## 2021-11-01 NOTE — TELEPHONE ENCOUNTER
She is probably not withdrawing if she has been off of this for this long. We will need to discuss alternatives and would need an office visit perhaps with her daughter to discuss these.

## 2021-11-01 NOTE — TELEPHONE ENCOUNTER
Spoke to patients daughter, today is her 5th day and she is better, the Zofran has helped her nausea. Will keep us updated as needed.

## 2021-11-02 ENCOUNTER — TELEPHONE (OUTPATIENT)
Dept: INTERNAL MEDICINE | Facility: CLINIC | Age: 76
End: 2021-11-02

## 2021-11-02 NOTE — TELEPHONE ENCOUNTER
PATIENTS DAUGHTER ADRIANA IS CALLING IN TO CHECK ON THE STATUS OF THE 3 MEDICATIONS THAT SHE CALLED IN ON EARLIER TODAY.  ADRIANA CAN BE REACHED  [phone]

## 2021-11-02 NOTE — TELEPHONE ENCOUNTER
Caller: ADRIANA PAGE    Relationship: Child      Medication requested (name and dosage):   LISINOPRIL 10 MG , 1 TAB PER DAY  levothyroxine (Synthroid) 0.125 MG   TRAZODONE 50 MG 1 TIME PER DAY       Pharmacy where request should be sent:   [id number] [place], [place] - [address] AT [facility] - [phone]  - [phone]   [phone]  Additional details provided by patient: PATIENT'S DAUGHTER IS CALLING TO REQUEST A NEW PRESCRIPTION FOR THE ABOVE MEDICATIONS.  SHE STATES THERE WAS A CHANGE IN HER MEDICATIONS WHEN SHE WAS IN THE HOSPITAL.    Best call back number: [phone]    Does the patient have less than a 3 day supply:  [x] Yes  [] No    Bianca Valente, Kari Rep   11/02/21 08:36 EDT       PLEASE ADVISE.

## 2021-11-03 ENCOUNTER — TELEPHONE (OUTPATIENT)
Dept: ONCOLOGY | Facility: CLINIC | Age: 76
End: 2021-11-03

## 2021-11-03 LAB — REF LAB TEST METHOD: NORMAL

## 2021-11-03 NOTE — TELEPHONE ENCOUNTER
Pt transferred to reschedule her appt- advised pt I was not sure who called her but that I could reschedule her appt - pt wanted to be scheduled out about 3 wks but we are not in the office on Friday 11/26/21 but dr garner would be available on 12/17/21 pt wanted this date at 9:50. Pt has been rescheduled and has confirmed her appt

## 2021-11-03 NOTE — TELEPHONE ENCOUNTER
Caller: ADRIANA PAGE    Relationship to patient: Child    Best call back number: [phone]    Patient is needing: DAUGHTER CALLED AGAIN REQUESTING FOR THESE TO BE SENT WITH NEW DOSING ASAP

## 2021-11-03 NOTE — TELEPHONE ENCOUNTER
PATIENT CALLED BACK STATING THAT SHE HAD JUST MISSED A CALL FROM OUR OFFICE BUT COULD NOT UNDERSTAND THE CALLER. SPOKE WITH OFFICE BUT UNABLE TO DETERMINE WHO CALLED PATIENT.  W/T TO OFFICE TO FURTHER ASSIST.

## 2021-11-04 RX ORDER — LEVOTHYROXINE SODIUM 0.12 MG/1
TABLET ORAL
Qty: 90 TABLET | Refills: 1 | Status: SHIPPED | OUTPATIENT
Start: 2021-11-04 | End: 2021-11-10 | Stop reason: DRUGHIGH

## 2021-11-04 RX ORDER — TRAZODONE HYDROCHLORIDE 50 MG/1
TABLET ORAL
Qty: 90 TABLET | Refills: 1 | Status: SHIPPED | OUTPATIENT
Start: 2021-11-04 | End: 2022-05-17

## 2021-11-04 RX ORDER — LISINOPRIL 10 MG/1
TABLET ORAL
Qty: 90 TABLET | Refills: 1 | Status: SHIPPED | OUTPATIENT
Start: 2021-11-04 | End: 2022-05-24

## 2021-11-04 NOTE — TELEPHONE ENCOUNTER
PATIENT DAUGHTER CALLING TO CHECK ON STATUS OF REFILL OF MEDICATION. WOULD LIKE 90 DAY SUPPLY IF POSSIBLE PATIENT OUT OF MEDICATIONS  PLEASE CALL AND ADVISE     CALLBACK # [phone]

## 2021-11-05 ENCOUNTER — APPOINTMENT (OUTPATIENT)
Dept: OTHER | Facility: HOSPITAL | Age: 76
End: 2021-11-05

## 2021-11-09 NOTE — TELEPHONE ENCOUNTER
Patient's daughter, Galdino, notified and expressed understanding.. She said that patient has an appointment to see Dr. Campbell tomorrow but Galdino's sister is to bring her.  She asked to have a 90 prescription for Zyprexa and Zofran for patient;

## 2021-11-10 ENCOUNTER — OFFICE VISIT (OUTPATIENT)
Dept: INTERNAL MEDICINE | Facility: CLINIC | Age: 76
End: 2021-11-10

## 2021-11-10 VITALS
RESPIRATION RATE: 16 BRPM | WEIGHT: 119.3 LBS | SYSTOLIC BLOOD PRESSURE: 120 MMHG | HEART RATE: 53 BPM | OXYGEN SATURATION: 96 % | BODY MASS INDEX: 19.88 KG/M2 | DIASTOLIC BLOOD PRESSURE: 62 MMHG | HEIGHT: 65 IN | TEMPERATURE: 97.8 F

## 2021-11-10 DIAGNOSIS — R53.81 DEBILITY: ICD-10-CM

## 2021-11-10 DIAGNOSIS — Z74.09 DECREASED AMBULATION STATUS: ICD-10-CM

## 2021-11-10 DIAGNOSIS — F41.9 ANXIETY: Primary | ICD-10-CM

## 2021-11-10 DIAGNOSIS — Z23 NEED FOR INFLUENZA VACCINATION: ICD-10-CM

## 2021-11-10 DIAGNOSIS — M25.561 ACUTE PAIN OF RIGHT KNEE: ICD-10-CM

## 2021-11-10 DIAGNOSIS — I10 ESSENTIAL HYPERTENSION: ICD-10-CM

## 2021-11-10 PROBLEM — G89.29 CHRONIC BACK PAIN: Status: ACTIVE | Noted: 2021-11-10

## 2021-11-10 PROBLEM — M62.81 MUSCLE WEAKNESS: Status: ACTIVE | Noted: 2021-11-10

## 2021-11-10 PROBLEM — M54.9 CHRONIC BACK PAIN: Status: ACTIVE | Noted: 2021-11-10

## 2021-11-10 PROBLEM — E78.00 HYPERCHOLESTEROLEMIA: Status: ACTIVE | Noted: 2021-11-10

## 2021-11-10 PROCEDURE — G0008 ADMIN INFLUENZA VIRUS VAC: HCPCS | Performed by: FAMILY MEDICINE

## 2021-11-10 PROCEDURE — 90662 IIV NO PRSV INCREASED AG IM: CPT | Performed by: FAMILY MEDICINE

## 2021-11-10 PROCEDURE — 99214 OFFICE O/P EST MOD 30 MIN: CPT | Performed by: FAMILY MEDICINE

## 2021-11-10 RX ORDER — AMLODIPINE BESYLATE 10 MG/1
10 TABLET ORAL DAILY
Qty: 90 TABLET | Refills: 1 | Status: SHIPPED | OUTPATIENT
Start: 2021-11-10 | End: 2022-09-06

## 2021-11-10 RX ORDER — TRAMADOL HYDROCHLORIDE 50 MG/1
50 TABLET ORAL EVERY 6 HOURS PRN
Qty: 90 TABLET | Refills: 2 | Status: SHIPPED | OUTPATIENT
Start: 2021-11-10 | End: 2022-02-22 | Stop reason: SDUPTHER

## 2021-11-10 RX ORDER — PROMETHAZINE HYDROCHLORIDE 12.5 MG/1
12.5 TABLET ORAL 3 TIMES DAILY PRN
Qty: 60 TABLET | Refills: 0 | Status: SHIPPED | OUTPATIENT
Start: 2021-11-10 | End: 2022-07-05 | Stop reason: SDUPTHER

## 2021-11-10 RX ORDER — ONDANSETRON 4 MG/1
TABLET, FILM COATED ORAL
Qty: 30 TABLET | Refills: 0 | Status: SHIPPED | OUTPATIENT
Start: 2021-11-10 | End: 2021-11-10 | Stop reason: SDUPTHER

## 2021-11-10 RX ORDER — ONDANSETRON 4 MG/1
TABLET, FILM COATED ORAL
Qty: 30 TABLET | Refills: 2 | OUTPATIENT
Start: 2021-11-10

## 2021-11-10 RX ORDER — ONDANSETRON 4 MG/1
4 TABLET, FILM COATED ORAL EVERY 8 HOURS PRN
Qty: 90 TABLET | Refills: 0 | Status: SHIPPED | OUTPATIENT
Start: 2021-11-10 | End: 2022-03-09 | Stop reason: SDUPTHER

## 2021-11-10 RX ORDER — LEVOTHYROXINE SODIUM 0.1 MG/1
TABLET ORAL
COMMUNITY
Start: 2021-10-30

## 2021-11-10 NOTE — TELEPHONE ENCOUNTER
I normally give the Zofran.  But I do not believe I have prescribed her Zyprexa in the past.  Either way we can discuss with this at tomorrow's visit with the patient.

## 2021-11-10 NOTE — PROGRESS NOTES
"Chief Complaint  Hospital Follow Up Visit (Was treated at [facility] on 10/22/21 for Over Using Ativan)    Subjective          Nicole Lofton presents to [facility] PRIMARY CARE  History of Present Illness    Patient notes that she was admitted at the [facility] due to overusing her Ativan.  Patient does have a long history having anxiety.  She is currently taking Trintellix 10 mg daily as well.  She has no longer taking any of her pain medications, but does note that she still has some pain.  She states that she is still taking the trazodone 50 mg at night which seems to help her sleep.  She has been started on Zyprexa 5 mg at night as well and that also seems to help her sleep as well.  She was evaluated by psychiatry during her time at the [facility].  She is still taking Ativan only once a day, and notes that she is tolerating it well and doing okay with the medication.    Patient does have a history having essential hypertension.  She is current taking amlodipine 10 mg daily along with lisinopril 10 mg daily.  She is no longer taking losartan.  Her blood pressure has been better at 120/62 at today's visit.  She denies any side effects of the medication.    Patient also has a history of having right knee pain.  We will need to restart her on the tramadol to help her with the pain.  She was stable with this medication.    Objective   Vital Signs:   /62   Pulse 53   Temp 97.8 °F (36.6 °C) (Infrared)   Resp 16   Ht 165.1 cm (65\")   Wt 54.1 kg (119 lb 4.8 oz)   SpO2 96%   BMI 19.85 kg/m²     Physical Exam  Vitals and nursing note reviewed.   Constitutional:       Appearance: She is well-developed.   HENT:      Head: Normocephalic and atraumatic.   Musculoskeletal:      Cervical back: Normal range of motion and neck supple.   Neurological:      Mental Status: She is alert and oriented to person, place, and time.   Psychiatric:         Behavior: Behavior normal.      "   Result Review :                 Assessment and Plan    Diagnoses and all orders for this visit:    1. Anxiety (Primary)    2. Essential hypertension  -     amLODIPine (NORVASC) 10 MG tablet; Take 1 tablet by mouth Daily.  Dispense: 90 tablet; Refill: 1    3. Acute pain of right knee  -     traMADol (ULTRAM) 50 MG tablet; Take 1 tablet by mouth Every 6 (Six) Hours As Needed for Moderate Pain .  Dispense: 90 tablet; Refill: 2    4. Need for influenza vaccination  -     Fluzone High-Dose 65+yrs ([id number])    Other orders  -     ondansetron (ZOFRAN) 4 MG tablet; Take 1 tablet by mouth Every 8 (Eight) Hours As Needed for Nausea or Vomiting.  Dispense: 90 tablet; Refill: 0  -     promethazine (PHENERGAN) 12.5 MG tablet; Take 1 tablet by mouth 3 (Three) Times a Day As Needed for Nausea.  Dispense: 60 tablet; Refill: 0      For patient's anxiety, continue Trintellix and Ativan.  I counseled the grandmother take more than 1 Ativan a day.  For the nausea that she is experiencing continue Phenergan and Zofran.  This could be related to her cancer medication.  As for her essential hypertension, currently stable, continue lisinopril and amlodipine.  For her chronic pain, continue tramadol.      Follow Up   No follow-ups on file.  Patient was given instructions and counseling regarding her condition or for health maintenance advice. Please see specific information pulled into the AVS if appropriate.

## 2021-11-11 ENCOUNTER — HOME HEALTH ADMISSION (OUTPATIENT)
Dept: HOME HEALTH SERVICES | Facility: HOME HEALTHCARE | Age: 76
End: 2021-11-11

## 2021-11-17 ENCOUNTER — TELEPHONE (OUTPATIENT)
Dept: INTERNAL MEDICINE | Facility: CLINIC | Age: 76
End: 2021-11-17

## 2021-11-19 ENCOUNTER — TELEPHONE (OUTPATIENT)
Dept: INTERNAL MEDICINE | Facility: CLINIC | Age: 76
End: 2021-11-19

## 2021-11-19 NOTE — TELEPHONE ENCOUNTER
PATIENT STATES THEY TALKED ABOUT HER STARTING PHYSICAL THERAPY THE LAST TIME SHE WAS IN THE OFFICE. PATIENT IS WONDERING HOW DOES SHE GET STARTED    CALLBACK [phone]

## 2021-12-03 ENCOUNTER — TELEPHONE (OUTPATIENT)
Dept: INTERNAL MEDICINE | Facility: CLINIC | Age: 76
End: 2021-12-03

## 2021-12-03 DIAGNOSIS — I10 ESSENTIAL HYPERTENSION: ICD-10-CM

## 2021-12-03 RX ORDER — HYDROCHLOROTHIAZIDE 12.5 MG/1
12.5 TABLET ORAL DAILY
Qty: 30 TABLET | Refills: 0 | Status: SHIPPED | OUTPATIENT
Start: 2021-12-03 | End: 2022-07-05

## 2021-12-03 NOTE — TELEPHONE ENCOUNTER
Caller: Nicole Lofton    Relationship: Self    Best call back number: [phone]    Requested Prescriptions:   Requested Prescriptions      No prescriptions requested or ordered in this encounter    WATER PILL (PATIENT DOESN'T REMEMBER NAME)    Pharmacy where request should be sent: [id number] [place], [place] - [address] AT [facility] - [phone] [facility] [phone] FX     Additional details provided by patient: PATIENT MISPLACED BOTTLE AND IS WONDERING IF SHE CAN GET A REFILL.     Does the patient have less than a 3 day supply:  [x] Yes  [] No    Kari Bajwa Rep   12/03/21 08:56 EST

## 2021-12-17 ENCOUNTER — APPOINTMENT (OUTPATIENT)
Dept: OTHER | Facility: HOSPITAL | Age: 76
End: 2021-12-17

## 2022-01-01 ENCOUNTER — TELEPHONE (OUTPATIENT)
Dept: INTERNAL MEDICINE | Facility: CLINIC | Age: 77
End: 2022-01-01

## 2022-01-01 RX ORDER — ONDANSETRON 4 MG/1
TABLET, FILM COATED ORAL EVERY 24 HOURS
Status: CANCELLED | OUTPATIENT
Start: 2022-01-01

## 2022-01-14 ENCOUNTER — APPOINTMENT (OUTPATIENT)
Dept: OTHER | Facility: HOSPITAL | Age: 77
End: 2022-01-14

## 2022-01-14 ENCOUNTER — TELEPHONE (OUTPATIENT)
Dept: ONCOLOGY | Facility: CLINIC | Age: 77
End: 2022-01-14

## 2022-01-14 ENCOUNTER — TELEPHONE (OUTPATIENT)
Dept: INTERNAL MEDICINE | Facility: CLINIC | Age: 77
End: 2022-01-14

## 2022-01-14 NOTE — TELEPHONE ENCOUNTER
Caller: ADRIANA PAGE    Relationship to patient: Child    Best call back number: [phone]    Date of positive COVID19 test: TODAY 01/14/2022    COVID19 symptoms: COUGH, HEADACHE    Additional information or concerns: PATIENT'S DAUGHTER CALLED REGARDING THE PATIENT'S POSITIVE COVID TEST. PATIENT'S DAUGHTER WANTED TO INFORM THE PATIENT'S PROVIDER. PLEASE ADVISE PATIENT'S DAUGHTER ON THE NEXT STEPS THAT SHOULD BE TAKEN.    What is the patients preferred pharmacy:       [facility] #[id number] [facility] [address] AT [facility] - [phone] [facility] [phone] FX

## 2022-01-14 NOTE — TELEPHONE ENCOUNTER
Called pt with her appt date and time - she is aware that she is coming in at 3:30 for labs then see dr garner

## 2022-01-14 NOTE — TELEPHONE ENCOUNTER
Caller: JenniferBrandy    Relationship: DAUGHTER    Best call back number: [phone]    What is the best time to reach you: AFTER 10      Who are you requesting to speak with (clinical staff, provider,  specific staff member): DR. CARLIN'S NURSE    Do you know the name of the person who called: BRANDY    What was the call regarding: PATIENT HAS AN APPT. THIS AFTERNOON, SHE WAS WITH HER CARETAKER LAST WEEK WHO CAME DOWN WITH COVID, PATIENT TESTED NEGATIVE ON Monday BUT WILL BE TESTED AGAIN THIS MORNING AT 10, SHE HAS DEVELOPED A COUGH SINCE Monday & BRANDY IS INQUIRING IF HER TEST COMES BACK NEGATIVE AGAIN TODAY, IS IT OKAY FOR HER TO COME TO TODAY'S APPT.    Do you require a callback: YES, PLEASE

## 2022-01-14 NOTE — TELEPHONE ENCOUNTER
----- Message from Celia Gardner RN sent at 1/14/2022  1:41 PM EST -----  Please cancel today's appt

## 2022-01-17 NOTE — TELEPHONE ENCOUNTER
Called patient's daughter to follow up. Patient is currently admitted to [facility] with a broken hip.

## 2022-01-18 ENCOUNTER — TELEPHONE (OUTPATIENT)
Dept: ONCOLOGY | Facility: CLINIC | Age: 77
End: 2022-01-18

## 2022-01-18 NOTE — TELEPHONE ENCOUNTER
Provider: DR CARLIN    Caller: BRANDY    Relationship to Patient: DAUGHTER    Reason for Call: BRANDY IS CALLING NEEDS TO CANCEL ANNA'S APPT FOR 1-28. SHE HAS FALLEN AND BROKEN HER HIP AND SHE IS CURRENTLY AT [facility].      BRANDY WOULD ALSO LIKE TO KNOW IF ANNA SHOULD  BE STILL CONTINUING HER MEDICATION BOSULIF.     PLEASE CALL TO ADVISE

## 2022-01-18 NOTE — TELEPHONE ENCOUNTER
Patient has had a fall and broken her hip, she is currently at [facility], I can cancel her appointment, but question also is should she stop her chemotherapy tablet of Bosutinib 400 mg daily.  Please advise.

## 2022-01-18 NOTE — TELEPHONE ENCOUNTER
Returned call to daughter to let her know to hold her chemotherapy tablet till she has improved.  She will need to make a follow up appointment once she has gotten better and resume her chemotherapy at that time.  Plan is to send her to rehab once she is out of the hospital so may be 4-6 weeks before she can come in.

## 2022-02-07 RX ORDER — OLANZAPINE 5 MG/1
5 TABLET ORAL NIGHTLY
Qty: 90 TABLET | Refills: 1 | OUTPATIENT
Start: 2022-02-07

## 2022-02-16 ENCOUNTER — TELEPHONE (OUTPATIENT)
Dept: ONCOLOGY | Facility: CLINIC | Age: 77
End: 2022-02-16

## 2022-02-16 DIAGNOSIS — C92.10 CML (CHRONIC MYELOID LEUKEMIA): Primary | ICD-10-CM

## 2022-02-16 NOTE — TELEPHONE ENCOUNTER
Provider: DR CARLIN    Caller: BRANDY    Relationship to Patient: DAUGHTER    Reason for Call: DR CARLIN TOOK ANNA OFF OF bosutinib (BOSULIF) 100 MG chemo tablet  WHEN SHE WAS IN THE HOSPITAL FOR HER HIP.    BRANDY IS WANTING TO KNOW WHEN SHE NEEDS TO GO BACK ON IT.  AND WHEN SHE NEEDS TO BE SEEN FOR HER FOLLOW UP.     PLEASE ADVISE.

## 2022-02-16 NOTE — TELEPHONE ENCOUNTER
Returned call to daughter and Ms. Lofton will be leaving the rehab facility tomorrow.  This is following her hip fracture.  Her Bosulif 100 mg has been on hold since her fracture and her daughter needs to know when to resume the medication and when to follow up here in the office.  Please advise.

## 2022-02-16 NOTE — TELEPHONE ENCOUNTER
Spoke to Clarissa, pts daughter. She was informed to continue to hold her mothers medication and she will have a follow-up appt with labs on 2/25. Clarissa unformed Vanda in scheduling will call her with the time. Clarissa V/U all above.

## 2022-02-17 ENCOUNTER — TELEPHONE (OUTPATIENT)
Dept: INTERNAL MEDICINE | Facility: CLINIC | Age: 77
End: 2022-02-17

## 2022-02-17 ENCOUNTER — TELEPHONE (OUTPATIENT)
Dept: ONCOLOGY | Facility: CLINIC | Age: 77
End: 2022-02-17

## 2022-02-17 ENCOUNTER — READMISSION MANAGEMENT (OUTPATIENT)
Dept: CALL CENTER | Facility: HOSPITAL | Age: 77
End: 2022-02-17

## 2022-02-17 NOTE — TELEPHONE ENCOUNTER
----- Message from Celia Gardner RN sent at 2/17/2022  3:58 PM EST -----  Regarding: RE: FUAD  I would call her daughter Clarissa. That is who I spoke with yesterday.  ----- Message -----  From: Binh Salinas  Sent: 2/17/2022   2:05 PM EST  To: Celai Gardner RN  Subject: RE: FUAD                                         I CALLED PT AND WAS ABLE TO REACH HER INSTEAD OF VM PT HAS BEEN IN THE HOSPITAL AND SHE WANTS TO CANCEL THIS APPT THAT I SET UP - PLEASE ADVISE WHAT WE WOULD LIKE TO DO.      THANK YOU   BINH   ----- Message -----  From: Celia Gardner RN  Sent: 2/16/2022  12:37 PM EST  To: Binh Salinas  Subject: FUAD                                             2 units with Fuad with Dr. Merida and labs on 2/25

## 2022-02-17 NOTE — TELEPHONE ENCOUNTER
CALLED AND SPOKE WITH THE DAUGHTER AND THE DAUGHTER WANTED TO KEEP THE APPT AS IT WAS, THEY HAVE CONFIRMED

## 2022-02-17 NOTE — TELEPHONE ENCOUNTER
PATIENT JUST RELEASED FROM [facility] WITH BROKEN HIP     HOME INSTEAD IS HELPING BUT PATIENT IS NEEDING PAIN MEDS TO HELP HER WITH PAIN    PLEASE ADVISE     Clarissa Rodriguez (EC) [phone]           PHARMACY:    [facility] #[id number] [place], [place] - [address] AT [facility] OF [facility] - [phone]  - [phone]   [phone]      [facility] GAVE HER OXYCONTIN 10     WAS IN FOR 2 WEEKS AND SCHEDULED APPT FOR NEXT WEEK / TELEHEALTH

## 2022-02-17 NOTE — OUTREACH NOTE
Prep Survey      Responses   [facility] facility patient discharged from? Non-BH   Is LACE score < 7 ? Non-BH Discharge   Emergency Room discharge w/ pulse ox? No   Eligibility [facility]   Date of Discharge 02/17/22   Discharge diagnosis Unavailable    Does the patient have one of the following disease processes/diagnoses(primary or secondary)? Non-BH Discharge   Prep survey completed? Yes          Margarita Samayoa RN

## 2022-02-17 NOTE — TELEPHONE ENCOUNTER
Caller:     Relationship to patient:     Best call back number:   Clarissa Rodriguez () [phone] (H)         New or established patient?    [x] Established    Date of discharge: 1-15-22 TO 1-22-22    Facility discharged from: [facility]     Diagnosis/Symptoms: BROKEN HIP      Length of stay (If applicable):  1WK    Specialty Only: Did you see a [facility] provider?      [x] No

## 2022-02-18 ENCOUNTER — TRANSITIONAL CARE MANAGEMENT TELEPHONE ENCOUNTER (OUTPATIENT)
Dept: CALL CENTER | Facility: HOSPITAL | Age: 77
End: 2022-02-18

## 2022-02-18 NOTE — OUTREACH NOTE
Call Center TCM Note      Responses   [facility] patient discharged from? Non-BH   Does the patient have one of the following disease processes/diagnoses(primary or secondary)? Non- Discharge   TCM attempt successful? No   Unsuccessful attempts Attempt 1          Almita Cristina LPN    2/18/2022, 10:57 EST

## 2022-02-18 NOTE — OUTREACH NOTE
Call Center TCM Note      Responses   [facility] patient discharged from? Non-BH   Does the patient have one of the following disease processes/diagnoses(primary or secondary)? Non- Discharge   TCM attempt successful? No   Unsuccessful attempts Attempt 2          Almita Cristina LPN    2/18/2022, 14:44 EST

## 2022-02-19 ENCOUNTER — TRANSITIONAL CARE MANAGEMENT TELEPHONE ENCOUNTER (OUTPATIENT)
Dept: CALL CENTER | Facility: HOSPITAL | Age: 77
End: 2022-02-19

## 2022-02-19 NOTE — OUTREACH NOTE
Call Center TCM Note      Responses   [facility] patient discharged from? Non-  [[facility]]   Does the patient have one of the following disease processes/diagnoses(primary or secondary)? Non- Discharge   TCM attempt successful? Yes   Call start time 0911   Call end time 0917   Discharge diagnosis Femur Fx   Meds reviewed with patient/caregiver? Yes   Is the patient having any side effects they believe may be caused by any medication additions or changes? No   Does the patient have all medications ordered at discharge? N/A   Prescription comments Pt reports she has no new meds--taking Ultram for pain control   Is the patient taking all medications as directed (includes completed medication regime)? Yes   Does the patient have a primary care provider?  Yes   Does the patient have an appointment with their PCP within 7 days of discharge? Yes   Comments regarding PCP Hospital d/c f/u appt is on 2/21/22@300pm--My Chart Video Visit   What is the Home health agency?  HH--   Has home health visited the patient within 72 hours of discharge? Yes   Psychosocial issues? No   Did the patient receive a copy of their discharge instructions? Yes   Nursing interventions Reviewed instructions with patient   What is the patient's perception of their health status since discharge? Improving  [Pt doing well--ambulatory with a walker, incisions have healed but reports she was discharged with dressing supplies. This RN encouraged her to discuss with her  nurse today as this Rn does not have access to rehab discharge orders. ]   Is the patient/caregiver able to teach back signs and symptoms related to disease process for when to call PCP? Yes   Additional teach back comments Incisions has healed    TCM call completed? Yes          Mally Whitehead RN    2/19/2022, 09:20 EST

## 2022-02-21 ENCOUNTER — TELEPHONE (OUTPATIENT)
Dept: ONCOLOGY | Facility: CLINIC | Age: 77
End: 2022-02-21

## 2022-02-21 NOTE — TELEPHONE ENCOUNTER
Caller: Brandy Rodriguez    Relationship to patient: Emergency Contact    Best call back number: [phone]    Chief complaint: DAUGHTER WOULD LIKE TO KNOW IF PATIENT APPT FOR 2/25/22 COULD BE CHANGED TO AN EARLY DAY THIS WEEK OR EARLIER THE SAME DAY?     Type of visit: LAB/FU    Additional notes: PLEASE CALL BRANDY TO ADVISE

## 2022-02-21 NOTE — PROGRESS NOTES
"[facility] CBC GROUP OUTPATIENT FOLLOW UP VISIT    REASON FOR FOLLOW-UP:    1.  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  Gleevec 400 mg daily initiated around 10/12/2017, stopped on 11/15/2017 due to intolerance (noah-orbital edema, nausea/vomiting, fatigue).  Resumed at 200 mg daily but, again, not tolerated.    3.  Nilotinib 150 mg twice daily started in late March, 2018.  Held due to intolerance and QTc prolongation.   4.  Hydroxyurea initiated in July 2018.  Subsequently discontinued.     5.  Bosutinib 400 mg daily started Feb 2019    HISTORY OF PRESENT ILLNESS:  Nicole Lofton is a 76 y.o. female with the above-mentioned history.    She unfortunately fell and fractured her hip 1/15/2022.  She also had COVID-19 infection at that time.  She had surgery.  She continues to have right leg pain that extends down to the right foot.  She spent some time in subacute rehabilitation but now is back home.  She is going to start rehabilitation at home today.  She continues to do reasonably well in spite of all of this.  She has not been on the bosutinib since her accident.      ROS:  Right leg pain.  Otherwise per the HPI.    Vitals:    02/22/22 1400   BP: 135/81   Pulse: 65   Resp: 16   Temp: 98.6 °F (37 °C)   TempSrc: Temporal   SpO2: 97%   Weight: Comment: cannot stand. broke hip   Height: 165.1 cm (65\")   PainSc: 0-No pain  Comment: CML       General:  No acute distress, awake, alert and oriented.  Chronically ill-appearing.  Sitting in a wheelchair today.  Skin:  Warm and dry, no visible rash  HEENT:  Normocephalic/atraumatic.  Wearing a facemask.  Chest:  Normal respiratory effort.  Lungs clear to auscultation bilaterally.  Heart: Regular rate and rhythm  Extremities:  No visible clubbing, cyanosis, or edema  Neuro/psych:  Grossly non-focal.  Normal mood and affect.    DIAGNOSTIC DATA:  CBC & Differential (02/22/2022 13:58)      IMAGING:  None " reviewed    ASSESSMENT:  This is a 76 y.o. female with:    *History of bilateral breast cancer in 1988 status post bilateral mastectomy.  She apparently completed 6 months of adjuvant therapy.  We have no details regarding this.    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    · Started on Hydrea 1000 mg daily.    · This was discontinued and she started Gleevec on approximately 10/12/2017.    · Gleevec discontinued due to intolerance on 11/15/2017.    · We started Gleevec at a lower dose of 200 mg bu  · t she was also intolerant of this dose.  Her side effects were as severe with a lower dose and she therefore stopped taking the drug.  Symptoms improved significantly.  Platelet count subsequently elevated again.  · She started nilotinib at 150mg twice daily at the end of March 2018.  This was held in mid-May due to QTc prolongation.  The QTc interval did subsequently normalized.  However, due to this and other adverse effects we are not able to resume the nilotinib nor does she desire to.  · In July we resumed hydroxyurea 1000 mg daily.  Her platelet count increased.  Her dose was increased to 1000 mg twice daily.  Blood counts improved nicely.  Platelet count normalized but her white blood cell count dropped to below normal.  Decreased hydroxyurea to 1000 mg daily.  · 11/20/18 platelets were up again.  We increased the hydroxyurea to 1000 mg in the mornings and 500 mg in the evenings.  · As of 1/15/2019 her platelet count was again elevated to 1.2 million.  Increased hydroxyurea to 1000 mg twice daily.    · Platelets improved but remained far from normal.  · Due to her history we referred her down to the [facility] to see Dr. Cifuentes for assistance and recommendations on further therapy.  · In mid-February 2019 she started bosutinib 400 mg daily which she takes along with olanzapine at night (she takes this as needed now).     · PCR on 8/25/2021  -.  · 2/22/2022: CBC normal.  PCR pending.    *Hypothyroidism: She continues levothyroxine    *Tobacco use: She is unwilling to quit smoking.      *Deconditioning  · This was an issue for her before her right hip fracture.  She is now making a good recovery after her hip fracture but remains very weak.    *Hypertension: Blood pressure is adequate today 135/81    *Bradycardia: She was referred back to Dr. Duque for this previously.  Heart rate 65 today.    *Anxiety: She is already on a couple of medications for this.  Follow-up with primary care.    *Right leg pain following her right hip fracture: She will follow-up with primary care and orthopedics regarding this.  She is on tramadol without much improvement.  She takes acetaminophen as well without much improvement.  I suggested the possibility of NSAIDs but, again, she will follow-up with primary care and orthopedics.    PLAN:  1. Follow-up per BCR-ABL PCR from today  2. If negative, she prefers to stay off the bosutinib at this time which is okay with me.  If the PCR is not at goal, I suggested that she go back on the bosutinib.  We will notify her and her daughter of the result.  3. Otherwise follow-up in 3 months with a CBC, CMP, BCR-ABL PCR.    High risk medication requiring intensive monitoring      Burt Merida MD

## 2022-02-22 ENCOUNTER — LAB (OUTPATIENT)
Dept: LAB | Facility: HOSPITAL | Age: 77
End: 2022-02-22

## 2022-02-22 ENCOUNTER — DOCUMENTATION (OUTPATIENT)
Dept: PHARMACY | Facility: HOSPITAL | Age: 77
End: 2022-02-22

## 2022-02-22 ENCOUNTER — OFFICE VISIT (OUTPATIENT)
Dept: ONCOLOGY | Facility: CLINIC | Age: 77
End: 2022-02-22

## 2022-02-22 VITALS
HEART RATE: 65 BPM | DIASTOLIC BLOOD PRESSURE: 81 MMHG | RESPIRATION RATE: 16 BRPM | BODY MASS INDEX: 19.85 KG/M2 | SYSTOLIC BLOOD PRESSURE: 135 MMHG | OXYGEN SATURATION: 97 % | HEIGHT: 65 IN | TEMPERATURE: 98.6 F

## 2022-02-22 DIAGNOSIS — C92.10 CML (CHRONIC MYELOID LEUKEMIA): ICD-10-CM

## 2022-02-22 DIAGNOSIS — C92.10 CML (CHRONIC MYELOID LEUKEMIA): Primary | ICD-10-CM

## 2022-02-22 LAB
ALBUMIN SERPL-MCNC: 3.8 G/DL (ref 3.5–5.2)
ALBUMIN/GLOB SERPL: 1.2 G/DL (ref 1.1–2.4)
ALP SERPL-CCNC: 245 U/L (ref 38–116)
ALT SERPL W P-5'-P-CCNC: 18 U/L (ref 0–33)
ANION GAP SERPL CALCULATED.3IONS-SCNC: 7.8 MMOL/L (ref 5–15)
AST SERPL-CCNC: 24 U/L (ref 0–32)
BASOPHILS # BLD AUTO: 0.06 10*3/MM3 (ref 0–0.2)
BASOPHILS NFR BLD AUTO: 0.8 % (ref 0–1.5)
BILIRUB SERPL-MCNC: 0.4 MG/DL (ref 0.2–1.2)
BUN SERPL-MCNC: 13 MG/DL (ref 6–20)
BUN/CREAT SERPL: 15.9 (ref 7.3–30)
CALCIUM SPEC-SCNC: 9.2 MG/DL (ref 8.5–10.2)
CHLORIDE SERPL-SCNC: 96 MMOL/L (ref 98–107)
CO2 SERPL-SCNC: 29.2 MMOL/L (ref 22–29)
CREAT SERPL-MCNC: 0.82 MG/DL (ref 0.6–1.1)
DEPRECATED RDW RBC AUTO: 53.4 FL (ref 37–54)
EOSINOPHIL # BLD AUTO: 0.13 10*3/MM3 (ref 0–0.4)
EOSINOPHIL NFR BLD AUTO: 1.7 % (ref 0.3–6.2)
ERYTHROCYTE [DISTWIDTH] IN BLOOD BY AUTOMATED COUNT: 15.9 % (ref 12.3–15.4)
GFR SERPL CREATININE-BSD FRML MDRD: 67 ML/MIN/1.73
GLOBULIN UR ELPH-MCNC: 3.3 GM/DL (ref 1.8–3.5)
GLUCOSE SERPL-MCNC: 98 MG/DL (ref 74–124)
HCT VFR BLD AUTO: 39.2 % (ref 34–46.6)
HGB BLD-MCNC: 12.4 G/DL (ref 12–15.9)
IMM GRANULOCYTES # BLD AUTO: 0.03 10*3/MM3 (ref 0–0.05)
IMM GRANULOCYTES NFR BLD AUTO: 0.4 % (ref 0–0.5)
LYMPHOCYTES # BLD AUTO: 1.8 10*3/MM3 (ref 0.7–3.1)
LYMPHOCYTES NFR BLD AUTO: 23 % (ref 19.6–45.3)
MCH RBC QN AUTO: 28.9 PG (ref 26.6–33)
MCHC RBC AUTO-ENTMCNC: 31.6 G/DL (ref 31.5–35.7)
MCV RBC AUTO: 91.4 FL (ref 79–97)
MONOCYTES # BLD AUTO: 0.63 10*3/MM3 (ref 0.1–0.9)
MONOCYTES NFR BLD AUTO: 8 % (ref 5–12)
NEUTROPHILS NFR BLD AUTO: 5.19 10*3/MM3 (ref 1.7–7)
NEUTROPHILS NFR BLD AUTO: 66.1 % (ref 42.7–76)
NRBC BLD AUTO-RTO: 0 /100 WBC (ref 0–0.2)
PLATELET # BLD AUTO: 324 10*3/MM3 (ref 140–450)
PMV BLD AUTO: 8.7 FL (ref 6–12)
POTASSIUM SERPL-SCNC: 4.9 MMOL/L (ref 3.5–4.7)
PROT SERPL-MCNC: 7.1 G/DL (ref 6.3–8)
RBC # BLD AUTO: 4.29 10*6/MM3 (ref 3.77–5.28)
SODIUM SERPL-SCNC: 133 MMOL/L (ref 134–145)
WBC NRBC COR # BLD: 7.84 10*3/MM3 (ref 3.4–10.8)

## 2022-02-22 PROCEDURE — 99214 OFFICE O/P EST MOD 30 MIN: CPT | Performed by: INTERNAL MEDICINE

## 2022-02-22 PROCEDURE — 36415 COLL VENOUS BLD VENIPUNCTURE: CPT

## 2022-02-22 PROCEDURE — 80053 COMPREHEN METABOLIC PANEL: CPT

## 2022-02-22 PROCEDURE — 85025 COMPLETE CBC W/AUTO DIFF WBC: CPT

## 2022-02-22 RX ORDER — PSEUDOEPHEDRINE HCL 30 MG
100 TABLET ORAL
COMMUNITY
Start: 2022-01-23

## 2022-02-22 RX ORDER — TERAZOSIN 5 MG/1
5 CAPSULE ORAL DAILY
COMMUNITY
Start: 2022-01-22 | End: 2022-03-09

## 2022-02-22 RX ORDER — ASPIRIN 81 MG/1
81 TABLET ORAL
COMMUNITY
Start: 2022-02-04 | End: 2022-02-22 | Stop reason: SDDI

## 2022-02-22 RX ORDER — FERROUS SULFATE 325(65) MG
325 TABLET ORAL DAILY
COMMUNITY
Start: 2022-01-23 | End: 2022-03-09

## 2022-02-22 RX ORDER — ACETAMINOPHEN 325 MG/1
650 TABLET ORAL
COMMUNITY
Start: 2022-01-22

## 2022-02-22 NOTE — PROGRESS NOTES
Pt has been approved for FREE Bosulif until 12/31/2022-[employer]. They have shipped a supply of medication to her.     Marifer Arvizu  Specialty Pharmacy Technician

## 2022-02-24 ENCOUNTER — TELEPHONE (OUTPATIENT)
Dept: INTERNAL MEDICINE | Facility: CLINIC | Age: 77
End: 2022-02-24

## 2022-02-24 ENCOUNTER — TELEMEDICINE (OUTPATIENT)
Dept: INTERNAL MEDICINE | Facility: CLINIC | Age: 77
End: 2022-02-24

## 2022-02-24 DIAGNOSIS — R26.81 UNSTEADY GAIT: ICD-10-CM

## 2022-02-24 DIAGNOSIS — S72.8X1G OTHER CLOSED FRACTURE OF RIGHT FEMUR WITH DELAYED HEALING, UNSPECIFIED PORTION OF FEMUR, SUBSEQUENT ENCOUNTER: Primary | ICD-10-CM

## 2022-02-24 PROCEDURE — 99442 PR PHYS/QHP TELEPHONE EVALUATION 11-20 MIN: CPT | Performed by: FAMILY MEDICINE

## 2022-02-24 RX ORDER — OXYCODONE AND ACETAMINOPHEN 7.5; 325 MG/1; MG/1
1 TABLET ORAL EVERY 8 HOURS PRN
Qty: 90 TABLET | Refills: 0 | Status: SHIPPED | OUTPATIENT
Start: 2022-02-24 | End: 2022-03-09 | Stop reason: SDUPTHER

## 2022-02-24 NOTE — TELEPHONE ENCOUNTER
Caller: ADRIANA - DAUGHTER NOT ON [place], BUT IS ONE OF HER POA.    Relationship to patient: Child    Best call back number: [phone]    Type of visit: JUST NEEDING TO KNOW WHAT TYPE OF FOLLOW UP APPOINTMENT .DR. KELLER WOULD LIKE?  PLEASE CONTACT  DAUGHTER ADRIANA.    Requested date: 1 MONTH    ADRIANA WILL BE SENDING HER POA THROUGH [employer].

## 2022-02-25 ENCOUNTER — APPOINTMENT (OUTPATIENT)
Dept: OTHER | Facility: HOSPITAL | Age: 77
End: 2022-02-25

## 2022-02-27 PROBLEM — S72.91XG: Status: ACTIVE | Noted: 2022-02-27

## 2022-02-27 PROBLEM — R26.81 UNSTEADY GAIT: Status: ACTIVE | Noted: 2022-02-27

## 2022-02-27 NOTE — PROGRESS NOTES
Chief Complaint  No chief complaint on file.  Cc fall    This visit has been rescheduled as a phone visit to comply with patient safety concerns in accordance with CDC recommendations. Total time of discussion was 15 minutes.    You have chosen to receive care through a telephone visit. Do you consent to use a telephone visit for your medical care today? Yes    This was an audio enabled telemedicine encounter.    Subjective          Nicole Lofton presents to [facility]  History of Present Illness    At today's office visit I have spoken to patient as well as her daughter.  It appears that the patient has had a hip fracture involving the fracture of the femur on the right side few weeks ago.  She has been out of the hospital for about 3 weeks now.  Patient states that her pain is still been present.  She is currently still follow-up with orthopedics, and is currently taking pain medication to help her with the pain.  She is currently on Percocet 10 mg tablets.  She is taking this about 3 or 4 times a day.  As seems to be helping her with the pain.  She currently has home health coming to her house but seems to help her with some physical therapy and Occupational Therapy.  She continues with a unsteady gait but uses a Rollator/walker.  She has not been the most compliant and using these assisting devices up to now even though she has been counseled on this many times.      Objective   Vital Signs:   There were no vitals taken for this visit.    Physical Exam  Vitals and nursing note reviewed.   Constitutional:       Appearance: She is well-developed.   HENT:      Head: Normocephalic and atraumatic.   Neurological:      Mental Status: She is alert and oriented to person, place, and time.   Psychiatric:         Behavior: Behavior normal.        Result Review :                 Assessment and Plan    Diagnoses and all orders for this visit:    1. Other closed fracture of right femur with  delayed healing, unspecified portion of femur, subsequent encounter (Primary)  -     oxyCODONE-acetaminophen (Percocet) 7.5-325 MG per tablet; Take 1 tablet by mouth Every 8 (Eight) Hours As Needed for Severe Pain .  Dispense: 90 tablet; Refill: 0    At today's office visit we will give her some Percocet 7.5 mg she can take every 8 hours for short period time.  She is to continue follow-up with orthopedics.  Should continue to follow-up with home health and undergo physical therapy and Occupational Therapy.      Follow Up   No follow-ups on file.  Patient was given instructions and counseling regarding her condition or for health maintenance advice. Please see specific information pulled into the AVS if appropriate.

## 2022-03-01 ENCOUNTER — TELEPHONE (OUTPATIENT)
Dept: INTERNAL MEDICINE | Facility: CLINIC | Age: 77
End: 2022-03-01

## 2022-03-01 DIAGNOSIS — E78.5 HYPERLIPIDEMIA, UNSPECIFIED HYPERLIPIDEMIA TYPE: ICD-10-CM

## 2022-03-01 RX ORDER — ATORVASTATIN CALCIUM 40 MG/1
40 TABLET, FILM COATED ORAL DAILY
Qty: 90 TABLET | Refills: 1 | Status: SHIPPED | OUTPATIENT
Start: 2022-03-01 | End: 2022-08-22

## 2022-03-01 NOTE — TELEPHONE ENCOUNTER
Caller: ADRIANAEva MEJIA    Relationship: Child    Best call back number: [phone]    Requested Prescriptions:   Requested Prescriptions     Pending Prescriptions Disp Refills   • atorvastatin (LIPITOR) 40 MG tablet 90 tablet 1     Sig: Take 1 tablet by mouth Daily.        Pharmacy where request should be sent: [facility] #[id number] [place], [place] - [address] AT [facility] - [phone] [facility] [phone]      Additional details provided by patient: PATIENT'S DAUGHTER RECEIVED AN EMAIL FROM THE PHARMACY STATING THAT THIS MEDICATION NEEDED A PRIOR AUTHORIZATION.    Does the patient have less than a 3 day supply:  [x] Yes  [] No    Kari Mariscal Rep   03/01/22 08:43 EST

## 2022-03-07 LAB — REF LAB TEST METHOD: NORMAL

## 2022-03-08 ENCOUNTER — TELEPHONE (OUTPATIENT)
Dept: ONCOLOGY | Facility: CLINIC | Age: 77
End: 2022-03-08

## 2022-03-08 NOTE — TELEPHONE ENCOUNTER
Per Dr. Merida please pts  PCR is again negative.  She can stay off of the bosutinib and he will see her back as scheduled. LVM letting pt know. Advised her to call back with any questions or concerns. Phone number provided.

## 2022-03-09 ENCOUNTER — OFFICE VISIT (OUTPATIENT)
Dept: INTERNAL MEDICINE | Facility: CLINIC | Age: 77
End: 2022-03-09

## 2022-03-09 VITALS
BODY MASS INDEX: 18.13 KG/M2 | SYSTOLIC BLOOD PRESSURE: 140 MMHG | RESPIRATION RATE: 16 BRPM | HEIGHT: 65 IN | DIASTOLIC BLOOD PRESSURE: 70 MMHG | WEIGHT: 108.8 LBS | OXYGEN SATURATION: 99 % | HEART RATE: 86 BPM

## 2022-03-09 DIAGNOSIS — F41.9 ANXIETY: ICD-10-CM

## 2022-03-09 DIAGNOSIS — F32.A DEPRESSION, UNSPECIFIED DEPRESSION TYPE: ICD-10-CM

## 2022-03-09 DIAGNOSIS — N39.0 ACUTE UTI: Primary | ICD-10-CM

## 2022-03-09 DIAGNOSIS — S72.8X1G OTHER CLOSED FRACTURE OF RIGHT FEMUR WITH DELAYED HEALING, UNSPECIFIED PORTION OF FEMUR, SUBSEQUENT ENCOUNTER: ICD-10-CM

## 2022-03-09 PROCEDURE — 99214 OFFICE O/P EST MOD 30 MIN: CPT | Performed by: FAMILY MEDICINE

## 2022-03-09 RX ORDER — VORTIOXETINE 20 MG/1
1 TABLET, FILM COATED ORAL DAILY
Qty: 30 TABLET | Refills: 11 | Status: SHIPPED | OUTPATIENT
Start: 2022-03-09 | End: 2022-09-19

## 2022-03-09 RX ORDER — ONDANSETRON 4 MG/1
4 TABLET, FILM COATED ORAL EVERY 8 HOURS PRN
Qty: 90 TABLET | Refills: 0 | Status: SHIPPED | OUTPATIENT
Start: 2022-03-09 | End: 2022-04-07

## 2022-03-09 RX ORDER — LORAZEPAM 1 MG/1
1 TABLET ORAL 2 TIMES DAILY PRN
Qty: 60 TABLET | Refills: 3 | Status: SHIPPED | OUTPATIENT
Start: 2022-03-09 | End: 2022-05-10

## 2022-03-09 RX ORDER — OXYCODONE AND ACETAMINOPHEN 7.5; 325 MG/1; MG/1
1 TABLET ORAL EVERY 8 HOURS PRN
Qty: 90 TABLET | Refills: 0 | Status: SHIPPED | OUTPATIENT
Start: 2022-03-09 | End: 2022-03-22 | Stop reason: SDUPTHER

## 2022-03-09 RX ORDER — CEFDINIR 300 MG/1
300 CAPSULE ORAL 2 TIMES DAILY
Qty: 14 CAPSULE | Refills: 0 | Status: SHIPPED | OUTPATIENT
Start: 2022-03-09 | End: 2022-03-16

## 2022-03-10 NOTE — PROGRESS NOTES
"Chief Complaint  Follow up to anxiety  (Possible UTI / needs refill on pain meds )    Subjective          Nicole Lofton presents to [facility]     History of Present Illness    The patient reports having a home helper bring her to the office visit, today.     Acute UTI  The patient is following up for anxiety, a UTI, and a recent fall. She does complain of some UTI symptoms and provided a urine sample at today's office visit. We did see 3+ leukocytes in her urine. She reports an overactive bladder and a burning sensation with urination.     Nausea  The patient reports mild symptoms of nausea first thing in the morning and takes medication for her symptoms.     Right hip fracture, fall injury  The patient reports having a slip and fall injury trying to get his garbage can and laid in the snow for a few hours until someone came to help. The patient reports having right hip surgery in 01/2022 and doing physical therapy and \"walking early.\" Doing walking exercises has improved the patient's condition more than any other treatment. She reports living alone in a condo and her daughters come and visit at least 1 time a day.     Medications  The patient reports taking Percocet 7.5 mg with improvement to his pain, Trintellix 20 mg with no side effects, Ativan, lorazepam 2 times daily, and trazodone 50 mg for insomnia.    Objective      Vital Signs:   /70   Pulse 86   Resp 16   Ht 165.1 cm (65\")   Wt 49.4 kg (108 lb 12.8 oz)   SpO2 99%   BMI 18.11 kg/m²       Physical Exam  Vitals and nursing note reviewed.   Constitutional:       Appearance: She is well-developed.   HENT:      Head: Normocephalic and atraumatic.   Musculoskeletal:      Cervical back: Normal range of motion and neck supple.   Neurological:      Mental Status: She is alert and oriented to person, place, and time.   Psychiatric:         Behavior: Behavior normal.          Result Review :                 Assessment " and Plan    Diagnoses and all orders for this visit:    1. Acute UTI (Primary)  -     cefdinir (OMNICEF) 300 MG capsule; Take 1 capsule by mouth 2 (Two) Times a Day for 7 days.  Dispense: 14 capsule; Refill: 0  - We have sent in a prescription of Omnicef.    2. Other closed fracture of right femur with delayed healing, unspecified portion of femur, subsequent encounter  -     oxyCODONE-acetaminophen (Percocet) 7.5-325 MG per tablet; Take 1 tablet by mouth Every 8 (Eight) Hours As Needed for Severe Pain .  Dispense: 90 tablet; Refill: 0  - We have sent in a re-fill of Percocet 7.5 mg.    3. Anxiety  -     Vortioxetine HBr (Trintellix) 20 MG tablet; Take 20 mg by mouth Daily.  Dispense: 30 tablet; Refill: 11  -     LORazepam (ATIVAN) 1 MG tablet; Take 1 tablet by mouth 2 (Two) Times a Day As Needed for Anxiety.  Dispense: 60 tablet; Refill: 3  - We have sent in a re-fill of Ativan. Continue trintellix.     4. Depression, unspecified depression type  -     Vortioxetine HBr (Trintellix) 20 MG tablet; Take 20 mg by mouth Daily.  Dispense: 30 tablet; Refill: 11  - He will continue Trintellix 20 mg.     Other orders  -     ondansetron (ZOFRAN) 4 MG tablet; Take 1 tablet by mouth Every 8 (Eight) Hours As Needed for Nausea or Vomiting.  Dispense: 90 tablet; Refill: 0           Follow Up   No follow-ups on file.     Patient was given instructions and counseling regarding her condition or for health maintenance advice. Please see specific information pulled into the AVS if appropriate.     Transcribed from ambient dictation for Keith Campbell MD by Roxana Narayanan.  03/09/22   21:24 EST    Patient verbalized consent to the visit recording.  I have personally performed the services described in this document as transcribed by the above individual, and it is both accurate and complete.  Keith Campbell MD  3/14/2022  09:30 EDT

## 2022-03-15 ENCOUNTER — TELEPHONE (OUTPATIENT)
Dept: INTERNAL MEDICINE | Facility: CLINIC | Age: 77
End: 2022-03-15

## 2022-03-15 NOTE — TELEPHONE ENCOUNTER
PATIENT'S DAUGHTER ADRIANA CALLED FOR MEDICATION REFILL OF  OLANZapine (zyPREXA) 5 MG tablet [[id number]] SHE WAS TAKING ONCE AT NIGHT.     SHE HAS BEEN TAKING THIS MEDICATION SINCE SHE WAS IN THE HOSPITAL IN October.    ADRIANA NEEDS TO KNOW IF SHE SHOULD STILL BE TAKING THIS MEDICATION. SHE IS STILL TAKING   LORazepam (ATIVAN) 1 MG tablet ONCE A DAY    PLEASE CALL AND ADVISE     [id number] - [place], [place] - [address] AT [facility] - [phone]  - [phone]   [phone]    CALL BACK NUMBER [phone]

## 2022-03-18 RX ORDER — OLANZAPINE 5 MG/1
5 TABLET ORAL NIGHTLY
Qty: 90 TABLET | Refills: 0 | Status: SHIPPED | OUTPATIENT
Start: 2022-03-18 | End: 2022-06-20

## 2022-03-22 DIAGNOSIS — S72.8X1G OTHER CLOSED FRACTURE OF RIGHT FEMUR WITH DELAYED HEALING, UNSPECIFIED PORTION OF FEMUR, SUBSEQUENT ENCOUNTER: ICD-10-CM

## 2022-03-22 RX ORDER — OXYCODONE AND ACETAMINOPHEN 7.5; 325 MG/1; MG/1
1 TABLET ORAL EVERY 8 HOURS PRN
Qty: 90 TABLET | Refills: 0 | Status: SHIPPED | OUTPATIENT
Start: 2022-03-22 | End: 2022-05-16 | Stop reason: SDUPTHER

## 2022-03-22 RX ORDER — POTASSIUM CHLORIDE 750 MG/1
10 TABLET, EXTENDED RELEASE ORAL DAILY
Qty: 30 TABLET | Refills: 3 | Status: SHIPPED | OUTPATIENT
Start: 2022-03-22 | End: 2022-07-05

## 2022-03-23 ENCOUNTER — TELEPHONE (OUTPATIENT)
Dept: INTERNAL MEDICINE | Facility: CLINIC | Age: 77
End: 2022-03-23

## 2022-03-23 RX ORDER — SULFAMETHOXAZOLE AND TRIMETHOPRIM 800; 160 MG/1; MG/1
1 TABLET ORAL 2 TIMES DAILY
Qty: 14 TABLET | Refills: 0 | Status: SHIPPED | OUTPATIENT
Start: 2022-03-23 | End: 2022-03-30

## 2022-03-23 NOTE — TELEPHONE ENCOUNTER
Caller: Nicole Lofton    Relationship: Self    Best call back number: [phone]    What medication are you requesting: ANTIBIOTIC    What are your current symptoms: UTI    How long have you been experiencing symptoms: 1 MONTH    Have you had these symptoms before:    [x] Yes  [] No    Have you been treated for these symptoms before:   [x] Yes  [] No    If a prescription is needed, what is your preferred pharmacy and phone number: [facility] #[id number] [facility] [address] AT [facility] - [phone] [facility] [phone] FX     Additional notes: PATIENT WAS PRESCRIBED A PREVIOUS ANTIBIOTIC TO TREAT A UTI, BUT STATES THE UTI DID NOT CLEAR UP. REQUESTING A NEW MEDICATION TO HELP TREAT INFECTION. PLEASE CALL AND ADVISE.

## 2022-03-23 NOTE — TELEPHONE ENCOUNTER
PATIENTS DAUGHTER CALLING WANTING TO KNOW IF THE PATIENT WAS TESTED FOR A UTI AT THE LAST APPOINTMENT AND IF THE PATIENT IS NEEDING ANOTHER ROUND OF ANTIBIOTICS DUE TO STILL HAVING SYMPTOMS OR DOES SHE NEED TO SEE A KIDNEY SPECIALIST.    PLEASE ADVISE   [phone]

## 2022-03-24 ENCOUNTER — TELEPHONE (OUTPATIENT)
Dept: ONCOLOGY | Facility: CLINIC | Age: 77
End: 2022-03-24

## 2022-03-24 NOTE — TELEPHONE ENCOUNTER
Caller: ANNA    Relationship to patient: DAUGHTER    Best call back number: [phone]    Patient is needing: TO R/S [phone] LAB AND F/U APPT TO ANY DAY AFTER DERBY WEEK.

## 2022-03-28 ENCOUNTER — TELEPHONE (OUTPATIENT)
Dept: INTERNAL MEDICINE | Facility: CLINIC | Age: 77
End: 2022-03-28

## 2022-03-28 NOTE — TELEPHONE ENCOUNTER
"Patient was warm transferred to the hub with complaints of \"overall weakness\" and \"leg weakness\". Advised to go to the ED because Dr. Campbell is out of the office for the day.   "

## 2022-04-04 ENCOUNTER — OFFICE VISIT (OUTPATIENT)
Dept: INTERNAL MEDICINE | Facility: CLINIC | Age: 77
End: 2022-04-04

## 2022-04-04 ENCOUNTER — LAB (OUTPATIENT)
Dept: LAB | Facility: HOSPITAL | Age: 77
End: 2022-04-04

## 2022-04-04 VITALS
DIASTOLIC BLOOD PRESSURE: 60 MMHG | OXYGEN SATURATION: 99 % | RESPIRATION RATE: 16 BRPM | HEART RATE: 73 BPM | HEIGHT: 65 IN | BODY MASS INDEX: 17.69 KG/M2 | SYSTOLIC BLOOD PRESSURE: 119 MMHG | WEIGHT: 106.2 LBS

## 2022-04-04 DIAGNOSIS — E87.1 HYPONATREMIA: ICD-10-CM

## 2022-04-04 DIAGNOSIS — R53.81 CHRONIC FATIGUE AND MALAISE: ICD-10-CM

## 2022-04-04 DIAGNOSIS — C92.10 CML (CHRONIC MYELOID LEUKEMIA): ICD-10-CM

## 2022-04-04 DIAGNOSIS — R53.82 CHRONIC FATIGUE AND MALAISE: ICD-10-CM

## 2022-04-04 DIAGNOSIS — D62 ACUTE BLOOD LOSS ANEMIA: ICD-10-CM

## 2022-04-04 DIAGNOSIS — R11.0 CHRONIC NAUSEA: Primary | ICD-10-CM

## 2022-04-04 DIAGNOSIS — R63.0 POOR APPETITE: ICD-10-CM

## 2022-04-04 DIAGNOSIS — E03.9 PRIMARY HYPOTHYROIDISM: ICD-10-CM

## 2022-04-04 PROBLEM — U07.1 COVID-19: Status: ACTIVE | Noted: 2022-01-15

## 2022-04-04 PROBLEM — N17.9 AKI (ACUTE KIDNEY INJURY): Status: ACTIVE | Noted: 2022-01-17

## 2022-04-04 LAB
ALBUMIN SERPL-MCNC: 4.3 G/DL (ref 3.5–5.2)
ALBUMIN/GLOB SERPL: 1.3 G/DL
ALP SERPL-CCNC: 146 U/L (ref 39–117)
ALT SERPL W P-5'-P-CCNC: 17 U/L (ref 1–33)
ANION GAP SERPL CALCULATED.3IONS-SCNC: 12.1 MMOL/L (ref 5–15)
AST SERPL-CCNC: 25 U/L (ref 1–32)
BASOPHILS # BLD AUTO: 0.04 10*3/MM3 (ref 0–0.2)
BASOPHILS NFR BLD AUTO: 0.5 % (ref 0–1.5)
BILIRUB SERPL-MCNC: 0.4 MG/DL (ref 0–1.2)
BUN SERPL-MCNC: 13 MG/DL (ref 8–23)
BUN/CREAT SERPL: 11.4 (ref 7–25)
CALCIUM SPEC-SCNC: 9.9 MG/DL (ref 8.6–10.5)
CHLORIDE SERPL-SCNC: 91 MMOL/L (ref 98–107)
CO2 SERPL-SCNC: 23.9 MMOL/L (ref 22–29)
CREAT SERPL-MCNC: 1.14 MG/DL (ref 0.57–1)
DEPRECATED RDW RBC AUTO: 45.5 FL (ref 37–54)
EGFRCR SERPLBLD CKD-EPI 2021: 49.4 ML/MIN/1.73
EOSINOPHIL # BLD AUTO: 0.12 10*3/MM3 (ref 0–0.4)
EOSINOPHIL NFR BLD AUTO: 1.6 % (ref 0.3–6.2)
ERYTHROCYTE [DISTWIDTH] IN BLOOD BY AUTOMATED COUNT: 14.2 % (ref 12.3–15.4)
GLOBULIN UR ELPH-MCNC: 3.2 GM/DL
GLUCOSE SERPL-MCNC: 88 MG/DL (ref 65–99)
HCT VFR BLD AUTO: 40.4 % (ref 34–46.6)
HGB BLD-MCNC: 13.1 G/DL (ref 12–15.9)
IMM GRANULOCYTES # BLD AUTO: 0.02 10*3/MM3 (ref 0–0.05)
IMM GRANULOCYTES NFR BLD AUTO: 0.3 % (ref 0–0.5)
IRON 24H UR-MRATE: 96 MCG/DL (ref 37–145)
IRON SATN MFR SERPL: 29 % (ref 20–50)
LIPASE SERPL-CCNC: 117 U/L (ref 13–60)
LYMPHOCYTES # BLD AUTO: 2.29 10*3/MM3 (ref 0.7–3.1)
LYMPHOCYTES NFR BLD AUTO: 31.2 % (ref 19.6–45.3)
MCH RBC QN AUTO: 28.9 PG (ref 26.6–33)
MCHC RBC AUTO-ENTMCNC: 32.4 G/DL (ref 31.5–35.7)
MCV RBC AUTO: 89.2 FL (ref 79–97)
MONOCYTES # BLD AUTO: 0.55 10*3/MM3 (ref 0.1–0.9)
MONOCYTES NFR BLD AUTO: 7.5 % (ref 5–12)
NEUTROPHILS NFR BLD AUTO: 4.32 10*3/MM3 (ref 1.7–7)
NEUTROPHILS NFR BLD AUTO: 58.9 % (ref 42.7–76)
NRBC BLD AUTO-RTO: 0 /100 WBC (ref 0–0.2)
PLATELET # BLD AUTO: 298 10*3/MM3 (ref 140–450)
PMV BLD AUTO: 9.5 FL (ref 6–12)
POTASSIUM SERPL-SCNC: 5.7 MMOL/L (ref 3.5–5.2)
PROT SERPL-MCNC: 7.5 G/DL (ref 6–8.5)
RBC # BLD AUTO: 4.53 10*6/MM3 (ref 3.77–5.28)
SODIUM SERPL-SCNC: 127 MMOL/L (ref 136–145)
T3FREE SERPL-MCNC: 1.99 PG/ML (ref 2–4.4)
T4 FREE SERPL-MCNC: 2.16 NG/DL (ref 0.93–1.7)
TIBC SERPL-MCNC: 335 MCG/DL (ref 298–536)
TRANSFERRIN SERPL-MCNC: 225 MG/DL (ref 200–360)
TSH SERPL DL<=0.05 MIU/L-ACNC: 0.8 UIU/ML (ref 0.27–4.2)
WBC NRBC COR # BLD: 7.34 10*3/MM3 (ref 3.4–10.8)

## 2022-04-04 PROCEDURE — 84481 FREE ASSAY (FT-3): CPT | Performed by: INTERNAL MEDICINE

## 2022-04-04 PROCEDURE — 83540 ASSAY OF IRON: CPT | Performed by: INTERNAL MEDICINE

## 2022-04-04 PROCEDURE — 99214 OFFICE O/P EST MOD 30 MIN: CPT | Performed by: INTERNAL MEDICINE

## 2022-04-04 PROCEDURE — 84466 ASSAY OF TRANSFERRIN: CPT | Performed by: INTERNAL MEDICINE

## 2022-04-04 PROCEDURE — 80053 COMPREHEN METABOLIC PANEL: CPT | Performed by: INTERNAL MEDICINE

## 2022-04-04 PROCEDURE — 83690 ASSAY OF LIPASE: CPT | Performed by: INTERNAL MEDICINE

## 2022-04-04 PROCEDURE — 84439 ASSAY OF FREE THYROXINE: CPT | Performed by: INTERNAL MEDICINE

## 2022-04-04 PROCEDURE — 85025 COMPLETE CBC W/AUTO DIFF WBC: CPT | Performed by: INTERNAL MEDICINE

## 2022-04-04 PROCEDURE — 36415 COLL VENOUS BLD VENIPUNCTURE: CPT | Performed by: INTERNAL MEDICINE

## 2022-04-04 PROCEDURE — 84443 ASSAY THYROID STIM HORMONE: CPT | Performed by: INTERNAL MEDICINE

## 2022-04-04 RX ORDER — OMEPRAZOLE 40 MG/1
CAPSULE, DELAYED RELEASE ORAL
Qty: 30 CAPSULE | Refills: 0 | Status: SHIPPED | OUTPATIENT
Start: 2022-04-04 | End: 2022-05-09

## 2022-04-04 RX ORDER — LEVOTHYROXINE SODIUM 0.12 MG/1
125 TABLET ORAL
COMMUNITY
Start: 2022-02-26 | End: 2022-05-23

## 2022-04-04 NOTE — PROGRESS NOTES
04/04/2022    Patient Information  Nicole Lofton                                                                                          [address] [facility] [address]      1945  [unfilled]  There is no work phone number on file.    Chief Complaint:     Complaining of nausea, vomiting, poor appetite and fatigue.    History of Present Illness:    Patient with history of multiple medical problems including chronic myelocytic leukemia, recent hip fracture requiring surgery, hyperlipidemia, recent blood loss anemia related to the hip fracture.  She presents today with approximately 2-week history of persistent nausea.  There is associated with occasional vomiting.  She is not describing reflux or heartburn symptoms.  She not have any significant abdominal pain.  Her chemotherapy medication bosutinib was discontinued not long ago by her cancer doctor for the CML.  She is describing poor appetite and her weight is down about 13 pounds since November 2021.  It is down 2 pounds from the most recent visit just a few weeks ago.  She had no fever, chills, or other systemic signs or symptoms.  No diarrhea or change in bowel habits.  She takes ondansetron 4 mg for the nausea and reports it really does not help.  She reports she still has her gallbladder.  She also indicates that food actually makes her symptoms better.    Review of Systems   Constitutional: Negative. Negative for chills and fever.   HENT: Negative.    Eyes: Negative.    Cardiovascular: Negative.    Respiratory: Negative.    Endocrine: Negative.    Hematologic/Lymphatic: Negative.    Skin: Negative.    Musculoskeletal: Negative.    Gastrointestinal: Positive for anorexia, melena, nausea and vomiting. Negative for abdominal pain, constipation, diarrhea and heartburn.   Genitourinary: Negative.    Neurological: Negative.    Psychiatric/Behavioral: Negative.    Allergic/Immunologic: Negative.        Active Problems:    Patient Active  Problem List   Diagnosis   • Thrombocytosis   • CML (chronic myeloid leukemia) ([facility])   • Primary hypothyroidism   • Medication monitoring encounter   • Severe single current episode of major depressive disorder, without psychotic features ([facility])   • Primary insomnia   • Major depressive disorder with current active episode   • Osteoarthritis   • Incidental lung nodule, greater than or equal to 8mm   • Hypertension   • Chronic pain syndrome   • Hyperlipidemia   • Other chronic pain   • Anxiety   • Nicotine use disorder   • Bradycardia   • Depression   • Panic attack   • Acute pain of right knee   • Muscle weakness   • Chronic back pain   • Hypercholesterolemia   • Unsteady gait   • Closed fracture of right femur with delayed healing   • COVID-19   • Acute blood loss anemia   • MIESHA (acute kidney injury) ([facility])   • Hyponatremia   • Chronic nausea   • Poor appetite   • Chronic fatigue and malaise         Past Medical History:   Diagnosis Date   • Anxiety    • Breast cancer ([facility]) 1988   • Cervical spondylosis    • CML (chronic myelocytic leukemia) ([facility]) 2017   • Depression    • H/O Closed fracture of right proximal humerus    • H/O Distal radius fracture, left    • Hyperlipidemia    • Hypertension    • Hypothyroidism    • Lumbar spondylosis    • Osteoarthritis    • Thrombocytosis          Past Surgical History:   Procedure Laterality Date   • BACK SURGERY  1980; 1992    ruptured herniated disc   • MASTECTOMY Bilateral 1988         No Known Allergies        Current Outpatient Medications:   •  acetaminophen (TYLENOL) 325 MG tablet, Take 650 mg by mouth., Disp: , Rfl:   •  amLODIPine (NORVASC) 10 MG tablet, Take 1 tablet by mouth Daily., Disp: 90 tablet, Rfl: 1  •  atorvastatin (LIPITOR) 40 MG tablet, Take 1 tablet by mouth Daily., Disp: 90 tablet, Rfl: 1  •  cyclobenzaprine (FEXMID) 7.5 MG tablet, Take 1 tablet by mouth 2 (Two) Times a Day As Needed for Muscle Spasms., Disp: 30 tablet, Rfl: 3  •  docusate sodium 100 MG  capsule, Take 100 mg by mouth., Disp: , Rfl:   •  hydroCHLOROthiazide (HYDRODIURIL) 12.5 MG tablet, Take 1 tablet by mouth Daily., Disp: 30 tablet, Rfl: 0  •  levothyroxine (SYNTHROID, LEVOTHROID) 100 MCG tablet, , Disp: , Rfl:   •  levothyroxine (SYNTHROID, LEVOTHROID) 125 MCG tablet, Take 125 mcg by mouth Every 14 (Fourteen) Days., Disp: , Rfl:   •  lisinopril (PRINIVIL,ZESTRIL) 10 MG tablet, TAKE 1 TABLET BY MOUTH DAILY FOR 14 DAYS, Disp: 90 tablet, Rfl: 1  •  LORazepam (ATIVAN) 1 MG tablet, Take 1 tablet by mouth 2 (Two) Times a Day As Needed for Anxiety., Disp: 60 tablet, Rfl: 3  •  meloxicam (MOBIC) 7.5 MG tablet, TAKE 1 TABLET BY MOUTH EVERY DAY, Disp: 90 tablet, Rfl: 1  •  OLANZapine (zyPREXA) 5 MG tablet, Take 1 tablet by mouth Every Night., Disp: 90 tablet, Rfl: 0  •  omeprazole (priLOSEC) 40 MG capsule, Take 1 p.o. daily before the first meal., Disp: 30 capsule, Rfl: 0  •  ondansetron (ZOFRAN) 4 MG tablet, Take 1 tablet by mouth Every 8 (Eight) Hours As Needed for Nausea or Vomiting., Disp: 90 tablet, Rfl: 0  •  oxyCODONE-acetaminophen (Percocet) 7.5-325 MG per tablet, Take 1 tablet by mouth Every 8 (Eight) Hours As Needed for Severe Pain ., Disp: 90 tablet, Rfl: 0  •  potassium chloride (K-DUR,KLOR-CON) 10 MEQ CR tablet, Take 1 tablet by mouth Daily., Disp: 30 tablet, Rfl: 3  •  promethazine (PHENERGAN) 12.5 MG tablet, Take 1 tablet by mouth 3 (Three) Times a Day As Needed for Nausea., Disp: 60 tablet, Rfl: 0  •  traZODone (DESYREL) 50 MG tablet, TAKE 1 TABLET BY MOUTH AT BEDTIME FOR 14 DAYS, Disp: 90 tablet, Rfl: 1  •  Vortioxetine HBr (Trintellix) 20 MG tablet, Take 20 mg by mouth Daily., Disp: 30 tablet, Rfl: 11      Family History   Problem Relation Age of Onset   • Breast cancer Sister         Diagnosed in her 40s   • Breast cancer Paternal Grandmother    • Lung cancer Brother         Diagnosed in his 50s   • Other Brother         Substance abuse   • Lung cancer Brother         Diagnosed in his 50s  "  • Liver disease Brother    • Lung cancer Brother         Diagnosed in his 50s   • Breast cancer Sister         Diagnosed in her 40s   • Breast cancer Sister    • Breast cancer Sister    • Dementia Mother    • Parkinsonism Mother    • Heart disease Father    • Heart attack Father          Social History     Socioeconomic History   • Marital status:    • Number of children: 3   • Years of education: High school   Tobacco Use   • Smoking status: Current Every Day Smoker     Packs/day: 1.00     Types: Cigarettes   • Smokeless tobacco: Never Used   • Tobacco comment: since age 18   1ppd   Vaping Use   • Vaping Use: Never used   Substance and Sexual Activity   • Alcohol use: Yes     Comment: SOCIALLY   • Drug use: No   • Sexual activity: Never         Vitals:    04/04/22 1240   BP: 119/60   BP Location: Right arm   Patient Position: Sitting   Cuff Size: Adult   Pulse: 73   Resp: 16   SpO2: 99%   Weight: 48.2 kg (106 lb 3.2 oz)   Height: 165.1 cm (65\")        Body mass index is 17.67 kg/m².      Physical Exam:    General: Alert and oriented x 3.  No acute distress.  Patient is thin and cachectic.  Appears chronically ill.  Normal affect.  HEENT: Pupils equal, round, reactive to light; extraocular movements intact; sclerae nonicteric; pharynx, ear canals and TMs normal.  Neck: Without JVD, thyromegaly, bruit, or adenopathy.  Lungs: Clear to auscultation in all fields.  Heart: Regular rate and rhythm without murmur, rub, gallop, or click.  Abdomen: Soft, nontender, without hepatosplenomegaly or hernia.  Bowel sounds normal.  : Deferred.  Rectal: Deferred.  Extremities: Without clubbing, cyanosis, edema, or pulse deficit.  Neurologic: Intact without focal deficit.  Patient ambulates with the assistance of a wheeled walker.  Skin: Without significant lesion.  Musculoskeletal: Unremarkable.    Lab/other results:      Assessment/Plan:     Diagnosis Plan   1. Chronic nausea  Lipase    US Gallbladder    NM HIDA SCAN " WITH PHARMACOLOGICAL INTERVENTION    omeprazole (priLOSEC) 40 MG capsule   2. Poor appetite  US Gallbladder    NM HIDA SCAN WITH PHARMACOLOGICAL INTERVENTION   3. Hyponatremia  Comprehensive Metabolic Panel   4. Chronic fatigue and malaise  CBC & Differential    Comprehensive Metabolic Panel    Lipase    TSH    T4, Free    T3, Free    Iron Profile    US Gallbladder    NM HIDA SCAN WITH PHARMACOLOGICAL INTERVENTION   5. CML (chronic myeloid leukemia) (HCC)  CBC & Differential   6. Acute blood loss anemia  Iron Profile   7. Primary hypothyroidism  TSH    T4, Free    T3, Free     Patient with multiple medical problems including AML presents with at least a 2-week history of chronic nausea with occasional vomiting.  Nausea seems to improve when she is able to eat.  She also has poor appetite/anorexia.  She was admitted earlier this year with a hip fracture that required surgical pinning.  She had blood loss anemia after the procedure.  She was on chemotherapy for her chronic myelocytic leukemia but this was discontinued recently by the cancer doctor.  She has multiple reasons why she would have chronic fatigue.  The exact etiology of her symptoms are not clear.  One consideration is that her symptoms are actually related to dyspepsia/reflux.  Gallbladder disease is certainly a consideration.    Plan is as follows: Check lab work today.  Ultrasound gallbladder and CCK HIDA scan.  Start omeprazole 40 mg/day.  I instructed patient she can increase ondansetron to 8 mg per dose, no more than 3/day.  Patient needs to follow-up next week with .        Procedures

## 2022-04-05 ENCOUNTER — TELEPHONE (OUTPATIENT)
Dept: INTERNAL MEDICINE | Facility: CLINIC | Age: 77
End: 2022-04-05

## 2022-04-05 NOTE — TELEPHONE ENCOUNTER
Caller: ADRIANA PAGE    Relationship: Emergency Contact    Best call back number: [phone]    Caller requesting test results: YES    What test was performed: BLOOD WORK    When was the test performed: 04/04/22    Where was the test performed: IN OFFICE

## 2022-04-05 NOTE — TELEPHONE ENCOUNTER
Caller: Nicole Lofton    Relationship: Self    Best call back number:[phone]  What medication are you requesting: SOMETHING FOR NAUSEA   What are your current symptoms: GAGGING, THROWING UP AND NAUSEA    How long have you been experiencing symptoms: A COUPLE WEEKS      If a prescription is needed, what is your preferred pharmacy and phone number: [facility] #[id number] [facility] [address] AT [facility] [employer] [facility] [phone] [place] [phone] FX     Additional notes:      [id number] [address] AT [facility] [employer]  [facility] [phone] [facility] [phone] FX  [phone]

## 2022-04-07 RX ORDER — ONDANSETRON 4 MG/1
4 TABLET, FILM COATED ORAL EVERY 8 HOURS PRN
Qty: 60 TABLET | Refills: 3 | Status: SHIPPED | OUTPATIENT
Start: 2022-04-07 | End: 2022-05-20 | Stop reason: SDUPTHER

## 2022-04-11 DIAGNOSIS — R11.0 CHRONIC NAUSEA: Primary | ICD-10-CM

## 2022-04-11 RX ORDER — SODIUM POLYSTYRENE SULFONATE 15 G/60ML
15 SUSPENSION ORAL; RECTAL ONCE
Qty: 60 ML | Refills: 0 | Status: SHIPPED | OUTPATIENT
Start: 2022-04-11 | End: 2022-04-11

## 2022-04-11 NOTE — PROGRESS NOTES
Please inform the patient of the following abnormal results.   This patient was recently seen by Dr. Mercer, let her know that for her high potassium I did call in some Kayexalate to [facility].  And that her lipase was elevated, and I am not sure if she has been drinking some alcohol which we can asked the patient.  I have ordered a CT scan abdomen pelvis with contrast for her.

## 2022-04-12 ENCOUNTER — TELEPHONE (OUTPATIENT)
Dept: INTERNAL MEDICINE | Facility: CLINIC | Age: 77
End: 2022-04-12

## 2022-04-12 NOTE — TELEPHONE ENCOUNTER
DAUGHTER CALLED, HER MOTHER IS WANTING MORE IN HOME PHYSICAL THERAPY FOR HER BROKEN HIP. SHE IS TOO WEAK FOR OUTPATIENT SERVICES. WANTING TO USE [employer]ERS.    PLEASE ADVISE  [phone]

## 2022-04-18 DIAGNOSIS — I10 PRIMARY HYPERTENSION: Primary | ICD-10-CM

## 2022-04-22 DIAGNOSIS — S72.009S CLOSED FRACTURE OF HIP, UNSPECIFIED LATERALITY, SEQUELA: Primary | ICD-10-CM

## 2022-05-02 ENCOUNTER — APPOINTMENT (OUTPATIENT)
Dept: ULTRASOUND IMAGING | Facility: HOSPITAL | Age: 77
End: 2022-05-02

## 2022-05-02 ENCOUNTER — APPOINTMENT (OUTPATIENT)
Dept: NUCLEAR MEDICINE | Facility: HOSPITAL | Age: 77
End: 2022-05-02

## 2022-05-07 DIAGNOSIS — F41.9 ANXIETY: ICD-10-CM

## 2022-05-09 DIAGNOSIS — F41.9 ANXIETY: ICD-10-CM

## 2022-05-09 DIAGNOSIS — R11.0 CHRONIC NAUSEA: ICD-10-CM

## 2022-05-09 RX ORDER — OMEPRAZOLE 40 MG/1
CAPSULE, DELAYED RELEASE ORAL
Qty: 30 CAPSULE | Refills: 0 | Status: SHIPPED | OUTPATIENT
Start: 2022-05-09 | End: 2022-06-14

## 2022-05-09 NOTE — TELEPHONE ENCOUNTER
Caller: ADRIANA PAGE, DAUGHTER     Relationship: Emergency Contact    Best call back number: [phone]    Requested Prescriptions:   Requested Prescriptions     Pending Prescriptions Disp Refills   • LORazepam (ATIVAN) 1 MG tablet 60 tablet 3     Sig: Take 1 tablet by mouth 2 (Two) Times a Day As Needed for Anxiety.        Pharmacy where request should be sent: [facility] #[id number] [place], [place] - [address] AT [facility] - [phone] [facility] [phone] FX     Additional details provided by patient: PATIENT HAS ENOUGH FOR THE REST OF THIS WEEK.  DAUGHTER REQUESTING A 90 DAY SUPPLY OF THIS MEDICATION.    Does the patient have less than a 3 day supply:  [] Yes  [x] No    Kari BEATNCOURT Rep   05/09/22 13:38 EDT          non-verbal indicator of pain/discomfort present

## 2022-05-10 DIAGNOSIS — F41.9 ANXIETY: ICD-10-CM

## 2022-05-10 RX ORDER — LORAZEPAM 1 MG/1
TABLET ORAL
Qty: 60 TABLET | Refills: 0 | Status: SHIPPED | OUTPATIENT
Start: 2022-05-10 | End: 2022-06-23 | Stop reason: SDUPTHER

## 2022-05-10 RX ORDER — LORAZEPAM 1 MG/1
TABLET ORAL
Qty: 60 TABLET | Refills: 1 | Status: SHIPPED | OUTPATIENT
Start: 2022-05-10 | End: 2022-05-20 | Stop reason: SDUPTHER

## 2022-05-10 RX ORDER — LORAZEPAM 1 MG/1
1 TABLET ORAL 2 TIMES DAILY PRN
Qty: 60 TABLET | Refills: 3 | Status: SHIPPED | OUTPATIENT
Start: 2022-05-10 | End: 2022-07-05

## 2022-05-10 NOTE — TELEPHONE ENCOUNTER
PATIENT'S DAUGHTER CALLED AGAIN IN TO CHECK ON STATUS OF MEDICATION REFILL OF     LORazepam (ATIVAN) 1 MG tablet    [id number] [place], [place] - [address] AT [facility] - [phone]  - [phone]   [phone]    CALL BACK NUMBER [phone]

## 2022-05-16 ENCOUNTER — TELEPHONE (OUTPATIENT)
Dept: ONCOLOGY | Facility: CLINIC | Age: 77
End: 2022-05-16

## 2022-05-16 DIAGNOSIS — S72.8X1G OTHER CLOSED FRACTURE OF RIGHT FEMUR WITH DELAYED HEALING, UNSPECIFIED PORTION OF FEMUR, SUBSEQUENT ENCOUNTER: ICD-10-CM

## 2022-05-16 RX ORDER — OXYCODONE AND ACETAMINOPHEN 7.5; 325 MG/1; MG/1
1 TABLET ORAL EVERY 8 HOURS PRN
Qty: 90 TABLET | Refills: 0 | Status: SHIPPED | OUTPATIENT
Start: 2022-05-16 | End: 2022-06-15 | Stop reason: SDUPTHER

## 2022-05-16 NOTE — TELEPHONE ENCOUNTER
Caller: Clarissa Rodriguez    Relationship: Emergency Contact    Best call back number: [phone]    What is the best time to reach you: ANYTIME    Who are you requesting to speak with (clinical staff, provider,  specific staff member): SCHEDULING    What was the call regarding: PTS DAUGHTER CALLING TO R/S PTS APPT FOR ANYTIME AFTER 11 AM - PT HAS A HARD TIME WITH EARLY APPTS.    PLEASE CALL TO R/S.     Do you require a callback: YES

## 2022-05-16 NOTE — TELEPHONE ENCOUNTER
Caller: Clarissa Rodriguez    Relationship: Emergency Contact    Best call back number:[phone]    Requested Prescriptions:   Requested Prescriptions     Pending Prescriptions Disp Refills   • oxyCODONE-acetaminophen (Percocet) 7.5-325 MG per tablet 90 tablet 0     Sig: Take 1 tablet by mouth Every 8 (Eight) Hours As Needed for Severe Pain .        Pharmacy where request should be sent: [facility] #[id number] [place], [place] - [address] AT [facility] - [phone] [facility] [phone] FX     Additional details provided by patient: PATIENT HAS A COUPLE LEFT    Does the patient have less than a 3 day supply:  [x] Yes  [] No    Kari Sewell Rep   05/16/22 14:34 EDT

## 2022-05-17 RX ORDER — TRAZODONE HYDROCHLORIDE 50 MG/1
TABLET ORAL
Qty: 90 TABLET | Refills: 1 | Status: SHIPPED | OUTPATIENT
Start: 2022-05-17 | End: 2022-11-15

## 2022-05-20 ENCOUNTER — LAB (OUTPATIENT)
Dept: OTHER | Facility: HOSPITAL | Age: 77
End: 2022-05-20

## 2022-05-20 ENCOUNTER — OFFICE VISIT (OUTPATIENT)
Dept: ONCOLOGY | Facility: CLINIC | Age: 77
End: 2022-05-20

## 2022-05-20 ENCOUNTER — APPOINTMENT (OUTPATIENT)
Dept: OTHER | Facility: HOSPITAL | Age: 77
End: 2022-05-20

## 2022-05-20 VITALS
TEMPERATURE: 96.6 F | WEIGHT: 106.9 LBS | OXYGEN SATURATION: 97 % | RESPIRATION RATE: 16 BRPM | HEIGHT: 65 IN | HEART RATE: 65 BPM | SYSTOLIC BLOOD PRESSURE: 116 MMHG | DIASTOLIC BLOOD PRESSURE: 71 MMHG | BODY MASS INDEX: 17.81 KG/M2

## 2022-05-20 DIAGNOSIS — C92.10 CML (CHRONIC MYELOID LEUKEMIA): ICD-10-CM

## 2022-05-20 DIAGNOSIS — R35.0 FREQUENT URINATION: ICD-10-CM

## 2022-05-20 DIAGNOSIS — R35.0 FREQUENT URINATION: Primary | ICD-10-CM

## 2022-05-20 LAB
ALBUMIN SERPL-MCNC: 4.2 G/DL (ref 3.5–5.2)
ALBUMIN/GLOB SERPL: 1.6 G/DL
ALP SERPL-CCNC: 110 U/L (ref 39–117)
ALT SERPL W P-5'-P-CCNC: 14 U/L (ref 1–33)
ANION GAP SERPL CALCULATED.3IONS-SCNC: 6.6 MMOL/L (ref 5–15)
AST SERPL-CCNC: 19 U/L (ref 1–32)
BACTERIA UR QL AUTO: ABNORMAL /HPF
BASOPHILS # BLD AUTO: 0.05 10*3/MM3 (ref 0–0.2)
BASOPHILS NFR BLD AUTO: 0.7 % (ref 0–1.5)
BILIRUB SERPL-MCNC: 0.4 MG/DL (ref 0–1.2)
BILIRUB UR QL STRIP: ABNORMAL
BUN SERPL-MCNC: 16 MG/DL (ref 8–23)
BUN/CREAT SERPL: 16.7 (ref 7–25)
CALCIUM SPEC-SCNC: 9.5 MG/DL (ref 8.6–10.5)
CHLORIDE SERPL-SCNC: 92 MMOL/L (ref 98–107)
CLARITY UR: CLEAR
CO2 SERPL-SCNC: 28.4 MMOL/L (ref 22–29)
COLOR UR: YELLOW
CREAT SERPL-MCNC: 0.96 MG/DL (ref 0.57–1)
DEPRECATED RDW RBC AUTO: 48.8 FL (ref 37–54)
EGFRCR SERPLBLD CKD-EPI 2021: 60.7 ML/MIN/1.73
EOSINOPHIL # BLD AUTO: 0.12 10*3/MM3 (ref 0–0.4)
EOSINOPHIL NFR BLD AUTO: 1.8 % (ref 0.3–6.2)
ERYTHROCYTE [DISTWIDTH] IN BLOOD BY AUTOMATED COUNT: 15.2 % (ref 12.3–15.4)
GLOBULIN UR ELPH-MCNC: 2.6 GM/DL
GLUCOSE SERPL-MCNC: 170 MG/DL (ref 65–99)
GLUCOSE UR STRIP-MCNC: NEGATIVE MG/DL
HCT VFR BLD AUTO: 35 % (ref 34–46.6)
HGB BLD-MCNC: 11.8 G/DL (ref 12–15.9)
HGB UR QL STRIP.AUTO: NEGATIVE
HYALINE CASTS UR QL AUTO: ABNORMAL /LPF
IMM GRANULOCYTES # BLD AUTO: 0.03 10*3/MM3 (ref 0–0.05)
IMM GRANULOCYTES NFR BLD AUTO: 0.4 % (ref 0–0.5)
KETONES UR QL STRIP: ABNORMAL
LEUKOCYTE ESTERASE UR QL STRIP.AUTO: ABNORMAL
LYMPHOCYTES # BLD AUTO: 1.77 10*3/MM3 (ref 0.7–3.1)
LYMPHOCYTES NFR BLD AUTO: 26 % (ref 19.6–45.3)
MCH RBC QN AUTO: 29.8 PG (ref 26.6–33)
MCHC RBC AUTO-ENTMCNC: 33.7 G/DL (ref 31.5–35.7)
MCV RBC AUTO: 88.4 FL (ref 79–97)
MONOCYTES # BLD AUTO: 0.45 10*3/MM3 (ref 0.1–0.9)
MONOCYTES NFR BLD AUTO: 6.6 % (ref 5–12)
NEUTROPHILS NFR BLD AUTO: 4.38 10*3/MM3 (ref 1.7–7)
NEUTROPHILS NFR BLD AUTO: 64.5 % (ref 42.7–76)
NITRITE UR QL STRIP: NEGATIVE
NRBC BLD AUTO-RTO: 0 /100 WBC (ref 0–0.2)
PH UR STRIP.AUTO: 5.5 [PH] (ref 5–8)
PLATELET # BLD AUTO: 255 10*3/MM3 (ref 140–450)
PMV BLD AUTO: 9 FL (ref 6–12)
POTASSIUM SERPL-SCNC: 4.2 MMOL/L (ref 3.5–5.2)
PROT SERPL-MCNC: 6.8 G/DL (ref 6–8.5)
PROT UR QL STRIP: ABNORMAL
RBC # BLD AUTO: 3.96 10*6/MM3 (ref 3.77–5.28)
RBC # UR STRIP: ABNORMAL /HPF
REF LAB TEST METHOD: ABNORMAL
SODIUM SERPL-SCNC: 127 MMOL/L (ref 136–145)
SP GR UR STRIP: 1.02 (ref 1–1.03)
SQUAMOUS #/AREA URNS HPF: ABNORMAL /HPF
UROBILINOGEN UR QL STRIP: ABNORMAL
WBC # UR STRIP: ABNORMAL /HPF
WBC NRBC COR # BLD: 6.8 10*3/MM3 (ref 3.4–10.8)

## 2022-05-20 PROCEDURE — 80053 COMPREHEN METABOLIC PANEL: CPT | Performed by: INTERNAL MEDICINE

## 2022-05-20 PROCEDURE — 36415 COLL VENOUS BLD VENIPUNCTURE: CPT

## 2022-05-20 PROCEDURE — 99213 OFFICE O/P EST LOW 20 MIN: CPT

## 2022-05-20 PROCEDURE — 81001 URINALYSIS AUTO W/SCOPE: CPT

## 2022-05-20 PROCEDURE — 85025 COMPLETE CBC W/AUTO DIFF WBC: CPT | Performed by: INTERNAL MEDICINE

## 2022-05-20 RX ORDER — ONDANSETRON 4 MG/1
TABLET, FILM COATED ORAL EVERY 24 HOURS
COMMUNITY
Start: 2022-04-07 | End: 2022-11-14 | Stop reason: SDUPTHER

## 2022-05-24 RX ORDER — LISINOPRIL 10 MG/1
10 TABLET ORAL DAILY
Qty: 90 TABLET | Refills: 1 | Status: SHIPPED | OUTPATIENT
Start: 2022-05-24 | End: 2022-11-30

## 2022-05-24 RX ORDER — LEVOTHYROXINE SODIUM 0.12 MG/1
125 TABLET ORAL DAILY
Qty: 90 TABLET | Refills: 1 | Status: SHIPPED | OUTPATIENT
Start: 2022-05-24 | End: 2022-07-05 | Stop reason: SDUPTHER

## 2022-05-26 ENCOUNTER — TELEPHONE (OUTPATIENT)
Dept: ONCOLOGY | Facility: CLINIC | Age: 77
End: 2022-05-26

## 2022-05-26 ENCOUNTER — PREP FOR SURGERY (OUTPATIENT)
Dept: OTHER | Facility: HOSPITAL | Age: 77
End: 2022-05-26

## 2022-05-26 DIAGNOSIS — R35.0 FREQUENT URINATION: Primary | ICD-10-CM

## 2022-05-26 DIAGNOSIS — N39.0 URINARY TRACT INFECTION WITHOUT HEMATURIA, SITE UNSPECIFIED: ICD-10-CM

## 2022-05-26 RX ORDER — NITROFURANTOIN 25; 75 MG/1; MG/1
100 CAPSULE ORAL 2 TIMES DAILY
Qty: 10 CAPSULE | Refills: 0 | Status: SHIPPED | OUTPATIENT
Start: 2022-05-26 | End: 2022-05-27 | Stop reason: SDUPTHER

## 2022-05-26 NOTE — TELEPHONE ENCOUNTER
Returned call to patient who is still experiencing UTI symptoms.  I explained that a urine culture was not done, but her UA was abnormal and [facility] is sending in a prescription for her. She v/u.

## 2022-05-26 NOTE — TELEPHONE ENCOUNTER
Caller: LUX    Relationship: Self    Best call back number: [phone]    Caller requesting test results: LXU    What test was performed: URINE CULTURE FOR UTI    When was the test performed: 5/20/2022    Where was the test performed:

## 2022-05-27 ENCOUNTER — TELEPHONE (OUTPATIENT)
Dept: ONCOLOGY | Facility: CLINIC | Age: 77
End: 2022-05-27

## 2022-05-27 RX ORDER — NITROFURANTOIN 25; 75 MG/1; MG/1
100 CAPSULE ORAL 2 TIMES DAILY
Qty: 10 CAPSULE | Refills: 0 | Status: SHIPPED | OUTPATIENT
Start: 2022-05-27 | End: 2022-07-05

## 2022-06-06 LAB — REF LAB TEST METHOD: NORMAL

## 2022-06-14 DIAGNOSIS — R11.0 CHRONIC NAUSEA: ICD-10-CM

## 2022-06-14 RX ORDER — OMEPRAZOLE 40 MG/1
CAPSULE, DELAYED RELEASE ORAL
Qty: 30 CAPSULE | Refills: 0 | Status: SHIPPED | OUTPATIENT
Start: 2022-06-14 | End: 2022-07-05 | Stop reason: SDUPTHER

## 2022-06-15 DIAGNOSIS — S72.8X1G OTHER CLOSED FRACTURE OF RIGHT FEMUR WITH DELAYED HEALING, UNSPECIFIED PORTION OF FEMUR, SUBSEQUENT ENCOUNTER: ICD-10-CM

## 2022-06-15 RX ORDER — OXYCODONE AND ACETAMINOPHEN 7.5; 325 MG/1; MG/1
1 TABLET ORAL EVERY 8 HOURS PRN
Qty: 90 TABLET | Refills: 0 | Status: SHIPPED | OUTPATIENT
Start: 2022-06-15 | End: 2022-07-18 | Stop reason: SDUPTHER

## 2022-06-15 NOTE — TELEPHONE ENCOUNTER
Caller: Clarissa Rodriguez    Relationship: Emergency Contact    Best call back number: [phone]    Requested Prescriptions:   Requested Prescriptions     Pending Prescriptions Disp Refills   • oxyCODONE-acetaminophen (Percocet) 7.5-325 MG per tablet 90 tablet 0     Sig: Take 1 tablet by mouth Every 8 (Eight) Hours As Needed for Severe Pain .        Pharmacy where request should be sent: [facility] #[id number] [place], [place] - [address] AT [facility] - [phone] [facility] [phone] FX     Additional details provided by patient:     Does the patient have less than a 3 day supply:  [] Yes  [x] No    Kari Da Silva Rep   06/15/22 08:53 EDT

## 2022-06-20 ENCOUNTER — TELEPHONE (OUTPATIENT)
Dept: INTERNAL MEDICINE | Facility: CLINIC | Age: 77
End: 2022-06-20

## 2022-06-20 RX ORDER — OLANZAPINE 5 MG/1
5 TABLET ORAL NIGHTLY
Qty: 90 TABLET | Refills: 0 | Status: SHIPPED | OUTPATIENT
Start: 2022-06-20 | End: 2022-09-19

## 2022-06-20 NOTE — TELEPHONE ENCOUNTER
Hub staff attempted to follow warm transfer process and was unsuccessful     Caller: Nicole Lofton    Relationship to patient: Self    Best call back number: [phone]    Patient is needing: NEEDS APPOINTMENT, HAS UTI AND NO SAME DAY SLOTS IN Epic.     PLEASE CALL PATIENT TO SCHEDULE

## 2022-06-23 ENCOUNTER — OFFICE VISIT (OUTPATIENT)
Dept: INTERNAL MEDICINE | Facility: CLINIC | Age: 77
End: 2022-06-23

## 2022-06-23 VITALS
TEMPERATURE: 97.7 F | OXYGEN SATURATION: 97 % | SYSTOLIC BLOOD PRESSURE: 140 MMHG | HEIGHT: 65 IN | BODY MASS INDEX: 17.16 KG/M2 | HEART RATE: 70 BPM | WEIGHT: 103 LBS | DIASTOLIC BLOOD PRESSURE: 70 MMHG

## 2022-06-23 DIAGNOSIS — F41.9 ANXIETY: ICD-10-CM

## 2022-06-23 DIAGNOSIS — N39.0 ACUTE UTI: ICD-10-CM

## 2022-06-23 DIAGNOSIS — R30.9 PAINFUL URINATION: Primary | ICD-10-CM

## 2022-06-23 LAB
BILIRUB BLD-MCNC: NEGATIVE MG/DL
CLARITY, POC: CLEAR
COLOR UR: YELLOW
EXPIRATION DATE: ABNORMAL
GLUCOSE UR STRIP-MCNC: NEGATIVE MG/DL
KETONES UR QL: NEGATIVE
LEUKOCYTE EST, POC: ABNORMAL
Lab: ABNORMAL
NITRITE UR-MCNC: NEGATIVE MG/ML
PH UR: 6.5 [PH] (ref 5–8)
PROT UR STRIP-MCNC: NEGATIVE MG/DL
RBC # UR STRIP: NEGATIVE /UL
SP GR UR: 1.01 (ref 1–1.03)
UROBILINOGEN UR QL: NORMAL

## 2022-06-23 PROCEDURE — 99214 OFFICE O/P EST MOD 30 MIN: CPT | Performed by: FAMILY MEDICINE

## 2022-06-23 PROCEDURE — 81003 URINALYSIS AUTO W/O SCOPE: CPT | Performed by: FAMILY MEDICINE

## 2022-06-23 RX ORDER — SULFAMETHOXAZOLE AND TRIMETHOPRIM 800; 160 MG/1; MG/1
1 TABLET ORAL 2 TIMES DAILY
Qty: 14 TABLET | Refills: 0 | Status: SHIPPED | OUTPATIENT
Start: 2022-06-23 | End: 2022-06-30

## 2022-06-23 RX ORDER — LORAZEPAM 1 MG/1
1 TABLET ORAL 2 TIMES DAILY PRN
Qty: 60 TABLET | Refills: 2 | Status: SHIPPED | OUTPATIENT
Start: 2022-06-23 | End: 2022-07-05

## 2022-06-23 NOTE — PROGRESS NOTES
"Chief Complaint  Dysuria    Subjective        Nicole Lofton presents to [facility]  History of Present Illness    Patient presents at today's office visit stating that she has some symptoms of urinary issues, but is not so painful.  It was found on a urinalysis that she does have some leukocytes in the urine.  We did discuss at today's office visit that we will treated.    Patient also has a history having anxiety, and she notes that her anxiety is still very high.  She has been on Trintellix 20 mg daily and is also taking Ativan once a day.  She denies any side effects of the medication.    Objective   Vital Signs:  /70   Pulse 70   Temp 97.7 °F (36.5 °C)   Ht 165.1 cm (65\")   Wt 46.7 kg (103 lb)   SpO2 97%   BMI 17.14 kg/m²   Estimated body mass index is 17.14 kg/m² as calculated from the following:    Height as of this encounter: 165.1 cm (65\").    Weight as of this encounter: 46.7 kg (103 lb).          Physical Exam  Vitals and nursing note reviewed.   Constitutional:       Appearance: She is well-developed.   HENT:      Head: Normocephalic and atraumatic.   Musculoskeletal:      Cervical back: Normal range of motion and neck supple.   Neurological:      Mental Status: She is alert and oriented to person, place, and time.   Psychiatric:         Behavior: Behavior normal.        Result Review :                Assessment and Plan   Diagnoses and all orders for this visit:    1. Painful urination (Primary)  -     POCT urinalysis dipstick, automated  -     Urine Culture - Urine, Urine, Clean Catch    2. Acute UTI  -     sulfamethoxazole-trimethoprim (BACTRIM DS,SEPTRA DS) 800-160 MG per tablet; Take 1 tablet by mouth 2 (Two) Times a Day for 7 days.  Dispense: 14 tablet; Refill: 0    3. Anxiety  -     LORazepam (ATIVAN) 1 MG tablet; Take 1 tablet by mouth 2 (Two) Times a Day As Needed for Anxiety. for anxiety  Dispense: 60 tablet; Refill: 2      Anxiety will continue " Trintellix, and will increase Ativan to 1 mg twice a day as needed.  We will start her on Bactrim DS for urinary tract infection.       Follow Up   No follow-ups on file.  Patient was given instructions and counseling regarding her condition or for health maintenance advice. Please see specific information pulled into the AVS if appropriate.

## 2022-06-26 LAB
BACTERIA UR CULT: NORMAL
BACTERIA UR CULT: NORMAL

## 2022-06-29 DIAGNOSIS — N39.0 ACUTE UTI: ICD-10-CM

## 2022-06-29 RX ORDER — SULFAMETHOXAZOLE AND TRIMETHOPRIM 800; 160 MG/1; MG/1
TABLET ORAL
Qty: 14 TABLET | Refills: 0 | OUTPATIENT
Start: 2022-06-29

## 2022-06-30 ENCOUNTER — TELEPHONE (OUTPATIENT)
Dept: INTERNAL MEDICINE | Facility: CLINIC | Age: 77
End: 2022-06-30

## 2022-06-30 NOTE — TELEPHONE ENCOUNTER
Caller: Nicole Lofton    Relationship to patient: Self    Best call back number: [phone]    Chief complaint: PAIN IN STOMACH IN THE MORNINGS, NO ENERGY, PAIN ALL OVER    Type of visit: OFFICE    Requested date: 6/30    Additional notes: NO OPENINGS, PATIENT WOULD LIKE TO BE SEEN TODAY. PLEASE ADVISE.

## 2022-06-30 NOTE — TELEPHONE ENCOUNTER
PATIENT IS CALLING BACK TO FOLLOW UP, SHE STATES THAT SHE IS NOT FEELING ANY BETTER AFTER HER CT. WANTS TO KNOW IF DR. KELLER WILL SEND IN AN ANTIBIOTIC FOR THIS ISSUE IF SHE IS UNABLE TO BE SEEN.     CONFIRMED PHARMACY: [facility] #[id number] [id number] AT [facility] - [phone] [facility] [phone]   [phone]

## 2022-07-05 ENCOUNTER — TELEMEDICINE (OUTPATIENT)
Dept: INTERNAL MEDICINE | Facility: CLINIC | Age: 77
End: 2022-07-05

## 2022-07-05 DIAGNOSIS — F41.9 ANXIETY: ICD-10-CM

## 2022-07-05 DIAGNOSIS — R10.84 GENERALIZED ABDOMINAL PAIN: ICD-10-CM

## 2022-07-05 DIAGNOSIS — Z12.11 ENCOUNTER FOR SCREENING COLONOSCOPY: ICD-10-CM

## 2022-07-05 DIAGNOSIS — R55 SYNCOPE, UNSPECIFIED SYNCOPE TYPE: Primary | ICD-10-CM

## 2022-07-05 DIAGNOSIS — R11.0 CHRONIC NAUSEA: ICD-10-CM

## 2022-07-05 PROCEDURE — 99214 OFFICE O/P EST MOD 30 MIN: CPT | Performed by: FAMILY MEDICINE

## 2022-07-05 RX ORDER — LEVOTHYROXINE SODIUM 0.12 MG/1
125 TABLET ORAL DAILY
Qty: 90 TABLET | Refills: 1 | Status: SHIPPED | OUTPATIENT
Start: 2022-07-05 | End: 2022-11-30 | Stop reason: SDUPTHER

## 2022-07-05 RX ORDER — LORAZEPAM 0.5 MG/1
0.5 TABLET ORAL EVERY 8 HOURS PRN
Qty: 60 TABLET | Refills: 3 | Status: SHIPPED | OUTPATIENT
Start: 2022-07-05 | End: 2022-11-30 | Stop reason: SDUPTHER

## 2022-07-05 RX ORDER — OMEPRAZOLE 40 MG/1
40 CAPSULE, DELAYED RELEASE ORAL DAILY
Qty: 30 CAPSULE | Refills: 0 | Status: SHIPPED | OUTPATIENT
Start: 2022-07-05 | End: 2022-08-15

## 2022-07-05 RX ORDER — PROMETHAZINE HYDROCHLORIDE 12.5 MG/1
12.5 TABLET ORAL 3 TIMES DAILY PRN
Qty: 60 TABLET | Refills: 0 | Status: SHIPPED | OUTPATIENT
Start: 2022-07-05 | End: 2022-08-08

## 2022-07-14 ENCOUNTER — PRE-PROCEDURE SCREENING (OUTPATIENT)
Dept: GASTROENTEROLOGY | Facility: CLINIC | Age: 77
End: 2022-07-14

## 2022-07-18 DIAGNOSIS — S72.8X1G OTHER CLOSED FRACTURE OF RIGHT FEMUR WITH DELAYED HEALING, UNSPECIFIED PORTION OF FEMUR, SUBSEQUENT ENCOUNTER: ICD-10-CM

## 2022-07-18 NOTE — TELEPHONE ENCOUNTER
PATIENT REQUESTED A REFILL ON:l oxyCODONE-acetaminophen (Percocet) 7.5-325 MG per tablet    PATIENT CAN BE REACHED ON:[phone]     PHARMACY [facility] #[id number] [place], [place] - [address] AT [facility] - [phone]  - [phone]   [phone]

## 2022-07-19 RX ORDER — OXYCODONE AND ACETAMINOPHEN 7.5; 325 MG/1; MG/1
1 TABLET ORAL EVERY 8 HOURS PRN
Qty: 90 TABLET | Refills: 0 | Status: SHIPPED | OUTPATIENT
Start: 2022-07-19 | End: 2022-08-18 | Stop reason: SDUPTHER

## 2022-07-24 NOTE — PROGRESS NOTES
"Chief Complaint  No chief complaint on file.    Cc syncope    This visit has been rescheduled as a phone visit to comply with patient safety concerns in accordance with CDC recommendations. Total time of discussion was 30 minutes.    You have chosen to receive care through a telephone visit. Do you consent to use a telephone visit for your medical care today? Yes      Subjective        Nicole Lofton presents to [facility]  History of Present Illness    Today's office visit I have speaking to both of her daughters as well as the patient.  It appears that patient had couple syncopal episodes in the last several days.  She did not go to the emergency room to get evaluated.  We did discuss at today's office visit that she would benefit from perhaps having a CT scan of her head done.  She also notes that she has some underlying nausea.  And she also states that she occasionally gets some intermittent abdominal pain.  She is not sure of abdominal pain is related to anything.  She really does not want to go to the emergency room for further evaluation.    Patient does have a long history having anxiety.  She currently takes Ativan, is currently taking 0.5 mg every 8 hours.  She denies any side effects of the medication.  She states the medicine seems to work well for her.    Objective   Vital Signs:  There were no vitals taken for this visit.  Estimated body mass index is 17.14 kg/m² as calculated from the following:    Height as of 6/23/22: 165.1 cm (65\").    Weight as of 6/23/22: 46.7 kg (103 lb).          Physical Exam  Vitals and nursing note reviewed.   Constitutional:       Appearance: She is well-developed.   HENT:      Head: Normocephalic and atraumatic.   Neurological:      Mental Status: She is alert and oriented to person, place, and time.   Psychiatric:         Behavior: Behavior normal.        Result Review :                Assessment and Plan   Diagnoses and all orders for " this visit:    1. Syncope, unspecified syncope type (Primary)  -     CT Head Without Contrast; Future  -     Ambulatory Referral to Cardiology    2. Anxiety  -     LORazepam (Ativan) 0.5 MG tablet; Take 1 tablet by mouth Every 8 (Eight) Hours As Needed for Anxiety.  Dispense: 60 tablet; Refill: 3    3. Generalized abdominal pain  -     CT Abdomen Pelvis With & Without Contrast; Future    4. Encounter for screening colonoscopy  -     Ambulatory Referral For Screening Colonoscopy    5. Chronic nausea  -     omeprazole (priLOSEC) 40 MG capsule; Take 1 capsule by mouth Daily.  Dispense: 30 capsule; Refill: 0    Other orders  -     promethazine (PHENERGAN) 12.5 MG tablet; Take 1 tablet by mouth 3 (Three) Times a Day As Needed for Nausea.  Dispense: 60 tablet; Refill: 0  -     levothyroxine (SYNTHROID, LEVOTHROID) 125 MCG tablet; Take 1 tablet by mouth Daily.  Dispense: 90 tablet; Refill: 1      For her nausea, she can currently take promethazine, as well as omeprazole.  For her generalized abdominal pain, will do CT scan of her abdomen and pelvis.  For the syncope, will refer to cardiology as well as CT scan of her head.  We will continue Ativan for her anxiety.  If she worsens in any way she will continue to have any falls I have advised her to go to the emergency room, the family has agreed.       Follow Up   No follow-ups on file.  Patient was given instructions and counseling regarding her condition or for health maintenance advice. Please see specific information pulled into the AVS if appropriate.

## 2022-07-28 ENCOUNTER — HOSPITAL ENCOUNTER (OUTPATIENT)
Dept: CT IMAGING | Facility: HOSPITAL | Age: 77
Discharge: HOME | End: 2022-07-28
Admitting: FAMILY MEDICINE

## 2022-07-28 DIAGNOSIS — R55 SYNCOPE, UNSPECIFIED SYNCOPE TYPE: ICD-10-CM

## 2022-07-28 DIAGNOSIS — R10.84 GENERALIZED ABDOMINAL PAIN: ICD-10-CM

## 2022-07-28 PROCEDURE — 74177 CT ABD & PELVIS W/CONTRAST: CPT

## 2022-07-28 PROCEDURE — 25010000002 IOPAMIDOL 61 % SOLUTION: Performed by: FAMILY MEDICINE

## 2022-07-28 PROCEDURE — 0 DIATRIZOATE MEGLUMINE & SODIUM PER 1 ML: Performed by: FAMILY MEDICINE

## 2022-07-28 PROCEDURE — 82565 ASSAY OF CREATININE: CPT

## 2022-07-28 PROCEDURE — 70470 CT HEAD/BRAIN W/O & W/DYE: CPT

## 2022-07-28 RX ADMIN — DIATRIZOATE MEGLUMINE AND DIATRIZOATE SODIUM 30 ML: 660; 100 LIQUID ORAL; RECTAL at 12:35

## 2022-07-28 RX ADMIN — IOPAMIDOL 100 ML: 612 INJECTION, SOLUTION INTRAVENOUS at 13:25

## 2022-07-31 DIAGNOSIS — Z12.11 SCREEN FOR COLON CANCER: Primary | ICD-10-CM

## 2022-07-31 NOTE — PROGRESS NOTES
Please inform the patient of the following abnormal results. I would like to discuss these CT scan with the patient and daughter, at an IN PERSON OFFICE VISIT (not over telehealth). She has a pulmonary nodule. Also some thickening in colon. Some kidney issues as well.

## 2022-07-31 NOTE — PROGRESS NOTES
Please inform the patient of the following abnormal results. CT of the head is normal except the small vessel disease that are most likely related to her age.

## 2022-08-02 ENCOUNTER — SPECIALTY PHARMACY (OUTPATIENT)
Dept: ONCOLOGY | Facility: CLINIC | Age: 77
End: 2022-08-02

## 2022-08-02 ENCOUNTER — SPECIALTY PHARMACY (OUTPATIENT)
Dept: PHARMACY | Facility: HOSPITAL | Age: 77
End: 2022-08-02

## 2022-08-02 ENCOUNTER — OFFICE VISIT (OUTPATIENT)
Dept: INTERNAL MEDICINE | Facility: CLINIC | Age: 77
End: 2022-08-02

## 2022-08-02 VITALS
DIASTOLIC BLOOD PRESSURE: 64 MMHG | HEIGHT: 65 IN | TEMPERATURE: 97.6 F | WEIGHT: 98 LBS | BODY MASS INDEX: 16.33 KG/M2 | HEART RATE: 67 BPM | SYSTOLIC BLOOD PRESSURE: 108 MMHG | OXYGEN SATURATION: 97 %

## 2022-08-02 DIAGNOSIS — Z00.00 HEALTHCARE MAINTENANCE: ICD-10-CM

## 2022-08-02 DIAGNOSIS — Z12.11 ENCOUNTER FOR SCREENING COLONOSCOPY: ICD-10-CM

## 2022-08-02 DIAGNOSIS — R91.1 LUNG NODULE: ICD-10-CM

## 2022-08-02 DIAGNOSIS — K63.9 COLONIC THICKENING: Primary | ICD-10-CM

## 2022-08-02 LAB — CREAT BLDA-MCNC: 1.1 MG/DL (ref 0.6–1.3)

## 2022-08-02 PROCEDURE — 99214 OFFICE O/P EST MOD 30 MIN: CPT | Performed by: FAMILY MEDICINE

## 2022-08-02 RX ORDER — LORAZEPAM 1 MG/1
1 TABLET ORAL 2 TIMES DAILY PRN
COMMUNITY
Start: 2022-07-23 | End: 2022-12-02 | Stop reason: SDUPTHER

## 2022-08-08 RX ORDER — PROMETHAZINE HYDROCHLORIDE 12.5 MG/1
TABLET ORAL
Qty: 60 TABLET | Refills: 0 | Status: SHIPPED | OUTPATIENT
Start: 2022-08-08 | End: 2022-09-29

## 2022-08-10 NOTE — PROGRESS NOTES
"[facility] CBC GROUP OUTPATIENT FOLLOW UP VISIT    REASON FOR FOLLOW-UP:    1.  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  Gleevec 400 mg daily initiated around 10/12/2017, stopped on 11/15/2017 due to intolerance (noah-orbital edema, nausea/vomiting, fatigue).  Resumed at 200 mg daily but, again, not tolerated.    3.  Nilotinib 150 mg twice daily started in late March, 2018.  Held due to intolerance and QTc prolongation.   4.  Hydroxyurea initiated in July 2018.  Subsequently discontinued.     5.  Bosutinib 400 mg daily started Feb 2019    HISTORY OF PRESENT ILLNESS:  Nicole Lofton is a 76 y.o. female with the above-mentioned history. She returns the office today for 3-month follow-up.  5/20/2022 her BCR-ABL PCR was elevated, patient was advised to resume bosutinib.  Unfortunately, patient was called to resume Bosutinib, but she did not tell her daughter.  The patient has had some problems with her memory, so the daughter is asking that all information be relayed to her, as the patient often does not recall the instructions she is given.  She complains of urinary urgency, frequency, and hesitancy.  She denies dysuria.  We will obtain a UA today to evaluate.  Appetite has improved, weight is up 10 pounds.  Otherwise, no new concerns today.    ROS:  Otherwise per the HPI.    Vitals:    08/19/22 1241   BP: 135/78   Pulse: 61   Resp: 16   Temp: 97.1 °F (36.2 °C)   TempSrc: Temporal   SpO2: 97%   Weight: 49 kg (108 lb)   Height: 165 cm (64.96\")   PainSc: 0-No pain     General:  No acute distress, awake, alert and oriented.  Chronically ill-appearing.  Sitting in a wheelchair today.  Skin:  Warm and dry, no visible rash  HEENT:  Normocephalic/atraumatic.  Wearing a facemask.  Chest:  Normal respiratory effort.  Lungs clear to auscultation bilaterally.  Heart: Regular rate.  Regular rhythm   Extremities:  No visible clubbing, cyanosis, or edema  Neuro/psych:  Grossly " non-focal.  Normal mood and affect.    DIAGNOSTIC DATA:  Comprehensive Metabolic Panel (08/19/2022 12:26)  CBC & Differential (08/19/2022 12:26)  Urinalysis With Culture If Indicated - Urine, Clean Catch (08/19/2022 13:32)      Physical Exam  Vitals reviewed.   HENT:      Head: Normocephalic.      Nose: Nose normal.      Mouth/Throat:      Mouth: Mucous membranes are moist.      Pharynx: Oropharynx is clear.   Eyes:      Conjunctiva/sclera: Conjunctivae normal.      Pupils: Pupils are equal, round, and reactive to light.   Cardiovascular:      Rate and Rhythm: Normal rate.      Heart sounds: Normal heart sounds.   Pulmonary:      Effort: Pulmonary effort is normal.   Abdominal:      General: Bowel sounds are normal.   Musculoskeletal:         General: Normal range of motion.      Cervical back: Normal range of motion.   Skin:     General: Skin is warm and dry.      Findings: No rash.   Neurological:      General: No focal deficit present.      Mental Status: She is alert and oriented to person, place, and time. Mental status is at baseline.      Motor: Weakness (wheelchair) present.   Psychiatric:         Mood and Affect: Mood normal.         Behavior: Behavior normal.         Thought Content: Thought content normal.         Judgment: Judgment normal.       IMAGING:   None reviewed today.    ASSESSMENT:  This is a 76 y.o. female with:    *History of bilateral breast cancer in 1988 status post bilateral mastectomy.  She apparently completed 6 months of adjuvant therapy.  We have no details regarding this.    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    · Started on Hydrea 1000 mg daily.    · This was discontinued and she started Gleevec on approximately 10/12/2017.    · Gleevec discontinued due to intolerance on 11/15/2017.    · We started Gleevec at a lower dose of 200 mg bu  · t she was also intolerant of this dose.  Her side effects were as severe with a lower  dose and she therefore stopped taking the drug.  Symptoms improved significantly.  Platelet count subsequently elevated again.  · She started nilotinib at 150mg twice daily at the end of March 2018.  This was held in mid-May due to QTc prolongation.  The QTc interval did subsequently normalized.  However, due to this and other adverse effects we are not able to resume the nilotinib nor does she desire to.  · In July we resumed hydroxyurea 1000 mg daily.  Her platelet count increased.  Her dose was increased to 1000 mg twice daily.  Blood counts improved nicely.  Platelet count normalized but her white blood cell count dropped to below normal.  Decreased hydroxyurea to 1000 mg daily.  · 11/20/18 platelets were up again.  We increased the hydroxyurea to 1000 mg in the mornings and 500 mg in the evenings.  · As of 1/15/2019 her platelet count was again elevated to 1.2 million.  Increased hydroxyurea to 1000 mg twice daily.    · Platelets improved but remained far from normal.  · Due to her history we referred her down to the [facility] to see Dr. Cifuentes for assistance and recommendations on further therapy.  · In mid-February 2019 she started bosutinib 400 mg daily which she takes along with olanzapine at night (she takes this as needed now).     · PCR on 8/25/2021 -.  · 2/22/2022: PCR negative  · 5/20/2022: BCR ABL PCR detected at 8.224%.  Patient was advised to resume bosutinib.  · 8/19/2022 patient did not resume Bosutinib.  Instructions were called to the patient, but she has been having trouble with her memory, so she did not start the medication.  We will hold Bosutinib today and await BCR-ABL PCR, and pending those results make decision about resuming Bosutinib.  Once BCR-ABL PCR is available, plan will be called to the patient's daughter Clarissa.    *Hypothyroidism: She continues levothyroxine    *Tobacco use: She is unwilling to quit smoking.      *Deconditioning  · This was an issue for her  before her right hip fracture.  She is now making a good recovery after her hip fracture but remains very weak.    *Hypertension: Blood pressure is adequate today 135/78.    *Bradycardia: She was referred back to Dr. Duque for this previously.  Heart rate 61.    *Anxiety: She is already on a couple of medications for this.  Follow-up with primary care.    *Right leg pain following her right hip fracture: She will follow-up with primary care and orthopedics regarding this.  She is on tramadol without much improvement.  She takes acetaminophen as well without much improvement.  Suggested the possibility of NSAIDs but, again, she will follow-up with primary care and orthopedics.    *Hyponatremia  · This appears to be an ongoing chronic issue since June 2020.  Patient being followed by nephrology.  Patient is to be following a 1 L fluid restriction and 2 g sodium diet.   · Sodium level today 132.    *Urinary urgency, frequency, hesitancy  · 8/19/2022, UA collected today, negative for any signs or symptoms of infection.  Recommended OTC Azo for symptom relief.  Follow-up with PCP should symptoms persist.    PLAN:   1. Continue bosutinib.  2. Collect UA today, negative for infection.  Start OTC Azo.  Follow-up with PCP should symptoms persist.  3. Follow-up on BCR-ABL PCR from today.  Plan will be called and discussed with the patient's daughter, Clarissa.  If negative, she prefers to stay off the bosutinib at this time.  If the PCR is not at goal, suggested that she go back on the bosutinib.  4. Otherwise follow-up in 3 months with a CBC, CMP, BCR-ABL PCR, and Dr. Merida    The patient is on high risk medication that requires close monitoring for toxicity.  The patient was discussed with Dr. Merida who is in agreement with today's plan of care.    I spent 45 minutes caring for Nicole on this date of service. This time includes time spent by me in the following activities: preparing for the visit, reviewing tests, obtaining  and/or reviewing a separately obtained history, performing a medically appropriate examination and/or evaluation, counseling and educating the patient/family/caregiver, ordering medications, tests, or procedures, documenting information in the medical record and care coordination.       Yue Rosen, APRN  08/19/22

## 2022-08-13 DIAGNOSIS — R11.0 CHRONIC NAUSEA: ICD-10-CM

## 2022-08-15 RX ORDER — OMEPRAZOLE 40 MG/1
40 CAPSULE, DELAYED RELEASE ORAL DAILY
Qty: 30 CAPSULE | Refills: 0 | Status: SHIPPED | OUTPATIENT
Start: 2022-08-15 | End: 2022-09-14

## 2022-08-15 RX ORDER — OMEPRAZOLE 40 MG/1
40 CAPSULE, DELAYED RELEASE ORAL DAILY
Qty: 30 CAPSULE | Refills: 0 | Status: SHIPPED | OUTPATIENT
Start: 2022-08-15 | End: 2022-10-18

## 2022-08-18 DIAGNOSIS — S72.8X1G OTHER CLOSED FRACTURE OF RIGHT FEMUR WITH DELAYED HEALING, UNSPECIFIED PORTION OF FEMUR, SUBSEQUENT ENCOUNTER: ICD-10-CM

## 2022-08-18 RX ORDER — OXYCODONE AND ACETAMINOPHEN 7.5; 325 MG/1; MG/1
1 TABLET ORAL EVERY 8 HOURS PRN
Qty: 90 TABLET | Refills: 0 | Status: SHIPPED | OUTPATIENT
Start: 2022-08-18 | End: 2022-09-15 | Stop reason: SDUPTHER

## 2022-08-18 NOTE — TELEPHONE ENCOUNTER
Caller: ADRIANA    Relationship: PATIENTS DAUGHTER    Best call back number:     Requested Prescriptions:   Requested Prescriptions     Pending Prescriptions Disp Refills   • oxyCODONE-acetaminophen (Percocet) 7.5-325 MG per tablet 90 tablet 0     Sig: Take 1 tablet by mouth Every 8 (Eight) Hours As Needed for Severe Pain .        Pharmacy where request should be sent:  [facility]  [address] [place] [address]  [phone]    Additional details provided by patient:     Does the patient have less than a 3 day supply:  [x] Yes  [] No    Kari Claros Rep   08/18/22 10:14 EDT

## 2022-08-19 ENCOUNTER — TELEPHONE (OUTPATIENT)
Dept: GASTROENTEROLOGY | Facility: CLINIC | Age: 77
End: 2022-08-19

## 2022-08-19 ENCOUNTER — LAB (OUTPATIENT)
Dept: OTHER | Facility: HOSPITAL | Age: 77
End: 2022-08-19

## 2022-08-19 ENCOUNTER — OFFICE VISIT (OUTPATIENT)
Dept: ONCOLOGY | Facility: CLINIC | Age: 77
End: 2022-08-19

## 2022-08-19 ENCOUNTER — SPECIALTY PHARMACY (OUTPATIENT)
Dept: ONCOLOGY | Facility: HOSPITAL | Age: 77
End: 2022-08-19

## 2022-08-19 ENCOUNTER — TELEPHONE (OUTPATIENT)
Dept: ONCOLOGY | Facility: CLINIC | Age: 77
End: 2022-08-19

## 2022-08-19 VITALS
SYSTOLIC BLOOD PRESSURE: 135 MMHG | HEIGHT: 65 IN | DIASTOLIC BLOOD PRESSURE: 78 MMHG | OXYGEN SATURATION: 97 % | TEMPERATURE: 97.1 F | WEIGHT: 108 LBS | HEART RATE: 61 BPM | RESPIRATION RATE: 16 BRPM | BODY MASS INDEX: 17.99 KG/M2

## 2022-08-19 DIAGNOSIS — Z79.899 HIGH RISK MEDICATION USE: ICD-10-CM

## 2022-08-19 DIAGNOSIS — R35.0 FREQUENT URINATION: ICD-10-CM

## 2022-08-19 DIAGNOSIS — C92.10 CML (CHRONIC MYELOID LEUKEMIA): Primary | ICD-10-CM

## 2022-08-19 LAB
ALBUMIN SERPL-MCNC: 4.3 G/DL (ref 3.5–5.2)
ALBUMIN/GLOB SERPL: 1.7 G/DL
ALP SERPL-CCNC: 136 U/L (ref 39–117)
ALT SERPL W P-5'-P-CCNC: 22 U/L (ref 1–33)
ANION GAP SERPL CALCULATED.3IONS-SCNC: 7.3 MMOL/L (ref 5–15)
AST SERPL-CCNC: 27 U/L (ref 1–32)
BASOPHILS # BLD AUTO: 0.09 10*3/MM3 (ref 0–0.2)
BASOPHILS NFR BLD AUTO: 1.1 % (ref 0–1.5)
BILIRUB SERPL-MCNC: 0.3 MG/DL (ref 0–1.2)
BILIRUB UR QL STRIP: NEGATIVE
BUN SERPL-MCNC: 15 MG/DL (ref 8–23)
BUN/CREAT SERPL: 17.4 (ref 7–25)
CALCIUM SPEC-SCNC: 9.7 MG/DL (ref 8.6–10.5)
CHLORIDE SERPL-SCNC: 96 MMOL/L (ref 98–107)
CLARITY UR: CLEAR
CO2 SERPL-SCNC: 28.7 MMOL/L (ref 22–29)
COLOR UR: YELLOW
CREAT SERPL-MCNC: 0.86 MG/DL (ref 0.57–1)
DEPRECATED RDW RBC AUTO: 45.4 FL (ref 37–54)
EGFRCR SERPLBLD CKD-EPI 2021: 69.2 ML/MIN/1.73
EOSINOPHIL # BLD AUTO: 0.11 10*3/MM3 (ref 0–0.4)
EOSINOPHIL NFR BLD AUTO: 1.4 % (ref 0.3–6.2)
ERYTHROCYTE [DISTWIDTH] IN BLOOD BY AUTOMATED COUNT: 13.4 % (ref 12.3–15.4)
GLOBULIN UR ELPH-MCNC: 2.6 GM/DL
GLUCOSE SERPL-MCNC: 88 MG/DL (ref 65–99)
GLUCOSE UR STRIP-MCNC: NEGATIVE MG/DL
HCT VFR BLD AUTO: 35.2 % (ref 34–46.6)
HGB BLD-MCNC: 11.9 G/DL (ref 12–15.9)
HGB UR QL STRIP.AUTO: NEGATIVE
IMM GRANULOCYTES # BLD AUTO: 0.04 10*3/MM3 (ref 0–0.05)
IMM GRANULOCYTES NFR BLD AUTO: 0.5 % (ref 0–0.5)
KETONES UR QL STRIP: NEGATIVE
LEUKOCYTE ESTERASE UR QL STRIP.AUTO: NEGATIVE
LYMPHOCYTES # BLD AUTO: 1.61 10*3/MM3 (ref 0.7–3.1)
LYMPHOCYTES NFR BLD AUTO: 20.2 % (ref 19.6–45.3)
MCH RBC QN AUTO: 31.3 PG (ref 26.6–33)
MCHC RBC AUTO-ENTMCNC: 33.8 G/DL (ref 31.5–35.7)
MCV RBC AUTO: 92.6 FL (ref 79–97)
MONOCYTES # BLD AUTO: 0.6 10*3/MM3 (ref 0.1–0.9)
MONOCYTES NFR BLD AUTO: 7.5 % (ref 5–12)
NEUTROPHILS NFR BLD AUTO: 5.54 10*3/MM3 (ref 1.7–7)
NEUTROPHILS NFR BLD AUTO: 69.3 % (ref 42.7–76)
NITRITE UR QL STRIP: NEGATIVE
NRBC BLD AUTO-RTO: 0 /100 WBC (ref 0–0.2)
PH UR STRIP.AUTO: 7 [PH] (ref 5–8)
PLATELET # BLD AUTO: 274 10*3/MM3 (ref 140–450)
PMV BLD AUTO: 9.6 FL (ref 6–12)
POTASSIUM SERPL-SCNC: 4.5 MMOL/L (ref 3.5–5.2)
PROT SERPL-MCNC: 6.9 G/DL (ref 6–8.5)
PROT UR QL STRIP: NEGATIVE
RBC # BLD AUTO: 3.8 10*6/MM3 (ref 3.77–5.28)
SODIUM SERPL-SCNC: 132 MMOL/L (ref 136–145)
SP GR UR STRIP: 1.01 (ref 1–1.03)
UROBILINOGEN UR QL STRIP: NORMAL
WBC NRBC COR # BLD: 7.99 10*3/MM3 (ref 3.4–10.8)

## 2022-08-19 PROCEDURE — 81003 URINALYSIS AUTO W/O SCOPE: CPT | Performed by: NURSE PRACTITIONER

## 2022-08-19 PROCEDURE — 99215 OFFICE O/P EST HI 40 MIN: CPT | Performed by: NURSE PRACTITIONER

## 2022-08-19 PROCEDURE — 36415 COLL VENOUS BLD VENIPUNCTURE: CPT

## 2022-08-19 PROCEDURE — 85025 COMPLETE CBC W/AUTO DIFF WBC: CPT | Performed by: INTERNAL MEDICINE

## 2022-08-19 PROCEDURE — 80053 COMPREHEN METABOLIC PANEL: CPT | Performed by: INTERNAL MEDICINE

## 2022-08-19 NOTE — TELEPHONE ENCOUNTER
Caller: JenniferClarissa rothman    Relationship: Emergency Contact    Best call back number: [phone]      What was the call regarding: RECEIVED MISSED CALL. NO INFORMATION IN CHART.     PATIENT DAUGHTER BELIEVES THAT THIS MAY BE IN REGARD TO URINE ANALYSIS.    Do you require a callback: YES

## 2022-08-19 NOTE — PROGRESS NOTES
This medication has been discontinued and when I called the patient, her daughter confirmed she has not been taking it recently.

## 2022-08-20 NOTE — PROGRESS NOTES
"Chief Complaint  Results (Ct Of Head AND ABDOMIN)    Subjective        Nicole Lofton presents to [facility]  History of Present Illness    At today's office visit patient was brought in to discuss her CT of her abdomen pelvis..      The CT of the abdomen pelvis did show: \"IMPRESSION: 1. Indeterminate 1.1 cm left lower lobe pulmonary nodule. If there is no outside facility chest CT or abdominal CT to assess for stability of the nodule, a chest CT is recommended to evaluate the entire chest. 2. A couple of thickened colonic folds have a nonspecific appearance. Please verify that the patient is up-to-date on screening colonoscopy. 3. Atrophied left kidney and compensatory hypertrophy of the right kidney.\"    Patient's CT of her head did show: \"IMPRESSION: 1. No acute intracranial abnormality is seen. 2. There is mild-to-moderate small vessel disease in the cerebral white matter, calcified plaques in the intracranial segments of the distal vertebral arteries and cavernous and supracavernous segments of the internal carotid arteries bilaterally. The remainder of the head CT is within normal limits.\"        Objective   Vital Signs:  /64   Pulse 67   Temp 97.6 °F (36.4 °C)   Ht 165.1 cm (65\")   Wt 44.5 kg (98 lb)   SpO2 97%   BMI 16.31 kg/m²   Estimated body mass index is 16.31 kg/m² as calculated from the following:    Height as of this encounter: 165.1 cm (65\").    Weight as of this encounter: 44.5 kg (98 lb).          Physical Exam  Vitals and nursing note reviewed.   Constitutional:       Appearance: She is well-developed.   HENT:      Head: Normocephalic and atraumatic.   Musculoskeletal:      Cervical back: Normal range of motion and neck supple.   Neurological:      Mental Status: She is alert and oriented to person, place, and time.   Psychiatric:         Behavior: Behavior normal.        Result Review :                Assessment and Plan   Diagnoses and all orders for " this visit:    1. Colonic thickening (Primary)    2. Lung nodule  -     CT Chest With Contrast; Future    3. Healthcare maintenance  -     CBC & Differential  -     Comprehensive Metabolic Panel  -     Hemoglobin A1c  -     Thyroid Panel With TSH  -     Lipid Panel With LDL / HDL Ratio    4. Encounter for screening colonoscopy  -     Ambulatory Referral For Screening Colonoscopy    Based on the CT of the abdomen pelvis which was concerning for lung nodules, I did order a CT scan of her entire chest.  I also discussed with her that the colonic thickening is concerning, and it appears that she most likely has not had a colonoscopy.  At least that seems to be the case from the history that I am getting from her family who is in the room as well.  I had put a order in for colonoscopy and few days ago, and I do think that she would benefit from having a screening colonoscopy.         Follow Up   No follow-ups on file.  Patient was given instructions and counseling regarding her condition or for health maintenance advice. Please see specific information pulled into the AVS if appropriate.

## 2022-08-21 DIAGNOSIS — E78.5 HYPERLIPIDEMIA, UNSPECIFIED HYPERLIPIDEMIA TYPE: ICD-10-CM

## 2022-08-22 ENCOUNTER — TELEPHONE (OUTPATIENT)
Dept: CARDIOLOGY | Facility: CLINIC | Age: 77
End: 2022-08-22

## 2022-08-22 ENCOUNTER — OFFICE VISIT (OUTPATIENT)
Dept: CARDIOLOGY | Facility: CLINIC | Age: 77
End: 2022-08-22

## 2022-08-22 ENCOUNTER — HOSPITAL ENCOUNTER (OUTPATIENT)
Dept: CARDIOLOGY | Facility: HOSPITAL | Age: 77
Discharge: HOME | End: 2022-08-22
Admitting: NURSE PRACTITIONER

## 2022-08-22 VITALS
HEIGHT: 66 IN | BODY MASS INDEX: 17.04 KG/M2 | HEART RATE: 56 BPM | DIASTOLIC BLOOD PRESSURE: 66 MMHG | SYSTOLIC BLOOD PRESSURE: 129 MMHG | WEIGHT: 106.04 LBS

## 2022-08-22 VITALS
WEIGHT: 106 LBS | DIASTOLIC BLOOD PRESSURE: 62 MMHG | SYSTOLIC BLOOD PRESSURE: 122 MMHG | HEIGHT: 66 IN | BODY MASS INDEX: 17.04 KG/M2 | HEART RATE: 66 BPM | OXYGEN SATURATION: 98 %

## 2022-08-22 DIAGNOSIS — R29.6 RECURRENT FALLS: ICD-10-CM

## 2022-08-22 DIAGNOSIS — R00.1 BRADYCARDIA: Primary | ICD-10-CM

## 2022-08-22 DIAGNOSIS — R55 SYNCOPE AND COLLAPSE: ICD-10-CM

## 2022-08-22 DIAGNOSIS — Z87.898 H/O PROLONGED Q-T INTERVAL ON ECG: ICD-10-CM

## 2022-08-22 LAB
AORTIC ARCH: 2 CM
ASCENDING AORTA: 2.1 CM
BH CV ECHO MEAS - ACS: 1.1 CM
BH CV ECHO MEAS - AI P1/2T: 668.3 MSEC
BH CV ECHO MEAS - AO MAX PG: 12.3 MMHG
BH CV ECHO MEAS - AO MEAN PG: 6.6 MMHG
BH CV ECHO MEAS - AO ROOT DIAM: 2.8 CM
BH CV ECHO MEAS - AO V2 MAX: 175.2 CM/SEC
BH CV ECHO MEAS - AO V2 VTI: 39.6 CM
BH CV ECHO MEAS - AVA(I,D): 2.21 CM2
BH CV ECHO MEAS - EDV(CUBED): 52.4 ML
BH CV ECHO MEAS - EDV(MOD-SP2): 56 ML
BH CV ECHO MEAS - EDV(MOD-SP4): 61 ML
BH CV ECHO MEAS - EF(MOD-BP): 61.5 %
BH CV ECHO MEAS - EF(MOD-SP2): 62.5 %
BH CV ECHO MEAS - EF(MOD-SP4): 59 %
BH CV ECHO MEAS - ESV(CUBED): 20.7 ML
BH CV ECHO MEAS - ESV(MOD-SP2): 21 ML
BH CV ECHO MEAS - ESV(MOD-SP4): 25 ML
BH CV ECHO MEAS - FS: 26.7 %
BH CV ECHO MEAS - IVS/LVPW: 0.83 CM
BH CV ECHO MEAS - IVSD: 0.77 CM
BH CV ECHO MEAS - LAT PEAK E' VEL: 7.2 CM/SEC
BH CV ECHO MEAS - LV DIASTOLIC VOL/BSA (35-75): 39.9 CM2
BH CV ECHO MEAS - LV MASS(C)D: 91 GRAMS
BH CV ECHO MEAS - LV MAX PG: 5.1 MMHG
BH CV ECHO MEAS - LV MEAN PG: 2.9 MMHG
BH CV ECHO MEAS - LV SYSTOLIC VOL/BSA (12-30): 16.3 CM2
BH CV ECHO MEAS - LV V1 MAX: 112.7 CM/SEC
BH CV ECHO MEAS - LV V1 VTI: 27.8 CM
BH CV ECHO MEAS - LVIDD: 3.7 CM
BH CV ECHO MEAS - LVIDS: 2.7 CM
BH CV ECHO MEAS - LVOT AREA: 3.2 CM2
BH CV ECHO MEAS - LVOT DIAM: 2 CM
BH CV ECHO MEAS - LVPWD: 0.93 CM
BH CV ECHO MEAS - MED PEAK E' VEL: 5.4 CM/SEC
BH CV ECHO MEAS - MV A DUR: 0.14 SEC
BH CV ECHO MEAS - MV A MAX VEL: 69.8 CM/SEC
BH CV ECHO MEAS - MV DEC SLOPE: 162 CM/SEC2
BH CV ECHO MEAS - MV DEC TIME: 0.27 MSEC
BH CV ECHO MEAS - MV E MAX VEL: 65.6 CM/SEC
BH CV ECHO MEAS - MV E/A: 0.94
BH CV ECHO MEAS - MV MAX PG: 4.8 MMHG
BH CV ECHO MEAS - MV MEAN PG: 1.43 MMHG
BH CV ECHO MEAS - MV P1/2T: 139.4 MSEC
BH CV ECHO MEAS - MV V2 VTI: 39.4 CM
BH CV ECHO MEAS - MVA(P1/2T): 1.58 CM2
BH CV ECHO MEAS - MVA(VTI): 2.22 CM2
BH CV ECHO MEAS - PA ACC TIME: 0.11 SEC
BH CV ECHO MEAS - PA PR(ACCEL): 31.5 MMHG
BH CV ECHO MEAS - PA V2 MAX: 97 CM/SEC
BH CV ECHO MEAS - PULM A REVS DUR: 0.2 SEC
BH CV ECHO MEAS - PULM A REVS VEL: 18.2 CM/SEC
BH CV ECHO MEAS - PULM DIAS VEL: 22.3 CM/SEC
BH CV ECHO MEAS - PULM S/D: 1.98
BH CV ECHO MEAS - PULM SYS VEL: 44.2 CM/SEC
BH CV ECHO MEAS - QP/QS: 0.34
BH CV ECHO MEAS - RAP SYSTOLE: 3 MMHG
BH CV ECHO MEAS - RV MAX PG: 1.24 MMHG
BH CV ECHO MEAS - RV V1 MAX: 55.7 CM/SEC
BH CV ECHO MEAS - RV V1 VTI: 11.2 CM
BH CV ECHO MEAS - RVOT DIAM: 1.84 CM
BH CV ECHO MEAS - RVSP: 16.6 MMHG
BH CV ECHO MEAS - SI(MOD-SP2): 22.9 ML/M2
BH CV ECHO MEAS - SI(MOD-SP4): 23.5 ML/M2
BH CV ECHO MEAS - SUP REN AO DIAM: 2.1 CM
BH CV ECHO MEAS - SV(LVOT): 87.6 ML
BH CV ECHO MEAS - SV(MOD-SP2): 35 ML
BH CV ECHO MEAS - SV(MOD-SP4): 36 ML
BH CV ECHO MEAS - SV(RVOT): 29.6 ML
BH CV ECHO MEAS - TAPSE (>1.6): 1.51 CM
BH CV ECHO MEAS - TR MAX PG: 13.6 MMHG
BH CV ECHO MEAS - TR MAX VEL: 184.7 CM/SEC
BH CV ECHO MEASUREMENTS AVERAGE E/E' RATIO: 10.41
BH CV VAS BP LEFT ARM: NORMAL MMHG
BH CV XLRA - RV BASE: 4 CM
BH CV XLRA - RV LENGTH: 5.6 CM
BH CV XLRA - RV MID: 3.5 CM
BH CV XLRA - TDI S': 9.9 CM/SEC
LEFT ATRIUM VOLUME INDEX: 17 ML/M2
LV EF 2D ECHO EST: 62 %
MAXIMAL PREDICTED HEART RATE: 142 BPM
SINUS: 2.9 CM
STJ: 2.22 CM
STRESS TARGET HR: 121 BPM

## 2022-08-22 PROCEDURE — 93306 TTE W/DOPPLER COMPLETE: CPT

## 2022-08-22 PROCEDURE — 99214 OFFICE O/P EST MOD 30 MIN: CPT | Performed by: NURSE PRACTITIONER

## 2022-08-22 PROCEDURE — 93306 TTE W/DOPPLER COMPLETE: CPT | Performed by: INTERNAL MEDICINE

## 2022-08-22 PROCEDURE — 93000 ELECTROCARDIOGRAM COMPLETE: CPT | Performed by: NURSE PRACTITIONER

## 2022-08-22 RX ORDER — ATORVASTATIN CALCIUM 40 MG/1
40 TABLET, FILM COATED ORAL DAILY
Qty: 90 TABLET | Refills: 1 | Status: SHIPPED | OUTPATIENT
Start: 2022-08-22 | End: 2023-02-20

## 2022-08-22 NOTE — PROGRESS NOTES
"Date of Office Visit: 22  Encounter Provider: FERNANDO Duran  Place of Service: [facility] CARDIOLOGY  Patient Name: Nicole Lofton  :1945    Chief Complaint   Patient presents with   • Fall     Pt's daughter denies syncopes/states only falls   :     HPI: Nicole Lofton is a 76 y.o. female  with chronic myeloid leukemia, bradycardia, prolonged QT, anxiety, recurrent falls, hyponatremia  She is followed by Dr. Duque.  I will visit with her for the first time today have reviewed her medical record.  She had evidence of prolonged QT interval on EKG which was intermittent but she was asymptomatic so this was monitored clinically..  She had some bradycardia with heart rate in the 40s so in 2020 atenolol was stopped.  Hydrochlorothiazide and as losartan medoxomil were both stopped and she was started on spironolactone.  Echocardiogram and Holter monitor were ordered but not completed.    She presents today for evaluation requested by her PCP for syncope.  Upon direct questioning with patient and her daughter.  Patient and daughter deny loss of consciousness or syncope.  Patient has experienced recurrent falls which started in January when she broke her right hip that resulted in surgery.  She had a couple falls within a few days which were reported to her PCP in July on telehealth visit.  Patient lives alone so these falls have been while she was by herself.  Patient believes she tripped.  She denies dizziness or lightheadedness or palpitations or chest pain or shortness of breath.    No Known Allergies        Family and social history reviewed.     ROS  All other systems were reviewed and are negative          Objective:     Vitals:    22 1026   BP: 122/62   BP Location: Left arm   Patient Position: Sitting   Cuff Size: Pediatric   Pulse: 66   SpO2: 98%   Weight: 48.1 kg (106 lb)   Height: 167.6 cm (66\")     Body mass index is 17.11 kg/m².    PHYSICAL " EXAM:  Cardiovascular:      Murmurs: There is a grade 1/6 systolic murmur at the URSB.           ECG 12 Lead    Date/Time: 8/22/2022 10:37 AM  Performed by: Donna Yu APRN  Authorized by: Donna Yu APRN   Comparison: compared with previous ECG   Similar to previous ECG  Rhythm: sinus rhythm  Rate: normal  Q waves: V1 and V2    Comments: No significant change               Current Outpatient Medications   Medication Sig Dispense Refill   • acetaminophen (TYLENOL) 325 MG tablet Take 650 mg by mouth.     • amLODIPine (NORVASC) 10 MG tablet Take 1 tablet by mouth Daily. 90 tablet 1   • atorvastatin (LIPITOR) 40 MG tablet TAKE 1 TABLET BY MOUTH DAILY 90 tablet 1   • docusate sodium 100 MG capsule Take 100 mg by mouth.     • levothyroxine (SYNTHROID, LEVOTHROID) 100 MCG tablet      • levothyroxine (SYNTHROID, LEVOTHROID) 125 MCG tablet Take 1 tablet by mouth Daily. 90 tablet 1   • lisinopril (PRINIVIL,ZESTRIL) 10 MG tablet Take 1 tablet by mouth Daily. 90 tablet 1   • LORazepam (Ativan) 0.5 MG tablet Take 1 tablet by mouth Every 8 (Eight) Hours As Needed for Anxiety. 60 tablet 3   • LORazepam (ATIVAN) 1 MG tablet Take 1 mg by mouth 2 (Two) Times a Day As Needed. for anxiety     • OLANZapine (zyPREXA) 5 MG tablet TAKE 1 TABLET BY MOUTH EVERY NIGHT 90 tablet 0   • omeprazole (priLOSEC) 40 MG capsule TAKE 1 CAPSULE BY MOUTH DAILY 30 capsule 0   • omeprazole (priLOSEC) 40 MG capsule TAKE 1 CAPSULE BY MOUTH DAILY 30 capsule 0   • ondansetron (ZOFRAN) 4 MG tablet Daily.     • oxyCODONE-acetaminophen (Percocet) 7.5-325 MG per tablet Take 1 tablet by mouth Every 8 (Eight) Hours As Needed for Severe Pain . 90 tablet 0   • promethazine (PHENERGAN) 12.5 MG tablet TAKE 1 TABLET BY MOUTH THREE TIMES DAILY AS NEEDED FOR NAUSEA 60 tablet 0   • traZODone (DESYREL) 50 MG tablet TAKE 1 TABLET BY MOUTH AT BEDTIME FOR 14 DAYS 90 tablet 1   • Vortioxetine HBr (Trintellix) 20 MG tablet Take 20 mg by mouth Daily. 30 tablet 11     No  current facility-administered medications for this visit.     Assessment:       Diagnosis Plan   1. Bradycardia  Holter Monitor - 48 Hour    ECG 12 Lead   2. Syncope and collapse  Adult Transthoracic Echo Complete W/ Cont if Necessary Per Protocol    Holter Monitor - 48 Hour   3. H/O prolonged Q-T interval on ECG  Holter Monitor - 48 Hour    ECG 12 Lead   4. Recurrent falls  ECG 12 Lead        Orders Placed This Encounter   Procedures   • Holter Monitor - 48 Hour     Standing Status:   Future     Standing Expiration Date:   8/22/2023     Order Specific Question:   Reason for exam?     Answer:   Presyncope or Syncope     Order Specific Question:   Release to patient     Answer:   Immediate   • ECG 12 Lead     This order was created via procedure documentation     Order Specific Question:   Release to patient     Answer:   Routine Release   • Adult Transthoracic Echo Complete W/ Cont if Necessary Per Protocol     Standing Status:   Future     Standing Expiration Date:   8/22/2023     Order Specific Question:   Reason for exam?     Answer:   Syncope         Plan:       1.  76-year-old female with recurrent falls.  Patient and daughter deny syncope or loss of Laurence notes.  There has been no chest pain or shortness of breath or palpitations reported.  EKG is abnormal but unchanged from prior.  QT interval is normal.  No acute ischemic changes are noted..  She has mild systolic cardiac murmur.  We will arrange for echo and 48 hour Holter.  2.  History of intermittent prolonged QT interval  3.  History of bradycardia with heart rate in the 40s so atenolol was stopped in 2020.  Her heart rate is 66 today  4.  Hypertension-blood pressure appears stable at 122/62.  She has no symptoms of orthostasis or dizziness or lightheadedness.  5.  Hyperlipidemia on atorvastatin 40 mg  6.  Hypothyroidism on replacement therapy  7. Mild to moderate small vessel cerebral disease on CT of the head July 2022  8.  Chronic myeloid  leukemia-followed by Dr. Burt Merida  Will review echo and Holter monitor results and if no significant findings she will just follow-up with us as needed          It has been a pleasure to participate in this patient's care.      Thank you,  FERNANDO Duran      **I used Dragon to dictate this note:**

## 2022-08-30 ENCOUNTER — HOSPITAL ENCOUNTER (OUTPATIENT)
Dept: CT IMAGING | Facility: HOSPITAL | Age: 77
Discharge: HOME | End: 2022-08-30
Admitting: FAMILY MEDICINE

## 2022-08-30 ENCOUNTER — TELEPHONE (OUTPATIENT)
Dept: CARDIOLOGY | Facility: CLINIC | Age: 77
End: 2022-08-30

## 2022-08-30 DIAGNOSIS — R91.1 LUNG NODULE: ICD-10-CM

## 2022-08-30 PROCEDURE — 25010000002 IOPAMIDOL 61 % SOLUTION: Performed by: FAMILY MEDICINE

## 2022-08-30 PROCEDURE — 71260 CT THORAX DX C+: CPT

## 2022-08-30 PROCEDURE — 82565 ASSAY OF CREATININE: CPT

## 2022-08-30 RX ADMIN — IOPAMIDOL 75 ML: 612 INJECTION, SOLUTION INTRAVENOUS at 13:43

## 2022-08-30 NOTE — TELEPHONE ENCOUNTER
----- Message from FERNANDO Duran sent at 8/30/2022 10:52 AM EDT -----  Please inform patient her monitor is benign.  Her average heart rate is 61 with a range of .  No changes are needed.  I recommend she follow-up as needed

## 2022-08-31 NOTE — TELEPHONE ENCOUNTER
Daughter Galdino called back on behalf of patient.  She may have information per RAYMOND form.  Notified her of results, she verbalizes understanding.    Kera Billingsley RN  [facility] Triage  08/31/22 10:20 EDT

## 2022-09-02 DIAGNOSIS — R91.1 LUNG NODULE: Primary | ICD-10-CM

## 2022-09-02 NOTE — PROGRESS NOTES
Please inform the patient of the following abnormal results.     There are 2 focal nodular areas of ground glass infiltrate  and several noncalcified pulmonary nodules as described above. Left  lower lobe nodule has not changed since CT abdomen 07/28/2022. Advise  conservative CT surveillance with 3-6 month follow-up CT chest. Advise  referral to the Multidisciplinary Lung Care Clinic.    I have referred to multidisciplinary lung care clinic. They should order the follow up CT scan which was suggested 3-6 mos.

## 2022-09-05 DIAGNOSIS — I10 ESSENTIAL HYPERTENSION: ICD-10-CM

## 2022-09-06 RX ORDER — AMLODIPINE BESYLATE 10 MG/1
10 TABLET ORAL DAILY
Qty: 90 TABLET | Refills: 1 | Status: SHIPPED | OUTPATIENT
Start: 2022-09-06 | End: 2022-09-09 | Stop reason: SDUPTHER

## 2022-09-08 DIAGNOSIS — S72.8X1G OTHER CLOSED FRACTURE OF RIGHT FEMUR WITH DELAYED HEALING, UNSPECIFIED PORTION OF FEMUR, SUBSEQUENT ENCOUNTER: ICD-10-CM

## 2022-09-08 NOTE — TELEPHONE ENCOUNTER
PATIENT'S DAUGHTER ADRIANA CALLED AND STATES THE PHARMACY DOESN'T HAVE THE MEDICATION REFILL OF   amLODIPine (NORVASC) 10 MG tablet    PLEASE RESEND TO    [facility] #[id number] - [facility] [address] AT [facility] [address] - [phone]  - [phone] FX  [phone]    CALL BACK NUMBER [phone]

## 2022-09-09 DIAGNOSIS — I10 ESSENTIAL HYPERTENSION: ICD-10-CM

## 2022-09-09 RX ORDER — AMLODIPINE BESYLATE 10 MG/1
10 TABLET ORAL DAILY
Qty: 90 TABLET | Refills: 1 | Status: SHIPPED | OUTPATIENT
Start: 2022-09-09 | End: 2023-02-27

## 2022-09-09 NOTE — TELEPHONE ENCOUNTER
PATIENTS DAUGHTER IS CALLING IN AGAIN ON THIS REFILL, STATES THE PHARMACY STILL HAS NOT RECEIVED THE REFILL AND PATIENT WILL BE OUT THIS WEEKEND. SHE STATES SHE HAS BEEN TRYING TO GET THIS FILLED FOR 3 DAYS.    SHE WOULD LIKE SOMEONE TO CALL HER AND LET HER KNOW WHEN SENT SO SHE DOES NOT HAVE TO KEEP CALLING OFFICE BACK.      PLEASE ADVISE    CALLBACK NUMBER IS  [phone]      CONFIRMED PHARMACY  [facility] #[id number] [place], [place] - [address] AT [facility] [address] - [phone] [facility] [phone] FX

## 2022-09-14 ENCOUNTER — OFFICE VISIT (OUTPATIENT)
Dept: INTERNAL MEDICINE | Facility: CLINIC | Age: 77
End: 2022-09-14

## 2022-09-14 VITALS
BODY MASS INDEX: 17.36 KG/M2 | HEART RATE: 74 BPM | OXYGEN SATURATION: 96 % | HEIGHT: 66 IN | WEIGHT: 108 LBS | SYSTOLIC BLOOD PRESSURE: 120 MMHG | RESPIRATION RATE: 18 BRPM | DIASTOLIC BLOOD PRESSURE: 60 MMHG

## 2022-09-14 DIAGNOSIS — R30.0 DYSURIA: Primary | ICD-10-CM

## 2022-09-14 LAB
BILIRUB BLD-MCNC: NEGATIVE MG/DL
CLARITY, POC: CLEAR
COLOR UR: YELLOW
EXPIRATION DATE: ABNORMAL
GLUCOSE UR STRIP-MCNC: NEGATIVE MG/DL
KETONES UR QL: NEGATIVE
LEUKOCYTE EST, POC: ABNORMAL
Lab: ABNORMAL
NITRITE UR-MCNC: NEGATIVE MG/ML
PH UR: 6 [PH] (ref 5–8)
PROT UR STRIP-MCNC: ABNORMAL MG/DL
RBC # UR STRIP: NEGATIVE /UL
SP GR UR: 1.01 (ref 1–1.03)
UROBILINOGEN UR QL: NORMAL

## 2022-09-14 PROCEDURE — 99213 OFFICE O/P EST LOW 20 MIN: CPT | Performed by: NURSE PRACTITIONER

## 2022-09-14 PROCEDURE — 81003 URINALYSIS AUTO W/O SCOPE: CPT | Performed by: NURSE PRACTITIONER

## 2022-09-14 RX ORDER — CEFDINIR 300 MG/1
300 CAPSULE ORAL 2 TIMES DAILY
Qty: 14 CAPSULE | Refills: 0 | Status: SHIPPED | OUTPATIENT
Start: 2022-09-14 | End: 2022-09-21

## 2022-09-14 NOTE — PROGRESS NOTES
"Chief Complaint  Urinary Tract Infection    Subjective        Nicole Lofton presents to [facility]  History of Present Illness    Patient is a pleasant 77 year old female who typically sees Dr. Campbell here in the office. She is new to me and here today with symptoms of urinary frequency and dysuria x 10 days.   She is here today with one of her caregivers and she has given me verbal consent to speak about her care today.  She has gained a little weight and she feels a little better.   She denies any chills/fever at this time.     Objective   Vital Signs:  /60   Pulse 74   Resp 18   Ht 167.6 cm (66\")   Wt 49 kg (108 lb)   SpO2 96%   BMI 17.43 kg/m²   Estimated body mass index is 17.43 kg/m² as calculated from the following:    Height as of this encounter: 167.6 cm (66\").    Weight as of this encounter: 49 kg (108 lb).          Physical Exam  Vitals and nursing note reviewed.   Constitutional:       Appearance: Normal appearance.   HENT:      Head: Normocephalic.      Nose: Nose normal.      Mouth/Throat:      Mouth: Mucous membranes are moist.   Eyes:      Pupils: Pupils are equal, round, and reactive to light.   Cardiovascular:      Rate and Rhythm: Normal rate and regular rhythm.      Pulses: Normal pulses.      Heart sounds: Normal heart sounds.      Comments: Slight peripheral edema noted.   Pulmonary:      Effort: Pulmonary effort is normal. No respiratory distress.      Breath sounds: Normal breath sounds. No stridor. No wheezing, rhonchi or rales.      Comments: Denies SOB  Chest:      Chest wall: No tenderness.   Genitourinary:     Comments: Urinary frequency and dysuria x 10 days  Skin:     General: Skin is warm.      Capillary Refill: Capillary refill takes less than 2 seconds.   Neurological:      Mental Status: She is alert and oriented to person, place, and time.   Psychiatric:         Behavior: Behavior normal.        Result Review :    Common labs  "   Common Labsle 5/20/22 5/20/22 7/28/22 8/19/22 8/19/22    1223 1223  1226 1226   Glucose  170 (A)   88   BUN  16   15   Creatinine  0.96 1.10  0.86   Sodium  127 (A)   132 (A)   Potassium  4.2   4.5   Chloride  92 (A)   96 (A)   Calcium  9.5   9.7   Albumin  4.20   4.30   Total Bilirubin  0.4   0.3   Alkaline Phosphatase  110   136 (A)   AST (SGOT)  19   27   ALT (SGPT)  14   22   WBC 6.80   7.99    Hemoglobin 11.8 (A)   11.9 (A)    Hematocrit 35.0   35.2    Platelets 255   274    (A) Abnormal value       Comments are available for some flowsheets but are not being displayed.                     Assessment and Plan   Diagnoses and all orders for this visit:    1. Dysuria (Primary)  -     POCT urinalysis dipstick, automated  -     Urine Culture - Urine, Urine, Clean Catch; Future    Other orders  -     cefdinir (OMNICEF) 300 MG capsule; Take 1 capsule by mouth 2 (Two) Times a Day for 7 days.  Dispense: 14 capsule; Refill: 0    Increase fluids at this time.   If you develop any worsening symptoms please contact the office.   We will call you with your urine culture results.   Take medication as indicated.        Follow Up   Return if symptoms worsen or fail to improve.  Patient was given instructions and counseling regarding her condition or for health maintenance advice. Please see specific information pulled into the AVS if appropriate.

## 2022-09-14 NOTE — TELEPHONE ENCOUNTER
Caller: ADRIANA PAGE    Relationship: Emergency Contact    Best call back number: [phone]    Requested Prescriptions:   Requested Prescriptions     Pending Prescriptions Disp Refills   • oxyCODONE-acetaminophen (Percocet) 7.5-325 MG per tablet 90 tablet 0     Sig: Take 1 tablet by mouth Every 8 (Eight) Hours As Needed for Severe Pain.        Pharmacy where request should be sent: [facility] #[id number] [place], [place] - [address] AT [facility] - [phone] [facility] [phone]      Additional details provided by patient:     Does the patient have less than a 3 day supply:  [] Yes  [x] No    Kari Sewell Rep   09/14/22 15:50 EDT

## 2022-09-15 DIAGNOSIS — S72.8X1G OTHER CLOSED FRACTURE OF RIGHT FEMUR WITH DELAYED HEALING, UNSPECIFIED PORTION OF FEMUR, SUBSEQUENT ENCOUNTER: ICD-10-CM

## 2022-09-15 RX ORDER — OXYCODONE AND ACETAMINOPHEN 7.5; 325 MG/1; MG/1
1 TABLET ORAL EVERY 8 HOURS PRN
Qty: 90 TABLET | Refills: 0 | Status: SHIPPED | OUTPATIENT
Start: 2022-09-15 | End: 2022-10-22

## 2022-09-16 LAB
BACTERIA UR CULT: NORMAL
BACTERIA UR CULT: NORMAL

## 2022-09-16 NOTE — PROGRESS NOTES
Please contact patient and let her know that the urine culture shows a mixed amount of bacteria. Does she feel better after the antibiotic was started? If so, she can complete at this time. Thank you, Heavenly

## 2022-09-19 DIAGNOSIS — F32.A DEPRESSION, UNSPECIFIED DEPRESSION TYPE: ICD-10-CM

## 2022-09-19 DIAGNOSIS — F41.9 ANXIETY: ICD-10-CM

## 2022-09-19 RX ORDER — OLANZAPINE 5 MG/1
5 TABLET ORAL NIGHTLY
Qty: 90 TABLET | Refills: 0 | Status: SHIPPED | OUTPATIENT
Start: 2022-09-19 | End: 2022-12-26 | Stop reason: SDUPTHER

## 2022-09-19 RX ORDER — VORTIOXETINE 20 MG/1
TABLET, FILM COATED ORAL
Qty: 30 TABLET | Refills: 11 | Status: SHIPPED | OUTPATIENT
Start: 2022-09-19

## 2022-09-20 ENCOUNTER — TELEPHONE (OUTPATIENT)
Dept: ONCOLOGY | Facility: CLINIC | Age: 77
End: 2022-09-20

## 2022-09-20 ENCOUNTER — TELEPHONE (OUTPATIENT)
Dept: INTERNAL MEDICINE | Facility: CLINIC | Age: 77
End: 2022-09-20

## 2022-09-20 LAB — CREAT BLDA-MCNC: 0.9 MG/DL (ref 0.6–1.3)

## 2022-09-20 NOTE — TELEPHONE ENCOUNTER
Caller: Nicole Lofton    Relationship: Self    Best call back number: [phone]    What is the best time to reach you: ANYTIME    Who are you requesting to speak with (clinical staff, provider,  specific staff member): PCP    Do you know the name of the person who called: SELF    What was the call regarding: THE PATIENT STATES THAT SHE HAS BEEN EXPERIENCING UNBEARABLE PAIN SINCE HER RECENT HIP SURGERY. THE PATIENT WOULD LIKE TO SPEAK WITH HER PCP REGARDING THIS.     Do you require a callback: YES, PLEASE   "Patient presents for Zarxio injection today. I am an RN. Does the patient have an active anti-cancer treatment plan? (oral, injection, or IV)   Yes. Regimen: TC  Cycle/Day: C3D6      ECOG:   ECOG [22 1051]   ECOG Performance Status 1       Oral Chemotherapy: No  Nursing Assessment:  A comprehensive nursing assessment was performed and the patient reports the following:    Anxiety/Depression/Insomnia: Anxiety: Yes: reassurance provided, Depression: No and Insomnia: No  Pain: YES, Pain Ratin, Location: joints, Pain Description: achy  During this visit, medication was administered to treat pain: No    Pt reports nausea, lack of appetite or eating,  And vomiting. RN recommended IV fluids today. Patient did not put her Emla cream on port site beforehand and today and refuses fluids. Pt also reported she is not taking antiemetics because ""they all have side effects that outweigh the benefits. \""      Toxicity Assessment     Adverse Events?   Adverse Events: No  Auditory/Ear  Assessment: Yes (Within Defined Limits)  Blood/Bone Marrow  Assessment: Not Reviewed  Cardiac General  Assessment: Yes (w/ Exceptions to WDL)  Hypotension: Asymptomatic, intervention not indicated  Constitutional  Assessment: Yes (w/ Exceptions to WDL)  Fatigue: Fatigue not relieved by rest, limiting instrumental ADL  Dermatology/Skin  Assessment: Yes (w/ Exceptions to WDL)  Alopecia: Hair loss of >50% normal for that individual that is readily apparent to others, a wig or hair piece is necessary if the patient desires to completely camouflage the hair loss, associated with psychosocial impact  Endocrine  Assessment: Yes (Within Defined Limits)  Gastrointestinal  Assessment: Yes (w/ Exceptions to WDL)  Constipation: Occasional or intermittent symptoms, occasional use of stool softeners, laxatives, dietary modification, or enema  Dehydration: Increased oral fluids indicated, dry mucous membranes, diminished skin turgor  Nausea: Oral " intake decreased without significant weight loss, dehydration or malnutrition  Vomitin - 2 episodes ( by 5 minutes) in 24 hrs intervention not indicated  Hemorrhage/Bleeding  Assessment: Yes (Within Defined Limits)  Infection  Assessment: Yes (Within Defined Limits)  Lymphatics  Assessment: Yes (Within Defined Limits)  Metabolic/Laboratory  Assessment: Not Reviewed  Musculoskeletal  Assessment: Yes (w/ Exceptions to WDL)  Generalized Muscle Weakness: Symptomatic, weakness evident on physical exam, weakness limiting instrumental ADL  Neurology  Assessment: Yes (w/ Exceptions to WDL)  Dizziness: Mild unsteadiness or sensation of movement  Anxiety: Moderate symptoms, limiting instrumental ADL  Ocular  Assessment: Yes (Within Defined Limits)  Pain  Assessment: Yes (w/ Exceptions to WDL)  - Joint: Moderate pain, pain or analgesics interfering with function, but not interfering with ADL  Pulmonary/Upper Respiratory  Assessment: Yes (Within Defined Limits)  Genitourinary  Assessment: Yes (Within Defined Limits)  Sexual/Reproductive  Assessment: Not Reviewed     Patient has valid pre-authorization, VS completed and Patient instructed to call the office with any questions or concerns    Pre-Injection Information: Allergies reviewed as required for injection type. , Pt states feeling well, no complaints. , Pt denies signs and symptoms of infection. and Vaccine Information Statement(s) given and reviewed; questions answered and verbal consent given by Patient for injection(s) administered. Refer to [facility] (medication administration record) for type of injection and medication given. Needle Size: needle provided in package  Patient tolerated well: Stable and Follow up appointment scheduled     Dr. Merritt Aguilera is supervising clinician today.

## 2022-09-20 NOTE — TELEPHONE ENCOUNTER
Provider: DR CARLIN  Caller: ADRIANA  Relationship to Patient: DAUGHTER    Reason for Call: ADRIANA IS CALLING TO R/S LAB AND FOLLOW UP    PLEASE ADVISE

## 2022-09-22 LAB — REF LAB TEST METHOD: NORMAL

## 2022-09-23 ENCOUNTER — APPOINTMENT (OUTPATIENT)
Dept: OTHER | Facility: HOSPITAL | Age: 77
End: 2022-09-23

## 2022-09-23 ENCOUNTER — OFFICE VISIT (OUTPATIENT)
Dept: INTERNAL MEDICINE | Facility: CLINIC | Age: 77
End: 2022-09-23

## 2022-09-23 ENCOUNTER — TELEPHONE (OUTPATIENT)
Dept: ONCOLOGY | Facility: CLINIC | Age: 77
End: 2022-09-23

## 2022-09-23 DIAGNOSIS — R52 INCREASED PAIN: ICD-10-CM

## 2022-09-23 DIAGNOSIS — S72.8X1G OTHER CLOSED FRACTURE OF RIGHT FEMUR WITH DELAYED HEALING, UNSPECIFIED PORTION OF FEMUR, SUBSEQUENT ENCOUNTER: Primary | ICD-10-CM

## 2022-09-23 PROCEDURE — 99213 OFFICE O/P EST LOW 20 MIN: CPT | Performed by: FAMILY MEDICINE

## 2022-09-23 RX ORDER — OXYCODONE AND ACETAMINOPHEN 10; 325 MG/1; MG/1
1 TABLET ORAL EVERY 8 HOURS PRN
Qty: 40 TABLET | Refills: 0 | Status: SHIPPED | OUTPATIENT
Start: 2022-09-23 | End: 2022-10-19 | Stop reason: SDUPTHER

## 2022-09-23 NOTE — TELEPHONE ENCOUNTER
Caller: ADRIANA PAGE    Relationship to patient: Emergency Contact    Best call back number: [phone]    Chief complaint: PATIENT NEEDS TO RESCHEDULE     Type of visit: LAB AND FOLLOW UP    Requested date: ANY DAY AROUND 1:00 AND THEY CAN GO TO EITHER LOCATION      If rescheduling, when is the original appointment: 9-27-22     Additional notes:PLEASE CALL TO ADVISE

## 2022-09-26 ENCOUNTER — TELEPHONE (OUTPATIENT)
Dept: ONCOLOGY | Facility: CLINIC | Age: 77
End: 2022-09-26

## 2022-09-26 NOTE — TELEPHONE ENCOUNTER
Pts daughter Clarissa states the pts never restarted the Bosutinib. They don't want her to take it until after she sees and Ortho Doc to assess her hip and need for surgery. Clarissa states they will call our office when they want to reschedule.

## 2022-09-26 NOTE — PROGRESS NOTES
"Chief Complaint  No chief complaint on file.     Cc increased pain    This visit has been rescheduled as a phone visit to comply with patient safety concerns in accordance with CDC recommendations. Total time of discussion was 15 minutes.    You have chosen to receive care through a telephone visit. Do you consent to use a telephone visit for your medical care today? Yes    This was an audio enabled telemedicine encounter.    I was in the office, and patient was at home for the visit.    Stacey Lofton presents to [facility]  History of Present Illness    Today's office visit I did speak with patient's and her daughter.  It appears the patient did see the orthopedics, and she is having some problems again with her hip.  There are issues with the surgery that she has had.  However she continues to be in a lot of pain.  She is scheduled to be seen by the hip specialist early next week.  It appears of the Percocet 7.5 mg which is currently getting is not enough.    Objective   Vital Signs:  There were no vitals taken for this visit.  Estimated body mass index is 17.43 kg/m² as calculated from the following:    Height as of 9/14/22: 167.6 cm (66\").    Weight as of 9/14/22: 49 kg (108 lb).          Physical Exam  Vitals and nursing note reviewed.   Constitutional:       Appearance: She is well-developed.   HENT:      Head: Normocephalic and atraumatic.   Neurological:      Mental Status: She is alert and oriented to person, place, and time.   Psychiatric:         Behavior: Behavior normal.        Result Review :                Assessment and Plan   Diagnoses and all orders for this visit:    1. Other closed fracture of right femur with delayed healing, unspecified portion of femur, subsequent encounter (Primary)  -     oxyCODONE-acetaminophen (Percocet)  MG per tablet; Take 1 tablet by mouth Every 8 (Eight) Hours As Needed for Moderate Pain.  Dispense: 40 tablet; " Refill: 0  -     She needs to see orthopedics. Will increase percocent to 10mg. If worsening pain or pain not manageable, needs to go to ER.            Follow Up   No follow-ups on file.  Patient was given instructions and counseling regarding her condition or for health maintenance advice. Please see specific information pulled into the AVS if appropriate.

## 2022-09-27 ENCOUNTER — APPOINTMENT (OUTPATIENT)
Dept: LAB | Facility: HOSPITAL | Age: 77
End: 2022-09-27

## 2022-09-29 RX ORDER — PROMETHAZINE HYDROCHLORIDE 12.5 MG/1
TABLET ORAL
Qty: 60 TABLET | Refills: 0 | Status: SHIPPED | OUTPATIENT
Start: 2022-09-29

## 2022-09-30 ENCOUNTER — TELEPHONE (OUTPATIENT)
Dept: INTERNAL MEDICINE | Facility: CLINIC | Age: 77
End: 2022-09-30

## 2022-09-30 NOTE — TELEPHONE ENCOUNTER
Caller: Nicole Lofton    Relationship to patient: Self    Best call back number:[phone]    Where are you experiencing symptoms: LEG/FOOT   PAIN    How long have you been experiencing symptoms: 2 WEEKS      Have you had any recent surgeries, procedures or injections: [x] Yes  [] No   If yes, explain: HIP SURGERY    Is it the symptoms constant or intermittent: [x] Constant  [] Intermittent   PATIENT STATES SHE IS IN SO MUCH PAIN AND WOULD LIKE A CALL TO DISCUSS IF SOMETHING ELSE COULD BE PRESCRIBED    What makes it better: NOTHING    What therapies/medications have you tried HYDROCODONE 7.5    If a prescription is needed, what is your preferred pharmacy:   [facility] #[id number] [address] AT [facility] - [phone] [facility] [phone]   [address]  [facility] [address]  Phone: [phone] Fax: [phone]

## 2022-10-03 ENCOUNTER — OFFICE VISIT (OUTPATIENT)
Dept: INTERNAL MEDICINE | Facility: CLINIC | Age: 77
End: 2022-10-03

## 2022-10-03 DIAGNOSIS — N39.0 ACUTE UTI: Primary | ICD-10-CM

## 2022-10-03 PROCEDURE — 99213 OFFICE O/P EST LOW 20 MIN: CPT | Performed by: FAMILY MEDICINE

## 2022-10-03 RX ORDER — SULFAMETHOXAZOLE AND TRIMETHOPRIM 800; 160 MG/1; MG/1
1 TABLET ORAL 2 TIMES DAILY
Qty: 14 TABLET | Refills: 0 | Status: SHIPPED | OUTPATIENT
Start: 2022-10-03 | End: 2022-10-10

## 2022-10-14 ENCOUNTER — TELEPHONE (OUTPATIENT)
Dept: INTERNAL MEDICINE | Facility: CLINIC | Age: 77
End: 2022-10-14

## 2022-10-14 ENCOUNTER — TELEPHONE (OUTPATIENT)
Dept: ONCOLOGY | Facility: CLINIC | Age: 77
End: 2022-10-14

## 2022-10-14 NOTE — TELEPHONE ENCOUNTER
Caller: Nicole Lofton    Relationship: Self    Best call back number: [phone]      What is the best time to reach you: ANY     Who are you requesting to speak with (clinical staff, provider,  specific staff member): CLINICAL STAFF   What was the call regarding: PATIENT IS ASKING FOR A CALL BACK.  SHE HAS NOT HAD A BOWEL MOVEMENT IN 8-9 DAYS, BLOATED AND VERY UNCOMFORTABLE   [id number] [address] AT [facility] OF [facility] [phone] [facility] [phone] FX      Do you require a callback: YES

## 2022-10-16 NOTE — PROGRESS NOTES
"Chief Complaint  No chief complaint on file.     Acute uti    This visit has been rescheduled as a phone visit to comply with patient safety concerns in accordance with CDC recommendations. Total time of discussion was 15 minutes.    You have chosen to receive care through a telephone visit. Do you consent to use a telephone visit for your medical care today? Yes    This was an audio enabled telemedicine encounter.    Patient was at home while I was in the office.  I did spend time talking to the patient's daughter who does dictate her health care.    Subjective        Nicole Lofton presents to [facility]  History of Present Illness    Is concerned that she may have a UTI.  Patient states that she does have some dysuria and some urinary frequency.  Patient states that she would like to get some medication for this.    Objective   Vital Signs:  There were no vitals taken for this visit.  Estimated body mass index is 17.43 kg/m² as calculated from the following:    Height as of 9/14/22: 167.6 cm (66\").    Weight as of 9/14/22: 49 kg (108 lb).          Physical Exam  Vitals and nursing note reviewed.   Constitutional:       Appearance: She is well-developed.   HENT:      Head: Normocephalic and atraumatic.   Musculoskeletal:      Cervical back: Normal range of motion and neck supple.   Neurological:      Mental Status: She is alert and oriented to person, place, and time.   Psychiatric:         Behavior: Behavior normal.        Result Review :                Assessment and Plan   Diagnoses and all orders for this visit:    1. Acute UTI (Primary)  -     sulfamethoxazole-trimethoprim (BACTRIM DS,SEPTRA DS) 800-160 MG per tablet; Take 1 tablet by mouth 2 (Two) Times a Day for 7 days.  Dispense: 14 tablet; Refill: 0  -     Urinalysis With Microscopic - Urine, Clean Catch    Patient would benefit from doing a urinalysis, however due to the most recent hip surgeries she has, she cannot " really get out to do the testing.  I did discuss we will send in some antibiotics to see if this helps.         Follow Up   No follow-ups on file.  Patient was given instructions and counseling regarding her condition or for health maintenance advice. Please see specific information pulled into the AVS if appropriate.

## 2022-10-17 DIAGNOSIS — R11.0 CHRONIC NAUSEA: ICD-10-CM

## 2022-10-18 RX ORDER — OMEPRAZOLE 40 MG/1
40 CAPSULE, DELAYED RELEASE ORAL DAILY
Qty: 30 CAPSULE | Refills: 0 | Status: SHIPPED | OUTPATIENT
Start: 2022-10-18 | End: 2022-11-21

## 2022-10-19 ENCOUNTER — TELEMEDICINE (OUTPATIENT)
Dept: INTERNAL MEDICINE | Facility: CLINIC | Age: 77
End: 2022-10-19

## 2022-10-19 DIAGNOSIS — R52 INCREASED PAIN: ICD-10-CM

## 2022-10-19 DIAGNOSIS — S72.8X1G OTHER CLOSED FRACTURE OF RIGHT FEMUR WITH DELAYED HEALING, UNSPECIFIED PORTION OF FEMUR, SUBSEQUENT ENCOUNTER: Primary | ICD-10-CM

## 2022-10-19 DIAGNOSIS — S72.8X1G OTHER CLOSED FRACTURE OF RIGHT FEMUR WITH DELAYED HEALING, UNSPECIFIED PORTION OF FEMUR, SUBSEQUENT ENCOUNTER: ICD-10-CM

## 2022-10-19 PROCEDURE — 99213 OFFICE O/P EST LOW 20 MIN: CPT | Performed by: FAMILY MEDICINE

## 2022-10-20 ENCOUNTER — LAB (OUTPATIENT)
Dept: LAB | Facility: HOSPITAL | Age: 77
End: 2022-10-20

## 2022-10-20 ENCOUNTER — OFFICE VISIT (OUTPATIENT)
Dept: ONCOLOGY | Facility: CLINIC | Age: 77
End: 2022-10-20

## 2022-10-20 VITALS
HEART RATE: 68 BPM | HEIGHT: 66 IN | WEIGHT: 109.4 LBS | SYSTOLIC BLOOD PRESSURE: 105 MMHG | TEMPERATURE: 97.8 F | BODY MASS INDEX: 17.58 KG/M2 | OXYGEN SATURATION: 100 % | RESPIRATION RATE: 18 BRPM | DIASTOLIC BLOOD PRESSURE: 68 MMHG

## 2022-10-20 DIAGNOSIS — C92.10 CML (CHRONIC MYELOID LEUKEMIA): ICD-10-CM

## 2022-10-20 DIAGNOSIS — C92.10 CML (CHRONIC MYELOID LEUKEMIA): Primary | ICD-10-CM

## 2022-10-20 LAB
ALBUMIN SERPL-MCNC: 3.8 G/DL (ref 3.5–5.2)
ALBUMIN/GLOB SERPL: 1.3 G/DL (ref 1.1–2.4)
ALP SERPL-CCNC: 185 U/L (ref 38–116)
ALT SERPL W P-5'-P-CCNC: 23 U/L (ref 0–33)
ANION GAP SERPL CALCULATED.3IONS-SCNC: 11.1 MMOL/L (ref 5–15)
AST SERPL-CCNC: 34 U/L (ref 0–32)
BASOPHILS # BLD AUTO: 0.24 10*3/MM3 (ref 0–0.2)
BASOPHILS NFR BLD AUTO: 1.9 % (ref 0–1.5)
BILIRUB SERPL-MCNC: 0.3 MG/DL (ref 0.2–1.2)
BUN SERPL-MCNC: 13 MG/DL (ref 6–20)
BUN/CREAT SERPL: 14.3 (ref 7.3–30)
CALCIUM SPEC-SCNC: 9.5 MG/DL (ref 8.5–10.2)
CHLORIDE SERPL-SCNC: 96 MMOL/L (ref 98–107)
CO2 SERPL-SCNC: 23.9 MMOL/L (ref 22–29)
CREAT SERPL-MCNC: 0.91 MG/DL (ref 0.6–1.1)
DEPRECATED RDW RBC AUTO: 53 FL (ref 37–54)
EGFRCR SERPLBLD CKD-EPI 2021: 64.3 ML/MIN/1.73
EOSINOPHIL # BLD AUTO: 0.11 10*3/MM3 (ref 0–0.4)
EOSINOPHIL NFR BLD AUTO: 0.9 % (ref 0.3–6.2)
ERYTHROCYTE [DISTWIDTH] IN BLOOD BY AUTOMATED COUNT: 15.6 % (ref 12.3–15.4)
GLOBULIN UR ELPH-MCNC: 3 GM/DL (ref 1.8–3.5)
GLUCOSE SERPL-MCNC: 119 MG/DL (ref 74–124)
HCT VFR BLD AUTO: 29.8 % (ref 34–46.6)
HGB BLD-MCNC: 9.6 G/DL (ref 12–15.9)
IMM GRANULOCYTES # BLD AUTO: 0.5 10*3/MM3 (ref 0–0.05)
IMM GRANULOCYTES NFR BLD AUTO: 3.9 % (ref 0–0.5)
LYMPHOCYTES # BLD AUTO: 1.57 10*3/MM3 (ref 0.7–3.1)
LYMPHOCYTES NFR BLD AUTO: 12.2 % (ref 19.6–45.3)
MCH RBC QN AUTO: 30 PG (ref 26.6–33)
MCHC RBC AUTO-ENTMCNC: 32.2 G/DL (ref 31.5–35.7)
MCV RBC AUTO: 93.1 FL (ref 79–97)
MONOCYTES # BLD AUTO: 0.63 10*3/MM3 (ref 0.1–0.9)
MONOCYTES NFR BLD AUTO: 4.9 % (ref 5–12)
NEUTROPHILS NFR BLD AUTO: 76.2 % (ref 42.7–76)
NEUTROPHILS NFR BLD AUTO: 9.87 10*3/MM3 (ref 1.7–7)
NRBC BLD AUTO-RTO: 0 /100 WBC (ref 0–0.2)
PLATELET # BLD AUTO: 448 10*3/MM3 (ref 140–450)
PMV BLD AUTO: 8.4 FL (ref 6–12)
POTASSIUM SERPL-SCNC: 5.1 MMOL/L (ref 3.5–4.7)
PROT SERPL-MCNC: 6.8 G/DL (ref 6.3–8)
RBC # BLD AUTO: 3.2 10*6/MM3 (ref 3.77–5.28)
SODIUM SERPL-SCNC: 131 MMOL/L (ref 134–145)
WBC NRBC COR # BLD: 12.92 10*3/MM3 (ref 3.4–10.8)

## 2022-10-20 PROCEDURE — 99214 OFFICE O/P EST MOD 30 MIN: CPT | Performed by: INTERNAL MEDICINE

## 2022-10-20 PROCEDURE — 85025 COMPLETE CBC W/AUTO DIFF WBC: CPT

## 2022-10-20 PROCEDURE — 80053 COMPREHEN METABOLIC PANEL: CPT

## 2022-10-20 PROCEDURE — 36415 COLL VENOUS BLD VENIPUNCTURE: CPT

## 2022-10-20 RX ORDER — LIDOCAINE 50 MG/G
1 PATCH TOPICAL EVERY 24 HOURS
COMMUNITY
Start: 2022-09-25

## 2022-10-20 RX ORDER — LISINOPRIL 10 MG/1
1 TABLET ORAL
COMMUNITY
Start: 2022-05-24 | End: 2022-11-30 | Stop reason: SDUPTHER

## 2022-10-20 RX ORDER — OXYCODONE AND ACETAMINOPHEN 10; 325 MG/1; MG/1
1 TABLET ORAL
COMMUNITY
Start: 2022-10-10 | End: 2022-11-20 | Stop reason: SDUPTHER

## 2022-10-20 RX ORDER — ASPIRIN 81 MG/1
81 TABLET ORAL
COMMUNITY
Start: 2022-10-07 | End: 2022-11-21

## 2022-10-20 RX ORDER — ONDANSETRON 4 MG/1
1 TABLET, FILM COATED ORAL
COMMUNITY
End: 2022-11-09

## 2022-10-20 RX ORDER — OXYCODONE AND ACETAMINOPHEN 10; 325 MG/1; MG/1
1 TABLET ORAL EVERY 8 HOURS PRN
Qty: 40 TABLET | Refills: 0 | Status: SHIPPED | OUTPATIENT
Start: 2022-10-20 | End: 2022-10-24 | Stop reason: SDUPTHER

## 2022-10-20 RX ORDER — LIDOCAINE 50 MG/G
PATCH TOPICAL
COMMUNITY
Start: 2022-09-27 | End: 2023-03-07

## 2022-10-20 RX ORDER — CEPHALEXIN 500 MG/1
CAPSULE ORAL
COMMUNITY
Start: 2022-10-10 | End: 2023-01-26

## 2022-10-20 RX ORDER — AMLODIPINE BESYLATE 10 MG/1
1 TABLET ORAL
COMMUNITY
End: 2023-03-07

## 2022-10-20 NOTE — PROGRESS NOTES
"[facility] CBC GROUP OUTPATIENT FOLLOW UP VISIT    REASON FOR FOLLOW-UP:    1.  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  Gleevec 400 mg daily initiated around 10/12/2017, stopped on 11/15/2017 due to intolerance (noah-orbital edema, nausea/vomiting, fatigue).  Resumed at 200 mg daily but, again, not tolerated.    3.  Nilotinib 150 mg twice daily started in late March, 2018.  Held due to intolerance and QTc prolongation.   4.  Hydroxyurea initiated in July 2018.  Subsequently discontinued.     5.  Bosutinib 400 mg daily started Feb 2019    HISTORY OF PRESENT ILLNESS:  Nicole Lofton is a 77 y.o. female with the above-mentioned history.     She had a right hip fracture back in January.  She had a nail placed but did not do well with this with persistent severe pain in the right hip area.  Therefore, she had a recent admission at [facility] following complicated right hip replacement.  She was discharged on 10/10/2022.    She is recovering from this.  She continues to have some pain in the proximal right lower extremity.  Overall, her pain is better however.  She continues to smoke.  Appetite is adequate.    She has not yet resumed bosutinib as she wanted to get past her hip replacement first.    ROS:  Otherwise per the HPI.    Vitals:    10/20/22 1148   BP: 105/68   Pulse: 68   Resp: 18   Temp: 97.8 °F (36.6 °C)   TempSrc: Temporal   SpO2: 100%   Weight: 49.6 kg (109 lb 6.4 oz)   Height: 167.6 cm (65.98\")   PainSc:   8   PainLoc: Hip  Comment: right     General:  No acute distress, awake, alert and oriented.  Chronically ill-appearing.  Sitting in a wheelchair again today.  Skin:  Warm and dry, no visible rash  HEENT:  Normocephalic/atraumatic.  Wearing a facemask.  Chest:  Normal respiratory effort.    Extremities:  No visible clubbing, cyanosis, or edema  Neuro/psych:  Grossly non-focal.  Normal mood and affect.    DIAGNOSTIC DATA  CBC & Differential (10/20/2022 " 11:41)      IMAGING:   None reviewed today.    ASSESSMENT:  This is a 77 y.o. female with:    *History of bilateral breast cancer in 1988 status post bilateral mastectomy.  She apparently completed 6 months of adjuvant therapy.  We have no details regarding this.      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    · Started on Hydrea 1000 mg daily.    · This was discontinued and she started Gleevec on approximately 10/12/2017.    · Gleevec discontinued due to intolerance on 11/15/2017.    · We started Gleevec at a lower dose of 200 mg bu  · t she was also intolerant of this dose.  Her side effects were as severe with a lower dose and she therefore stopped taking the drug.  Symptoms improved significantly.  Platelet count subsequently elevated again.  · She started nilotinib at 150mg twice daily at the end of March 2018.  This was held in mid-May due to QTc prolongation.  The QTc interval did subsequently normalized.  However, due to this and other adverse effects we are not able to resume the nilotinib nor does she desire to.  · In July we resumed hydroxyurea 1000 mg daily.  Her platelet count increased.  Her dose was increased to 1000 mg twice daily.  Blood counts improved nicely.  Platelet count normalized but her white blood cell count dropped to below normal.  Decreased hydroxyurea to 1000 mg daily.  · 11/20/18 platelets were up again.  We increased the hydroxyurea to 1000 mg in the mornings and 500 mg in the evenings.  · As of 1/15/2019 her platelet count was again elevated to 1.2 million.  Increased hydroxyurea to 1000 mg twice daily.    · Platelets improved but remained far from normal.  · Due to her history we referred her down to the [facility] to see Dr. Cifuentes for assistance and recommendations on further therapy.  · In mid-February 2019 she started bosutinib 400 mg daily which she takes along with olanzapine at night (she takes this as  needed now).     · PCR on 8/25/2021 -.  · 2/22/2022: PCR negative  · 5/20/2022: BCR ABL PCR detected at 8.224%.  Patient was advised to resume bosutinib.  · PCR on 8/19/2022 was high at 10.828%  · 8/19/2022, she had not resumed bosutinib.  Instructions were called to the patient, but she has been having trouble with her memory, so she did not start the medication.  We will hold Bosutinib today and await BCR-ABL PCR, and pending those results make decision about resuming Bosutinib.  Once BCR-ABL PCR is available, plan will be called to the patient's daughter Clarissa.  · 10/20/2022: She has not yet resumed the bosutinib.  Once she recovers a little bit more over the next 1 to 2 weeks from her orthopedic surgery she will resume this.    *Hypothyroidism: She continues levothyroxine    *Tobacco use: She remains unwilling to quit smoking.      *Deconditioning  · Working with therapy following her hip replacement.    *Hypertension: Blood pressure is adequate today 105/68.    *Bradycardia: Heart rate normalized at 68    *Anxiety: She is already on a couple of medications for this.  Follow-up with primary care.    *Right hip fracture, now status post complicated arthroplasty on 10/6/2022    *Hyponatremia  · This appears to be an ongoing chronic issue since June 2020.  Patient being followed by nephrology.  Patient is to be following a 1 L fluid restriction and 2 g sodium diet.   · Sodium level today pending    PLAN:   1. PCR for BCR-ABL today   2. She will resume bosutinib in the next couple of weeks assuming her follow-up appointment with orthopedic surgery goes well next week.  She states that she is willing to resume the medication at this point.  I will have our pharmacy staff reach out to her and her daughter.  3. Follow-up in 2 months with a CBC, CMP, PCR for BCR-ABL  4. Continue rehabilitation for the right hip  5. All communication should go through her daughter Clarissa      Burt Merida MD  10/20/22

## 2022-10-21 ENCOUNTER — TELEPHONE (OUTPATIENT)
Dept: INTERNAL MEDICINE | Facility: CLINIC | Age: 77
End: 2022-10-21

## 2022-10-21 NOTE — TELEPHONE ENCOUNTER
Caller: ADRIANA PAGE    Relationship: Emergency Contact    Best call back number: [phone]    What medications are you currently taking:   Current Outpatient Medications on File Prior to Visit   Medication Sig Dispense Refill   • acetaminophen (TYLENOL) 325 MG tablet Take 650 mg by mouth.     • amLODIPine (NORVASC) 10 MG tablet Take 1 tablet by mouth Daily. 90 tablet 1   • amLODIPine (NORVASC) 10 MG tablet Take 1 tablet by mouth.     • aspirin 81 MG EC tablet Take 1 tablet by mouth.     • atorvastatin (LIPITOR) 40 MG tablet TAKE 1 TABLET BY MOUTH DAILY 90 tablet 1   • cephalexin (KEFLEX) 500 MG capsule      • Diclofenac Sodium (VOLTAREN) 1 % gel gel Apply 2 g topically to the appropriate area as directed 4 (Four) Times a Day.     • Diclofenac Sodium (VOLTAREN) 1 % gel gel APPLY 2 GRAMS TOPICALLY TO THE AFFECTED JOINTS FOUR TIMES DAILY     • docusate sodium 100 MG capsule Take 100 mg by mouth.     • levothyroxine (SYNTHROID, LEVOTHROID) 100 MCG tablet      • levothyroxine (SYNTHROID, LEVOTHROID) 125 MCG tablet Take 1 tablet by mouth Daily. 90 tablet 1   • lidocaine (LIDODERM) 5 % Place 1 patch on the skin as directed by provider Daily.     • lidocaine (LIDODERM) 5 %      • lisinopril (PRINIVIL,ZESTRIL) 10 MG tablet Take 1 tablet by mouth Daily. 90 tablet 1   • lisinopril (PRINIVIL,ZESTRIL) 10 MG tablet Take 1 tablet by mouth.     • LORazepam (Ativan) 0.5 MG tablet Take 1 tablet by mouth Every 8 (Eight) Hours As Needed for Anxiety. 60 tablet 3   • LORazepam (ATIVAN) 1 MG tablet Take 1 mg by mouth 2 (Two) Times a Day As Needed. for anxiety     • mupirocin (BACTROBAN) 2 % ointment APPLY TOPICALLY USING EVERY-TIP IN EACH NOSTRIL TWICE DAILY FOR 5 DAYS PRIOR TO SURGERY     • OLANZapine (zyPREXA) 5 MG tablet TAKE 1 TABLET BY MOUTH EVERY NIGHT 90 tablet 0   • omeprazole (priLOSEC) 40 MG capsule TAKE 1 CAPSULE BY MOUTH DAILY 30 capsule 0   • ondansetron (ZOFRAN) 4 MG tablet Daily.     • ondansetron (ZOFRAN) 4 MG  tablet Take 1 tablet by mouth.     • oxyCODONE-acetaminophen (Percocet)  MG per tablet Take 1 tablet by mouth Every 8 (Eight) Hours As Needed for Moderate Pain. 40 tablet 0   • oxyCODONE-acetaminophen (PERCOCET)  MG per tablet Take 1 tablet by mouth.     • oxyCODONE-acetaminophen (Percocet) 7.5-325 MG per tablet Take 1 tablet by mouth Every 8 (Eight) Hours As Needed for Severe Pain. 90 tablet 0   • oxyCODONE-acetaminophen (Percocet) 7.5-325 MG per tablet Take 1 tablet by mouth Every 8 (Eight) Hours As Needed for Severe Pain. 90 tablet 0   • promethazine (PHENERGAN) 12.5 MG tablet TAKE 1 TABLET BY MOUTH THREE TIMES DAILY AS NEEDED FOR NAUSEA 60 tablet 0   • traZODone (DESYREL) 50 MG tablet TAKE 1 TABLET BY MOUTH AT BEDTIME FOR 14 DAYS 90 tablet 1   • Trintellix 20 MG tablet TAKE 1 TABLET BY MOUTH DAILY 30 tablet 11     No current facility-administered medications on file prior to visit.          When did you start taking these medications: N/A    Which medication are you concerned about: oxyCODONE-acetaminophen (Percocet)  MG per tablet    Who prescribed you this medication: DAMONDEWAYNE    What are your concerns: THE QUANTITY IS WRONG.  SHE SHOULD BE TAKING 4 A DAY FOR 30 DAYS.  NOT 40 PILLS.  CAN YOU PLEASE GET THIS UPDATED.    How long have you had these concerns: Wednesday 10/12/22

## 2022-10-22 NOTE — PROGRESS NOTES
"Chief Complaint  No chief complaint on file.    Cc pain control    This visit has been rescheduled as a phone visit to comply with patient safety concerns in accordance with CDC recommendations. Total time of discussion was 15 minutes.    You have chosen to receive care through a telephone visit. Do you consent to use a telephone visit for your medical care today? Yes    This was an audio and video enabled telemedicine encounter.    I am the office, patient is at home.  I mainly spoke to patient's daughters both Clarissa and Galdino, and Galdino is at home with the patient.    Subjective        Nicole Lofton presents to [facility]  History of Present Illness    It appears that patient continues to be in pain from the hip surgery.  It appears that her pain medicine has been adjusted to where she is getting Percocet 10 mg 3 times a day.  They would like to continue it at this dose.  I had initially given him a lower dose.  Was seen by the orthopedist and determined that the patient needs to have a higher dose of the medicine.    Objective   Vital Signs:  There were no vitals taken for this visit.  Estimated body mass index is 17.67 kg/m² as calculated from the following:    Height as of 10/20/22: 167.6 cm (65.98\").    Weight as of 10/20/22: 49.6 kg (109 lb 6.4 oz).    BMI is below normal parameters (malnutrition). Recommendations: none (medical contraindication)      Physical Exam  Vitals and nursing note reviewed.   Constitutional:       Appearance: She is well-developed.   HENT:      Head: Normocephalic and atraumatic.   Neurological:      Mental Status: She is alert and oriented to person, place, and time.   Psychiatric:         Behavior: Behavior normal.        Result Review :                Assessment and Plan   Diagnoses and all orders for this visit:    1. Other closed fracture of right femur with delayed healing, unspecified portion of femur, subsequent encounter (Primary)    2. " Increased pain    For her increased pain in her right femur fracture, we will continue her on Percocet 10 mg 3 times a day.  I have sent the prescription in for this today.         Follow Up   No follow-ups on file.  Patient was given instructions and counseling regarding her condition or for health maintenance advice. Please see specific information pulled into the AVS if appropriate.

## 2022-10-24 DIAGNOSIS — S72.8X1G OTHER CLOSED FRACTURE OF RIGHT FEMUR WITH DELAYED HEALING, UNSPECIFIED PORTION OF FEMUR, SUBSEQUENT ENCOUNTER: ICD-10-CM

## 2022-10-24 RX ORDER — OXYCODONE AND ACETAMINOPHEN 10; 325 MG/1; MG/1
1 TABLET ORAL
OUTPATIENT
Start: 2022-10-24 | End: 2022-11-23

## 2022-10-24 RX ORDER — OXYCODONE AND ACETAMINOPHEN 10; 325 MG/1; MG/1
1 TABLET ORAL EVERY 8 HOURS PRN
Qty: 80 TABLET | Refills: 0 | Status: SHIPPED | OUTPATIENT
Start: 2022-10-24 | End: 2023-03-22 | Stop reason: SDUPTHER

## 2022-10-25 ENCOUNTER — SPECIALTY PHARMACY (OUTPATIENT)
Dept: PHARMACY | Facility: HOSPITAL | Age: 77
End: 2022-10-25

## 2022-10-25 NOTE — TELEPHONE ENCOUNTER
Caller: Rolly Nicole C    Relationship: Self    Best call back number: [phone]    What medication are you requesting: SOMETHING FOR CONSTIPATION    What are your current symptoms: CONSTIPATION    How long have you been experiencing symptoms: 7 DAYS    Have you had these symptoms before:    [x] Yes  [] No    Have you been treated for these symptoms before:   [x] Yes  [] No    If a prescription is needed, what is your preferred pharmacy and phone number: [id number] [address] AT [facility] - [phone] [facility] [phone] FX     Additional notes: SHE HAD HIP SURGERY A COUPLE WEEKS AGO AND HAS NOT BEEN ABLE TO HAVE A BOWEL MOVEMENT IN 7 DAYS.  PLEASE CALL AND ADVISE WHEN SENT OVER

## 2022-10-30 RX ORDER — LUBIPROSTONE 24 UG/1
24 CAPSULE ORAL 2 TIMES DAILY WITH MEALS
Qty: 60 CAPSULE | Refills: 2 | Status: SHIPPED | OUTPATIENT
Start: 2022-10-30

## 2022-11-03 ENCOUNTER — OFFICE VISIT (OUTPATIENT)
Dept: INTERNAL MEDICINE | Facility: CLINIC | Age: 77
End: 2022-11-03

## 2022-11-03 VITALS
HEIGHT: 66 IN | HEART RATE: 75 BPM | OXYGEN SATURATION: 97 % | WEIGHT: 103 LBS | SYSTOLIC BLOOD PRESSURE: 118 MMHG | DIASTOLIC BLOOD PRESSURE: 60 MMHG | BODY MASS INDEX: 16.55 KG/M2

## 2022-11-03 DIAGNOSIS — K59.03 CONSTIPATION DUE TO OPIOID THERAPY: Primary | ICD-10-CM

## 2022-11-03 DIAGNOSIS — R42 DIZZINESS: ICD-10-CM

## 2022-11-03 DIAGNOSIS — R19.7 DIARRHEA, UNSPECIFIED TYPE: ICD-10-CM

## 2022-11-03 DIAGNOSIS — T40.2X5A CONSTIPATION DUE TO OPIOID THERAPY: Primary | ICD-10-CM

## 2022-11-03 DIAGNOSIS — R53.1 WEAKNESS: ICD-10-CM

## 2022-11-03 PROCEDURE — 99214 OFFICE O/P EST MOD 30 MIN: CPT | Performed by: FAMILY MEDICINE

## 2022-11-03 NOTE — PROGRESS NOTES
"Chief Complaint  Diarrhea and Extremity Weakness    Subjective          Nicole Lofton presents to [facility]  History of Present Illness     The patient presents today due to some diarrhea as well as some extreme dizziness and weakness. She is accompanied by her caregiver, Theresa, who contributes to her history. Her daughter, Clarissa, is also present via telephone and also contributes to the patient's history.    Diarrhea  The patient states that she has been experiencing diarrhea for approximately 3 days. She states that she had been constipated for quite some time. She was prescribed Amitiza for her constipation due to narcotic medications. Her caregiver states that the patient has been saying she is weak, and she just keeps sleeping. She adds that she has not been eating much. She states that she has been drinking a lot of water. The caregiver states that she has been drinking Ensure at least once a day. The patient's daughter states that the patient resumed taking Bosulif 4 to 5 days ago, and she wonders if that could be causing some of the weakness.    Review of Systems was performed, and pertinent findings are noted in the HPI.    Objective   Vital Signs:   /60 (BP Location: Left arm, Patient Position: Sitting, Cuff Size: Adult)   Pulse 75   Ht 167 cm (65.75\")   Wt 46.7 kg (103 lb)   SpO2 97%   BMI 16.75 kg/m²     Physical Exam  Vitals and nursing note reviewed.   Constitutional:       Appearance: She is well-developed.   HENT:      Head: Normocephalic and atraumatic.   Musculoskeletal:      Cervical back: Normal range of motion and neck supple.   Neurological:      Mental Status: She is alert and oriented to person, place, and time.   Psychiatric:         Behavior: Behavior normal.        Result Review :              No results were reviewed or obtained today.     Assessment and Plan    Diagnoses and all orders for this visit:    1. Constipation due to opioid " therapy (Primary)    2. Diarrhea, unspecified type    3. Weakness    4. Dizziness       Dizziness and weakness  We will send her to [facility] for evaluation and likely I.V. fluids.      Follow Up   No follow-ups on file.  Patient was given instructions and counseling regarding her condition or for health maintenance advice. Please see specific information pulled into the AVS if appropriate.         Transcribed from ambient dictation for Keith Campbell MD by Era Mooney.  11/03/22   10:03 EDT    Patient or patient representative verbalized consent to the visit recording.  I have personally performed the services described in this document as transcribed by the above individual, and it is both accurate and complete.      Discussed with patient and patient daughter and caregiver that she would benefit from going to emergency room get further evaluated.  She may benefit from IV fluids as well as some further testing that she may need.  It is unclear exactly why she feels weak but it could be related from the diarrhea that she had.  I do believe the diarrhea could be related from the Amitiza was given to her due to the drug induced constipation.

## 2022-11-04 LAB — REF LAB TEST METHOD: NORMAL

## 2022-11-07 ENCOUNTER — TELEPHONE (OUTPATIENT)
Dept: ONCOLOGY | Facility: CLINIC | Age: 77
End: 2022-11-07

## 2022-11-07 NOTE — TELEPHONE ENCOUNTER
She was given fluids at [facility] and released. She has resumed the bosutinib. Daughter informed of elevated PCR. Daughter V/U.

## 2022-11-11 NOTE — TELEPHONE ENCOUNTER
Caller: ADRIANA PAGE    Relationship: Emergency Contact    Best call back number: [phone]    What is the best time to reach you: ANY     Who are you requesting to speak with (clinical staff, provider,  specific staff member): DR KELLER       What was the call regarding: PATIENT IS COMPLETELY OUT OF NAUSEA MEDICATION AND DAUGHTER CALLED BACK TO CHECK THE STATUS OF THE REFILL REQUEST     Do you require a callback: YES

## 2022-11-11 NOTE — TELEPHONE ENCOUNTER
Caller: PAGEADRIANA    Relationship: Emergency Contact    Best call back number: [phone]    Requested Prescriptions:   Requested Prescriptions     Pending Prescriptions Disp Refills   • ondansetron (ZOFRAN) 4 MG tablet       Sig: Daily.        Pharmacy where request should be sent: [id number] [place], [place] - [address] AT [facility] OF [facility] - [phone] [facility] [phone]      Additional details provided by patient: PATIENT LOST PRESCRIPTION THAT WAS CALLED IN 11/9/22, NEEDS A REFILL AND DAUGHTER IS AWARE THEY WILL HAVE TO PAY OUT OF POCKET AND INSURANCE WILL NOT COVER    Does the patient have less than a 3 day supply:  [x] Yes  [] No    Kari Sanchez Rep   11/11/22 10:44 EST

## 2022-11-14 ENCOUNTER — TELEPHONE (OUTPATIENT)
Dept: ONCOLOGY | Facility: CLINIC | Age: 77
End: 2022-11-14

## 2022-11-14 RX ORDER — ONDANSETRON 4 MG/1
4 TABLET, FILM COATED ORAL EVERY 8 HOURS PRN
Qty: 42 TABLET | Refills: 2 | Status: SHIPPED | OUTPATIENT
Start: 2022-11-14 | End: 2022-12-14 | Stop reason: SDUPTHER

## 2022-11-14 NOTE — TELEPHONE ENCOUNTER
Returned call to daughter who is reporting nausea and diarrhea since resuming her Bosutinib.  She was out of her Zofran and that is being refilled.  I advised them to also obtain Imodium for the diarrhea.  Daughter wants to know if she should hold the Bosutinib.

## 2022-11-14 NOTE — TELEPHONE ENCOUNTER
Per Dr. Merida pt will hold Bosutinib for one week. She will call next Monday and update me on her symptoms. Pts daughter V/U.

## 2022-11-14 NOTE — TELEPHONE ENCOUNTER
Returned call to daughter who is reporting that she is not having a great deal of diarrhea over the weekend.  She reports that she does not feel well.  She has nausea and had to had IV fluids last week. She is sleeping all of the time. Her daughter is wanting to know if she could stop the medication for a trial period to see if she could start to feel better.  Please advise.

## 2022-11-15 DIAGNOSIS — N39.0 ACUTE UTI: Primary | ICD-10-CM

## 2022-11-15 RX ORDER — ONDANSETRON 4 MG/1
TABLET, ORALLY DISINTEGRATING ORAL
Qty: 10 TABLET | Refills: 0 | Status: SHIPPED | OUTPATIENT
Start: 2022-11-15 | End: 2023-03-12 | Stop reason: SDUPTHER

## 2022-11-15 RX ORDER — TRAZODONE HYDROCHLORIDE 50 MG/1
TABLET ORAL
Qty: 90 TABLET | Refills: 1 | Status: SHIPPED | OUTPATIENT
Start: 2022-11-15

## 2022-11-15 RX ORDER — NITROFURANTOIN 25; 75 MG/1; MG/1
100 CAPSULE ORAL 2 TIMES DAILY
Qty: 14 CAPSULE | Refills: 0 | Status: SHIPPED | OUTPATIENT
Start: 2022-11-15 | End: 2022-11-22

## 2022-11-15 NOTE — TELEPHONE ENCOUNTER
Caller: Clarissa Rodriguez    Relationship: Emergency Contact    Best call back number:  [phone] (Home)    What medication are you requesting: UTI MEDS     What are your current symptoms: FREQUENCY   BURNING     How long have you been experiencing symptoms: 3-4 DAYS    Have you had these symptoms before:    [x] Yes  [] No    Have you been treated for these symptoms before:   [x] Yes  [] No    If a prescription is needed, what is your preferred pharmacy and phone number: [facility] #[id number] [place], [place] - [address] AT [facility] - [phone] [facility] [phone] FX     Additional notes:

## 2022-11-18 ENCOUNTER — SPECIALTY PHARMACY (OUTPATIENT)
Dept: PHARMACY | Facility: HOSPITAL | Age: 77
End: 2022-11-18

## 2022-11-18 NOTE — PROGRESS NOTES
MTM Encounter-Re: Adherence and side effects (Bosutinib)    Today's encounter was conducted via telephone with patient's daughter, Clarissa, who handles all communication. She stated they have a caregiver that helps with all medications.     Medication:  Bosutinib 400 mg PO daily   - Reason for outreach: Routine medication check-in .  - Administration: Patient has not been taking since 11/14. When she was taking before, caregiver was helping her and she was taking at the same time everyday.  - Missed doses: Patient reports missing 5 doses in the last 30 days. Per daughter, patient started back on bosutinib a couple weeks ago but has not been taking since Monday 11/14 since she has not been feeling good. Patient did recently go to the ED to get fluids due to excessive diarrhea (now resolved). Has started to feel better, will consider resuming bosutinib soon.   - Self-administration: Barriers to self administration/adherence identified: cannot manage own medications. Methods for Supporting Patient Self-Administration/Adherence: caregiver present, daughter also helps.    - Diagnosis/Indication: CML. Progress toward achieving therapeutic goals reviewed.   - Patient was experiencing side effects, including diarrhea (now resolved, did have to go to ED for fluids) and weakness. Starting to feel better now, but has not been taking bosutinib since Monday, 11/14. Did try Imodium -- may consider something stronger if it becomes an issue again when she restarts.   - Medication availability/affordability: Patient has had no issues obtaining medication from pharmacy.   - Questions/concerns about medications: none        Completed medication reconciliation today to assess for drug interactions.   Reviewed allergies, medical history, labs, quality of life, and medication history with patient.   Patient has had the following changes to medication list: stopped keflex; patient's chart has been updated to reflect changes. Assessed  medication list for interactions, Category D interaction between omeprazole and bosutinib -- PPIs may decrease concentration of bosutinib. Daughter is unaware if pt is still taking omeprazole but will check and let us know. Advised her to stop omeprazole if possible and consider famotidine if needed, but separate at least 2 hours from bosutinib.  Advised pt to call the clinic if any new medications are started so we can assess for drug-drug interactions.     All questions addressed. Patient had no additional concerns for MTM office. Will f/u next week to assess if pt has restarted bosutinib.     Shikha Garcia, Pharmacy Intern  11/18/2022  10:01 EST

## 2022-11-20 DIAGNOSIS — R11.0 CHRONIC NAUSEA: ICD-10-CM

## 2022-11-21 RX ORDER — OXYCODONE AND ACETAMINOPHEN 10; 325 MG/1; MG/1
1 TABLET ORAL EVERY 6 HOURS PRN
Qty: 100 TABLET | Refills: 0 | Status: SHIPPED | OUTPATIENT
Start: 2022-11-21 | End: 2022-12-02 | Stop reason: SDUPTHER

## 2022-11-21 RX ORDER — OMEPRAZOLE 40 MG/1
40 CAPSULE, DELAYED RELEASE ORAL DAILY
Qty: 30 CAPSULE | Refills: 0 | Status: SHIPPED | OUTPATIENT
Start: 2022-11-21 | End: 2023-02-13

## 2022-11-28 ENCOUNTER — OFFICE VISIT (OUTPATIENT)
Dept: INTERNAL MEDICINE | Facility: CLINIC | Age: 77
End: 2022-11-28
Payer: MEDICARE

## 2022-11-28 VITALS
HEART RATE: 66 BPM | SYSTOLIC BLOOD PRESSURE: 119 MMHG | OXYGEN SATURATION: 98 % | WEIGHT: 102 LBS | HEIGHT: 66 IN | DIASTOLIC BLOOD PRESSURE: 80 MMHG | BODY MASS INDEX: 16.39 KG/M2

## 2022-11-28 DIAGNOSIS — R05.9 COUGH, UNSPECIFIED TYPE: ICD-10-CM

## 2022-11-28 DIAGNOSIS — R53.83 OTHER FATIGUE: Primary | ICD-10-CM

## 2022-11-28 LAB
EXPIRATION DATE: NORMAL
FLUAV AG UPPER RESP QL IA.RAPID: NOT DETECTED
FLUBV AG UPPER RESP QL IA.RAPID: NOT DETECTED
INTERNAL CONTROL: NORMAL
Lab: NORMAL
SARS-COV-2 AG UPPER RESP QL IA.RAPID: NOT DETECTED

## 2022-11-28 PROCEDURE — 87428 SARSCOV & INF VIR A&B AG IA: CPT | Performed by: NURSE PRACTITIONER

## 2022-11-28 PROCEDURE — 99213 OFFICE O/P EST LOW 20 MIN: CPT | Performed by: NURSE PRACTITIONER

## 2022-11-28 NOTE — PROGRESS NOTES
"Chief Complaint  Headache and Fatigue    Subjective        Nicole Lofton presents to [facility] PRIMARY CARE  History of Present Illness    Pleasant 77 year old female who typically sees Dr. Campbell here in the office. She is here today for fatigue and H/A.   This has been ongoing x   She has been taking OTC laxatives x 2.    She was just treated with Macrobid 100 mg twice daily for 7 days for an Acute UTI.   Denies fever (she has been having chills).   Her caregiver is here with her today.   She gives me verbal consent to speak in front of her today.   She has ensures, cokes, ginger ale, and water.   She usually has 2 ensures a day. Down to one.     Objective   Vital Signs:  /80 (BP Location: Left arm, Patient Position: Sitting, Cuff Size: Adult)   Pulse 66   Ht 167 cm (65.75\")   Wt 46.3 kg (102 lb)   SpO2 98%   BMI 16.59 kg/m²   Estimated body mass index is 16.59 kg/m² as calculated from the following:    Height as of this encounter: 167 cm (65.75\").    Weight as of this encounter: 46.3 kg (102 lb).          Physical Exam   Result Review :    Common labs    Common Labs 8/30/22 10/7/22 10/7/22 10/20/22 10/20/22     2135 2135 1141 1141   Glucose     119   BUN     13   Creatinine 0.90    0.91   Sodium   129 (A)  131 (A)   Potassium     5.1 (A)   Chloride     96 (A)   Calcium     9.5   Albumin     3.80   Total Bilirubin     0.3   Alkaline Phosphatase     185 (A)   AST (SGOT)     34 (A)   ALT (SGPT)     23   WBC    12.92 (A)    Hemoglobin  8.0 (A)  9.6 (A)    Hematocrit  23.0 (A)  29.8 (A)    Platelets    448    (A) Abnormal value       Comments are available for some flowsheets but are not being displayed.                     Assessment and Plan   Diagnoses and all orders for this visit:    1. Other fatigue (Primary)  -     POCT SARS-CoV-2 Antigen LORENZA + Flu    2. Cough, unspecified type  -     POCT SARS-CoV-2 Antigen LORENZA + Flu             Follow Up   Return if symptoms worsen or fail " to improve.  Patient was given instructions and counseling regarding her condition or for health maintenance advice. Please see specific information pulled into the AVS if appropriate.

## 2022-11-28 NOTE — PROGRESS NOTES
"Chief Complaint  Headache and Fatigue    Subjective        Nicole Lofton presents to [facility] PRIMARY CARE  History of Present Illness    Pleasant 77 year old female who typically sees Dr. Campbell here in the office. She is here today for fatigue and H/A.   This has been ongoing x 1 week.  She has been taking OTC laxatives x 2.   She was just treated with Macrobid 100 mg twice daily for 7 days for an Acute UTI.   Denies fever (she has been having chills).   Her caregiver is here with her today.   She gives me verbal consent to speak in front of her today.   She has ensures, cokes, ginger ale, and water.   She usually has 2 ensures a day. Down to one.  She has not been eating or drinking well over the past few weeks.    Objective   Vital Signs:  /80 (BP Location: Left arm, Patient Position: Sitting, Cuff Size: Adult)   Pulse 66   Ht 167 cm (65.75\")   Wt 46.3 kg (102 lb)   SpO2 98%   BMI 16.59 kg/m²   Estimated body mass index is 16.59 kg/m² as calculated from the following:    Height as of this encounter: 167 cm (65.75\").    Weight as of this encounter: 46.3 kg (102 lb).          Physical Exam  Vitals and nursing note reviewed.   HENT:      Nose: Nose normal.      Mouth/Throat:      Mouth: Mucous membranes are moist.   Eyes:      Pupils: Pupils are equal, round, and reactive to light.   Cardiovascular:      Rate and Rhythm: Normal rate and regular rhythm.      Pulses: Normal pulses.      Heart sounds: Normal heart sounds.      Comments: No peripheral edema  Pulmonary:      Effort: Pulmonary effort is normal. No respiratory distress.      Breath sounds: Normal breath sounds. No stridor. No wheezing, rhonchi or rales.      Comments: Denies shortness of breath  Chest:      Chest wall: No tenderness.   Skin:     General: Skin is warm.      Capillary Refill: Capillary refill takes less than 2 seconds.   Neurological:      Mental Status: She is alert and oriented to person, place, and " time.   Psychiatric:         Behavior: Behavior normal.        Result Review :    Common labs    Common Labs 10/7/22 10/7/22 10/20/22 10/20/22 1/6/23 1/6/23    2135 2135 1141 1141 1306 1306   Glucose    119  93   BUN    13  16   Creatinine    0.91  0.84   Sodium  129 (A)  131 (A)  132 (A)   Potassium    5.1 (A)  4.4   Chloride    96 (A)  95 (A)   Calcium    9.5  10.1   Albumin    3.80  4.4   Total Bilirubin    0.3  0.2   Alkaline Phosphatase    185 (A)  139 (A)   AST (SGOT)    34 (A)  31   ALT (SGPT)    23  24   WBC   12.92 (A)  9.04    Hemoglobin 8.0 (A)  9.6 (A)  11.9 (A)    Hematocrit 23.0 (A)  29.8 (A)  37.2    Platelets   448  346    (A) Abnormal value       Comments are available for some flowsheets but are not being displayed.                     Assessment and Plan   Diagnoses and all orders for this visit:    1. Other fatigue (Primary)  -     POCT SARS-CoV-2 Antigen LORENZA + Flu    2. Cough, unspecified type  -     POCT SARS-CoV-2 Antigen LORENZA + Flu    COVID and flu test in the office were both negative.  Patient will make sure she  Eats more food throughout the day.   Increase ensures from one a day to two with your three squares.   If you develop any worsening weakness or fatigue please go to the ER.          Follow Up   Return if symptoms worsen or fail to improve.  Patient was given instructions and counseling regarding her condition or for health maintenance advice. Please see specific information pulled into the AVS if appropriate.

## 2022-11-29 ENCOUNTER — SPECIALTY PHARMACY (OUTPATIENT)
Dept: PHARMACY | Facility: HOSPITAL | Age: 77
End: 2022-11-29

## 2022-11-29 NOTE — PROGRESS NOTES
MTM Encounter-Re: Adherence and side effects (Bosutinib)    Today's encounter was conducted via telephone.    Medication:  Bosutinib 400 mg PO daily  - Reason for outreach: Routine medication check-in .  - Administration: Patient has not been taking since 11/14 per daughter, Galdino. They have made the decision together to no longer proceed with treatment. They do have an appointment with Dr. Merida in January to further discuss this.  - Missed doses: Patient reports missing 16 doses in the last 30 days. Has not been taking since 11/14 due to side effects and no longer wish to proceed with treatment.  - Self-administration: Barriers to self administration/adherence identified: patient cannot self-administer medications. Methods for Supporting Patient Self-Administration/Adherence: caregiver available to help.    - Diagnosis/Indication: CML. Progress toward achieving therapeutic goals reviewed.   - Patient was experiencing side effects, including diarrhea.   - Medication availability/affordability: Patient has had no issues obtaining medication from pharmacy.   - Questions/concerns about medications: none        Completed medication reconciliation today to assess for drug interactions.   Reviewed allergies, medical history, labs, quality of life, and medication history with patient.   Patient denies starting or stopping any medications.  Assessed medication list for interactions, Category D DDI: PPIs may decrease concentration of bosutinib -- patient is no longer taking bosutinib  Advised pt to call the clinic if any new medications are started so we can assess for drug-drug interactions.     All questions addressed. Patient had no additional concerns for MTM office.     Shikha Garcia, Pharmacy Intern  11/29/2022  12:49 EST

## 2022-11-30 DIAGNOSIS — F41.9 ANXIETY: ICD-10-CM

## 2022-11-30 RX ORDER — LISINOPRIL 10 MG/1
10 TABLET ORAL DAILY
Qty: 90 TABLET | Refills: 1 | Status: SHIPPED | OUTPATIENT
Start: 2022-11-30 | End: 2022-11-30 | Stop reason: SDUPTHER

## 2022-11-30 RX ORDER — LORAZEPAM 1 MG/1
TABLET ORAL
Qty: 60 TABLET | OUTPATIENT
Start: 2022-11-30

## 2022-11-30 RX ORDER — LORAZEPAM 0.5 MG/1
0.5 TABLET ORAL EVERY 8 HOURS PRN
Qty: 60 TABLET | Refills: 3 | Status: SHIPPED | OUTPATIENT
Start: 2022-11-30

## 2022-11-30 RX ORDER — LEVOTHYROXINE SODIUM 0.12 MG/1
125 TABLET ORAL DAILY
Qty: 90 TABLET | Refills: 1 | OUTPATIENT
Start: 2022-11-30

## 2022-11-30 RX ORDER — LISINOPRIL 10 MG/1
10 TABLET ORAL DAILY
Qty: 90 TABLET | Refills: 1 | Status: SHIPPED | OUTPATIENT
Start: 2022-11-30 | End: 2023-03-22 | Stop reason: SDUPTHER

## 2022-11-30 RX ORDER — LEVOTHYROXINE SODIUM 0.12 MG/1
125 TABLET ORAL DAILY
Qty: 90 TABLET | Refills: 1 | Status: SHIPPED | OUTPATIENT
Start: 2022-11-30

## 2022-12-01 NOTE — TELEPHONE ENCOUNTER
Caller: PAGEADRIANA    Relationship: Emergency Contact    Best call back number: [phone]    Requested Prescriptions: LORazepam (ATIVAN)       Pharmacy where request should be sent:    [id number] [address] AT [facility] - [phone]  - [phone]   [phone]      Additional details provided by patient: PATIENT'S DAUGHTER IS CALLING TO STATE SHE RECEIVED THE 0.5 MG OF THE  ABOVE MEDICATION, BUT SHE IS ALMOST OUT OF THE 1. MG.  SHE IS REQUESTING A NEW PRESCRIPTION WITH REFILLS FOR THE 1. MG OF THE MEDICATION    Does the patient have less than a 3 day supply:  [x] Yes  [] No    Would you like a call back once the refill request has been completed: [x] Yes [] No    If the office needs to give you a call back, can they leave a voicemail: [x] Yes [] No    Bianca Valente, Kari Rep   12/01/22 10:56 EST     PLEASE ADVISE.

## 2022-12-02 ENCOUNTER — DOCUMENTATION (OUTPATIENT)
Dept: PHARMACY | Facility: HOSPITAL | Age: 77
End: 2022-12-02

## 2022-12-02 NOTE — TELEPHONE ENCOUNTER
PATIENT'S DAUGHTER IS CALLING BACK AGAIN IN REGARDS TO A MEDICATION REFILL OF  oxyCODONE-acetaminophen (PERCOCET)  MG per tablet  SHE WILL BE OUT OF MEDICATION BY SUNDAY      LORazepam (ATIVAN) 1 MG tablet  SHE WILL BE OUT OF MEDICATION BY Sunday    THIS WILL BE NEW  PRESCRIPTIONS SINCE IT WILL BE A PHARMACY CHANGE. HER PREVIOUS PHARMACY DOES NOT HAVE IN STOCK. A PRESCRIPTION WAS SENT EARLIER. IT WAS NOT FILLED      [facility] [id number] - [facility] [address] [facility] [address] [address] - [phone]  - [phone]   [phone]    PLEASE CALL [phone] WHEN SENT TO PHARMACY

## 2022-12-02 NOTE — TELEPHONE ENCOUNTER
Caller: PAGEADRIANA    Relationship: Emergency Contact    Best call back number: [phone]      Requested Prescriptions:   Requested Prescriptions     Pending Prescriptions Disp Refills   • oxyCODONE-acetaminophen (PERCOCET)  MG per tablet 100 tablet 0     Sig: Take 1 tablet by mouth Every 6 (Six) Hours As Needed for Moderate Pain for up to 30 days.   • LORazepam (ATIVAN) 1 MG tablet       Sig: Take 1 tablet by mouth 2 (Two) Times a Day As Needed. for anxiety        Pharmacy where request should be sent: [facility] [id number] [facility] [address] [facility] AT [facility] [address] - [phone] [facility] [phone] FX     Additional details provided by patient: [facility] WAS OUT OF THE OXYCODONE SHE NEEDS 90 PILLS BECAUSE SHE TAKES 3 A DAY   Does the patient have less than a 3 day supply:  [x] Yes  [] No    Would you like a call back once the refill request has been completed: [x] Yes [] No    If the office needs to give you a call back, can they leave a voicemail: [x] Yes [] No    Kari Roman Rep   12/02/22 09:40 EST

## 2022-12-02 NOTE — PROGRESS NOTES
Per pts daughter, Carol Ann- Pt does not wish to continue Bosulif therapy. This will be discussed with Dr Merida at her appt in January.    It is time to renew the application for FREE Bosulif through [employer]-this has been placed on hold per pts wishes.      Marifer Arvizu  Specialty Pharmacy Technician

## 2022-12-03 RX ORDER — LORAZEPAM 1 MG/1
1 TABLET ORAL 2 TIMES DAILY PRN
Qty: 180 TABLET | Refills: 1 | Status: SHIPPED | OUTPATIENT
Start: 2022-12-03

## 2022-12-03 RX ORDER — OXYCODONE AND ACETAMINOPHEN 10; 325 MG/1; MG/1
1 TABLET ORAL EVERY 6 HOURS PRN
Qty: 100 TABLET | Refills: 0 | Status: SHIPPED | OUTPATIENT
Start: 2022-12-03 | End: 2022-12-30 | Stop reason: SDUPTHER

## 2022-12-03 RX ORDER — LORAZEPAM 1 MG/1
TABLET ORAL
Qty: 60 TABLET | OUTPATIENT
Start: 2022-12-03

## 2022-12-03 RX ORDER — LORAZEPAM 1 MG/1
1 TABLET ORAL 2 TIMES DAILY PRN
OUTPATIENT
Start: 2022-12-03

## 2022-12-14 RX ORDER — ONDANSETRON 4 MG/1
4 TABLET, FILM COATED ORAL EVERY 8 HOURS PRN
Qty: 42 TABLET | Refills: 2 | Status: SHIPPED | OUTPATIENT
Start: 2022-12-14 | End: 2023-03-07

## 2022-12-28 NOTE — TELEPHONE ENCOUNTER
PATIENT'S DAUGHTER CALLED TO CHECK ON THIS REQUEST, SHE WOULD LIKE AN UPDATE ON GETTING THIS REFILLED.    PLEASE ADVISE    CALLBACK: [phone]

## 2022-12-29 RX ORDER — OLANZAPINE 5 MG/1
5 TABLET ORAL NIGHTLY
Qty: 90 TABLET | Refills: 3 | Status: SHIPPED | OUTPATIENT
Start: 2022-12-29

## 2022-12-30 ENCOUNTER — TELEPHONE (OUTPATIENT)
Dept: ONCOLOGY | Facility: CLINIC | Age: 77
End: 2022-12-30

## 2022-12-30 NOTE — TELEPHONE ENCOUNTER
Caller: ADRIANA PAGE    Relationship: Emergency Contact    Best call back number: [phone]    What is the best time to reach you: ANYTIME     Who are you requesting to speak with (clinical staff, provider,  specific staff member): SCHEDULING/ OFFICE         What was the call regarding:       WANTED TO PUT A NOTE IN MOTHERS CHART TO NOT CALL HER WITH APPOINTMENTS OR CONFIRM  AS SHE CONTINUES TO CANCEL THEM     REQUEST TO JUST NOTIFY THROUGH MY CHART       NEEDING TO PUT APPOINTMENT HAD FOR 01/06 BACK ON PLEASE. [place]         Do you require a callback: YES

## 2023-01-01 ENCOUNTER — TELEPHONE (OUTPATIENT)
Dept: INTERNAL MEDICINE | Facility: CLINIC | Age: 78
End: 2023-01-01

## 2023-01-01 ENCOUNTER — TELEPHONE (OUTPATIENT)
Dept: ONCOLOGY | Facility: CLINIC | Age: 78
End: 2023-01-01

## 2023-01-01 DIAGNOSIS — S72.8X1G OTHER CLOSED FRACTURE OF RIGHT FEMUR WITH DELAYED HEALING, UNSPECIFIED PORTION OF FEMUR, SUBSEQUENT ENCOUNTER: ICD-10-CM

## 2023-01-01 RX ORDER — OXYCODONE AND ACETAMINOPHEN 10; 325 MG/1; MG/1
1 TABLET ORAL EVERY 8 HOURS PRN
Qty: 80 TABLET | Refills: 0 | Status: CANCELLED | OUTPATIENT
Start: 2023-01-01

## 2023-01-03 RX ORDER — OXYCODONE AND ACETAMINOPHEN 10; 325 MG/1; MG/1
1 TABLET ORAL EVERY 6 HOURS PRN
Qty: 100 TABLET | Refills: 0 | Status: SHIPPED | OUTPATIENT
Start: 2023-01-03 | End: 2023-01-28 | Stop reason: SDUPTHER

## 2023-01-03 NOTE — TELEPHONE ENCOUNTER
Caller: ADRIANA PAGE    Relationship: Emergency Contact    Best call back number: [phone]    Requested Prescriptions:   Requested Prescriptions     Pending Prescriptions Disp Refills   • oxyCODONE-acetaminophen (PERCOCET)  MG per tablet 100 tablet 0     Sig: Take 1 tablet by mouth Every 6 (Six) Hours As Needed for Moderate Pain for up to 30 days.        Pharmacy where request should be sent:  [facility] [id number] [facility] [address] [facility] [address] [facility] [phone] [facility] [phone] FX    Additional details provided by patient: PATIENT HAS A WEEK LEFT.    DAUGHTER ADRIANA CALLED WONDERING WHAT IS THE BACK UP ON THIS.    SHE KNOWS IT TAKES SEVERAL DAYS FOR THIS TO BE SUBMITTED THROUGH THE OFFICE AND THROUGH THE PHARMACY.     Does the patient have less than a 3 day supply:  [x] Yes  [] No    Would you like a call back once the refill request has been completed: [x] Yes [] No    If the office needs to give you a call back, can they leave a voicemail: [x] Yes [] No    Milli Bone, PCT   01/03/23 11:45 EST

## 2023-01-06 ENCOUNTER — OFFICE VISIT (OUTPATIENT)
Dept: ONCOLOGY | Facility: CLINIC | Age: 78
End: 2023-01-06
Payer: MEDICARE

## 2023-01-06 ENCOUNTER — APPOINTMENT (OUTPATIENT)
Dept: OTHER | Facility: HOSPITAL | Age: 78
End: 2023-01-06
Payer: MEDICARE

## 2023-01-06 ENCOUNTER — LAB (OUTPATIENT)
Dept: OTHER | Facility: HOSPITAL | Age: 78
End: 2023-01-06
Payer: MEDICARE

## 2023-01-06 VITALS
DIASTOLIC BLOOD PRESSURE: 67 MMHG | HEIGHT: 66 IN | SYSTOLIC BLOOD PRESSURE: 146 MMHG | WEIGHT: 103.5 LBS | TEMPERATURE: 97.7 F | RESPIRATION RATE: 18 BRPM | BODY MASS INDEX: 16.63 KG/M2 | OXYGEN SATURATION: 97 % | HEART RATE: 64 BPM

## 2023-01-06 DIAGNOSIS — C92.10 CML (CHRONIC MYELOID LEUKEMIA): Primary | ICD-10-CM

## 2023-01-06 DIAGNOSIS — C92.10 CML (CHRONIC MYELOID LEUKEMIA): ICD-10-CM

## 2023-01-06 LAB
ALBUMIN SERPL-MCNC: 4.4 G/DL (ref 3.5–5.2)
ALBUMIN/GLOB SERPL: 1.5 G/DL
ALP SERPL-CCNC: 139 U/L (ref 39–117)
ALT SERPL W P-5'-P-CCNC: 24 U/L (ref 1–33)
ANION GAP SERPL CALCULATED.3IONS-SCNC: 9 MMOL/L (ref 5–15)
AST SERPL-CCNC: 31 U/L (ref 1–32)
BASOPHILS # BLD AUTO: 0.14 10*3/MM3 (ref 0–0.2)
BASOPHILS NFR BLD AUTO: 1.5 % (ref 0–1.5)
BILIRUB SERPL-MCNC: 0.2 MG/DL (ref 0–1.2)
BUN SERPL-MCNC: 16 MG/DL (ref 8–23)
BUN/CREAT SERPL: 19 (ref 7–25)
CALCIUM SPEC-SCNC: 10.1 MG/DL (ref 8.6–10.5)
CHLORIDE SERPL-SCNC: 95 MMOL/L (ref 98–107)
CO2 SERPL-SCNC: 28 MMOL/L (ref 22–29)
CREAT SERPL-MCNC: 0.84 MG/DL (ref 0.57–1)
DEPRECATED RDW RBC AUTO: 44.8 FL (ref 37–54)
EGFRCR SERPLBLD CKD-EPI 2021: 70.8 ML/MIN/1.73
EOSINOPHIL # BLD AUTO: 0.12 10*3/MM3 (ref 0–0.4)
EOSINOPHIL NFR BLD AUTO: 1.3 % (ref 0.3–6.2)
ERYTHROCYTE [DISTWIDTH] IN BLOOD BY AUTOMATED COUNT: 13.9 % (ref 12.3–15.4)
GLOBULIN UR ELPH-MCNC: 3 GM/DL
GLUCOSE SERPL-MCNC: 93 MG/DL (ref 65–99)
HCT VFR BLD AUTO: 37.2 % (ref 34–46.6)
HGB BLD-MCNC: 11.9 G/DL (ref 12–15.9)
IMM GRANULOCYTES # BLD AUTO: 0.06 10*3/MM3 (ref 0–0.05)
IMM GRANULOCYTES NFR BLD AUTO: 0.7 % (ref 0–0.5)
LYMPHOCYTES # BLD AUTO: 1.75 10*3/MM3 (ref 0.7–3.1)
LYMPHOCYTES NFR BLD AUTO: 19.4 % (ref 19.6–45.3)
MCH RBC QN AUTO: 28.5 PG (ref 26.6–33)
MCHC RBC AUTO-ENTMCNC: 32 G/DL (ref 31.5–35.7)
MCV RBC AUTO: 89 FL (ref 79–97)
MONOCYTES # BLD AUTO: 0.58 10*3/MM3 (ref 0.1–0.9)
MONOCYTES NFR BLD AUTO: 6.4 % (ref 5–12)
NEUTROPHILS NFR BLD AUTO: 6.39 10*3/MM3 (ref 1.7–7)
NEUTROPHILS NFR BLD AUTO: 70.7 % (ref 42.7–76)
NRBC BLD AUTO-RTO: 0 /100 WBC (ref 0–0.2)
PLATELET # BLD AUTO: 346 10*3/MM3 (ref 140–450)
PMV BLD AUTO: 9.1 FL (ref 6–12)
POTASSIUM SERPL-SCNC: 4.4 MMOL/L (ref 3.5–5.2)
PROT SERPL-MCNC: 7.4 G/DL (ref 6–8.5)
RBC # BLD AUTO: 4.18 10*6/MM3 (ref 3.77–5.28)
SODIUM SERPL-SCNC: 132 MMOL/L (ref 136–145)
WBC NRBC COR # BLD: 9.04 10*3/MM3 (ref 3.4–10.8)

## 2023-01-06 PROCEDURE — 36415 COLL VENOUS BLD VENIPUNCTURE: CPT

## 2023-01-06 PROCEDURE — 80053 COMPREHEN METABOLIC PANEL: CPT | Performed by: INTERNAL MEDICINE

## 2023-01-06 PROCEDURE — 99214 OFFICE O/P EST MOD 30 MIN: CPT | Performed by: INTERNAL MEDICINE

## 2023-01-06 PROCEDURE — 85025 COMPLETE CBC W/AUTO DIFF WBC: CPT | Performed by: INTERNAL MEDICINE

## 2023-01-06 NOTE — PROGRESS NOTES
[facility] CBC GROUP OUTPATIENT FOLLOW UP VISIT    REASON FOR FOLLOW-UP:    1.  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  Gleevec 400 mg daily initiated around 10/12/2017, stopped on 11/15/2017 due to intolerance (noah-orbital edema, nausea/vomiting, fatigue).  Resumed at 200 mg daily but, again, not tolerated.    3.  Nilotinib 150 mg twice daily started in late March, 2018.  Held due to intolerance and QTc prolongation.   4.  Hydroxyurea initiated in July 2018.  Subsequently discontinued.     5.  Bosutinib 400 mg daily started Feb 2019, subsequently stopped due to patient preference    HISTORY OF PRESENT ILLNESS:  Nicole Lofton is a 77 y.o. female with the above-mentioned history.     She remains weak and fatigued following her hip fracture in January 2022.  She is not very mobile.  When she does get around she continues to use her walker at home.  She continues to smoke cigarettes.  She has a poor appetite which is a stable issue for her.  She has not been able to gain weight.  She remains reluctant to resume the bosutinib.    ROS:  Otherwise per the HPI.    Vitals:    01/06/23 1314   BP: 146/67   Pulse: 64   Resp: 18   Temp: 97.7 °F (36.5 °C)   TempSrc: Temporal   SpO2: 97%   Weight: 46.9 kg (103 lb 8 oz)   Height: 167 cm (65.75\")   PainSc:   6     General:  No acute distress, awake, alert and oriented.  Thin and chronically ill-appearing.  Sitting in a wheelchair again today.  Skin:  Warm and dry, no visible rash  HEENT:  Normocephalic/atraumatic.  Wearing a facemask.  Chest:  Normal respiratory effort.  Lungs clear to auscultation bilaterally.  Heart regular with ectopy  Extremities:  No visible clubbing, cyanosis, or edema  Neuro/psych:  Grossly non-focal.  Flat affect    DIAGNOSTIC DATA  CBC & Differential (10/20/2022 11:41)  Comprehensive Metabolic Panel (01/06/2023 13:06)      IMAGING:   None reviewed today.    ASSESSMENT:  This is a 77 y.o. female  with:    *History of bilateral breast cancer in 1988 status post bilateral mastectomy.  She apparently completed 6 months of adjuvant therapy.  We have no details regarding this.      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    · Started on Hydrea 1000 mg daily.    · This was discontinued and she started Gleevec on approximately 10/12/2017.    · Gleevec discontinued due to intolerance on 11/15/2017.    · We started Gleevec at a lower dose of 200 mg bu  · t she was also intolerant of this dose.  Her side effects were as severe with a lower dose and she therefore stopped taking the drug.  Symptoms improved significantly.  Platelet count subsequently elevated again.  · She started nilotinib at 150mg twice daily at the end of March 2018.  This was held in mid-May due to QTc prolongation.  The QTc interval did subsequently normalized.  However, due to this and other adverse effects we are not able to resume the nilotinib nor does she desire to.  · In July we resumed hydroxyurea 1000 mg daily.  Her platelet count increased.  Her dose was increased to 1000 mg twice daily.  Blood counts improved nicely.  Platelet count normalized but her white blood cell count dropped to below normal.  Decreased hydroxyurea to 1000 mg daily.  · 11/20/18 platelets were up again.  We increased the hydroxyurea to 1000 mg in the mornings and 500 mg in the evenings.  · As of 1/15/2019 her platelet count was again elevated to 1.2 million.  Increased hydroxyurea to 1000 mg twice daily.    · Platelets improved but remained far from normal.  · Due to her history we referred her down to the [facility] to see Dr. Cifuentes for assistance and recommendations on further therapy.  · In mid-February 2019 she started bosutinib 400 mg daily which she takes along with olanzapine at night (she takes this as needed now).     · PCR on 8/25/2021 -.  · 2/22/2022: PCR negative  · 5/20/2022: BCR ABL  PCR detected at 8.224%.  Patient was advised to resume bosutinib.  · PCR on 8/19/2022 was high at 10.828%  · 8/19/2022, she had not resumed bosutinib.  Instructions were called to the patient, but she has been having trouble with her memory, so she did not start the medication.  We will hold Bosutinib today and await BCR-ABL PCR, and pending those results make decision about resuming Bosutinib.  Once BCR-ABL PCR is available, plan will be called to the patient's daughter Clarissa.  · 10/20/2022: She has not yet resumed the bosutinib.  Once she recovers a little bit more over the next 1 to 2 weeks from her orthopedic surgery she will resume this.  · 1/6/2023: She never resumed the bosutinib.  Her PCR from 10/20/2022 was positive at 45.12% I-S    *Hypothyroidism: She continues levothyroxine    *Tobacco use: She remains unwilling to quit smoking.      *Deconditioning  · Continues to walk with a walker    *Hypertension: Blood pressure is adequate today 146/67    *Bradycardia: Heart rate normalized at 64    *Anxiety: She is already on a couple of medications for this.  Follow-up with primary care.    *Right hip fracture, now status post arthroplasty on 10/6/2022    *Hyponatremia  · Sodium remains a little bit low at 132    PLAN:   1. PCR for BCR-ABL today pending  2. She has not yet resumed the bosutinib.  After our discussion today she does state that she is motivated to resume this.  However, she has a history of not following through when she goes home.  3. We discussed that quitting smoking would be a way for her to try to increase her appetite.  She is not interested in this at this time.  4. All communication should go through her daughter Clarissa  5. Follow-up in about 3 months with labs    Orders Placed This Encounter   Procedures   • Comprehensive Metabolic Panel     Standing Status:   Future     Standing Expiration Date:   1/6/2024     Order Specific Question:   Release to patient     Answer:   Routine Release   •  BCR-ABL1, CML / ALL, PCR, Quant     Standing Status:   Future     Standing Expiration Date:   1/6/2024     Order Specific Question:   Release to patient     Answer:   Routine Release   • CBC & Differential     Standing Status:   Future     Standing Expiration Date:   1/6/2024     Order Specific Question:   Manual Differential     Answer:   No           Burt Merida MD  01/06/23

## 2023-01-13 LAB — REF LAB TEST METHOD: NORMAL

## 2023-01-13 NOTE — PROGRESS NOTES
Please call her daughter Clarissa with the PCR result. It actually looks a lot better, even though she hasn't been on therapy. I'll see her as scheduled. LETICIA

## 2023-01-16 ENCOUNTER — TELEPHONE (OUTPATIENT)
Dept: ONCOLOGY | Facility: CLINIC | Age: 78
End: 2023-01-16
Payer: MEDICARE

## 2023-01-16 NOTE — TELEPHONE ENCOUNTER
----- Message from Burt Meriad MD sent at 1/13/2023  5:26 PM EST -----  Please call her daughter Clarissa with the PCR result. It actually looks a lot better, even though she hasn't been on therapy. I'll see her as scheduled. LETICIA

## 2023-01-16 NOTE — TELEPHONE ENCOUNTER
----- Message from Burt Merida MD sent at 1/13/2023  5:26 PM EST -----  Please call her daughter Clarissa with the PCR result. It actually looks a lot better, even though she hasn't been on therapy. I'll see her as scheduled. LETICIA

## 2023-01-16 NOTE — TELEPHONE ENCOUNTER
JERADM lettelizabeth Romo know pts PCR has improved and Dr. Merida will see pts as scheduled. Phone number provided for any questions or concerns.

## 2023-01-26 ENCOUNTER — OFFICE VISIT (OUTPATIENT)
Dept: INTERNAL MEDICINE | Facility: CLINIC | Age: 78
End: 2023-01-26
Payer: MEDICARE

## 2023-01-26 DIAGNOSIS — R35.0 URINARY FREQUENCY: Primary | ICD-10-CM

## 2023-01-26 PROCEDURE — 81003 URINALYSIS AUTO W/O SCOPE: CPT | Performed by: NURSE PRACTITIONER

## 2023-01-26 PROCEDURE — 99213 OFFICE O/P EST LOW 20 MIN: CPT | Performed by: NURSE PRACTITIONER

## 2023-01-27 LAB
BILIRUB BLD-MCNC: NEGATIVE MG/DL
CLARITY, POC: ABNORMAL
COLOR UR: ABNORMAL
EXPIRATION DATE: ABNORMAL
GLUCOSE UR STRIP-MCNC: NEGATIVE MG/DL
KETONES UR QL: NEGATIVE
LEUKOCYTE EST, POC: NEGATIVE
Lab: ABNORMAL
NITRITE UR-MCNC: NEGATIVE MG/ML
PH UR: 6 [PH] (ref 5–8)
PROT UR STRIP-MCNC: ABNORMAL MG/DL
RBC # UR STRIP: NEGATIVE /UL
SP GR UR: 1.02 (ref 1–1.03)
UROBILINOGEN UR QL: ABNORMAL

## 2023-01-30 NOTE — TELEPHONE ENCOUNTER
Pt was exposed to Covid by her caretaker. Her first test was negative. She is now symptomatic with cough. We will reschedule her appt. She will call with her Covid result when she receives it. Message sent to Vanda. Pts daughter Clarissa V/U.     Per Dr. Merida pt can continue Bosulif unless her Covid symptoms worsen. Pts daughter Clarissa was informed and will call with worsening symptoms. Clarissa V/U. Pt is not currently interested in the monoclonal antibody infusion.    Detail Level: Zone Continue Regimen: -Isotretinoin 40mg take one capsule po bid with fatty food\\n

## 2023-01-31 RX ORDER — OXYCODONE AND ACETAMINOPHEN 10; 325 MG/1; MG/1
1 TABLET ORAL EVERY 6 HOURS PRN
Qty: 100 TABLET | Refills: 0 | Status: SHIPPED | OUTPATIENT
Start: 2023-01-31 | End: 2023-02-24 | Stop reason: SDUPTHER

## 2023-01-31 NOTE — TELEPHONE ENCOUNTER
PATIENT'S DAUGHTER CALLED TO CHECK THE STATUS OF THIS REQUEST, STATING THAT IT HAS TO BE DONE BY END OF DAY.    SHE WOULD LIKE TO BE CONTACTED WHEN THIS IS SENT TO THE PHARMACY.    PLEASE ADVISE  [phone]

## 2023-02-11 DIAGNOSIS — R11.0 CHRONIC NAUSEA: ICD-10-CM

## 2023-02-13 RX ORDER — OMEPRAZOLE 40 MG/1
40 CAPSULE, DELAYED RELEASE ORAL DAILY
Qty: 30 CAPSULE | Refills: 5 | Status: SHIPPED | OUTPATIENT
Start: 2023-02-13

## 2023-02-19 DIAGNOSIS — E78.5 HYPERLIPIDEMIA, UNSPECIFIED HYPERLIPIDEMIA TYPE: ICD-10-CM

## 2023-02-20 RX ORDER — ATORVASTATIN CALCIUM 40 MG/1
40 TABLET, FILM COATED ORAL DAILY
Qty: 90 TABLET | Refills: 2 | Status: SHIPPED | OUTPATIENT
Start: 2023-02-20

## 2023-02-25 RX ORDER — OXYCODONE AND ACETAMINOPHEN 10; 325 MG/1; MG/1
1 TABLET ORAL EVERY 6 HOURS PRN
Qty: 100 TABLET | Refills: 0 | Status: SHIPPED | OUTPATIENT
Start: 2023-02-25 | End: 2023-03-07

## 2023-02-26 DIAGNOSIS — I10 ESSENTIAL HYPERTENSION: ICD-10-CM

## 2023-02-27 RX ORDER — AMLODIPINE BESYLATE 10 MG/1
10 TABLET ORAL DAILY
Qty: 90 TABLET | Refills: 0 | Status: SHIPPED | OUTPATIENT
Start: 2023-02-27

## 2023-02-27 NOTE — TELEPHONE ENCOUNTER
Caller: Nicole Lofton    Relationship: Self    Best call back number: [phone]     What medication are you requesting: ANTIBIOTIC    What are your current symptoms: FREQUENT URINATION / FEELS AWFUL    How long have you been experiencing symptoms: 4 DAYS    Have you had these symptoms before:    [x] Yes  [] No    Have you been treated for these symptoms before:   [x] Yes  [] No    If a prescription is needed, what is your preferred pharmacy and phone number: [facility] #[id number] [facility] [address] AT [facility] - [phone] [facility] [phone] FX     Additional notes: PATIENT DOES NOT WANT TO COME INTO THE OFFICE         hide

## 2023-02-27 NOTE — TELEPHONE ENCOUNTER
Caller: ADRIANA PAGE (ON  VERBAL)    Relationship: [unfilled]     Best call back number: [phone]    What is your medical concern? STATES THAT MOM HAS UTI BUT DOES NOT FEEL IT IS NEEDED TO COME IN AND SEE DR KELLER. WOULD LIKE TO KNOW IF SOMETHING CAN BE CALLED IN OR AN ORDER FOR A UA CAN BE DONE.     How long has this issue been going on? 2 DAYS     Is your provider already aware of this issue? YES    Have you been treated for this issue? NO

## 2023-03-01 ENCOUNTER — OFFICE VISIT (OUTPATIENT)
Dept: INTERNAL MEDICINE | Facility: CLINIC | Age: 78
End: 2023-03-01
Payer: MEDICARE

## 2023-03-01 VITALS
HEIGHT: 65 IN | BODY MASS INDEX: 17.38 KG/M2 | DIASTOLIC BLOOD PRESSURE: 70 MMHG | WEIGHT: 104.3 LBS | TEMPERATURE: 97.4 F | RESPIRATION RATE: 16 BRPM | OXYGEN SATURATION: 95 % | HEART RATE: 74 BPM | SYSTOLIC BLOOD PRESSURE: 124 MMHG

## 2023-03-01 DIAGNOSIS — R39.15 URINARY URGENCY: Primary | ICD-10-CM

## 2023-03-01 PROCEDURE — 99213 OFFICE O/P EST LOW 20 MIN: CPT | Performed by: FAMILY MEDICINE

## 2023-03-01 RX ORDER — SULFAMETHOXAZOLE AND TRIMETHOPRIM 800; 160 MG/1; MG/1
1 TABLET ORAL 2 TIMES DAILY
Qty: 14 TABLET | Refills: 0 | Status: SHIPPED | OUTPATIENT
Start: 2023-03-01 | End: 2023-03-08

## 2023-03-01 NOTE — PROGRESS NOTES
"Chief Complaint  urine urgency (5 X days )    Subjective        Nicole Lofton presents to [facility] PRIMARY CARE  History of Present Illness    Patient has some urine urgency and is unable to urinate today. However she has some dysuria when this does happen. She has a long hx of UTI.     Objective   Vital Signs:  /70   Pulse 74   Temp 97.4 °F (36.3 °C)   Resp 16   Ht 165.1 cm (65\")   Wt 47.3 kg (104 lb 4.8 oz)   SpO2 95%   BMI 17.36 kg/m²   Estimated body mass index is 17.36 kg/m² as calculated from the following:    Height as of this encounter: 165.1 cm (65\").    Weight as of this encounter: 47.3 kg (104 lb 4.8 oz).             Physical Exam  Vitals and nursing note reviewed.   Constitutional:       Appearance: She is well-developed.   HENT:      Head: Normocephalic and atraumatic.   Musculoskeletal:      Cervical back: Normal range of motion and neck supple.   Neurological:      Mental Status: She is alert and oriented to person, place, and time.   Psychiatric:         Behavior: Behavior normal.        Result Review :                   Assessment and Plan   Diagnoses and all orders for this visit:    1. Urinary urgency (Primary)  -     sulfamethoxazole-trimethoprim (BACTRIM DS,SEPTRA DS) 800-160 MG per tablet; Take 1 tablet by mouth 2 (Two) Times a Day for 7 days.  Dispense: 14 tablet; Refill: 0      Will tx with bactrim DS.        Follow Up   No follow-ups on file.  Patient was given instructions and counseling regarding her condition or for health maintenance advice. Please see specific information pulled into the AVS if appropriate.       "

## 2023-03-07 ENCOUNTER — OFFICE VISIT (OUTPATIENT)
Dept: INTERNAL MEDICINE | Facility: CLINIC | Age: 78
End: 2023-03-07
Payer: MEDICARE

## 2023-03-07 VITALS
HEART RATE: 66 BPM | DIASTOLIC BLOOD PRESSURE: 64 MMHG | OXYGEN SATURATION: 98 % | SYSTOLIC BLOOD PRESSURE: 144 MMHG | TEMPERATURE: 96.7 F | HEIGHT: 65 IN | RESPIRATION RATE: 16 BRPM | BODY MASS INDEX: 17 KG/M2 | WEIGHT: 102 LBS

## 2023-03-07 DIAGNOSIS — R10.84 GENERALIZED ABDOMINAL PAIN: Primary | ICD-10-CM

## 2023-03-07 PROCEDURE — 99214 OFFICE O/P EST MOD 30 MIN: CPT | Performed by: FAMILY MEDICINE

## 2023-03-09 ENCOUNTER — TELEPHONE (OUTPATIENT)
Dept: ONCOLOGY | Facility: CLINIC | Age: 78
End: 2023-03-09
Payer: MEDICARE

## 2023-03-09 NOTE — TELEPHONE ENCOUNTER
Caller: Brandy Rodriguez    Relationship: Emergency Contact    Best call back number: [phone]    What is the best time to reach you: ANYTIME    Who are you requesting to speak with (clinical staff, provider, specific staff member): SCHEDULING    What was the call regarding: PLEASE CALL BRANDY TO R/S PTS 4/14 APPTS.     Do you require a callback: YES

## 2023-03-12 NOTE — PROGRESS NOTES
"Chief Complaint  Abdominal Pain (Follow-up)    Subjective        Nicole Lofton presents to [facility]  History of Present Illness    Patient presents at today's office visit with some abdominal pain, perhaps a UTI.  She is here with her caregiver.  However she is unable to urinate.  In the past I did give her some Bactrim about a week or 2 ago to help her, she does state that this does seem to help her, however she still has trouble urinating.  She feels very weak and tired.    Objective   Vital Signs:  /64   Pulse 66   Temp 96.7 °F (35.9 °C)   Resp 16   Ht 165.1 cm (65\")   Wt 46.3 kg (102 lb)   SpO2 98%   BMI 16.97 kg/m²   Estimated body mass index is 16.97 kg/m² as calculated from the following:    Height as of this encounter: 165.1 cm (65\").    Weight as of this encounter: 46.3 kg (102 lb).             Physical Exam  Vitals and nursing note reviewed.   Constitutional:       Appearance: She is well-developed.   HENT:      Head: Normocephalic and atraumatic.   Musculoskeletal:      Cervical back: Normal range of motion and neck supple.   Neurological:      Mental Status: She is alert and oriented to person, place, and time.   Psychiatric:         Behavior: Behavior normal.        Result Review :                   Assessment and Plan   Diagnoses and all orders for this visit:    1. Generalized abdominal pain (Primary)    I discussed with patient along with the caregiver, as well as calling her daughter and discussed that the patient would benefit from going to the emergency room for further evaluation.  I do think she needs further testing done, that would be best treated for the emergency room.         Follow Up   No follow-ups on file.  Patient was given instructions and counseling regarding her condition or for health maintenance advice. Please see specific information pulled into the AVS if appropriate.       "

## 2023-03-13 ENCOUNTER — TELEPHONE (OUTPATIENT)
Dept: INTERNAL MEDICINE | Facility: CLINIC | Age: 78
End: 2023-03-13
Payer: MEDICARE

## 2023-03-13 NOTE — TELEPHONE ENCOUNTER
Caller: Nicole Lofton    Relationship to patient: Self    Best call back number:[phone]    Patient is needing:  PATIENT JUST GOT OUT OF THE HOSPITAL AND WANT TO KNOW IF SHE CAN GET SOMETHING SENT TO THE PHARMACY TO CALM HER DOWN    Ok to switch to Synjardy XR   mg daily.

## 2023-03-14 ENCOUNTER — LAB (OUTPATIENT)
Dept: LAB | Facility: HOSPITAL | Age: 78
End: 2023-03-14
Payer: MEDICARE

## 2023-03-14 ENCOUNTER — TRANSCRIBE ORDERS (OUTPATIENT)
Dept: ADMINISTRATIVE | Facility: HOSPITAL | Age: 78
End: 2023-03-14
Payer: MEDICARE

## 2023-03-14 DIAGNOSIS — E87.1 ACUTE HYPONATREMIA: Primary | ICD-10-CM

## 2023-03-14 DIAGNOSIS — E87.1 ACUTE HYPONATREMIA: ICD-10-CM

## 2023-03-14 LAB
ANION GAP SERPL CALCULATED.3IONS-SCNC: 11 MMOL/L (ref 5–15)
BUN SERPL-MCNC: 15 MG/DL (ref 8–23)
BUN/CREAT SERPL: 18.8 (ref 7–25)
CALCIUM SPEC-SCNC: 9.2 MG/DL (ref 8.6–10.5)
CHLORIDE SERPL-SCNC: 99 MMOL/L (ref 98–107)
CO2 SERPL-SCNC: 26 MMOL/L (ref 22–29)
CREAT SERPL-MCNC: 0.8 MG/DL (ref 0.57–1)
EGFRCR SERPLBLD CKD-EPI 2021: 75.1 ML/MIN/1.73
GLUCOSE SERPL-MCNC: 94 MG/DL (ref 65–99)
MAGNESIUM SERPL-MCNC: 1.8 MG/DL (ref 1.6–2.4)
PHOSPHATE SERPL-MCNC: 3.3 MG/DL (ref 2.5–4.5)
POTASSIUM SERPL-SCNC: 3.9 MMOL/L (ref 3.5–5.2)
SODIUM SERPL-SCNC: 136 MMOL/L (ref 136–145)

## 2023-03-14 PROCEDURE — 83735 ASSAY OF MAGNESIUM: CPT

## 2023-03-14 PROCEDURE — 36415 COLL VENOUS BLD VENIPUNCTURE: CPT

## 2023-03-14 PROCEDURE — 80048 BASIC METABOLIC PNL TOTAL CA: CPT

## 2023-03-14 PROCEDURE — 84100 ASSAY OF PHOSPHORUS: CPT

## 2023-03-17 RX ORDER — ONDANSETRON 4 MG/1
4 TABLET, ORALLY DISINTEGRATING ORAL EVERY 8 HOURS PRN
Qty: 90 TABLET | Refills: 5 | Status: SHIPPED | OUTPATIENT
Start: 2023-03-17

## 2023-03-22 ENCOUNTER — OFFICE VISIT (OUTPATIENT)
Dept: INTERNAL MEDICINE | Facility: CLINIC | Age: 78
End: 2023-03-22
Payer: MEDICARE

## 2023-03-22 VITALS
BODY MASS INDEX: 16.66 KG/M2 | DIASTOLIC BLOOD PRESSURE: 78 MMHG | SYSTOLIC BLOOD PRESSURE: 140 MMHG | WEIGHT: 100 LBS | RESPIRATION RATE: 16 BRPM | TEMPERATURE: 97 F | HEART RATE: 74 BPM | OXYGEN SATURATION: 97 % | HEIGHT: 65 IN

## 2023-03-22 DIAGNOSIS — I10 ESSENTIAL HYPERTENSION: ICD-10-CM

## 2023-03-22 DIAGNOSIS — E03.9 PRIMARY HYPOTHYROIDISM: ICD-10-CM

## 2023-03-22 DIAGNOSIS — F17.200 NICOTINE USE DISORDER: ICD-10-CM

## 2023-03-22 DIAGNOSIS — N39.0 FREQUENT UTI: ICD-10-CM

## 2023-03-22 DIAGNOSIS — R63.0 ANOREXIA: ICD-10-CM

## 2023-03-22 DIAGNOSIS — S72.8X1G OTHER CLOSED FRACTURE OF RIGHT FEMUR WITH DELAYED HEALING, UNSPECIFIED PORTION OF FEMUR, SUBSEQUENT ENCOUNTER: ICD-10-CM

## 2023-03-22 DIAGNOSIS — E87.1 HYPONATREMIA: Primary | ICD-10-CM

## 2023-03-22 RX ORDER — LISINOPRIL 10 MG/1
10 TABLET ORAL DAILY
Qty: 90 TABLET | Refills: 1 | Status: SHIPPED | OUTPATIENT
Start: 2023-03-22

## 2023-03-22 RX ORDER — OXYCODONE AND ACETAMINOPHEN 10; 325 MG/1; MG/1
1 TABLET ORAL EVERY 8 HOURS PRN
Qty: 80 TABLET | Refills: 0 | Status: SHIPPED | OUTPATIENT
Start: 2023-03-22

## 2023-03-22 NOTE — PROGRESS NOTES
"Transitional Care Follow Up Visit  Subjective     Nicole Lofton is a 77 y.o. female who presents for a transitional care management visit.    Within 48 business hours after discharge our office contacted her via telephone to coordinate her care and needs.      I reviewed and discussed the details of that call along with the discharge summary, hospital problems, inpatient lab results, inpatient diagnostic studies, and consultation reports with Nicole.     Current outpatient and discharge medications have been reconciled for the patient.    Date of TCM Phone Call 12/7/2020 2/17/2022   [facility]   Date of Discharge 12/7/2020 2/17/2022     [place]: DOA 3/7/23, DOD 3/12/23    Risk for Readmission (LACE) No data recorded    History of Present Illness   Course During Hospital Stay:      \"Nicole Lofton is a 76 year old female known to our services with past medical history significant for HTN (lisinopril, norvasc), hypothyroidism (levothyroxine), anxiety/depression  (zyprexa, vortioxetine), CML (bosutinib), history of breast cancer. She was admitted in January 2023 for right hip fracture and underwent ORIF with IM nailing on 1/16. She suffered an MIESHA at that time and was also found to be hyponatremic with high ADH state, which seems to be a chronic issue since 6/2020, that improved with IVF. Renal US 4/2021 shows atrophic left kidney measuring 6.4 cm, normal right kidney measuring 11.3 cm, no calculi or hydronephrosis. No echocardiogram on file.     Patient presented to [facility] on 3/7/23 with complaints of fatigue for the past couple of weeks. On this admission she is found to be hyponatremic with serum Na 120 for which nephrology consult requested. Saline 500cc bolus ordered. Reports drinking 3 cups of coffee per day, 3 small bottled miller, and 2 small cokes. She also endorses nausea and vomiting about once every morning when she wakes up, that seems to resolve with " "her morning boost or breakfast and antiemetic medication. Additional home medications include trazodone, temazepam, vortioxetine. She endorses being treated for UTI recently, review of chart suggests she has been prescribed TMP-SMX starting 3/1/2023. . Also reports daily NSAID use. Nephrology consulted for hyponatremia.\"    \"Hospital Course:  Patient presented to the ER with weakness. She was found to have a Na level of 120 which was the cause of her symptoms. She was admitted for further management. Issues addressed this admission are detailed below.     Hyponatremia acute on chronic. Multifactorial Due to low solute load, some SIADH, impaired free water clearance related to bactrim, NSaids compounded by nausea with relative polydypsia. renal assistance appreciated. Sodium has proven difficult to correct. finally stable above 130. F/u with nephrology in 2-4 weeks.      Tobacco abuse Nicotine patch     Hypothyroidism tsh a little low. recommedn repeat in a few weeks.      Anxiety cont home meds     Back pain confirmed pt takes percocet at home will resume.      Anorexia po intake somewhat improved since admissin. Could consider increasing zyprexa for appetite stimulation, will defer to pt's psychiatrist. Discussed that polypharmacy with multiple sedating medications may further suppress pt's appetite. Pt is not interested in decreasing any of these.     Acute metabolic encephalo[athy likley multifactorial due to hyponatremia, hospitalization, polypharmacy etc.\"     At today's office visit, patient is present with her daughter Galdino.  It appears that she still does not feel well.  She constantly always says she is not feeling good.  During her hospital stay, she was discharged with sodium chloride tablets which she is post take 2 tablet 3 times a day.  She was also supposed taking Lasix.  She never took the Lasix.  She stopped taking the sodium tablets.  She recently had sodium levels checked about a week ago which " showed it to come back up to 136.  She still feels tired and fatigued.  She has an appointment to see nephrology next week.    While patient was in in the hospital, it appears that her lisinopril was stopped, is unclear exactly why lisinopril was stopped.  Her blood pressure today is still staying a bit elevated and is currently 140/78.  We did discuss at today's office visit about perhaps restarting the lisinopril 10 mg daily.    He has a history of having hypothyroidism.  She is currently taking levothyroxine 125 mcg daily.  Her TSH was slightly low while she was at the hospital, I did discuss today about having this rechecked.    As for her olanzapine that she is currently getting from psychiatry, would like her to follow-up with psychiatry and given the medication.    With the current anorexia that she is currently experiencing, I do encourage her to drink some boost or Ensure.  She should still try to ambulate as best as she can.  She is at a fall risk so I recommend her using a walker.       The following portions of the patient's history were reviewed and updated as appropriate: allergies, current medications, past family history, past medical history, past social history, past surgical history and problem list.    Current outpatient and discharge medications have been reconciled for the patient.  Reviewed by: Keith Campbell MD      Review of Systems    Objective   Physical Exam  Vitals and nursing note reviewed.   Constitutional:       Appearance: She is well-developed.   HENT:      Head: Normocephalic and atraumatic.   Musculoskeletal:      Cervical back: Normal range of motion and neck supple.   Neurological:      Mental Status: She is alert and oriented to person, place, and time.   Psychiatric:         Behavior: Behavior normal.         Assessment & Plan   Diagnoses and all orders for this visit:    1. Hyponatremia (Primary)  -     Comprehensive Metabolic Panel  -     Magnesium  -     Phosphorus    2.  Primary hypothyroidism  -     Thyroid Panel With TSH    3. Anorexia  -     Magnesium  -     Phosphorus  -     Ambulatory Referral to Nutrition Services    4. Essential hypertension  -     lisinopril (PRINIVIL,ZESTRIL) 10 MG tablet; Take 1 tablet by mouth Daily.  Dispense: 90 tablet; Refill: 1    5. Frequent UTI  -     Ambulatory Referral to Urology    6. Other closed fracture of right femur with delayed healing, unspecified portion of femur, subsequent encounter  -     oxyCODONE-acetaminophen (Percocet)  MG per tablet; Take 1 tablet by mouth Every 8 (Eight) Hours As Needed for Moderate Pain.  Dispense: 80 tablet; Refill: 0    7. Nicotine use disorder        Today for her chronic pain I will refill her Percocet.  For the anxiety that she currently has she can still continue taking the Ativan.  I do recommend her following up with her psychiatrist about dose adjustments if needed.  For the essential hypertension, continue amlodipine would like to restart back to lisinopril 10 mg daily.  For primary hypothyroidism, continue levothyroxine 125 mcg daily.  We will check her thyroid levels.  For the hyponatremia we will check several labs at today's visit, I did discuss with her that I would like her to resume sodium tablets.  She can do release 1 tablet daily of the sodium.  She will follow-up with nephrology next week and they will get further blood work then.       Dictated utilizing Dragon Voice Recognition Software

## 2023-03-23 LAB
ALBUMIN SERPL-MCNC: 4.8 G/DL (ref 3.5–5.2)
ALBUMIN/GLOB SERPL: 1.7 G/DL
ALP SERPL-CCNC: 184 U/L (ref 39–117)
ALT SERPL-CCNC: 33 U/L (ref 1–33)
AST SERPL-CCNC: 34 U/L (ref 1–32)
BILIRUB SERPL-MCNC: 0.3 MG/DL (ref 0–1.2)
BUN SERPL-MCNC: 12 MG/DL (ref 8–23)
BUN/CREAT SERPL: 13.3 (ref 7–25)
CALCIUM SERPL-MCNC: 10.8 MG/DL (ref 8.6–10.5)
CHLORIDE SERPL-SCNC: 95 MMOL/L (ref 98–107)
CO2 SERPL-SCNC: 28.6 MMOL/L (ref 22–29)
CREAT SERPL-MCNC: 0.9 MG/DL (ref 0.57–1)
EGFRCR SERPLBLD CKD-EPI 2021: 65.2 ML/MIN/1.73
FT4I SERPL CALC-MCNC: 4.4 (ref 1.2–4.9)
GLOBULIN SER CALC-MCNC: 2.8 GM/DL
GLUCOSE SERPL-MCNC: 103 MG/DL (ref 65–99)
MAGNESIUM SERPL-MCNC: 2.1 MG/DL (ref 1.6–2.4)
PHOSPHATE SERPL-MCNC: 3.7 MG/DL (ref 2.5–4.5)
POTASSIUM SERPL-SCNC: 4.6 MMOL/L (ref 3.5–5.2)
PROT SERPL-MCNC: 7.6 G/DL (ref 6–8.5)
SODIUM SERPL-SCNC: 138 MMOL/L (ref 136–145)
T3RU NFR SERPL: 32 % (ref 24–39)
T4 SERPL-MCNC: 13.6 UG/DL (ref 4.5–12)
TSH SERPL DL<=0.005 MIU/L-ACNC: 0.16 UIU/ML (ref 0.45–4.5)

## 2023-03-24 NOTE — TELEPHONE ENCOUNTER
Caller: Nicole Lofton    Relationship to patient: Self    Best call back number: [phone]    Patient is needing: PATIENT IS CALLING TO TELL DR. KELLER THAT SHE DID NOT SEE ANY NEED TO GO TO A UROLOGIST AND SHE HAS CANCELLED THE APPOINTMENT.

## 2023-04-19 ENCOUNTER — OFFICE VISIT (OUTPATIENT)
Dept: INTERNAL MEDICINE | Facility: CLINIC | Age: 78
End: 2023-04-19
Payer: MEDICARE

## 2023-04-19 VITALS
SYSTOLIC BLOOD PRESSURE: 138 MMHG | WEIGHT: 100 LBS | OXYGEN SATURATION: 98 % | HEIGHT: 65 IN | HEART RATE: 52 BPM | BODY MASS INDEX: 16.66 KG/M2 | DIASTOLIC BLOOD PRESSURE: 72 MMHG

## 2023-04-19 DIAGNOSIS — R30.0 DYSURIA: ICD-10-CM

## 2023-04-19 DIAGNOSIS — N39.0 ACUTE UTI: Primary | ICD-10-CM

## 2023-04-19 LAB
BILIRUB BLD-MCNC: NEGATIVE MG/DL
CLARITY, POC: CLEAR
COLOR UR: YELLOW
EXPIRATION DATE: ABNORMAL
GLUCOSE UR STRIP-MCNC: NEGATIVE MG/DL
KETONES UR QL: ABNORMAL
LEUKOCYTE EST, POC: ABNORMAL
Lab: ABNORMAL
NITRITE UR-MCNC: NEGATIVE MG/ML
PH UR: 6 [PH] (ref 5–8)
PROT UR STRIP-MCNC: ABNORMAL MG/DL
RBC # UR STRIP: ABNORMAL /UL
SP GR UR: 1.02 (ref 1–1.03)
UROBILINOGEN UR QL: ABNORMAL

## 2023-04-19 PROCEDURE — 1159F MED LIST DOCD IN RCRD: CPT | Performed by: FAMILY MEDICINE

## 2023-04-19 PROCEDURE — 3075F SYST BP GE 130 - 139MM HG: CPT | Performed by: FAMILY MEDICINE

## 2023-04-19 PROCEDURE — 1160F RVW MEDS BY RX/DR IN RCRD: CPT | Performed by: FAMILY MEDICINE

## 2023-04-19 PROCEDURE — 81003 URINALYSIS AUTO W/O SCOPE: CPT | Performed by: FAMILY MEDICINE

## 2023-04-19 PROCEDURE — 3078F DIAST BP <80 MM HG: CPT | Performed by: FAMILY MEDICINE

## 2023-04-19 PROCEDURE — 99213 OFFICE O/P EST LOW 20 MIN: CPT | Performed by: FAMILY MEDICINE

## 2023-04-19 RX ORDER — NITROFURANTOIN 25; 75 MG/1; MG/1
100 CAPSULE ORAL 2 TIMES DAILY
Qty: 14 CAPSULE | Refills: 0 | Status: SHIPPED | OUTPATIENT
Start: 2023-04-19 | End: 2023-04-26

## 2023-04-19 RX ORDER — FUROSEMIDE 20 MG/1
TABLET ORAL
COMMUNITY
Start: 2023-03-12

## 2023-04-19 NOTE — PROGRESS NOTES
"Chief Complaint  Urinary Tract Infection    Subjective        Nicole Kumar presents to [facility]  History of Present Illness      Ms. NICOLE KUMAR presents today for a urinary tract infection.    She has been feeling like this for 1 week. She has a little bit of burning when she urinates. She went to a kidney specialist and they checked out okay. One of her kidneys was smaller than the other, which is that unusual. Her daughter said one is not as strong as the other one because that is what took her potassium down.    Objective   Vital Signs:  /72 (BP Location: Left arm, Patient Position: Sitting, Cuff Size: Adult)   Pulse 52   Ht 165.1 cm (65\")   Wt 45.4 kg (100 lb)   SpO2 98%   BMI 16.64 kg/m²   Estimated body mass index is 16.64 kg/m² as calculated from the following:    Height as of this encounter: 165.1 cm (65\").    Weight as of this encounter: 45.4 kg (100 lb).             Physical Exam  Vitals and nursing note reviewed.   Constitutional:       Appearance: She is well-developed.   HENT:      Head: Normocephalic and atraumatic.   Musculoskeletal:      Cervical back: Normal range of motion and neck supple.   Neurological:      Mental Status: She is alert and oriented to person, place, and time.   Psychiatric:         Behavior: Behavior normal.        Result Review :                   Assessment and Plan   Diagnoses and all orders for this visit:    1. Acute UTI (Primary)  -     nitrofurantoin, macrocrystal-monohydrate, (Macrobid) 100 MG capsule; Take 1 capsule by mouth 2 (Two) Times a Day for 7 days.  Dispense: 14 capsule; Refill: 0  -     Ambulatory Referral to Urology    2. Dysuria  -     POCT urinalysis dipstick, automated        1. Urinary tract infection  - We will start the patient on Macrobid for 7 days.  - We will refer the patient to a urologist.         Follow Up   No follow-ups on file.  Patient was given instructions and counseling regarding her " condition or for health maintenance advice. Please see specific information pulled into the AVS if appropriate.             Transcribed from ambient dictation for Keith Campbell MD by Celia Hui.  04/19/23   10:47 EDT    Patient or patient representative verbalized consent to the visit recording.  I have personally performed the services described in this document as transcribed by the above individual, and it is both accurate and complete.

## 2023-04-20 ENCOUNTER — TELEPHONE (OUTPATIENT)
Dept: FAMILY MEDICINE CLINIC | Facility: CLINIC | Age: 78
End: 2023-04-20
Payer: MEDICARE

## 2023-04-20 DIAGNOSIS — S72.8X1G OTHER CLOSED FRACTURE OF RIGHT FEMUR WITH DELAYED HEALING, UNSPECIFIED PORTION OF FEMUR, SUBSEQUENT ENCOUNTER: ICD-10-CM

## 2023-04-20 RX ORDER — OXYCODONE AND ACETAMINOPHEN 10; 325 MG/1; MG/1
1 TABLET ORAL EVERY 8 HOURS PRN
Qty: 80 TABLET | Refills: 0 | Status: SHIPPED | OUTPATIENT
Start: 2023-04-20

## 2023-04-20 NOTE — TELEPHONE ENCOUNTER
HUB TO READ: [email or web address] MESSAGE SENT TO PATIENT: Hi Ms. Lofton,     Your urine results did show an infection. Dr. Campbell said he sent antibiotics in when you were in the office. He also referred you to urology and someone will give you a call regarding that appointment soon. Thank you!

## 2023-04-20 NOTE — PROGRESS NOTES
Please inform the patient of the following abnormal results. Did treat for uti in office, and referred to urology.

## 2023-04-20 NOTE — TELEPHONE ENCOUNTER
Rx Refill Note  Requested Prescriptions     Pending Prescriptions Disp Refills   • oxyCODONE-acetaminophen (Percocet)  MG per tablet 80 tablet 0     Sig: Take 1 tablet by mouth Every 8 (Eight) Hours As Needed for Moderate Pain.      Last office visit with prescribing clinician: 4/19/2023   Last telemedicine visit with prescribing clinician: 5/19/2023   Next office visit with prescribing clinician: 5/19/2023                         Would you like a call back once the refill request has been completed: [] Yes [] No    If the office needs to give you a call back, can they leave a voicemail: [] Yes [] No    Mari David MA  04/20/23, 08:11 EDT

## 2023-04-20 NOTE — TELEPHONE ENCOUNTER
----- Message from Keith Campbell MD sent at 4/20/2023  1:25 PM EDT -----  Please inform the patient of the following abnormal results. Did treat for uti in office, and referred to urology.

## 2023-04-24 DIAGNOSIS — S72.8X1G OTHER CLOSED FRACTURE OF RIGHT FEMUR WITH DELAYED HEALING, UNSPECIFIED PORTION OF FEMUR, SUBSEQUENT ENCOUNTER: ICD-10-CM

## 2023-04-24 NOTE — TELEPHONE ENCOUNTER
Caller: Rolly Nicole LAURA    Relationship: Self    Best call back number: [phone]    Requested Prescriptions:   Requested Prescriptions     Pending Prescriptions Disp Refills   • oxyCODONE-acetaminophen (Percocet)  MG per tablet 80 tablet 0     Sig: Take 1 tablet by mouth Every 8 (Eight) Hours As Needed for Moderate Pain.        Pharmacy where request should be sent: [id number] [place], [place] - [address] AT [facility] [phone] [facility] [phone]      Last office visit with prescribing clinician: 4/19/2023   Last telemedicine visit with prescribing clinician: 5/19/2023   Next office visit with prescribing clinician: 5/19/2023     Additional details provided by patient: OUT OF MEDICATION    Does the patient have less than a 3 day supply:  [x] Yes  [] No    Would you like a call back once the refill request has been completed: [] Yes [x] No    If the office needs to give you a call back, can they leave a voicemail: [] Yes [x] No    Kari Urbina Rep   04/24/23 12:51 EDT

## 2023-04-27 RX ORDER — OXYCODONE AND ACETAMINOPHEN 10; 325 MG/1; MG/1
1 TABLET ORAL EVERY 8 HOURS PRN
Qty: 80 TABLET | Refills: 0 | Status: SHIPPED | OUTPATIENT
Start: 2023-04-27

## 2023-04-28 ENCOUNTER — LAB (OUTPATIENT)
Dept: OTHER | Facility: HOSPITAL | Age: 78
End: 2023-04-28
Payer: MEDICARE

## 2023-04-28 DIAGNOSIS — C92.10 CML (CHRONIC MYELOID LEUKEMIA): ICD-10-CM

## 2023-04-28 LAB
ALBUMIN SERPL-MCNC: 4.5 G/DL (ref 3.5–5.2)
ALBUMIN/GLOB SERPL: 1.5 G/DL
ALP SERPL-CCNC: 185 U/L (ref 39–117)
ALT SERPL W P-5'-P-CCNC: 20 U/L (ref 1–33)
ANION GAP SERPL CALCULATED.3IONS-SCNC: 7 MMOL/L (ref 5–15)
AST SERPL-CCNC: 26 U/L (ref 1–32)
BASOPHILS # BLD AUTO: 1 10*3/MM3 (ref 0–0.2)
BASOPHILS # BLD MANUAL: 0.3 10*3/MM3 (ref 0–0.2)
BASOPHILS NFR BLD AUTO: 3.3 % (ref 0–1.5)
BASOPHILS NFR BLD MANUAL: 1 % (ref 0–1.5)
BILIRUB SERPL-MCNC: 0.2 MG/DL (ref 0–1.2)
BUN SERPL-MCNC: 12 MG/DL (ref 8–23)
BUN/CREAT SERPL: 13.2 (ref 7–25)
CALCIUM SPEC-SCNC: 9.8 MG/DL (ref 8.6–10.5)
CHLORIDE SERPL-SCNC: 91 MMOL/L (ref 98–107)
CO2 SERPL-SCNC: 27 MMOL/L (ref 22–29)
CREAT SERPL-MCNC: 0.91 MG/DL (ref 0.57–1)
DEPRECATED RDW RBC AUTO: 46.1 FL (ref 37–54)
EGFRCR SERPLBLD CKD-EPI 2021: 64.3 ML/MIN/1.73
EOSINOPHIL # BLD AUTO: 0.6 10*3/MM3 (ref 0–0.4)
EOSINOPHIL # BLD MANUAL: 0.61 10*3/MM3 (ref 0–0.4)
EOSINOPHIL NFR BLD AUTO: 2 % (ref 0.3–6.2)
EOSINOPHIL NFR BLD MANUAL: 2 % (ref 0.3–6.2)
ERYTHROCYTE [DISTWIDTH] IN BLOOD BY AUTOMATED COUNT: 14.6 % (ref 12.3–15.4)
GLOBULIN UR ELPH-MCNC: 3 GM/DL
GLUCOSE SERPL-MCNC: 95 MG/DL (ref 65–99)
HCT VFR BLD AUTO: 34.6 % (ref 34–46.6)
HGB BLD-MCNC: 11.5 G/DL (ref 12–15.9)
IMM GRANULOCYTES # BLD AUTO: 2.35 10*3/MM3 (ref 0–0.05)
IMM GRANULOCYTES NFR BLD AUTO: 7.8 % (ref 0–0.5)
LYMPHOCYTES # BLD AUTO: 3.2 10*3/MM3 (ref 0.7–3.1)
LYMPHOCYTES # BLD MANUAL: 1.82 10*3/MM3 (ref 0.7–3.1)
LYMPHOCYTES NFR BLD AUTO: 10.6 % (ref 19.6–45.3)
LYMPHOCYTES NFR BLD MANUAL: 1 % (ref 5–12)
MCH RBC QN AUTO: 28.6 PG (ref 26.6–33)
MCHC RBC AUTO-ENTMCNC: 33.2 G/DL (ref 31.5–35.7)
MCV RBC AUTO: 86.1 FL (ref 79–97)
METAMYELOCYTES NFR BLD MANUAL: 3 % (ref 0–0)
MONOCYTES # BLD AUTO: 1.17 10*3/MM3 (ref 0.1–0.9)
MONOCYTES # BLD: 0.3 10*3/MM3 (ref 0.1–0.9)
MONOCYTES NFR BLD AUTO: 3.9 % (ref 5–12)
MYELOCYTES NFR BLD MANUAL: 3 % (ref 0–0)
NEUTROPHILS # BLD AUTO: 25.44 10*3/MM3 (ref 1.7–7)
NEUTROPHILS NFR BLD AUTO: 21.96 10*3/MM3 (ref 1.7–7)
NEUTROPHILS NFR BLD AUTO: 72.4 % (ref 42.7–76)
NEUTROPHILS NFR BLD MANUAL: 84 % (ref 42.7–76)
NRBC BLD AUTO-RTO: 0 /100 WBC (ref 0–0.2)
PLAT MORPH BLD: NORMAL
PLATELET # BLD AUTO: 609 10*3/MM3 (ref 140–450)
PMV BLD AUTO: 9.1 FL (ref 6–12)
POTASSIUM SERPL-SCNC: 5.3 MMOL/L (ref 3.5–5.2)
PROT SERPL-MCNC: 7.5 G/DL (ref 6–8.5)
RBC # BLD AUTO: 4.02 10*6/MM3 (ref 3.77–5.28)
RBC MORPH BLD: NORMAL
SODIUM SERPL-SCNC: 125 MMOL/L (ref 136–145)
VARIANT LYMPHS NFR BLD MANUAL: 6 % (ref 19.6–45.3)
WBC MORPH BLD: NORMAL
WBC NRBC COR # BLD: 30.28 10*3/MM3 (ref 3.4–10.8)

## 2023-04-28 PROCEDURE — 85007 BL SMEAR W/DIFF WBC COUNT: CPT | Performed by: INTERNAL MEDICINE

## 2023-04-28 PROCEDURE — 80053 COMPREHEN METABOLIC PANEL: CPT | Performed by: INTERNAL MEDICINE

## 2023-04-28 PROCEDURE — 36415 COLL VENOUS BLD VENIPUNCTURE: CPT

## 2023-04-28 PROCEDURE — 85025 COMPLETE CBC W/AUTO DIFF WBC: CPT | Performed by: INTERNAL MEDICINE

## 2023-05-05 ENCOUNTER — SPECIALTY PHARMACY (OUTPATIENT)
Dept: PHARMACY | Facility: HOSPITAL | Age: 78
End: 2023-05-05
Payer: MEDICARE

## 2023-05-10 ENCOUNTER — TELEPHONE (OUTPATIENT)
Dept: ONCOLOGY | Facility: CLINIC | Age: 78
End: 2023-05-10
Payer: MEDICARE

## 2023-05-10 NOTE — TELEPHONE ENCOUNTER
Caller: Clarissa Rodriguez    Relationship to patient: Emergency Contact    Best call back number: [phone]     Chief complaint: R/S    Type of visit: MYCHART VIDEO VISIT    Requested date: FIRST AVAILABLE      If rescheduling, when is the original appointment: 05/11/23    Additional notes:PT HAD LABS DONE ON 04/28/23

## 2023-05-15 ENCOUNTER — TELEMEDICINE (OUTPATIENT)
Dept: ONCOLOGY | Facility: CLINIC | Age: 78
End: 2023-05-15
Payer: MEDICARE

## 2023-05-15 DIAGNOSIS — C92.10 CML (CHRONIC MYELOID LEUKEMIA): Primary | ICD-10-CM

## 2023-05-15 PROCEDURE — 1125F AMNT PAIN NOTED PAIN PRSNT: CPT | Performed by: INTERNAL MEDICINE

## 2023-05-15 PROCEDURE — 1159F MED LIST DOCD IN RCRD: CPT | Performed by: INTERNAL MEDICINE

## 2023-05-15 PROCEDURE — 1160F RVW MEDS BY RX/DR IN RCRD: CPT | Performed by: INTERNAL MEDICINE

## 2023-05-15 PROCEDURE — 99214 OFFICE O/P EST MOD 30 MIN: CPT | Performed by: INTERNAL MEDICINE

## 2023-05-15 NOTE — PROGRESS NOTES
[facility] GROUP OUTPATIENT FOLLOW UP VISIT    REASON FOR FOLLOW-UP:    1.  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  Gleevec 400 mg daily initiated around 10/12/2017, stopped on 11/15/2017 due to intolerance (noah-orbital edema, nausea/vomiting, fatigue).  Resumed at 200 mg daily but, again, not tolerated.    3.  Nilotinib 150 mg twice daily started in late March, 2018.  Held due to intolerance and QTc prolongation.   4.  Hydroxyurea initiated in July 2018.  Subsequently discontinued.     5.  Bosutinib 400 mg daily started Feb 2019, subsequently stopped due to patient preference    HISTORY OF PRESENT ILLNESS:  Nicole Lofton is a 77 y.o. female with the above-mentioned history.     Video visit today.  Ongoing weakness and fatigue.  She is not on any medication for CML at this point.  She has had frequent urinary tract infections and has been on several antibiotics.  She is going to see urology in the near future.    ROS:  Otherwise per the HPI.    There were no vitals filed for this visit.  General:  No acute distress, awake, alert and oriented.  Thin and chronically ill-appearing.    Neuro/psych:   Flat affect  Video visit today.  Limited exam.    DIAGNOSTIC DATA  Comprehensive Metabolic Panel (04/28/2023 16:02)  CBC & Differential (04/28/2023 16:02)    BCR-ABL PCR detected at 11.557%      IMAGING:   None reviewed today.    ASSESSMENT:  This is a 77 y.o. female with:    *History of bilateral breast cancer in 1988 status post bilateral mastectomy.  She apparently completed 6 months of adjuvant therapy.  We have no details regarding this.      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    · Started on Hydrea 1000 mg daily.    · This was discontinued and she started Gleevec on approximately 10/12/2017.    · Gleevec discontinued due to intolerance on 11/15/2017.    · We started Gleevec at a lower dose of 200 mg  bu  · t she was also intolerant of this dose.  Her side effects were as severe with a lower dose and she therefore stopped taking the drug.  Symptoms improved significantly.  Platelet count subsequently elevated again.  · She started nilotinib at 150mg twice daily at the end of March 2018.  This was held in mid-May due to QTc prolongation.  The QTc interval did subsequently normalized.  However, due to this and other adverse effects we are not able to resume the nilotinib nor does she desire to.  · In July we resumed hydroxyurea 1000 mg daily.  Her platelet count increased.  Her dose was increased to 1000 mg twice daily.  Blood counts improved nicely.  Platelet count normalized but her white blood cell count dropped to below normal.  Decreased hydroxyurea to 1000 mg daily.  · 11/20/18 platelets were up again.  We increased the hydroxyurea to 1000 mg in the mornings and 500 mg in the evenings.  · As of 1/15/2019 her platelet count was again elevated to 1.2 million.  Increased hydroxyurea to 1000 mg twice daily.    · Platelets improved but remained far from normal.  · Due to her history we referred her down to the [facility] to see Dr. Cifuentes for assistance and recommendations on further therapy.  · In mid-February 2019 she started bosutinib 400 mg daily which she takes along with olanzapine at night (she takes this as needed now).     · PCR on 8/25/2021 -.  · 2/22/2022: PCR negative  · 5/20/2022: BCR ABL PCR detected at 8.224%.  Patient was advised to resume bosutinib.  · PCR on 8/19/2022 was high at 10.828%  · 8/19/2022, she had not resumed bosutinib.  Instructions were called to the patient, but she has been having trouble with her memory, so she did not start the medication.  We will hold Bosutinib today and await BCR-ABL PCR, and pending those results make decision about resuming Bosutinib.  Once BCR-ABL PCR is available, plan will be called to the patient's daughter Clarissa.  · 10/20/2022: She has  not yet resumed the bosutinib.  Once she recovers a little bit more over the next 1 to 2 weeks from her orthopedic surgery she will resume this.  · 1/6/2023: She never resumed the bosutinib.  Her PCR from 10/20/2022 was positive at 45.12% I-S  · PCR positive at 13.862% on 1/6/2023  · 4/28/2023: White blood cell count higher at 30.28.  BCR-ABL PCR positive at 11.557%    *Hypothyroidism: She continues levothyroxine    *Tobacco use: She remains unwilling to quit smoking.      *Deconditioning  · Continues to walk with a walker    *Hypertension    *Bradycardia: Heart rate previously normalized    *Anxiety: She is already on a couple of medications for this.  Follow-up with primary care.    *Right hip fracture, now status post arthroplasty on 10/6/2022    *Hyponatremia  · Has been worse requiring hospitalization.  Improved.    PLAN:   1. She is not interested in medication for CML at this point and she understands the consequences of not treating this  2. Follow-up in about 3 months with labs done that day    Orders Placed This Encounter   Procedures   • Comprehensive Metabolic Panel     Standing Status:   Future     Standing Expiration Date:   5/14/2024     Order Specific Question:   Release to patient     Answer:   Routine Release   • BCR-ABL1, CML / ALL, PCR, Quant     Standing Status:   Future     Standing Expiration Date:   5/14/2024     Order Specific Question:   Release to patient     Answer:   Routine Release   • CBC & Differential     Standing Status:   Future     Standing Expiration Date:   5/14/2024     Order Specific Question:   Manual Differential     Answer:   No           Burt Merida MD  05/15/23

## 2023-05-15 NOTE — TELEPHONE ENCOUNTER
Caller: Nicole Lofton    Relationship to patient: Self    Best call back number: [phone]    Chief complaint: FREQUENT URGENCY TO URINATE, LITTLE URINE, STOMACH PAIN    Type of visit: SAME DAY    Requested date: TODAY 05.15.23    Additional notes: HUB HAD NO AVAILABILITY. PLEASE CALL PATIENT TO SCHEDULE OR ADVISE ON WHAT SHE NEEDS TO DO.

## 2023-05-16 ENCOUNTER — SPECIALTY PHARMACY (OUTPATIENT)
Dept: PHARMACY | Facility: HOSPITAL | Age: 78
End: 2023-05-16
Payer: MEDICARE

## 2023-05-16 LAB — REF LAB TEST METHOD: NORMAL

## 2023-05-18 DIAGNOSIS — S72.8X1G OTHER CLOSED FRACTURE OF RIGHT FEMUR WITH DELAYED HEALING, UNSPECIFIED PORTION OF FEMUR, SUBSEQUENT ENCOUNTER: ICD-10-CM

## 2023-05-19 RX ORDER — OXYCODONE AND ACETAMINOPHEN 10; 325 MG/1; MG/1
1 TABLET ORAL EVERY 8 HOURS PRN
Qty: 80 TABLET | Refills: 0 | Status: SHIPPED | OUTPATIENT
Start: 2023-05-19

## 2023-05-22 RX ORDER — TRAZODONE HYDROCHLORIDE 50 MG/1
TABLET ORAL
Qty: 90 TABLET | Refills: 1 | Status: CANCELLED | OUTPATIENT
Start: 2023-05-22

## 2023-05-23 ENCOUNTER — TELEPHONE (OUTPATIENT)
Dept: INTERNAL MEDICINE | Facility: CLINIC | Age: 78
End: 2023-05-23
Payer: MEDICARE

## 2023-05-23 DIAGNOSIS — N39.0 RECURRENT UTI: Primary | ICD-10-CM

## 2023-05-23 RX ORDER — TRAZODONE HYDROCHLORIDE 50 MG/1
TABLET ORAL
Qty: 90 TABLET | Refills: 1 | OUTPATIENT
Start: 2023-05-23

## 2023-05-23 NOTE — TELEPHONE ENCOUNTER
Hub staff attempted to follow warm transfer process and was unsuccessful     Caller: ADRIANA PAGE    Relationship to patient: Emergency Contact    Best call back number:     Patient is needing: PATIENT'S DAUGHTER IS ASKING FOR A CALL BACK FROM THE OFFICE REGARDING WHY THE PATIENT'S REQUEST TO HAVE HER TRAZODONE WAS DENIED.   SHE STATES THAT [facility] STATED THAT DR. KELLER DID NOT APPROVE THE PRESCRIPTION FOR THE PATIENT, AND THEY ARE UNCERTAIN AS TO WHY.

## 2023-05-24 LAB — REF LAB TEST METHOD: NORMAL

## 2023-05-24 RX ORDER — TRAZODONE HYDROCHLORIDE 50 MG/1
50 TABLET ORAL NIGHTLY
Qty: 90 TABLET | Refills: 1 | Status: SHIPPED | OUTPATIENT
Start: 2023-05-24

## 2023-05-31 ENCOUNTER — TELEPHONE (OUTPATIENT)
Dept: INTERNAL MEDICINE | Facility: CLINIC | Age: 78
End: 2023-05-31

## 2023-06-01 ENCOUNTER — LAB (OUTPATIENT)
Dept: LAB | Facility: HOSPITAL | Age: 78
End: 2023-06-01
Payer: MEDICARE

## 2023-06-01 ENCOUNTER — OFFICE VISIT (OUTPATIENT)
Dept: INTERNAL MEDICINE | Facility: CLINIC | Age: 78
End: 2023-06-01

## 2023-06-01 VITALS
BODY MASS INDEX: 17.27 KG/M2 | OXYGEN SATURATION: 98 % | HEART RATE: 68 BPM | WEIGHT: 103.8 LBS | DIASTOLIC BLOOD PRESSURE: 68 MMHG | SYSTOLIC BLOOD PRESSURE: 130 MMHG

## 2023-06-01 DIAGNOSIS — Z12.31 ENCOUNTER FOR SCREENING MAMMOGRAM FOR MALIGNANT NEOPLASM OF BREAST: ICD-10-CM

## 2023-06-01 DIAGNOSIS — T40.2X5A CONSTIPATION DUE TO OPIOID THERAPY: ICD-10-CM

## 2023-06-01 DIAGNOSIS — D64.9 ANEMIA, UNSPECIFIED TYPE: ICD-10-CM

## 2023-06-01 DIAGNOSIS — R53.1 WEAKNESS: ICD-10-CM

## 2023-06-01 DIAGNOSIS — R53.83 OTHER FATIGUE: ICD-10-CM

## 2023-06-01 DIAGNOSIS — Z74.09 DECREASED AMBULATION STATUS: ICD-10-CM

## 2023-06-01 DIAGNOSIS — Z00.00 MEDICARE ANNUAL WELLNESS VISIT, SUBSEQUENT: Primary | ICD-10-CM

## 2023-06-01 DIAGNOSIS — K59.03 CONSTIPATION DUE TO OPIOID THERAPY: ICD-10-CM

## 2023-06-01 DIAGNOSIS — Z78.0 POST-MENOPAUSAL: ICD-10-CM

## 2023-06-01 DIAGNOSIS — Z00.00 HEALTHCARE MAINTENANCE: ICD-10-CM

## 2023-06-01 LAB
ALBUMIN SERPL-MCNC: 4.3 G/DL (ref 3.5–5.2)
ALBUMIN/GLOB SERPL: 1.5 G/DL
ALP SERPL-CCNC: 129 U/L (ref 39–117)
ALT SERPL W P-5'-P-CCNC: 28 U/L (ref 1–33)
ANION GAP SERPL CALCULATED.3IONS-SCNC: 11.7 MMOL/L (ref 5–15)
ANISOCYTOSIS BLD QL: ABNORMAL
AST SERPL-CCNC: 28 U/L (ref 1–32)
BASOPHILS # BLD MANUAL: 0.56 10*3/MM3 (ref 0–0.2)
BASOPHILS NFR BLD MANUAL: 2.1 % (ref 0–1.5)
BILIRUB SERPL-MCNC: 0.3 MG/DL (ref 0–1.2)
BUN SERPL-MCNC: 12 MG/DL (ref 8–23)
BUN/CREAT SERPL: 13.8 (ref 7–25)
CALCIUM SPEC-SCNC: 9.7 MG/DL (ref 8.6–10.5)
CHLORIDE SERPL-SCNC: 85 MMOL/L (ref 98–107)
CHOLEST SERPL-MCNC: 138 MG/DL (ref 0–200)
CO2 SERPL-SCNC: 26.3 MMOL/L (ref 22–29)
CREAT SERPL-MCNC: 0.87 MG/DL (ref 0.57–1)
DEPRECATED RDW RBC AUTO: 43.5 FL (ref 37–54)
EGFRCR SERPLBLD CKD-EPI 2021: 67.9 ML/MIN/1.73
EOSINOPHIL # BLD MANUAL: 0.3 10*3/MM3 (ref 0–0.4)
EOSINOPHIL NFR BLD MANUAL: 1.1 % (ref 0.3–6.2)
ERYTHROCYTE [DISTWIDTH] IN BLOOD BY AUTOMATED COUNT: 14.1 % (ref 12.3–15.4)
FERRITIN SERPL-MCNC: 42.9 NG/ML (ref 13–150)
FOLATE SERPL-MCNC: 9.58 NG/ML (ref 4.78–24.2)
GLOBULIN UR ELPH-MCNC: 2.8 GM/DL
GLUCOSE SERPL-MCNC: 104 MG/DL (ref 65–99)
HBA1C MFR BLD: 5.2 % (ref 4.8–5.6)
HCT VFR BLD AUTO: 36.9 % (ref 34–46.6)
HDLC SERPL-MCNC: 42 MG/DL (ref 40–60)
HGB BLD-MCNC: 11.9 G/DL (ref 12–15.9)
IRON 24H UR-MRATE: 34 MCG/DL (ref 37–145)
IRON SATN MFR SERPL: 8 % (ref 20–50)
LDLC SERPL CALC-MCNC: 74 MG/DL (ref 0–100)
LDLC/HDLC SERPL: 1.71 {RATIO}
LYMPHOCYTES # BLD MANUAL: 2.25 10*3/MM3 (ref 0.7–3.1)
LYMPHOCYTES NFR BLD MANUAL: 4.2 % (ref 5–12)
MCH RBC QN AUTO: 27.2 PG (ref 26.6–33)
MCHC RBC AUTO-ENTMCNC: 32.2 G/DL (ref 31.5–35.7)
MCV RBC AUTO: 84.4 FL (ref 79–97)
MONOCYTES # BLD: 1.13 10*3/MM3 (ref 0.1–0.9)
MYELOCYTES NFR BLD MANUAL: 3.2 % (ref 0–0)
NEUTROPHILS # BLD AUTO: 21.75 10*3/MM3 (ref 1.7–7)
NEUTROPHILS NFR BLD MANUAL: 81.1 % (ref 42.7–76)
NRBC BLD AUTO-RTO: 0 /100 WBC (ref 0–0.2)
PLAT MORPH BLD: NORMAL
PLATELET # BLD AUTO: 649 10*3/MM3 (ref 140–450)
PMV BLD AUTO: 9.2 FL (ref 6–12)
POIKILOCYTOSIS BLD QL SMEAR: ABNORMAL
POTASSIUM SERPL-SCNC: 4.6 MMOL/L (ref 3.5–5.2)
PROT SERPL-MCNC: 7.1 G/DL (ref 6–8.5)
RBC # BLD AUTO: 4.37 10*6/MM3 (ref 3.77–5.28)
SODIUM SERPL-SCNC: 123 MMOL/L (ref 136–145)
T-UPTAKE NFR SERPL: 0.97 TBI (ref 0.8–1.3)
T4 SERPL-MCNC: 11.2 MCG/DL (ref 4.5–11.7)
TIBC SERPL-MCNC: 446 MCG/DL (ref 298–536)
TRANSFERRIN SERPL-MCNC: 299 MG/DL (ref 200–360)
TRIGL SERPL-MCNC: 121 MG/DL (ref 0–150)
TSH SERPL DL<=0.05 MIU/L-ACNC: 0.41 UIU/ML (ref 0.27–4.2)
VARIANT LYMPHS NFR BLD MANUAL: 8.4 % (ref 19.6–45.3)
VIT B12 BLD-MCNC: >2000 PG/ML (ref 211–946)
VLDLC SERPL-MCNC: 22 MG/DL (ref 5–40)
WBC MORPH BLD: NORMAL
WBC NRBC COR # BLD: 26.82 10*3/MM3 (ref 3.4–10.8)

## 2023-06-01 PROCEDURE — 80061 LIPID PANEL: CPT | Performed by: FAMILY MEDICINE

## 2023-06-01 PROCEDURE — 82746 ASSAY OF FOLIC ACID SERUM: CPT | Performed by: FAMILY MEDICINE

## 2023-06-01 PROCEDURE — 80053 COMPREHEN METABOLIC PANEL: CPT | Performed by: FAMILY MEDICINE

## 2023-06-01 PROCEDURE — 85025 COMPLETE CBC W/AUTO DIFF WBC: CPT | Performed by: FAMILY MEDICINE

## 2023-06-01 PROCEDURE — 83540 ASSAY OF IRON: CPT | Performed by: FAMILY MEDICINE

## 2023-06-01 PROCEDURE — 36415 COLL VENOUS BLD VENIPUNCTURE: CPT | Performed by: FAMILY MEDICINE

## 2023-06-01 PROCEDURE — 84436 ASSAY OF TOTAL THYROXINE: CPT | Performed by: FAMILY MEDICINE

## 2023-06-01 PROCEDURE — 85007 BL SMEAR W/DIFF WBC COUNT: CPT | Performed by: FAMILY MEDICINE

## 2023-06-01 PROCEDURE — 82607 VITAMIN B-12: CPT | Performed by: FAMILY MEDICINE

## 2023-06-01 PROCEDURE — 84466 ASSAY OF TRANSFERRIN: CPT | Performed by: FAMILY MEDICINE

## 2023-06-01 PROCEDURE — 84479 ASSAY OF THYROID (T3 OR T4): CPT | Performed by: FAMILY MEDICINE

## 2023-06-01 PROCEDURE — 83036 HEMOGLOBIN GLYCOSYLATED A1C: CPT | Performed by: FAMILY MEDICINE

## 2023-06-01 PROCEDURE — 82728 ASSAY OF FERRITIN: CPT | Performed by: FAMILY MEDICINE

## 2023-06-01 PROCEDURE — 84443 ASSAY THYROID STIM HORMONE: CPT | Performed by: FAMILY MEDICINE

## 2023-06-01 RX ORDER — LORAZEPAM 1 MG/1
TABLET ORAL EVERY 24 HOURS
COMMUNITY

## 2023-06-01 RX ORDER — ATORVASTATIN CALCIUM 40 MG/1
TABLET, FILM COATED ORAL EVERY 24 HOURS
COMMUNITY

## 2023-06-01 RX ORDER — AMLODIPINE BESYLATE 10 MG/1
TABLET ORAL EVERY 24 HOURS
COMMUNITY

## 2023-06-01 RX ORDER — LEVOTHYROXINE SODIUM 0.12 MG/1
TABLET ORAL EVERY 24 HOURS
COMMUNITY

## 2023-06-01 NOTE — PROGRESS NOTES
The ABCs of the Annual Wellness Visit  Subsequent Medicare Wellness Visit    Subjective      Nicole Lofton is a 77 y.o. female who presents for a Subsequent Medicare Wellness Visit.    The following portions of the patient's history were reviewed and   updated as appropriate: allergies, current medications, past family history, past medical history, past social history, past surgical history and problem list.    Compared to one year ago, the patient feels her physical   health is the same.    Compared to one year ago, the patient feels her mental   health is the same.    Recent Hospitalizations:  She was admitted within the past 365 days at [facility].       Current Medical Providers:  Patient Care Team:  Keith Campbell MD as PCP - General (Family Medicine)  Burt Merida MD as Consulting Physician (Hematology and Oncology)  Amos Najera FNP as Referring Physician (Family Medicine)  Galdino Duque MD as Consulting Physician (Cardiology)    Outpatient Medications Prior to Visit   Medication Sig Dispense Refill   • acetaminophen (TYLENOL) 325 MG tablet Take 2 tablets by mouth.     • amLODIPine (NORVASC) 10 MG tablet TAKE 1 TABLET BY MOUTH DAILY 90 tablet 0   • amLODIPine (NORVASC) 10 MG tablet Daily.     • atorvastatin (LIPITOR) 40 MG tablet TAKE 1 TABLET BY MOUTH DAILY 90 tablet 2   • atorvastatin (LIPITOR) 40 MG tablet Daily.     • Diclofenac Sodium (VOLTAREN) 1 % gel gel Apply 2 g topically to the appropriate area as directed 4 (Four) Times a Day.     • docusate sodium 100 MG capsule Take 1 capsule by mouth.     • furosemide (LASIX) 20 MG tablet      • levothyroxine (SYNTHROID, LEVOTHROID) 100 MCG tablet      • levothyroxine (SYNTHROID, LEVOTHROID) 125 MCG tablet Take 1 tablet by mouth Daily. 90 tablet 1   • levothyroxine (SYNTHROID, LEVOTHROID) 125 MCG tablet Daily.     • lidocaine (LIDODERM) 5 % Place 1 patch on the skin as directed by provider Daily.     • lisinopril (PRINIVIL,ZESTRIL) 10  MG tablet Take 1 tablet by mouth Daily. 90 tablet 1   • LORazepam (Ativan) 0.5 MG tablet Take 1 tablet by mouth Every 8 (Eight) Hours As Needed for Anxiety. 60 tablet 3   • LORazepam (ATIVAN) 1 MG tablet Take 1 tablet by mouth 2 (Two) Times a Day As Needed for Anxiety. for anxiety 180 tablet 1   • LORazepam (ATIVAN) 1 MG tablet Daily.     • lubiprostone (Amitiza) 24 MCG capsule Take 1 capsule by mouth 2 (Two) Times a Day With Meals. 60 capsule 2   • mupirocin (BACTROBAN) 2 % ointment APPLY TOPICALLY USING EVERY-TIP IN EACH NOSTRIL TWICE DAILY FOR 5 DAYS PRIOR TO SURGERY     • OLANZapine (zyPREXA) 5 MG tablet Take 1 tablet by mouth Every Night. 90 tablet 3   • omeprazole (priLOSEC) 40 MG capsule TAKE 1 CAPSULE BY MOUTH DAILY 30 capsule 5   • ondansetron ODT (ZOFRAN-ODT) 4 MG disintegrating tablet Place 1 tablet on the tongue Every 8 (Eight) Hours As Needed for Nausea or Vomiting. 90 tablet 5   • oxyCODONE-acetaminophen (Percocet)  MG per tablet Take 1 tablet by mouth Every 8 (Eight) Hours As Needed for Moderate Pain. 80 tablet 0   • promethazine (PHENERGAN) 12.5 MG tablet TAKE 1 TABLET BY MOUTH THREE TIMES DAILY AS NEEDED FOR NAUSEA 60 tablet 0   • traZODone (DESYREL) 50 MG tablet Take 1 tablet by mouth Every Night. 90 tablet 1   • Trintellix 20 MG tablet TAKE 1 TABLET BY MOUTH DAILY 30 tablet 11     No facility-administered medications prior to visit.       Opioid medication/s are on active medication list.  and I have evaluated her active treatment plan and pain score trends (see table).  There were no vitals filed for this visit.  I have reviewed the chart for potential of high risk medication and harmful drug interactions in the elderly.            Aspirin is not on active medication list.  Aspirin use is not indicated based on review of current medical condition/s. Risk of harm outweighs potential benefits.  .    Patient Active Problem List   Diagnosis   • Thrombocytosis   • CML (chronic myeloid leukemia)  "  • Primary hypothyroidism   • Medication monitoring encounter   • Severe single current episode of major depressive disorder, without psychotic features   • Primary insomnia   • Major depressive disorder with current active episode   • Osteoarthritis   • Incidental lung nodule, greater than or equal to 8mm   • Hypertension   • Chronic pain syndrome   • Hyperlipidemia   • Other chronic pain   • Anxiety   • Nicotine use disorder   • Bradycardia   • Depression   • Panic attack   • Acute pain of right knee   • Muscle weakness   • Chronic back pain   • Hypercholesterolemia   • Unsteady gait   • Closed fracture of right femur with delayed healing   • COVID-19   • Acute blood loss anemia   • MIESHA (acute kidney injury)   • Hyponatremia   • Chronic nausea   • Poor appetite   • Chronic fatigue and malaise     Advance Care Planning   Advance Care Planning     Advance Directive is not on file.  ACP discussion was held with the patient during this visit. Patient has an advance directive (not in EMR), copy requested.     Objective    Vitals:    06/01/23 0924   BP: 130/68   Pulse: 68   SpO2: 98%   Weight: 47.1 kg (103 lb 12.8 oz)     Estimated body mass index is 17.27 kg/m² as calculated from the following:    Height as of 4/19/23: 165.1 cm (65\").    Weight as of this encounter: 47.1 kg (103 lb 12.8 oz).    BMI is below normal parameters (malnutrition). Recommendations: referral to dietitian      Does the patient have evidence of cognitive impairment?   Yes: Referral to neurology ordered.            HEALTH RISK ASSESSMENT    Smoking Status:  Social History     Tobacco Use   Smoking Status Every Day   • Packs/day: 1.00   • Types: Cigarettes   Smokeless Tobacco Never   Tobacco Comments    since age 18   1ppd     Alcohol Consumption:  Social History     Substance and Sexual Activity   Alcohol Use Yes    Comment: SOCIALLY     Fall Risk Screen:    STEADI Fall Risk Assessment was completed, and patient is at LOW risk for " falls.Assessment completed on:2023    Depression Screenin/1/2023     9:22 AM   PHQ-2/PHQ-9 Depression Screening   Little Interest or Pleasure in Doing Things 3-->nearly every day   Feeling Down, Depressed or Hopeless 3-->nearly every day   Trouble Falling or Staying Asleep, or Sleeping Too Much 0-->not at all   Feeling Tired or Having Little Energy 1-->several days   Poor Appetite or Overeating 0-->not at all   Feeling Bad about Yourself - or that You are a Failure or Have Let Yourself or Your Family Down 1-->several days   Trouble Concentrating on Things, Such as Reading the Newspaper or Watching Television 1-->several days   Moving or Speaking So Slowly that Other People Could Have Noticed? Or the Opposite - Being So Fidgety 1-->several days   Thoughts that You Would be Better Off Dead or of Hurting Yourself in Some Way 0-->not at all   PHQ-9: Brief Depression Severity Measure Score 10   If You Checked Off Any Problems, How Difficult Have These Problems Made It For You to Do Your Work, Take Care of Things at Home, or Get Along with Other People? not difficult at all       Health Habits and Functional and Cognitive Screenin/1/2023     9:00 AM   Functional & Cognitive Status   Do you have difficulty preparing food and eating? Yes   Do you have difficulty bathing yourself, getting dressed or grooming yourself? No   Do you have difficulty using the toilet? No   Do you have difficulty moving around from place to place? Yes   Do you have trouble with steps or getting out of a bed or a chair? Yes   Current Diet Unhealthy Diet   Dental Exam Up to date   Eye Exam Up to date   Exercise (times per week) 0 times per week   Current Exercises Include No Regular Exercise   Do you need help using the phone?  No   Are you deaf or do you have serious difficulty hearing?  Yes   Do you need help with transportation? Yes   Do you need help shopping? Yes   Do you need help preparing meals?  Yes   Do you need help  with housework?  Yes   Do you need help with laundry? Yes   Do you need help taking your medications? No   Do you need help managing money? No   Do you ever drive or ride in a car without wearing a seat belt? No   Have you felt unusual stress, anger or loneliness in the last month? Yes   Who do you live with? Alone   If you need help, do you have trouble finding someone available to you? No   Have you been bothered in the last four weeks by sexual problems? No   Do you have difficulty concentrating, remembering or making decisions? Yes       Age-appropriate Screening Schedule:  Refer to the list below for future screening recommendations based on patient's age, sex and/or medical conditions. Orders for these recommended tests are listed in the plan section. The patient has been provided with a written plan.    Health Maintenance   Topic Date Due   • Pneumococcal Vaccine 65+ (1 - PCV) Never done   • ZOSTER VACCINE (1 of 2) Never done   • COLORECTAL CANCER SCREENING  02/26/2013   • DXA SCAN  03/21/2015   • COVID-19 Vaccine (4 - Booster for Pfizer series) 12/30/2021   • LIPID PANEL  03/10/2022   • INFLUENZA VACCINE  08/01/2023   • ANNUAL WELLNESS VISIT  06/01/2024   • TDAP/TD VACCINES (5 - Td or Tdap) 09/30/2032   • HEPATITIS C SCREENING  Completed   • MAMMOGRAM  Discontinued                  CMS Preventative Services Quick Reference  Risk Factors Identified During Encounter:    Chronic Pain: Natural history and expected course discussed. Questions answered.  Fall Risk-High or Moderate: Discussed Fall Prevention in the home  Urinary Incontinence: Referred to Urology    The above risks/problems have been discussed with the patient.  Pertinent information has been shared with the patient in the After Visit Summary.    Diagnoses and all orders for this visit:    1. Medicare annual wellness visit, subsequent (Primary)    2. Healthcare maintenance  -     CBC & Differential  -     Comprehensive Metabolic Panel  -      Hemoglobin A1c  -     Thyroid Panel With TSH  -     Lipid Panel With LDL / HDL Ratio  -     Urinalysis With Microscopic - Urine, Clean Catch    3. Post-menopausal  -     DEXA Bone Density Axial    4. Encounter for screening mammogram for malignant neoplasm of breast  -     Mammo Screening Bilateral With CAD        Follow Up:   Next Medicare Wellness visit to be scheduled in 1 year.      An After Visit Summary and PPPS were made available to the patient.

## 2023-06-01 NOTE — PROGRESS NOTES
"Chief Complaint  Follow-up and Abdominal Pain    Subjective        Nicole Lofton presents to [facility]  History of Present Illness    Patient presents at today's office visit stating that she has not been feeling well for the last couple weeks, and states that she has been experiencing some abdominal pain.  Patient does state that abdominal pain is intermittent.  She does have a history having constipation, but does take several medications for this including taking MiraLAX as well as laxatives.  She has not seen a gastroenterologist.  She still states that she feels tired and weak.  She states that she asked if needed based on her age.  Upon further questioning does seem that she is most likely not getting the proper caloric intake that she may need.    Objective   Vital Signs:  /68   Pulse 68   Wt 47.1 kg (103 lb 12.8 oz)   SpO2 98%   BMI 17.27 kg/m²   Estimated body mass index is 17.27 kg/m² as calculated from the following:    Height as of 4/19/23: 165.1 cm (65\").    Weight as of this encounter: 47.1 kg (103 lb 12.8 oz).             Physical Exam  Vitals and nursing note reviewed.   Constitutional:       Appearance: She is well-developed.   HENT:      Head: Normocephalic and atraumatic.   Musculoskeletal:      Cervical back: Normal range of motion and neck supple.   Neurological:      Mental Status: She is alert and oriented to person, place, and time.   Psychiatric:         Behavior: Behavior normal.        Result Review :                   Assessment and Plan   Diagnoses and all orders for this visit:        Healthcare maintenance  -     CBC & Differential  -     Comprehensive Metabolic Panel  -     Hemoglobin A1c  -     Thyroid Panel With TSH  -     Lipid Panel With LDL / HDL Ratio  -     Urinalysis With Microscopic - Urine, Clean Catch    Constipation due to opioid therapy    Weakness    Other fatigue    Anemia, unspecified type  -     Ferritin  -     Iron " Profile  -     Folate  -     Vitamin B12    Decreased ambulation status  -     Ambulatory Referral to Home Health      I will check several labs at today's office visit to try to investigate further for her weakness.  She does have a history of anemia which we will get several labs as well.  I do think that some of her weakness could be related to her sedentary lifestyle that she currently has.  However she does appear to be fairly unstable if on her own.  I do think having some home health physical therapy and OT help her would benefit her as well.  Recommending home health.       Follow Up   No follow-ups on file.  Patient was given instructions and counseling regarding her condition or for health maintenance advice. Please see specific information pulled into the AVS if appropriate.

## 2023-06-03 NOTE — PROGRESS NOTES
Please inform the patient of the following abnormal results. Elevated wbc, could be related to the cml. She does have iron deficiency. She should discuss this with her hematolgist and oncologist.

## 2023-06-05 DIAGNOSIS — I10 ESSENTIAL HYPERTENSION: ICD-10-CM

## 2023-06-05 RX ORDER — AMLODIPINE BESYLATE 10 MG/1
10 TABLET ORAL DAILY
Qty: 90 TABLET | Refills: 1 | Status: SHIPPED | OUTPATIENT
Start: 2023-06-05

## 2023-06-05 RX ORDER — LEVOTHYROXINE SODIUM 0.12 MG/1
125 TABLET ORAL DAILY
Qty: 90 TABLET | Refills: 1 | Status: SHIPPED | OUTPATIENT
Start: 2023-06-05

## 2023-06-09 NOTE — TELEPHONE ENCOUNTER
ERIC WITH [employer] CALLED TO SEE IF MUSCLE WASTING AND ATROPHIE CAN BE ADDED TO HER DIAGNOSIS CODE.    PLEASE CALL AND ADVISE [phone]

## 2023-06-12 NOTE — TELEPHONE ENCOUNTER
Caller: OTHER    Relationship to patient: Other    Best call back number: [phone]    Patient is needing: PLEASE CALL ERIC WITH [employer]  ASAP REGARDING DIAGNOSIS CODE THAT IS NEEDED.

## 2023-06-12 NOTE — TELEPHONE ENCOUNTER
Muscle wasting and atrophy are the dx codes needed to extend care.  Verbal order given to Yecenia.

## 2023-06-16 DIAGNOSIS — S72.8X1G OTHER CLOSED FRACTURE OF RIGHT FEMUR WITH DELAYED HEALING, UNSPECIFIED PORTION OF FEMUR, SUBSEQUENT ENCOUNTER: ICD-10-CM

## 2023-06-16 RX ORDER — OXYCODONE AND ACETAMINOPHEN 10; 325 MG/1; MG/1
1 TABLET ORAL EVERY 8 HOURS PRN
Qty: 80 TABLET | Refills: 0 | Status: SHIPPED | OUTPATIENT
Start: 2023-06-16

## 2023-06-16 NOTE — TELEPHONE ENCOUNTER
Rx Refill Note  Requested Prescriptions     Pending Prescriptions Disp Refills    oxyCODONE-acetaminophen (Percocet)  MG per tablet 80 tablet 0     Sig: Take 1 tablet by mouth Every 8 (Eight) Hours As Needed for Moderate Pain.      Last office visit with prescribing clinician: 6/1/2023   Last telemedicine visit with prescribing clinician: Visit date not found   Next office visit with prescribing clinician: 9/1/2023                         Would you like a call back once the refill request has been completed: [] Yes [] No    If the office needs to give you a call back, can they leave a voicemail: [] Yes [] No    Amanda Varma MA  06/16/23, 11:45 EDT

## 2023-06-22 NOTE — PROGRESS NOTES
"I reviewed labs with Mrs. Lofton and her daughter today. Her CBC is stable. She remains on Hrydrea but feels this medication makes her feel very fatigue and just \"off.\" She is also complaining of nausea and states Compazine causes her to be drowsy. She has discussed the cost of Gleevec with her family and at this time she wants to pursue this medication. She can afford the very high co-pay. I have made Marifer Arvizu aware of this and she will be contacting [employer] to secure delivery. Mrs. Lofton will return next week to see Dr Merida as already scheduled. She will hopefully have received Gleevec by this time. She knows to call the office with new or worsening symptoms. I have also e-scribed Zofran today.    Lab Results   Component Value Date    WBC 7.08 10/11/2017    HGB 12.7 10/11/2017    HCT 37.8 10/11/2017    MCV 94.0 10/11/2017     10/11/2017       " Show Aperture Variable?: Yes Consent: The patient's consent was obtained including but not limited to risks of crusting, scabbing, blistering, scarring, darker or lighter pigmentary change, recurrence, incomplete removal and infection. Duration Of Freeze Thaw-Cycle (Seconds): 3 Render Note In Bullet Format When Appropriate: No Number Of Freeze-Thaw Cycles: 1 freeze-thaw cycle Detail Level: Detailed Post-Care Instructions: I reviewed with the patient in detail post-care instructions. Patient is to wear sunprotection, and avoid picking at any of the treated lesions. Pt may apply Vaseline to crusted or scabbing areas. Medical Necessity Clause: This procedure was medically necessary because the lesions that were treated were: Spray Paint Text: The liquid nitrogen was applied to the skin utilizing a spray paint frosting technique. Medical Necessity Information: It is in your best interest to select a reason for this procedure from the list below. All of these items fulfill various CMS LCD requirements except the new and changing color options.

## 2023-07-11 LAB — REF LAB TEST METHOD: NORMAL

## 2023-07-12 NOTE — TELEPHONE ENCOUNTER
Caller: Brandy Rodriguez    Relationship to patient: Emergency Contact    Best call back number: [phone]     Patient is needing: BRANDY, PATIENT'S DAUGHTER IS CALLING TO REQUEST AN H & P, LAST OFFICE NOTE, AND MED LIST.  SHE STATES PATIENT IS GOING TO BE PLACED IN THE [place] AND THEY NEED A COPY.  SHE IS WANTING TO KNOW IF SHE CAN PICK THOSE UP AS SOON AS POSSIBLE.  BRANDY IS REQUESTING A RETURN CALL WHEN THE RECORDS ARE READY TO .      PLEASE ADVISE.

## 2023-07-14 NOTE — TELEPHONE ENCOUNTER
Caller: Clarissa Rodriguez    Relationship: Emergency Contact    PHONE :[phone]     What medication are you requesting: NICOTINE PATCHES    What are your current symptoms: TO STOP SMOKING      If a prescription is needed, what is your preferred pharmacy and phone number: [id number] [place], [place] - [address] AT [facility] - [phone] [facility] [phone] FX     Additional notes: PATIENT IS MOVING INTO A NURSING HOME AND IS NEEDING HELP TO QUIT SMOKING.

## 2023-07-17 NOTE — TELEPHONE ENCOUNTER
I have reached out regarding the nicotine patches, can you please sign and send the oxycodone?  She is current on protocol.  Last ov 6/1/23 next ov 9/1/23

## 2023-07-17 NOTE — TELEPHONE ENCOUNTER
PATIENTS DAUGHTER CALLED BACK TO CHECK THE STATUS.  PLEASE ADVISE IF YOU ARE GOING TO SEND IN THE PRESCRIPTION.  ADRIANA [phone].

## 2023-07-24 ENCOUNTER — TELEMEDICINE (OUTPATIENT)
Dept: INTERNAL MEDICINE | Facility: CLINIC | Age: 78
End: 2023-07-24
Payer: MEDICARE

## 2023-07-24 DIAGNOSIS — R29.6 FALLING: Primary | ICD-10-CM

## 2023-07-24 DIAGNOSIS — Z74.09 DECREASED AMBULATION STATUS: ICD-10-CM

## 2023-07-24 PROCEDURE — 1159F MED LIST DOCD IN RCRD: CPT | Performed by: FAMILY MEDICINE

## 2023-07-24 PROCEDURE — 99214 OFFICE O/P EST MOD 30 MIN: CPT | Performed by: FAMILY MEDICINE

## 2023-07-24 PROCEDURE — 1160F RVW MEDS BY RX/DR IN RCRD: CPT | Performed by: FAMILY MEDICINE

## 2023-07-24 NOTE — TELEPHONE ENCOUNTER
Caller: Clarissa Rodriguez    Relationship: Emergency Contact    Best call back number: [phone]    What was the call regarding: PATIENTS DAUGHTER WOULD LIKE TO SPEAK WITH DR KELLER REGARDING PATIENTS MYCHART VISIT SCHEDULED FOR TODAY.    PLEASE CALL.

## 2023-07-24 NOTE — PROGRESS NOTES
"Chief Complaint  No chief complaint on file.    Cc decreased ambulation    This visit has been rescheduled as a phone visit to comply with patient safety concerns in accordance with CDC recommendations. Total time of discussion was 30 minutes.    This was an audio and video enabled telemedicine encounter.    You have chosen to receive care through a telephone visit. Do you consent to use a telephone visit for your medical care today? Yes    I am in the office. Patient is at home.    Stacey Lofotn presents to [facility]  History of Present Illness    At today's office visit I did speak with patient's daughter Clarissa along with the patient at today's visit.  We spoke over the telephone.  She has now entered her new assisted living facility.    The patient notes that she continues to still have a lot of falls.  But now that she is in the new assisted living facility, she seems to be doing fairly better.  As they seem to want to help her.  The plan is for her to do some physical therapy while she is at this facility.    Last month patient had seen me in the office, at that time she stated that she had not been feeling well due to her abdominal pain.  She has stated that her abdominal pain has improved.  She seems to be eating better caloric intake, but the family does state a lot of this is due to her being in a new living environment where she has around the clock care.    Objective   Vital Signs:  There were no vitals taken for this visit.  Estimated body mass index is 17.27 kg/m² as calculated from the following:    Height as of 4/19/23: 165.1 cm (65\").    Weight as of 6/1/23: 47.1 kg (103 lb 12.8 oz).               Physical Exam  Vitals and nursing note reviewed.   Constitutional:       Appearance: She is well-developed.   HENT:      Head: Normocephalic and atraumatic.   Neurological:      Mental Status: She is alert and oriented to person, place, and time. "   Psychiatric:         Behavior: Behavior normal.      Result Review :                   Assessment and Plan   Diagnoses and all orders for this visit:    1. Falling (Primary)    2. Decreased ambulation status      Some of her abdominal pain could be related to constipation, but she seems to be eating better, and having less frequent bouts of pain.  She recently just several labs at last office visit.  She is currently staying in the [facility], I do think that she will do well at this facility especially to help her with falls.  They will help her ambulate a bit better.  Her ambulation has always been bad, and is progressively getting worse if she continues to have a sedentary lifestyle.       Follow Up   No follow-ups on file.  Patient was given instructions and counseling regarding her condition or for health maintenance advice. Please see specific information pulled into the AVS if appropriate.

## 2023-07-24 NOTE — TELEPHONE ENCOUNTER
DISREGARD MESSAGE. PATIENT DID NOT HAVE INTERNET BUT THEY HAVE SINCE GOTTEN IT FIXED AND WILL BE ABLE TO DO TELEHEALTH TODAY.

## 2023-07-27 NOTE — TELEPHONE ENCOUNTER
Caller: Clarissa Rodriguez    Relationship: Emergency Contact        What was the call regarding: REQUESTING TO CANCEL APPOINTMENT 08/18 LAB AND FOLLOW UP WITH DR CARLIN     WILL CALL BACK LATER TO RESCHEDULE

## 2023-09-27 ENCOUNTER — TELEPHONE (OUTPATIENT)
Dept: ONCOLOGY | Facility: CLINIC | Age: 78
End: 2023-09-27
Payer: MEDICARE

## 2023-09-27 NOTE — TELEPHONE ENCOUNTER
Caller: Brandy Rodriguez    Relationship: Emergency Contact    Best call back number: [phone]    What is the best time to reach you: ANYTIME    Who are you requesting to speak with (clinical staff, provider, specific staff member): CLINICAL    What was the call regarding: BRANDY STATED PATIENT HAD HER LABS CHECKED 9/21/23 AND HER WBC COUNT WAS 43.7. SHE WILL FAX RESULTS TO THE OFFICE BUT SHE WANTED DR CARLIN TO TAKE A LOOK AT THESE AND CALL HER TO DISCUSS THEM.

## 2023-09-27 NOTE — TELEPHONE ENCOUNTER
Called Clarissa to let her know we received the labs and I sent them to Dr. Merida. I asked what they were planning to do at the [facility] and she stated that they wanted the patient to go to the ER but they refused because she was not taking the bosutinib. I let her know I would let them know what Dr. Merida recommends.

## 2023-09-28 NOTE — TELEPHONE ENCOUNTER
Is she interested in resuming any medication for the CML? If not, there's nothing to do except she should be on aspirin for the high platelets. [facility]          I called the sister back and she stated the patient was not interested in CML and treatment and that she would discuss what Dr. Merida said with the patient and her other sisters. I let her know to call us back with any other questions and she v/u.

## 2023-10-09 NOTE — TELEPHONE ENCOUNTER
Caller: Clarissa Rodriguez    Relationship: Emergency Contact    Best call back number: [phone]     What is the best time to reach you: ASAP    Who are you requesting to speak with (clinical staff, provider,  specific staff member): RAEGAN    What was the call regarding: PT NURSING HOME TOOK THE PT TO [facility]. PT IS CURRENTLY ADMITTED TO THE HOSPITAL. THE PT HAS BEEN HAVE TESTS DONE AND A RECENT CT HAS SHOWED THE THERE IS A SPOT ON HER LUNG AND A MASS ON HER BLADDER. THE MAY BE DOING A BIOPSY TOMORROW.    FAMILY IS WANTING TO MAKE DR. CARLIN AWARE AND SEEKING ADVICE.     Is it okay if the provider responds through MyChart: NO - PLEASE CALL BACK TO ADVISE.

## 2023-10-10 ENCOUNTER — TELEPHONE (OUTPATIENT)
Dept: ONCOLOGY | Facility: CLINIC | Age: 78
End: 2023-10-10
Payer: MEDICARE

## 2023-10-10 NOTE — TELEPHONE ENCOUNTER
I returned Clarissa's call regarding the patient being admitted to [facility]. Clarissa states they have spoken to the oncologist at [facility] and don't feel they will move forward with any treatment. I advised her to call if they have any questions or concerns. Clarissa V/PEYTON.

## 2024-01-29 NOTE — TELEPHONE ENCOUNTER
Caller: Nicole Lofton    Relationship: Self    Best call back number: [phone]    What was the call regarding: PATIENT CALLED AND STATED THAT HER STOMACH WAS UPSET AND SHE WANTED TO BE SEEN IN THE OFFICE AS SOON AS POSSIBLE. HUB DID NOT HAVE ANY APPOINTMENT'S WITH DR. KELLER OR STEFAN UNTIL NEXT WEEK. PLEASE CALL PATIENT TO ADVISE.           
  Hub staff attempted to follow warm transfer process and was unsuccessful     Caller: Nicole Lofton    Relationship to patient: Self    Best call back number: [phone] (Home)    Patient is needing: PATIENT CALLED TO ADVISE THAT SHE HAS A UTI AND NEEDS SOMETHING TO TREAT IT. PATIENT IS REQUESTING A CALLBACK TO DISCUSS THIS MATTER.     PLEASE CONTACT PATIENT TO ADVISE.   THANKS        
.
Pt informed she will go to [facility] and [place] has been placed.  
She is constantly keep getting uti or sensation of uti. She needs to go to urgent care, and needs a referral to urology.   
Yes

## 2024-08-20 NOTE — PROGRESS NOTES
[facility] GROUP OUTPATIENT FOLLOW UP CLINIC VISIT    REASON FOR FOLLOW-UP:    1.  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  Gleevec 400 mg daily initiated around 10/12/2017, stopped on 11/15/2017 due to intolerance (noah-orbital edema, nausea/vomiting, fatigue).  Resumed at 200 mg daily but, again, not tolerated.    3.  Nilotinib 150 mg twice daily started in late March, 2018.  Held due to intolerance and QTc prolongation.     HISTORY OF PRESENT ILLNESS:  Nicole Lofton is a 72 y.o. female with the above-mentioned history who is here today for reevaluation.  At her last visit on 8/7/2018 Dr. Merida increased her Hydrea dose to 1000 mg twice daily.  She complains of weakness today.  She states that this is been ongoing for quite some time.  She actually was diagnosed last week with a severe sinus infection and was placed on amoxicillin as well as Bactroban ointment.  She is scheduled to see ENT tomorrow.  She's been having difficulty with earwax buildup in her year.  She's had some occasional dizzy spells.  She is taking Tylenol intermittently for pain.  She is not sure if she has been febrile or not.    ONCOLOGIC HISTORY:  For about 6 months she has noticed bilateral arm tingling and weakness in her hands.  This has occurred about 5 or 6 times over the past 6 months.  She is vague in her description of this.  She is globally weak and is quite inactive as she lives by herself.  She was completing physical therapy with some improvement but is now quite unsteady on her feet.  She has not had any falls.  She denies any bleeding.  She has chronic fatigue.  She has osteoarthritis pain and reports pain in the right side and in her neck.  She denies any acute low back pain but does have chronic low back pain.  She does have orthostatic symptoms and she is lightheaded when she stands.  She has had a decreased appetite and some weight loss over the past 9 months.     Labs  performed on 8/28/2017 showed a white blood cell count of 39.1 with a differential showing 61% neutrophils and 32% lymphocytes, hemoglobin 13.9 and platelets 565,000.         She had further labs done at [facility] on 8/20/2017 showing a white blood cell count of 24.4, hemoglobin 13.7, and platelets 1795.  A differential showed 64% neutrophils, 7% lymphocytes, and 12% basophils.  Peripheral blood flow cytometry was also done on this date showing 11% basophils and less than 1% myeloblasts.  There was no monoclonal B-cell, T-cell, plasma cell, or NK cell population.  FISH for BCR-ABL and ESSENCE 2 mutation analysis were done as well.     Her CBC was normal as of 7/8/2016.     Of note, she has a history of bilateral breast cancer diagnosed in 1988.  She had bilateral mastectomy with reconstruction.  We do not have any records regarding this at this time.  She apparently completed at least 6 months of adjuvant chemotherapy but again does not know the details..    FISH for BCR-ABL performed at [facility] was actually positive.  As of 9/6/2017 ESSENCE 2 is still pending.    She returned on 9/6/2017 for follow-up.  Platelets at 1.9 million with hydroxyurea 1000 mg daily.  She will increase the dose to 2000 mg daily.  She will have a bone marrow aspiration biopsy performed.  Weekly CBCs.    As of 9/6/2017 peripheral blood PCR was positive at 89.238% with a major break point mutation.    ESSENCE 2 and CALR mutation negative.    Bone marrow aspiration and biopsy performed on 9/15/2017.  Flow cytometry showed no increase in blasts.  Morphology showed no increase in blasts.  Bone marrow FISH showed BCR/ABL rearrangement in 93% of cells.  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    Labs as of 9/20/2017 show white blood cell count of 4.7 with hemoglobin of 12.7 and platelets 1.1 million.    On 9/27/2017 white blood cells and hemoglobin are normal.  Platelets are down to 722,000.  We plan to start Gleevec at 400 mg daily if she tolerates it.   In the meantime she will decrease the hydroxyurea dose to 1000 mg daily.    Blood counts normalized.  Hydroxyurea stopped.    Gleevec initiated around 10/12/2017, stopped on 11/15/2017 due to intolerance (noah-orbital edema, nausea/vomiting, fatigue).    Symptoms improving as of 11/29/2017.  Blood counts normal.  Hold further therapy at this time until she improves more clinically.      Plan to resume Gleevec at 200 mg daily as of 1/11/2018.  Only took it for less than a week but also did not tolerate this dose.    As of 2/21/2018 her platelet count is again elevated at greater than 700,000.  Resume hydroxyurea 1000 mg daily.  Considering nilotinib at a decreased dose.    Platelets continued to rise in March 2018.  Hydroxyurea increased to 1500 mg daily with stabilization of her platelet count and some improvement as of 3/21/2018.  Plan for nilotinib.    Nilotinib initiated in late March 2018.    Subsequently held due to QTC prolongation.    Hydroxyurea 1000 mg daily resumed at the end of July 2018.  Increased the dose to 1000 mg twice daily as of 8/7/2018 for a rising platelet count of 1.3 million.    PAST MEDICAL, SURGICAL, FAMILY, AND SOCIAL HISTORIES were reviewed with the patient and in the electronic medical record, and were updated if indicated.    ALLERGIES:  No Known Allergies    MEDICATIONS:  The medication list has been reviewed with the patient by the medical assistant, and the list has been updated in the electronic medical record, which I reviewed.  Medication dosages and frequencies were confirmed to be accurate.    I have reviewed the patient's medical history in detail and updated the computerized patient record.    Review of Systems   Constitutional: Negative for appetite change, chills, fatigue, fever and unexpected weight change.   HENT:   Positive for hearing loss. Negative for mouth sores, nosebleeds, sore throat and trouble swallowing.    Respiratory: Negative for cough and shortness of  "breath.    Cardiovascular: Negative for chest pain and leg swelling.   Gastrointestinal: Negative for abdominal pain, constipation, diarrhea, nausea and vomiting.   Endocrine: Negative for hot flashes.   Genitourinary: Negative for difficulty urinating.    Musculoskeletal: Positive for arthralgias and back pain. Negative for myalgias.   Skin: Negative for rash.   Neurological: Positive for dizziness. Negative for extremity weakness.   Hematological: Negative for adenopathy. Does not bruise/bleed easily.   Psychiatric/Behavioral: Negative for sleep disturbance.       Vitals:    08/21/18 1130   BP: 124/85   Pulse: 69   Resp: 16   Temp: 98.4 °F (36.9 °C)   TempSrc: Oral   SpO2: 96%   Weight: 63.8 kg (140 lb 9.6 oz)   Height: 165 cm (64.96\")   PainSc: 0-No pain       Physical Exam   Constitutional: She is oriented to person, place, and time. She appears well-developed and well-nourished. No distress.   HENT:   Head: Normocephalic and atraumatic.   Mouth/Throat: Oropharynx is clear and moist and mucous membranes are normal. No oropharyngeal exudate.   Left canal with cerumen impaction.    Eyes: Pupils are equal, round, and reactive to light.   Neck: Normal range of motion.   Cardiovascular: Normal rate, regular rhythm and normal heart sounds.    No murmur heard.  Pulmonary/Chest: Effort normal and breath sounds normal. No respiratory distress. She has no wheezes. She has no rhonchi. She has no rales.   Abdominal: Soft. Normal appearance and bowel sounds are normal. She exhibits no distension. There is no hepatosplenomegaly.   Musculoskeletal: Normal range of motion. She exhibits no edema.   Neurological: She is alert and oriented to person, place, and time.   Skin: Skin is warm and dry. No rash noted.   Psychiatric: She has a normal mood and affect.   Vitals reviewed.      DIAGNOSTIC DATA:  Results for orders placed or performed in visit on 08/21/18   CBC Auto Differential   Result Value Ref Range    WBC 6.93 4.50 - " 10.70 10*3/mm3    RBC 4.14 3.90 - 5.20 10*6/mm3    Hemoglobin 12.9 11.9 - 15.5 g/dL    Hematocrit 39.1 35.6 - 45.5 %    MCV 94.4 80.5 - 98.2 fL    MCH 31.2 26.9 - 32.0 pg    MCHC 33.0 32.4 - 36.3 g/dL    RDW 17.2 (H) 11.7 - 13.0 %    RDW-SD 53.6 37.0 - 54.0 fl    MPV 9.8 6.0 - 12.0 fL    Platelets 975 (H) 140 - 500 10*3/mm3   Scan Slide   Result Value Ref Range    Scan Slide     Manual Differential   Result Value Ref Range    Neutrophil % 55.0 42.7 - 76.0 %    Lymphocyte % 28.0 24.0 - 44.0 %    Monocyte % 4.0 0.0 - 12.0 %    Eosinophil % 7.0 0.0 - 7.0 %    Basophil % 5.0 (H) 0.0 - 2.0 %    Atypical Lymphocyte % 1.0 0.0 - 5.0 %    Neutrophils Absolute 3.81 1.90 - 8.10 10*3/mm3    Lymphocytes Absolute 1.94 0.80 - 7.00 10*3/mm3    Monocytes Absolute 0.28 0.00 - 1.00 10*3/mm3    Eosinophils Absolute 0.49 0.00 - 0.70 10*3/mm3    Basophils Absolute 0.35 (H) 0.00 - 0.20 10*3/mm3    Anisocytosis Slight/1+ None Seen    Microcytes Slight/1+ None Seen    Stomatocytes Slight/1+ None Seen    WBC Morphology Normal Normal    Platelet Morphology Normal Normal         IMAGING:  EKG, QTc interval 487 ms    ASSESSMENT:  This is a 72 y.o. female with:  1.  History of bilateral breast cancer in 1988 status post bilateral mastectomy.  She apparently completed 6 months of adjuvant therapy.  We have no details regarding this.    2.  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  Started on Hydrea 1000 mg daily.  This was discontinued and she started Gleevec on approximately 10/12/2017.  Gleevec discontinued due to intolerance on 11/15/2017.  We started Gleevec at a lower dose of 200 mg but she was also intolerant of this dose.  Her side effects were as severe with a lower dose and she therefore stopped taking the drug.  Symptoms improved significantly.  Platelet count subsequently elevated again.    She started nilotinib at 150mg twice daily at the end of March 2018.  This was held in  mid-May due to QTc prolongation.  The QTc interval did subsequently normalized.  However, due to this and other adverse effects we are not able to resume the nilotinib nor does she desire to.    In July we resumed hydroxyurea 1000 mg daily.  In spite of this, her platelet count has gone up from 900,000 to 1.3 million.  Her hemoglobin is stable.  Her white blood cell count has decreased from 20,000 to 14,000.  8/7/2018 increase the hydroxyurea to 1000 mg twice daily at this point.  She remains reluctant to try any other medications although we did again today discussed the possibility of starting injectable Synribo.      8/21/2018 platelet count decreased to 970,000. WBC 6.93 and Hgb 12.9.  Continue current dosing of Hydrea at 1000 mg twice daily and return in 2 weeks for reevaluation.    3.  Thrombocytosis:  Due to CML.  This resolved while she was on imatinib and nilotinib but it is worsening again.  4.  Hypothyroidism:  Levothyroxine 100 mcg.  5.  Nausea and vomiting: Resolved off Gleevec.   6. Periorbital edema: Resolved off Gleevec.    7.  Hypokalemia:  Potassium 3.9 on 8/7/2018.  8.  Osteoarthritis pain: she reports this became worse sine starting nilotinib. She is taking acetaminophen for this.  Prescribed meloxicam on 8/7/2018.  9.  Depression: She had been on Prozac for years, however, drug interaction with nilotinib prompted us to change this to Paxil. Her depression is stable.  10.  Insomnia.  This had been treated with trazodone but as mentioned above this also caused a potential for QT interval prolongation.  She was therefore changed to temazepam.  Increased to 30 mg as necessary. Improved.  11.  QTc prolongation: Due to nilotinib.  This resolved.  She saw Dr. Duque with cardiology.    12.  Tobacco use: She is unwilling to quit smoking  13.  Nasal and otic congestion: I patient was diagnosed with a sinus infection and started on amoxicillin.  She is about 2 days remaining of this antibiotic.  She is also  scheduled to follow-up with ENT. She continues to have cerumen impactionin her left ear canal.       PLAN:  1.  Continue hydroxyurea to 1000 mg twice daily.  2.  Return for follow-up visit with Dr. Merida in 2 weeks with repeat labs.  3.  Follow-up with ENT as scheduled.  4.  Continue meloxicam for pain.    FERNANDO Stevens     none
